# Patient Record
Sex: MALE | Race: ASIAN | NOT HISPANIC OR LATINO | URBAN - METROPOLITAN AREA
[De-identification: names, ages, dates, MRNs, and addresses within clinical notes are randomized per-mention and may not be internally consistent; named-entity substitution may affect disease eponyms.]

---

## 2024-10-13 ENCOUNTER — EMERGENCY (EMERGENCY)
Facility: HOSPITAL | Age: 44
LOS: 0 days | Discharge: ANOTHER HOSPITAL | End: 2024-10-14
Attending: EMERGENCY MEDICINE
Payer: MEDICAID

## 2024-10-13 VITALS
WEIGHT: 145.06 LBS | RESPIRATION RATE: 18 BRPM | HEIGHT: 63 IN | TEMPERATURE: 99 F | OXYGEN SATURATION: 99 % | SYSTOLIC BLOOD PRESSURE: 185 MMHG | DIASTOLIC BLOOD PRESSURE: 100 MMHG | HEART RATE: 83 BPM

## 2024-10-13 DIAGNOSIS — I10 ESSENTIAL (PRIMARY) HYPERTENSION: ICD-10-CM

## 2024-10-13 DIAGNOSIS — R51.9 HEADACHE, UNSPECIFIED: ICD-10-CM

## 2024-10-13 DIAGNOSIS — Z91.148 PATIENT'S OTHER NONCOMPLIANCE WITH MEDICATION REGIMEN FOR OTHER REASON: ICD-10-CM

## 2024-10-13 DIAGNOSIS — T46.5X6A UNDERDOSING OF OTHER ANTIHYPERTENSIVE DRUGS, INITIAL ENCOUNTER: ICD-10-CM

## 2024-10-13 DIAGNOSIS — I49.8 OTHER SPECIFIED CARDIAC ARRHYTHMIAS: ICD-10-CM

## 2024-10-13 DIAGNOSIS — R50.9 FEVER, UNSPECIFIED: ICD-10-CM

## 2024-10-13 DIAGNOSIS — I61.5 NONTRAUMATIC INTRACEREBRAL HEMORRHAGE, INTRAVENTRICULAR: ICD-10-CM

## 2024-10-13 PROCEDURE — 99291 CRITICAL CARE FIRST HOUR: CPT

## 2024-10-13 NOTE — ED ADULT TRIAGE NOTE - CHIEF COMPLAINT QUOTE
per brother, sent from urgent care for hypertension. .  bodyaches, headache, fever, vomiting, chills x 3 days.  pt non-compliant with HTN meds, stopped taking it "a long time ago"

## 2024-10-13 NOTE — ED ADULT NURSE NOTE - OBJECTIVE STATEMENT
a&ox4, ambulatory at baseline as per pt. PT presents today coming from  for HTN. Pt family at bedside reports he went to  due to subjective fever, body aches, vomiting and decreased intake that started Friday. Family noticed an unsteady gait. Pt reports a headache along with symptoms. Denies cough, CP, dizziness, lightheadedness, SOB. UC - (-)COVID, (-)Flu, and afebrile. Pt reports taking 4 extra strength Tylenol today prior to UC visit. While at , Hypertensive and was told to come to ER. Pt reports he forgets and has not taken his BP medications in 5 days. PMH - HTN. Denies PSH

## 2024-10-14 ENCOUNTER — INPATIENT (INPATIENT)
Facility: HOSPITAL | Age: 44
LOS: 20 days | Discharge: NURSING, REHAB OR LONG-TERM CARE | DRG: 66 | End: 2024-11-04
Attending: INTERNAL MEDICINE | Admitting: NEUROLOGICAL SURGERY
Payer: COMMERCIAL

## 2024-10-14 VITALS
HEIGHT: 68 IN | HEART RATE: 89 BPM | WEIGHT: 179.9 LBS | DIASTOLIC BLOOD PRESSURE: 88 MMHG | SYSTOLIC BLOOD PRESSURE: 133 MMHG | RESPIRATION RATE: 18 BRPM | OXYGEN SATURATION: 94 %

## 2024-10-14 VITALS
DIASTOLIC BLOOD PRESSURE: 98 MMHG | SYSTOLIC BLOOD PRESSURE: 179 MMHG | OXYGEN SATURATION: 96 % | HEART RATE: 80 BPM | RESPIRATION RATE: 18 BRPM

## 2024-10-14 DIAGNOSIS — I61.0 NONTRAUMATIC INTRACEREBRAL HEMORRHAGE IN HEMISPHERE, SUBCORTICAL: ICD-10-CM

## 2024-10-14 LAB
A1C WITH ESTIMATED AVERAGE GLUCOSE RESULT: 5.9 % — HIGH (ref 4–5.6)
ALBUMIN SERPL ELPH-MCNC: 4 G/DL — SIGNIFICANT CHANGE UP (ref 3.3–5)
ALBUMIN SERPL ELPH-MCNC: 4.2 G/DL — SIGNIFICANT CHANGE UP (ref 3.3–5)
ALP SERPL-CCNC: 101 U/L — SIGNIFICANT CHANGE UP (ref 40–120)
ALP SERPL-CCNC: 97 U/L — SIGNIFICANT CHANGE UP (ref 40–120)
ALT FLD-CCNC: 69 U/L — HIGH (ref 10–45)
ALT FLD-CCNC: 86 U/L — HIGH (ref 12–78)
ANION GAP SERPL CALC-SCNC: 13 MMOL/L — SIGNIFICANT CHANGE UP (ref 5–17)
ANION GAP SERPL CALC-SCNC: 15 MMOL/L — SIGNIFICANT CHANGE UP (ref 5–17)
ANION GAP SERPL CALC-SCNC: 15 MMOL/L — SIGNIFICANT CHANGE UP (ref 5–17)
ANION GAP SERPL CALC-SCNC: 9 MMOL/L — SIGNIFICANT CHANGE UP (ref 5–17)
ANISOCYTOSIS BLD QL: SLIGHT — SIGNIFICANT CHANGE UP
APTT BLD: 26.6 SEC — SIGNIFICANT CHANGE UP (ref 24.5–35.6)
APTT BLD: 29.6 SEC — SIGNIFICANT CHANGE UP (ref 24.5–35.6)
AST SERPL-CCNC: 30 U/L — SIGNIFICANT CHANGE UP (ref 15–37)
AST SERPL-CCNC: 35 U/L — SIGNIFICANT CHANGE UP (ref 10–40)
BASOPHILS # BLD AUTO: 0 K/UL — SIGNIFICANT CHANGE UP (ref 0–0.2)
BASOPHILS # BLD AUTO: 0.03 K/UL — SIGNIFICANT CHANGE UP (ref 0–0.2)
BASOPHILS NFR BLD AUTO: 0 % — SIGNIFICANT CHANGE UP (ref 0–2)
BASOPHILS NFR BLD AUTO: 0.2 % — SIGNIFICANT CHANGE UP (ref 0–2)
BILIRUB SERPL-MCNC: 0.9 MG/DL — SIGNIFICANT CHANGE UP (ref 0.2–1.2)
BILIRUB SERPL-MCNC: 1.1 MG/DL — SIGNIFICANT CHANGE UP (ref 0.2–1.2)
BLD GP AB SCN SERPL QL: SIGNIFICANT CHANGE UP
BUN SERPL-MCNC: 17 MG/DL — SIGNIFICANT CHANGE UP (ref 7–23)
BUN SERPL-MCNC: 20 MG/DL — SIGNIFICANT CHANGE UP (ref 7–23)
BUN SERPL-MCNC: 20 MG/DL — SIGNIFICANT CHANGE UP (ref 7–23)
BUN SERPL-MCNC: 22 MG/DL — SIGNIFICANT CHANGE UP (ref 7–23)
CALCIUM SERPL-MCNC: 8.5 MG/DL — SIGNIFICANT CHANGE UP (ref 8.4–10.5)
CALCIUM SERPL-MCNC: 8.6 MG/DL — SIGNIFICANT CHANGE UP (ref 8.4–10.5)
CALCIUM SERPL-MCNC: 9 MG/DL — SIGNIFICANT CHANGE UP (ref 8.4–10.5)
CALCIUM SERPL-MCNC: 9.4 MG/DL — SIGNIFICANT CHANGE UP (ref 8.5–10.1)
CHLORIDE SERPL-SCNC: 100 MMOL/L — SIGNIFICANT CHANGE UP (ref 96–108)
CHLORIDE SERPL-SCNC: 98 MMOL/L — SIGNIFICANT CHANGE UP (ref 96–108)
CHLORIDE SERPL-SCNC: 99 MMOL/L — SIGNIFICANT CHANGE UP (ref 96–108)
CHLORIDE SERPL-SCNC: 99 MMOL/L — SIGNIFICANT CHANGE UP (ref 96–108)
CHOLEST SERPL-MCNC: 209 MG/DL — HIGH
CK SERPL-CCNC: 222 U/L — SIGNIFICANT CHANGE UP (ref 26–308)
CO2 SERPL-SCNC: 19 MMOL/L — LOW (ref 22–31)
CO2 SERPL-SCNC: 21 MMOL/L — LOW (ref 22–31)
CO2 SERPL-SCNC: 21 MMOL/L — LOW (ref 22–31)
CO2 SERPL-SCNC: 25 MMOL/L — SIGNIFICANT CHANGE UP (ref 22–31)
CREAT SERPL-MCNC: 0.86 MG/DL — SIGNIFICANT CHANGE UP (ref 0.5–1.3)
CREAT SERPL-MCNC: 0.9 MG/DL — SIGNIFICANT CHANGE UP (ref 0.5–1.3)
CREAT SERPL-MCNC: 0.91 MG/DL — SIGNIFICANT CHANGE UP (ref 0.5–1.3)
CREAT SERPL-MCNC: 1.19 MG/DL — SIGNIFICANT CHANGE UP (ref 0.5–1.3)
EGFR: 107 ML/MIN/1.73M2 — SIGNIFICANT CHANGE UP
EGFR: 109 ML/MIN/1.73M2 — SIGNIFICANT CHANGE UP
EGFR: 110 ML/MIN/1.73M2 — SIGNIFICANT CHANGE UP
EGFR: 78 ML/MIN/1.73M2 — SIGNIFICANT CHANGE UP
EOSINOPHIL # BLD AUTO: 0 K/UL — SIGNIFICANT CHANGE UP (ref 0–0.5)
EOSINOPHIL # BLD AUTO: 0 K/UL — SIGNIFICANT CHANGE UP (ref 0–0.5)
EOSINOPHIL NFR BLD AUTO: 0 % — SIGNIFICANT CHANGE UP (ref 0–6)
EOSINOPHIL NFR BLD AUTO: 0 % — SIGNIFICANT CHANGE UP (ref 0–6)
ESTIMATED AVERAGE GLUCOSE: 123 MG/DL — HIGH (ref 68–114)
FLUAV AG NPH QL: SIGNIFICANT CHANGE UP
FLUBV AG NPH QL: SIGNIFICANT CHANGE UP
GLUCOSE BLDC GLUCOMTR-MCNC: 107 MG/DL — HIGH (ref 70–99)
GLUCOSE BLDC GLUCOMTR-MCNC: 111 MG/DL — HIGH (ref 70–99)
GLUCOSE SERPL-MCNC: 118 MG/DL — HIGH (ref 70–99)
GLUCOSE SERPL-MCNC: 137 MG/DL — HIGH (ref 70–99)
GLUCOSE SERPL-MCNC: 139 MG/DL — HIGH (ref 70–99)
GLUCOSE SERPL-MCNC: 148 MG/DL — HIGH (ref 70–99)
HCT VFR BLD CALC: 41.5 % — SIGNIFICANT CHANGE UP (ref 39–50)
HCT VFR BLD CALC: 43.7 % — SIGNIFICANT CHANGE UP (ref 39–50)
HDLC SERPL-MCNC: 43 MG/DL — SIGNIFICANT CHANGE UP
HGB BLD-MCNC: 14.3 G/DL — SIGNIFICANT CHANGE UP (ref 13–17)
HGB BLD-MCNC: 15.2 G/DL — SIGNIFICANT CHANGE UP (ref 13–17)
IMM GRANULOCYTES NFR BLD AUTO: 0.9 % — SIGNIFICANT CHANGE UP (ref 0–0.9)
INR BLD: 1.01 RATIO — SIGNIFICANT CHANGE UP (ref 0.85–1.16)
LG PLATELETS BLD QL AUTO: SLIGHT — SIGNIFICANT CHANGE UP
LIPID PNL WITH DIRECT LDL SERPL: 146 MG/DL — HIGH
LYMPHOCYTES # BLD AUTO: 1.95 K/UL — SIGNIFICANT CHANGE UP (ref 1–3.3)
LYMPHOCYTES # BLD AUTO: 12.4 % — LOW (ref 13–44)
LYMPHOCYTES # BLD AUTO: 19 % — SIGNIFICANT CHANGE UP (ref 13–44)
LYMPHOCYTES # BLD AUTO: 2.89 K/UL — SIGNIFICANT CHANGE UP (ref 1–3.3)
MACROCYTES BLD QL: SLIGHT — SIGNIFICANT CHANGE UP
MAGNESIUM SERPL-MCNC: 2 MG/DL — SIGNIFICANT CHANGE UP (ref 1.6–2.6)
MANUAL SMEAR VERIFICATION: SIGNIFICANT CHANGE UP
MCHC RBC-ENTMCNC: 28.6 PG — SIGNIFICANT CHANGE UP (ref 27–34)
MCHC RBC-ENTMCNC: 28.7 PG — SIGNIFICANT CHANGE UP (ref 27–34)
MCHC RBC-ENTMCNC: 34.5 GM/DL — SIGNIFICANT CHANGE UP (ref 32–36)
MCHC RBC-ENTMCNC: 34.8 G/DL — SIGNIFICANT CHANGE UP (ref 32–36)
MCV RBC AUTO: 82.1 FL — SIGNIFICANT CHANGE UP (ref 80–100)
MCV RBC AUTO: 83.2 FL — SIGNIFICANT CHANGE UP (ref 80–100)
MONOCYTES # BLD AUTO: 0 K/UL — SIGNIFICANT CHANGE UP (ref 0–0.9)
MONOCYTES # BLD AUTO: 0.42 K/UL — SIGNIFICANT CHANGE UP (ref 0–0.9)
MONOCYTES NFR BLD AUTO: 0 % — LOW (ref 2–14)
MONOCYTES NFR BLD AUTO: 2.7 % — SIGNIFICANT CHANGE UP (ref 2–14)
NEUTROPHILS # BLD AUTO: 12.33 K/UL — HIGH (ref 1.8–7.4)
NEUTROPHILS # BLD AUTO: 13.23 K/UL — HIGH (ref 1.8–7.4)
NEUTROPHILS NFR BLD AUTO: 80 % — HIGH (ref 43–77)
NEUTROPHILS NFR BLD AUTO: 83.8 % — HIGH (ref 43–77)
NEUTS BAND # BLD: 1 % — SIGNIFICANT CHANGE UP (ref 0–8)
NON HDL CHOLESTEROL: 165 MG/DL — HIGH
NRBC # BLD: 0 /100 WBCS — SIGNIFICANT CHANGE UP (ref 0–0)
NRBC # BLD: 0 /100 WBCS — SIGNIFICANT CHANGE UP (ref 0–0)
NRBC # BLD: SIGNIFICANT CHANGE UP /100 WBCS (ref 0–0)
PHOSPHATE SERPL-MCNC: 1.4 MG/DL — LOW (ref 2.5–4.5)
PLAT MORPH BLD: NORMAL — SIGNIFICANT CHANGE UP
PLATELET # BLD AUTO: 166 K/UL — SIGNIFICANT CHANGE UP (ref 150–400)
PLATELET # BLD AUTO: 218 K/UL — SIGNIFICANT CHANGE UP (ref 150–400)
POTASSIUM SERPL-MCNC: 3.1 MMOL/L — LOW (ref 3.5–5.3)
POTASSIUM SERPL-MCNC: 3.3 MMOL/L — LOW (ref 3.5–5.3)
POTASSIUM SERPL-MCNC: 3.3 MMOL/L — LOW (ref 3.5–5.3)
POTASSIUM SERPL-MCNC: 3.7 MMOL/L — SIGNIFICANT CHANGE UP (ref 3.5–5.3)
POTASSIUM SERPL-SCNC: 3.1 MMOL/L — LOW (ref 3.5–5.3)
POTASSIUM SERPL-SCNC: 3.3 MMOL/L — LOW (ref 3.5–5.3)
POTASSIUM SERPL-SCNC: 3.3 MMOL/L — LOW (ref 3.5–5.3)
POTASSIUM SERPL-SCNC: 3.7 MMOL/L — SIGNIFICANT CHANGE UP (ref 3.5–5.3)
PROT SERPL-MCNC: 7.4 G/DL — SIGNIFICANT CHANGE UP (ref 6–8.3)
PROT SERPL-MCNC: 8.2 GM/DL — SIGNIFICANT CHANGE UP (ref 6–8.3)
PROTHROM AB SERPL-ACNC: 11.6 SEC — SIGNIFICANT CHANGE UP (ref 9.9–13.4)
RBC # BLD: 4.99 M/UL — SIGNIFICANT CHANGE UP (ref 4.2–5.8)
RBC # BLD: 5.32 M/UL — SIGNIFICANT CHANGE UP (ref 4.2–5.8)
RBC # FLD: 12.9 % — SIGNIFICANT CHANGE UP (ref 10.3–14.5)
RBC # FLD: 13 % — SIGNIFICANT CHANGE UP (ref 10.3–14.5)
RBC BLD AUTO: NORMAL — SIGNIFICANT CHANGE UP
SARS-COV-2 RNA SPEC QL NAA+PROBE: SIGNIFICANT CHANGE UP
SMUDGE CELLS # BLD: PRESENT — SIGNIFICANT CHANGE UP
SODIUM SERPL-SCNC: 132 MMOL/L — LOW (ref 135–145)
SODIUM SERPL-SCNC: 133 MMOL/L — LOW (ref 135–145)
SODIUM SERPL-SCNC: 134 MMOL/L — LOW (ref 135–145)
SODIUM SERPL-SCNC: 135 MMOL/L — SIGNIFICANT CHANGE UP (ref 135–145)
T3 SERPL-MCNC: 49 NG/DL — LOW (ref 80–200)
T4 AB SER-ACNC: 3.7 UG/DL — LOW (ref 4.6–12)
T4 FREE SERPL-MCNC: 1.1 NG/DL — SIGNIFICANT CHANGE UP (ref 0.9–1.8)
TRIGL SERPL-MCNC: 109 MG/DL — SIGNIFICANT CHANGE UP
TROPONIN I, HIGH SENSITIVITY RESULT: 524.6 NG/L — HIGH
TROPONIN T, HIGH SENSITIVITY RESULT: 44 NG/L — SIGNIFICANT CHANGE UP (ref 0–51)
TSH SERPL-MCNC: 0.45 UIU/ML — SIGNIFICANT CHANGE UP (ref 0.27–4.2)
WBC # BLD: 15.22 K/UL — HIGH (ref 3.8–10.5)
WBC # BLD: 15.77 K/UL — HIGH (ref 3.8–10.5)
WBC # FLD AUTO: 15.22 K/UL — HIGH (ref 3.8–10.5)
WBC # FLD AUTO: 15.77 K/UL — HIGH (ref 3.8–10.5)

## 2024-10-14 PROCEDURE — 70450 CT HEAD/BRAIN W/O DYE: CPT | Mod: 26,59,77

## 2024-10-14 PROCEDURE — 93010 ELECTROCARDIOGRAM REPORT: CPT

## 2024-10-14 PROCEDURE — 99291 CRITICAL CARE FIRST HOUR: CPT

## 2024-10-14 PROCEDURE — 70496 CT ANGIOGRAPHY HEAD: CPT | Mod: 26

## 2024-10-14 PROCEDURE — 70450 CT HEAD/BRAIN W/O DYE: CPT | Mod: 26,59,MC

## 2024-10-14 PROCEDURE — 70498 CT ANGIOGRAPHY NECK: CPT | Mod: 26

## 2024-10-14 PROCEDURE — 93306 TTE W/DOPPLER COMPLETE: CPT | Mod: 26

## 2024-10-14 PROCEDURE — 93970 EXTREMITY STUDY: CPT | Mod: 26

## 2024-10-14 PROCEDURE — 71045 X-RAY EXAM CHEST 1 VIEW: CPT | Mod: 26

## 2024-10-14 RX ORDER — LOSARTAN POTASSIUM 25 MG/1
100 TABLET ORAL DAILY
Refills: 0 | Status: DISCONTINUED | OUTPATIENT
Start: 2024-10-14 | End: 2024-10-17

## 2024-10-14 RX ORDER — LABETALOL HYDROCHLORIDE 200 MG/1
20 TABLET, FILM COATED ORAL
Refills: 0 | Status: DISCONTINUED | OUTPATIENT
Start: 2024-10-14 | End: 2024-10-14

## 2024-10-14 RX ORDER — PANTOPRAZOLE SODIUM 40 MG/1
40 TABLET, DELAYED RELEASE ORAL DAILY
Refills: 0 | Status: DISCONTINUED | OUTPATIENT
Start: 2024-10-14 | End: 2024-10-16

## 2024-10-14 RX ORDER — LOSARTAN POTASSIUM 25 MG/1
50 TABLET ORAL DAILY
Refills: 0 | Status: DISCONTINUED | OUTPATIENT
Start: 2024-10-14 | End: 2024-10-14

## 2024-10-14 RX ORDER — POLYETHYLENE GLYCOL 3350 17 G/17G
17 POWDER, FOR SOLUTION ORAL EVERY 12 HOURS
Refills: 0 | Status: DISCONTINUED | OUTPATIENT
Start: 2024-10-14 | End: 2024-11-04

## 2024-10-14 RX ORDER — NICARDIPINE HCL 25 MG/10ML
5 AMPUL (ML) INTRAVENOUS
Qty: 40 | Refills: 0 | Status: DISCONTINUED | OUTPATIENT
Start: 2024-10-14 | End: 2024-10-14

## 2024-10-14 RX ORDER — LABETALOL HYDROCHLORIDE 200 MG/1
20 TABLET, FILM COATED ORAL ONCE
Refills: 0 | Status: COMPLETED | OUTPATIENT
Start: 2024-10-14 | End: 2024-10-14

## 2024-10-14 RX ORDER — ACETAMINOPHEN 500 MG
650 TABLET ORAL EVERY 6 HOURS
Refills: 0 | Status: DISCONTINUED | OUTPATIENT
Start: 2024-10-14 | End: 2024-10-14

## 2024-10-14 RX ORDER — HYDRALAZINE HYDROCHLORIDE 100 MG/1
10 TABLET ORAL ONCE
Refills: 0 | Status: COMPLETED | OUTPATIENT
Start: 2024-10-14 | End: 2024-10-14

## 2024-10-14 RX ORDER — POTASSIUM CHLORIDE 10 MEQ
10 TABLET, EXTENDED RELEASE ORAL
Refills: 0 | Status: COMPLETED | OUTPATIENT
Start: 2024-10-14 | End: 2024-10-14

## 2024-10-14 RX ORDER — HYDRALAZINE HYDROCHLORIDE 50 MG/1
5 TABLET, FILM COATED ORAL EVERY 8 HOURS
Refills: 0 | Status: DISCONTINUED | OUTPATIENT
Start: 2024-10-14 | End: 2024-10-14

## 2024-10-14 RX ORDER — LOSARTAN POTASSIUM 25 MG/1
50 TABLET ORAL ONCE
Refills: 0 | Status: COMPLETED | OUTPATIENT
Start: 2024-10-14 | End: 2024-10-14

## 2024-10-14 RX ORDER — POTASSIUM CHLORIDE 10 MEQ
40 TABLET, EXTENDED RELEASE ORAL
Refills: 0 | Status: COMPLETED | OUTPATIENT
Start: 2024-10-14 | End: 2024-10-15

## 2024-10-14 RX ORDER — AMLODIPINE BESYLATE 10 MG
10 TABLET ORAL DAILY
Refills: 0 | Status: DISCONTINUED | OUTPATIENT
Start: 2024-10-14 | End: 2024-10-17

## 2024-10-14 RX ORDER — POTASSIUM PHOSPHATE 236; 224 MG/ML; MG/ML
30 INJECTION, SOLUTION INTRAVENOUS ONCE
Refills: 0 | Status: COMPLETED | OUTPATIENT
Start: 2024-10-14 | End: 2024-10-14

## 2024-10-14 RX ORDER — SENNA 187 MG
2 TABLET ORAL AT BEDTIME
Refills: 0 | Status: DISCONTINUED | OUTPATIENT
Start: 2024-10-14 | End: 2024-11-04

## 2024-10-14 RX ORDER — INSULIN LISPRO 100/ML
VIAL (ML) SUBCUTANEOUS EVERY 6 HOURS
Refills: 0 | Status: DISCONTINUED | OUTPATIENT
Start: 2024-10-14 | End: 2024-10-17

## 2024-10-14 RX ORDER — SODIUM CHLORIDE 5 % 5 %
1000 INTRAVENOUS SOLUTION INTRAVENOUS
Refills: 0 | Status: DISCONTINUED | OUTPATIENT
Start: 2024-10-14 | End: 2024-10-14

## 2024-10-14 RX ORDER — ACETAMINOPHEN 325 MG
1000 TABLET ORAL ONCE
Refills: 0 | Status: COMPLETED | OUTPATIENT
Start: 2024-10-14 | End: 2024-10-14

## 2024-10-14 RX ORDER — AMLODIPINE BESYLATE 10 MG
5 TABLET ORAL DAILY
Refills: 0 | Status: DISCONTINUED | OUTPATIENT
Start: 2024-10-14 | End: 2024-10-14

## 2024-10-14 RX ORDER — TRAMADOL HYDROCHLORIDE 50 MG/1
50 TABLET, COATED ORAL EVERY 6 HOURS
Refills: 0 | Status: DISCONTINUED | OUTPATIENT
Start: 2024-10-14 | End: 2024-10-14

## 2024-10-14 RX ORDER — SODIUM CHLORIDE 0.9 % (FLUSH) 0.9 %
1000 SYRINGE (ML) INJECTION ONCE
Refills: 0 | Status: COMPLETED | OUTPATIENT
Start: 2024-10-14 | End: 2024-10-14

## 2024-10-14 RX ORDER — HYDRALAZINE HYDROCHLORIDE 50 MG/1
25 TABLET, FILM COATED ORAL ONCE
Refills: 0 | Status: COMPLETED | OUTPATIENT
Start: 2024-10-14 | End: 2024-10-14

## 2024-10-14 RX ORDER — METOCLOPRAMIDE HCL 5 MG
10 TABLET ORAL ONCE
Refills: 0 | Status: COMPLETED | OUTPATIENT
Start: 2024-10-14 | End: 2024-10-14

## 2024-10-14 RX ORDER — POTASSIUM CHLORIDE 10 MEQ
20 TABLET, EXTENDED RELEASE ORAL EVERY 4 HOURS
Refills: 0 | Status: COMPLETED | OUTPATIENT
Start: 2024-10-14 | End: 2024-10-14

## 2024-10-14 RX ORDER — HYDRALAZINE HYDROCHLORIDE 50 MG/1
25 TABLET, FILM COATED ORAL EVERY 8 HOURS
Refills: 0 | Status: DISCONTINUED | OUTPATIENT
Start: 2024-10-14 | End: 2024-10-15

## 2024-10-14 RX ORDER — SODIUM CHLORIDE 5 % 5 %
1000 INTRAVENOUS SOLUTION INTRAVENOUS
Refills: 0 | Status: DISCONTINUED | OUTPATIENT
Start: 2024-10-14 | End: 2024-10-15

## 2024-10-14 RX ORDER — HYDRALAZINE HYDROCHLORIDE 50 MG/1
5 TABLET, FILM COATED ORAL ONCE
Refills: 0 | Status: DISCONTINUED | OUTPATIENT
Start: 2024-10-14 | End: 2024-10-14

## 2024-10-14 RX ORDER — NICARDIPINE HCL 30 MG
5 CAPSULE, EXTENDED RELEASE ORAL
Qty: 40 | Refills: 0 | Status: DISCONTINUED | OUTPATIENT
Start: 2024-10-14 | End: 2024-10-16

## 2024-10-14 RX ORDER — TRAMADOL HYDROCHLORIDE 50 MG/1
25 TABLET, COATED ORAL EVERY 4 HOURS
Refills: 0 | Status: DISCONTINUED | OUTPATIENT
Start: 2024-10-14 | End: 2024-10-16

## 2024-10-14 RX ORDER — LEVETIRACETAM 500 MG/1
1000 TABLET, FILM COATED ORAL ONCE
Refills: 0 | Status: DISCONTINUED | OUTPATIENT
Start: 2024-10-14 | End: 2024-10-14

## 2024-10-14 RX ORDER — CHLORHEXIDINE GLUCONATE 40 MG/ML
1 SOLUTION TOPICAL
Refills: 0 | Status: DISCONTINUED | OUTPATIENT
Start: 2024-10-14 | End: 2024-10-17

## 2024-10-14 RX ORDER — ACETAMINOPHEN 325 MG
975 TABLET ORAL ONCE
Refills: 0 | Status: DISCONTINUED | OUTPATIENT
Start: 2024-10-14 | End: 2024-10-14

## 2024-10-14 RX ADMIN — Medication 100 MILLIEQUIVALENT(S): at 13:46

## 2024-10-14 RX ADMIN — Medication 25 MG/HR: at 06:12

## 2024-10-14 RX ADMIN — LABETALOL HYDROCHLORIDE 20 MILLIGRAM(S): 200 TABLET, FILM COATED ORAL at 01:51

## 2024-10-14 RX ADMIN — LOSARTAN POTASSIUM 50 MILLIGRAM(S): 25 TABLET ORAL at 20:26

## 2024-10-14 RX ADMIN — LEVETIRACETAM 1000 MILLIGRAM(S): 500 TABLET, FILM COATED ORAL at 04:16

## 2024-10-14 RX ADMIN — Medication 1000 MILLILITER(S): at 00:56

## 2024-10-14 RX ADMIN — Medication 40 MILLIGRAM(S): at 22:40

## 2024-10-14 RX ADMIN — Medication 20 MILLIEQUIVALENT(S): at 13:46

## 2024-10-14 RX ADMIN — PANTOPRAZOLE SODIUM 40 MILLIGRAM(S): 40 TABLET, DELAYED RELEASE ORAL at 11:20

## 2024-10-14 RX ADMIN — Medication 25 MG/HR: at 20:30

## 2024-10-14 RX ADMIN — Medication 50 MILLILITER(S): at 23:36

## 2024-10-14 RX ADMIN — Medication 40 MILLIEQUIVALENT(S): at 22:42

## 2024-10-14 RX ADMIN — Medication 25 MG/HR: at 02:36

## 2024-10-14 RX ADMIN — Medication 400 MILLIGRAM(S): at 02:32

## 2024-10-14 RX ADMIN — Medication 2 TABLET(S): at 22:40

## 2024-10-14 RX ADMIN — Medication 50 MILLILITER(S): at 06:57

## 2024-10-14 RX ADMIN — Medication 100 MILLIEQUIVALENT(S): at 16:18

## 2024-10-14 RX ADMIN — Medication 100 MILLIEQUIVALENT(S): at 07:00

## 2024-10-14 RX ADMIN — Medication 100 MILLIEQUIVALENT(S): at 08:16

## 2024-10-14 RX ADMIN — LOSARTAN POTASSIUM 50 MILLIGRAM(S): 25 TABLET ORAL at 06:57

## 2024-10-14 RX ADMIN — Medication 25 MG/HR: at 08:16

## 2024-10-14 RX ADMIN — HYDRALAZINE HYDROCHLORIDE 10 MILLIGRAM(S): 100 TABLET ORAL at 02:00

## 2024-10-14 RX ADMIN — Medication 25 MG/HR: at 04:13

## 2024-10-14 RX ADMIN — CHLORHEXIDINE GLUCONATE 1 APPLICATION(S): 40 SOLUTION TOPICAL at 22:41

## 2024-10-14 RX ADMIN — Medication 10 MILLIGRAM(S): at 01:01

## 2024-10-14 RX ADMIN — POTASSIUM PHOSPHATE 83.33 MILLIMOLE(S): 236; 224 INJECTION, SOLUTION INTRAVENOUS at 23:36

## 2024-10-14 RX ADMIN — POLYETHYLENE GLYCOL 3350 17 GRAM(S): 17 POWDER, FOR SOLUTION ORAL at 17:10

## 2024-10-14 RX ADMIN — Medication 100 MILLIEQUIVALENT(S): at 14:55

## 2024-10-14 RX ADMIN — Medication 5 MILLIGRAM(S): at 06:58

## 2024-10-14 RX ADMIN — HYDRALAZINE HYDROCHLORIDE 25 MILLIGRAM(S): 50 TABLET, FILM COATED ORAL at 17:49

## 2024-10-14 RX ADMIN — Medication 100 MILLIEQUIVALENT(S): at 06:12

## 2024-10-14 RX ADMIN — Medication 20 MILLIEQUIVALENT(S): at 17:10

## 2024-10-14 RX ADMIN — Medication 50 MILLILITER(S): at 08:16

## 2024-10-14 NOTE — PROCEDURE NOTE - NSINFORMCONSENT_GEN_A_CORE
Kerri Ferro, Brother/Benefits, risks, and possible complications of procedure explained to patient/caregiver who verbalized understanding and gave written consent.

## 2024-10-14 NOTE — DISCHARGE NOTE NURSING/CASE MANAGEMENT/SOCIAL WORK - PATIENT PORTAL LINK FT
You can access the FollowMyHealth Patient Portal offered by [facility] by registering at the following website: [email or web address]. By joining [employer]’s FollowMyHealth portal, you will also be able to view your health information using other applications (apps) compatible with our system.

## 2024-10-14 NOTE — PROVIDER CONTACT NOTE (CHANGE IN STATUS NOTIFICATION) - SITUATION
change in mental status, on assessment pt. disoriented to name, month and year, and RUE drift noted.

## 2024-10-14 NOTE — PROGRESS NOTE ADULT - SUBJECTIVE AND OBJECTIVE BOX
SUMMARY:    HOSPITAL COURSE:    ADMISSION SCORES:   GCS: HH: MF: NIHSS: ICH Score:    REVIEW OF SYSTEMS: None other than stated in HPI    ALLERGIES: Allergies    No Known Allergies    Intolerances        VITALS/DATA/ORDERS:    T(C): 36.6 (10-14-24 @ 03:37), Max: 36.6 (10-14-24 @ 03:37)  HR: 79 (10-14-24 @ 04:21) (79 - 110)  BP: 128/74 (10-14-24 @ 04:21) (128/74 - 144/61)  RR: 17 (10-14-24 @ 04:21) (17 - 19)  SpO2: 96% (10-14-24 @ 04:21) (94% - 97%)                          14.3   15.77 )-----------( 166      ( 14 Oct 2024 04:13 )             41.5       10-14    132[L]  |  98  |  20  ----------------------------<  139[H]  3.7   |  19[L]  |  0.91    Ca    9.0      14 Oct 2024 04:13    TPro  7.4  /  Alb  4.2  /  TBili  0.9  /  DBili  x   /  AST  35  /  ALT  69[H]  /  AlkPhos  97  10-14              PT/INR - ( 14 Oct 2024 04:13 )   PT: 11.6 sec;   INR: 1.01 ratio         PTT - ( 14 Oct 2024 04:13 )  PTT:29.6 sec              acetaminophen     Tablet .. 650 milliGRAM(s) Oral every 6 hours PRN  chlorhexidine 4% Liquid 1 Application(s) Topical <User Schedule>  niCARdipine Infusion 5 mG/Hr IV Continuous <Continuous>  polyethylene glycol 3350 17 Gram(s) Oral every 12 hours  senna 2 Tablet(s) Oral at bedtime      EXAMINATION:  General: No acute distress  HEENT: Anicteric sclerae  Cardiac: [id number]  Lungs: Clear  Abdomen: Soft, non-tender, +BS  Extremities: No c/c/e  Skin/Incision Site: Clean, dry and intact  Neurologic: Awake, alert, fully oriented, follows commands, PERRL, VFFtc, EOMI, face symmetric, tongue midline, no drift, full strength SUMMARY:  44M, not on AC/AP, PMH uncontrolled HTN. P/w flu-like symptoms and headache; CTH w/ left BG IPH w/ IVH extension. Exam: Kinyarwanda speaking, EOV, Ox3, PERRL, no drift, CHAPA 5/5, SILT    HOSPITAL COURSE:  10/14: admitted to [facility]    ADMISSION SCORES:   NIHSS: 1    ICH Score: 1    REVIEW OF SYSTEMS: None other than stated in HPI    ALLERGIES: Allergies    No Known Allergies    Intolerances        VITALS/DATA/ORDERS:    T(C): 36.6 (10-14-24 @ 03:37), Max: 36.6 (10-14-24 @ 03:37)  HR: 79 (10-14-24 @ 04:21) (79 - 110)  BP: 128/74 (10-14-24 @ 04:21) (128/74 - 144/61)  RR: 17 (10-14-24 @ 04:21) (17 - 19)  SpO2: 96% (10-14-24 @ 04:21) (94% - 97%)                          14.3   15.77 )-----------( 166      ( 14 Oct 2024 04:13 )             41.5       10-14    132[L]  |  98  |  20  ----------------------------<  139[H]  3.7   |  19[L]  |  0.91    Ca    9.0      14 Oct 2024 04:13    TPro  7.4  /  Alb  4.2  /  TBili  0.9  /  DBili  x   /  AST  35  /  ALT  69[H]  /  AlkPhos  97  10-14              PT/INR - ( 14 Oct 2024 04:13 )   PT: 11.6 sec;   INR: 1.01 ratio         PTT - ( 14 Oct 2024 04:13 )  PTT:29.6 sec              acetaminophen     Tablet .. 650 milliGRAM(s) Oral every 6 hours PRN  chlorhexidine 4% Liquid 1 Application(s) Topical <User Schedule>  niCARdipine Infusion 5 mG/Hr IV Continuous <Continuous>  polyethylene glycol 3350 17 Gram(s) Oral every 12 hours  senna 2 Tablet(s) Oral at bedtime      EXAMINATION:  General: No acute distress  HEENT: Anicteric sclerae  Cardiac: [id number]  Lungs: Clear  Abdomen: Soft, non-tender, +BS  Extremities: No c/c/e  Skin/Incision Site: Clean, dry and intact  Neurologic: Awake, alert, fully oriented, follows commands, PERRL, VFFtc, EOMI, face symmetric, tongue midline, no drift, full strength SUMMARY:  44M, not on AC/AP, PMH uncontrolled HTN. P/w flu-like symptoms and headache; CTH w/ left BG IPH w/ IVH extension. Exam: Turkish speaking, EOV, Ox3, PERRL, no drift, CHAPA 5/5, SILT    HOSPITAL COURSE:  10/14: admitted to [facility]    ADMISSION SCORES:   NIHSS: 1    ICH Score: 1    REVIEW OF SYSTEMS: None other than stated in HPI    ALLERGIES: Allergies    No Known Allergies    Intolerances        VITALS/DATA/ORDERS:    T(C): 36.6 (10-14-24 @ 03:37), Max: 36.6 (10-14-24 @ 03:37)  HR: 79 (10-14-24 @ 04:21) (79 - 110)  BP: 128/74 (10-14-24 @ 04:21) (128/74 - 144/61)  RR: 17 (10-14-24 @ 04:21) (17 - 19)  SpO2: 96% (10-14-24 @ 04:21) (94% - 97%)                          14.3   15.77 )-----------( 166      ( 14 Oct 2024 04:13 )             41.5       10-14    132[L]  |  98  |  20  ----------------------------<  139[H]  3.7   |  19[L]  |  0.91    Ca    9.0      14 Oct 2024 04:13    TPro  7.4  /  Alb  4.2  /  TBili  0.9  /  DBili  x   /  AST  35  /  ALT  69[H]  /  AlkPhos  97  10-14              PT/INR - ( 14 Oct 2024 04:13 )   PT: 11.6 sec;   INR: 1.01 ratio         PTT - ( 14 Oct 2024 04:13 )  PTT:29.6 sec              acetaminophen     Tablet .. 650 milliGRAM(s) Oral every 6 hours PRN  chlorhexidine 4% Liquid 1 Application(s) Topical <User Schedule>  niCARdipine Infusion 5 mG/Hr IV Continuous <Continuous>  polyethylene glycol 3350 17 Gram(s) Oral every 12 hours  senna 2 Tablet(s) Oral at bedtime      EXAMINATION:  General: No acute distress  HEENT: Anicteric sclerae  Cardiac: [id number]  Lungs: Clear  Abdomen: Soft, non-tender, +BS  Extremities: No c/c/e  Skin/Incision Site: Clean, dry and intact  Neurologic: Awake, alert, fully oriented 2 to person and time. not to place.  follows commands  CNII-CNXII intact. , PERRL, VFFtc, EOMI, face symmetric, tongue midline,   Strength: 5/5 in the UE and LE b/l.   no drift,   FTn intact b/l.

## 2024-10-14 NOTE — ED PROVIDER NOTE - OBJECTIVE STATEMENT
This patient is a 44 year old man hx of HTN who presents to the ER c/o fever, chills, headache, and body aches x 3 days.  No sick contacts of recent travel.  He had a few episodes of vomiting today.  He has been taking tylenol for pain.  Patient non-compliant with his high blood pressure medication.  He was last seen by his PMD 1 month ago.  Patient was seen at urgent care and sent to the ER because of hypertension.  Currently in the ER patient's family saying that he feels sick.  Patient c/o headache unable to give pain scale.  No focal weakness and no vision loss.    : patient's sister and brother in law at bedside

## 2024-10-14 NOTE — ED PROVIDER NOTE - CLINICAL SUMMARY MEDICAL DECISION MAKING FREE TEXT BOX
Patient sent from urgent care with hypertension c/o flu like symptoms.  Patient reporting headache appearing uncomfortable due to pain.  Possible flu covid, however with patient hypertension will get CT r/o ICH, check labs and give medication for pain.    CT + for ICH.  Transfer Center called for neurosurgical transfer.  BP medications given including infusion.  Patient accepted for transfer.

## 2024-10-14 NOTE — ED PROVIDER NOTE - CLINICAL SUMMARY MEDICAL DECISION MAKING FREE TEXT BOX
Patient presents emergency department for concern of a head bleed.  Patient is hemodynamically stable afebrile on presentation.  Patient arrives a nicardipine drip.  Patient neurological exam within normal limits at this time.  Neurosurgery at bedside.  Patient will be admitted to neurosurgical ICU.  Preop's have been obtained

## 2024-10-14 NOTE — PROGRESS NOTE ADULT - ASSESSMENT
ASSESSMENT/PLAN:  44M w/ uncontrolled HTN w/ hypertensive Left BG heme w/ IVH.    NEURO:  -Q1H neurochecks.  -CTA H/N w/o obvious vascular malformation, pending final read.  -Pending 6hr repeat CTH at 7:30AM.  -Na goal 140-150.  -Hydrowatch.  -Stroke Neurology consult.  Activity: [] mobilize as tolerated [] Bedrest [] PT [] OT [] PMNR    PULM:  -SpO2 goal >92%.    CV:  SBP goal 100-160.  TTE pending.  Troponin at OSH elevated to 500s, pending repeat troponin level.    RENAL:  Fluids: IV NS at 70cc/hr  Na 132, starting 2% NaCl at 50cc/hr, Na goal 140-150. BMP Q6H.  Monitor I&Os.    GI:  Diet: NPO  GI prophylaxis [X] not indicated [] PPI [] other:  Bowel regimen [] colace [X] senna [] other:    ENDO:   Goal euglycemia (-180), most recent glucose 139.    HEME/ONC:  VTE prophylaxis: scdS    ID:  C/o flu-like symptoms but RVP neg at OSH.   Monitor for fever    MISC:    SOCIAL/FAMILY:  [] awaiting [X] updated at bedside [] family meeting    CODE STATUS:  [X] Full Code [] DNR [] DNI [] Palliative/Comfort Care    DISPOSITION:  [X] ICU [] Stroke Unit [] Floor [] EMU [] RCU [] PCU     ASSESSMENT/PLAN:  44M w/ uncontrolled HTN w/ hypertensive Left BG heme w/ IVH.    NEURO:  -Q1H neurochecks.  -CTA H/N w/o obvious vascular malformation, pending final read.  -Pending 6hr repeat CTH at 7:30AM.  -Na goal 140-150.  -Hydrowatch.  -Stroke Neurology consult.  Activity: [] mobilize as tolerated [] Bedrest [] PT [] OT [] PMNR    PULM:  Incentive spirometry   -SpO2 goal >92%.      CV:  SBP goal 100-160.  TTE pending.  Troponin at OSH elevated to 500s, pending repeat troponin level.    RENAL:  Fluids: IV NS at 70cc/hr  Na 132,    2% NaCl at 50cc/hr,   Na goal 140-150.   BMP Q6H.  Monitor I&Os.    GI:  Diet: NPO  GI prophylaxis [X] not indicated [] PPI [] other:  Bowel regimen [] colace [X] senna [] other:  monitor lfts in 48hrs     ENDO:   Goal euglycemia (-180), most recent glucose 139.    HEME/ONC:  VTE prophylaxis: scdS    ID:  C/o flu-like symptoms but RVP neg at OSH.   Monitor for fever    MISC:    SOCIAL/FAMILY:  [] awaiting [X] updated at bedside [] family meeting    CODE STATUS:  [X] Full Code [] DNR [] DNI [] Palliative/Comfort Care    DISPOSITION:  [X] ICU [] Stroke Unit [] Floor [] EMU [] RCU [] PCU

## 2024-10-14 NOTE — CONSULT NOTE ADULT - NSCONSULTADDITIONALINFOA_GEN_ALL_CORE
44 Right handed man with HTN, HLD p/w non-focal increased ICP signs with headache, emesis and dizziness, was found to have L BG IPH with intraventricular extension. Likely HTNsive etiology based on locations. Blood pressure management and ICU monitoring for potential EVD placement mainstay of treatment at this point. Rest of the plan as above.

## 2024-10-14 NOTE — OCCUPATIONAL THERAPY INITIAL EVALUATION ADULT - COGNITIVE, VISUAL PERCEPTUAL, OT EVAL
Pt will follow 100% simple commands within 4 weeks. Pt will be oriented x4 100% of the time within 4 weeks.

## 2024-10-14 NOTE — PROGRESS NOTE ADULT - ASSESSMENT
ASSESSMENT: HPI:  44M no AC/AP hx uncontrolled HTN tx LVS s/p flu-like symptoms and headache; CTH w/ left BG IPH w/ IVH extension. Exam: Malay speaking, Ox3, PERRL, no drift, CHAPA 5/5, SILT (14 Oct 2024 04:30)      s/p    NEURO:  - CT Head in AM  - CTH (10/14): L BG lucency c/f infarct, L corona radiata heme w/ IVH extension and mild vent dilation, stable   - 10/14 CTA H/N: no significant stenosis, no vessel occlusion/AVM   - Continue to monitor for possible EVD if exam worsens  - Hydrowatch  - Continue with neuro checks q1  - Continue pain regimen: tylenol and oxycodone PRN   - Stroke neurology following  - Activity: [] OOB as tolerated [] Bedrest [] PT [] OT [] PMNR    PULM:  - Incentive spirometry, mobilize as tolerated  - satting >92% on room air    CV:  - Keep -160mmHg  - TTE (10/14): EF: 60%  - Continue to wean off cardene, added hydralazine 25q8  - Continue lipitor 40mg daily for HLD  - Continue hydralazine 25q8, losartan 100 daily, amlodipine 10mg daily for HTN  - Troponin 524 on admission, repeat 44    RENAL:  - Continue 2% + acetate @50  - Continue to monitor BMP q6hrs for Na goal 140-150  - Continue to monitor Is&Os    GI:  - Diet: Regular  - GI prophylaxis [] not indicated [x] PPI [] other:  - Bowel regimen [x] miralax  [x] senna [] other:  - LBM:     ENDO:   - Goal euglycemia (-180)  - ISS    HEME/ONC:  - VTE prophylaxis: [x] SCDs [] chemoprophylaxis [] hold chemoprophylaxis due to: [] high risk of DVT/PE on admission due to:  - LED done on (10/14): negative    ID:  - afebrile    MISC:    SOCIAL/FAMILY:  [x] awaiting [] updated at bedside [] family meeting    CODE STATUS:  [x] Full Code [] DNR [] DNI [] Palliative/Comfort Care    DISPOSITION:  [x] ICU [] Stroke Unit [] Floor [] EMU [] RCU [] PCU    [x] Patient is at high risk of neurologic deterioration/death due to: pending clincial course    Time seen:  Time spent: __35_ [x] critical care minutes    Contact: [phone] ASSESSMENT: HPI:  44M no AC/AP hx uncontrolled HTN tx LVS s/p flu-like symptoms and headache; CTH w/ left BG IPH w/ IVH extension. Exam: Maltese speaking, Ox3, PERRL, no drift, CHAPA 5/5, SILT (14 Oct 2024 04:30)      s/p    NEURO:  - CT Head in AM  - CTH (10/14): L BG lucency c/f infarct, L corona radiata heme w/ IVH extension and mild vent dilation, stable   - 10/14 CTA H/N: no significant stenosis, no vessel occlusion/AVM   - Continue to monitor for possible EVD if exam worsens  - Hydrowatch  - Continue with neuro checks q1  - Continue pain regimen: tylenol and oxycodone PRN   - Stroke neurology following  - Activity: [] OOB as tolerated [] Bedrest [] PT [] OT [] PMNR    PULM:  - Incentive spirometry, mobilize as tolerated  - satting >92% on room air    CV:  - Keep -160mmHg  - TTE (10/14): EF: 60%  - Continue to wean off cardene, added hydralazine 25q8  - Continue lipitor 40mg daily for HLD  - Continue hydralazine 25q8, losartan 100 daily, amlodipine 10mg daily for HTN  - Troponin 524 on admission, repeat 44    RENAL:  - Continue 3% + acetate @50  - Continue to monitor BMP q6hrs for Na goal 140-150  - Continue to monitor Is&Os    GI:  - Diet: Regular  - GI prophylaxis [] not indicated [x] PPI [] other:  - Bowel regimen [x] miralax  [x] senna [] other:  - LBM:     ENDO:   - Goal euglycemia (-180)  - ISS    HEME/ONC:  - VTE prophylaxis: [x] SCDs [] chemoprophylaxis [] hold chemoprophylaxis due to: [] high risk of DVT/PE on admission due to:  - LED done on (10/14): negative    ID:  - afebrile    MISC:    SOCIAL/FAMILY:  [x] awaiting [] updated at bedside [] family meeting    CODE STATUS:  [x] Full Code [] DNR [] DNI [] Palliative/Comfort Care    DISPOSITION:  [x] ICU [] Stroke Unit [] Floor [] EMU [] RCU [] PCU    [x] Patient is at high risk of neurologic deterioration/death due to: pending clincial course    Time seen:  Time spent: __35_ [x] critical care minutes    Contact: [phone] ASSESSMENT: HPI:  44M no AC/AP hx uncontrolled HTN tx LVS s/p flu-like symptoms and headache; CTH w/ left BG IPH w/ IVH extension. Exam: Divehi speaking, Ox3, PERRL, no drift, CHAPA 5/5, SILT (14 Oct 2024 04:30)    NEURO:  - CT Head in AM  - CTH (10/14): L BG lucency c/f infarct, L corona radiata heme w/ IVH extension and mild vent dilation, stable   - 10/14 CTA H/N: no significant stenosis, no vessel occlusion/AVM   - Continue to monitor for possible EVD if exam worsens  - Hydrowatch  - Continue with neuro checks q2  - Continue pain regimen: tylenol and oxycodone PRN   - Stroke neurology following  - Activity: [x] OOB as tolerated [] Bedrest [] PT [] OT [] PMNR    PULM:  - Incentive spirometry, mobilize as tolerated  - satting >92% on 2L NC    CV:  - Keep -160mmHg  - TTE (10/14): EF: 60%  - Continue to wean off cardene, added hydralazine 25q8  - Continue lipitor 40mg daily for HLD  - Continue hydralazine 25q8, losartan 100 daily, amlodipine 10mg daily for HTN  - Troponin 524 on admission, repeat 44    RENAL:  - Continue 3% + acetate @50  - Continue to monitor BMP q6hrs for Na goal 140-150  - Continue to monitor Is&Os    GI:  - Diet: Regular  - GI prophylaxis [] not indicated [x] PPI [] other:  - Bowel regimen [x] miralax  [x] senna [] other:  - LBM: prior to admission    ENDO:   - Goal euglycemia (-180)  - ISS    HEME/ONC:  - VTE prophylaxis: [x] SCDs [] chemoprophylaxis [] hold chemoprophylaxis due to: [] high risk of DVT/PE on admission due to:  - LED done on (10/14): negative    ID:  - afebrile    MISC:    SOCIAL/FAMILY:  [x] awaiting [] updated at bedside [] family meeting    CODE STATUS:  [x] Full Code [] DNR [] DNI [] Palliative/Comfort Care    DISPOSITION:  [x] ICU [] Stroke Unit [] Floor [] EMU [] RCU [] PCU    [x] Patient is at high risk of neurologic deterioration/death due to: pending clincial course    Time seen:  Time spent: __35_ [x] critical care minutes    Contact: [phone] ASSESSMENT: HPI:  44M no AC/AP hx uncontrolled HTN tx LVS s/p flu-like symptoms and headache; CTH w/ left BG IPH w/ IVH extension. Exam: Wolof speaking, Ox3, PERRL, no drift, CHAPA 5/5, SILT (14 Oct 2024 04:30)    NEURO:  - CT Head in AM  - CTH (10/14): L BG lucency c/f infarct, L corona radiata heme w/ IVH extension and mild vent dilation, stable   - 10/14 CTA H/N: no significant stenosis, no vessel occlusion/AVM   - Hydrowatch: Continue to monitor for possible EVD if exam worsens  - Continue with neuro checks q2  - Continue pain regimen: tylenol and oxycodone PRN   - Stroke neurology following  - Activity: [x] OOB as tolerated [] Bedrest [] PT [] OT [] PMNR    PULM:  - Incentive spirometry, mobilize as tolerated  - satting >92% on 2L NC    CV:  - Keep -160mmHg  - TTE (10/14): EF: 60%  - Continue to wean off cardene, added hydralazine 25q8  - Continue lipitor 40mg daily for HLD  - Continue hydralazine 25q8, losartan 100 daily, amlodipine 10mg daily for HTN  - Troponin 524 on admission, repeat was 44    RENAL:  - Continue 3% + acetate @50  - Continue to monitor BMP q6hrs for Na goal 140-150  - Continue to monitor Is&Os    GI:  - Diet: Regular  - GI prophylaxis [] not indicated [x] PPI [] other:  - Bowel regimen [x] miralax  [x] senna [] other:  - LBM: prior to admission    ENDO:   - Goal euglycemia (-180)  - ISS    HEME/ONC:  - VTE prophylaxis: [x] SCDs [] chemoprophylaxis [] hold chemoprophylaxis due to: [] high risk of DVT/PE on admission due to:  - LED done on (10/14): negative    ID:  - afebrile    MISC:    SOCIAL/FAMILY:  [x] awaiting [] updated at bedside [] family meeting    CODE STATUS:  [x] Full Code [] DNR [] DNI [] Palliative/Comfort Care    DISPOSITION:  [x] ICU [] Stroke Unit [] Floor [] EMU [] RCU [] PCU    [x] Patient is at high risk of neurologic deterioration/death due to: pending clincial course    Time seen:  Time spent: __35_ [x] critical care minutes    Contact: [phone]

## 2024-10-14 NOTE — PHYSICAL THERAPY INITIAL EVALUATION ADULT - GENERAL OBSERVATIONS, REHAB EVAL
received supine with head of bed elevated +O2 via nc, +iv, +maggy, +condom cath, +bilateral sequential compression devices

## 2024-10-14 NOTE — ED ADULT NURSE NOTE - OBJECTIVE STATEMENT
Pt is a 44 y.o male c.o headache. PT BIBEMS from OSH where he was found to have a spontaneous basal ganglia bleed. PT sister at bedside. PT has a hx of HTN and is noncompliant with his medication. PT endorsed headache, fever, and nausea x43 days. PT lethargic on exam, but arousable and A&Ox3, spontaneously breathing on 2L NC, PERRLA, GCS 14, peripheral pulses present, skin dry and intact. PT denies any HA, N/V/D, CP, SOB, or fever at this time. Safety and comfort measures initiated- bed placed in lowest position and side rails raised.

## 2024-10-14 NOTE — CHART NOTE - NSCHARTNOTEFT_GEN_A_CORE
CAPRINI SCORE [CLOT] Score on Admission for     AGE RELATED RISK FACTORS                                                       MOBILITY RELATED FACTORS  [x ] Age 41-60 years                                            (1 Point)                  [ ] Bed rest                                                        (1 Point)  [ ] Age: 61-74 years                                           (2 Points)                 [ ] Plaster cast                                                   (2 Points)  [ ] Age= 75 years                                              (3 Points)                 [ ] Bed bound for more than 72 hours                 (2 Points)    DISEASE RELATED RISK FACTORS                                               GENDER SPECIFIC FACTORS  [ ] Edema in the lower extremities                       (1 Point)                  [ ] Pregnancy                                                     (1 Point)  [ ] Varicose veins                                               (1 Point)                  [ ] Post-partum < 6 weeks                                   (1 Point)             [ ] BMI > 25 Kg/m2                                            (1 Point)                  [ ] Hormonal therapy  or oral contraception          (1 Point)                 [ ] Sepsis (in the previous month)                        (1 Point)                  [ ] History of pregnancy complications                 (1 point)  [ ] Pneumonia or serious lung disease                                               [ ] Unexplained or recurrent                     (1 Point)           (in the previous month)                               (1 Point)  [ ] Abnormal pulmonary function test                     (1 Point)                 SURGERY RELATED RISK FACTORS (include planned surgeries)  [ ] Acute myocardial infarction                              (1 Point)                 [ ]  Section                                             (1 Point)  [ ] Congestive heart failure (in the previous month)  (1 Point)         [ ] Minor surgery                                                  (1 Point)   [ ] Inflammatory bowel disease                             (1 Point)                 [ ] Arthroscopic surgery                                        (2 Points)  [ ] Central venous access                                      (2 Points)                [ ] General surgery lasting more than 45 minutes   (2 Points)       [x ] Stroke (in the previous month)                          (5 Points)               [ ] Elective arthroplasty                                         (5 Points)            [ ] current or past malignancy                              (2 Points)                                                                                                       HEMATOLOGY RELATED FACTORS                                                 TRAUMA RELATED RISK FACTORS  [ ] Prior episodes of VTE                                     (3 Points)                [ ] Fracture of the hip, pelvis, or leg                       (5 Points)  [ ] Positive family history for VTE                         (3 Points)                 [ ] Acute spinal cord injury (in the previous month)  (5 Points)  [ ] Prothrombin 27474 A                                     (3 Points)                 [ ] Paralysis  (less than 1 month)                             (5 Points)  [ ] Factor V Leiden                                             (3 Points)                  [ ] Multiple Trauma within 1 month                        (5 Points)  [ ] Lupus anticoagulants                                     (3 Points)                                                           [ ] Anticardiolipin antibodies                               (3 Points)                                                       [ ] High homocysteine in the blood                      (3 Points)                                             [ ] Other congenital or acquired thrombophilia      (3 Points)                                                [ ] Heparin induced thrombocytopenia                  (3 Points)                                          Total Score [     6     ]    Risk:  Very low 0   Low 1 to 2   Moderate 3 to 4   High =5       VTE Prophylasix Recommednations:  [ x] mechanical pneumatic compression devices                                      [ ] contraindicated: _____________________  [ ] chemo prophylasix                                                                                   [ ] contraindicated _____________________    **** HIGH LIKELIHOOD DVT PRESENT ON ADMISSION  [ ] (please order LE dopplers within 24 hours of admission)

## 2024-10-14 NOTE — OCCUPATIONAL THERAPY INITIAL EVALUATION ADULT - PERTINENT HX OF CURRENT PROBLEM, REHAB EVAL
Patient is a 44 year old M no AC/AP, hx uncontrolled HTN, transfer from [facility] s/p flu-like symptoms and headache; CTH w/ left BG IPH w/ IVH extension. Exam: Turkish speaking, Ox3, PERRL, no drift, CHAPA 5/5, SILT, adm NSCU under Dr. Flores    CT head 10/14: IMPRESSION: Left basal ganglia lucency suspicious for infarct. Left   corona radiata hemorrhage with intraventricular extension and mild   ventricular dilatation, no change since 4:46 AM.

## 2024-10-14 NOTE — H&P ADULT - HISTORY OF PRESENT ILLNESS
44M no AC/AP hx uncontrolled HTN tx LVS s/p flu-like symptoms and headache; CTH w/ left BG IPH w/ IVH extension. Exam: English speaking, Ox3, PERRL, no drift, CHAPA 5/5, SILT

## 2024-10-14 NOTE — ED PROVIDER NOTE - OBJECTIVE STATEMENT
Patient is a 44-year-old male with a past medical history of hypertension and is noncompliant with his medication presents.  Transfer from outside emergency department for spontaneous basal ganglia bleed.  Patient was evaluated at outside hospital and found to have a spontaneous basal ganglia bleed.  GCS 14.  Patient was transferred for neurosurgical evaluation.  Patient is alert and oriented x 3 on examination and arrival.  Neurosurgery at bedside.

## 2024-10-14 NOTE — PHYSICAL THERAPY INITIAL EVALUATION ADULT - ADDITIONAL COMMENTS
Santa video  used during session  pt hypophonic and difficult for  to hear and pt somnolent during orientation questions

## 2024-10-14 NOTE — CONSULT NOTE ADULT - ATTENDING COMMENTS
I reviewed available diagnostic studies, and reviewed images personally. I agree with resident's history, exam, orders placed, and plan of care. Thirty minutes of critical care time was spent in caring for this patient who has concern of sudden and clinically significant deterioration requiring a high level of physician preparedness, management of brain supporting interventions, and frequent physician assessments. This excludes any time spent on separate procedures or teaching.    Medical issues needing to be addressed include: Nontraumatic intracerebral hemorrhage subcortical w/ IVH, perihematomal cerebral edema and brain compression, HTN, medication noncompliance per brother, HA, AMS, n/v, dizziness. Plan & exam as above. Likely hypertensive hemorrhage, will f up tox screen. Hydro watch. cardene, start Po antihypertensives if passes swallow.

## 2024-10-14 NOTE — ED ADULT NURSE NOTE - NS ED NURSE TRANSPORT WITH
ED RN, NSCU MD, ED Tech/Cardiac Monitor/Defib/ACLS/Rescue Kit/O2/BVM/IV pump ED RN, NSCU PA, ED Tech/Cardiac Monitor/Defib/ACLS/Rescue Kit/O2/BVM/IV pump

## 2024-10-14 NOTE — PHYSICAL THERAPY INITIAL EVALUATION ADULT - PERTINENT HX OF CURRENT PROBLEM, REHAB EVAL
PMHx: uncontrolled HTN. transferred from [facility] s/p flu-like symptoms and headache; CTH: left BG IPH with IVH extension.   NIHSS: 1, ICH Score: 1.    CT HEAD: Stable L medial basal ganglia parenchymal hemorrhage with associated intraventricular extension of hemorrhage. Unchanged edema within the adjacent L basal ganglia as well as rightward bulging of the septum pellucidum.  CTA NECK: No evidence of significant stenosis or occlusion.  CTA HEAD: No large vessel occlusion, significant stenosis or vascular abnormality identified.

## 2024-10-14 NOTE — PROGRESS NOTE ADULT - SUBJECTIVE AND OBJECTIVE BOX
HOSPITAL COURSE: 44M, not on AC/AP, PMH uncontrolled HTN. P/w flu-like symptoms and headache; CTH w/ left BG IPH w/ IVH extension. Exam: Upper sorbian speaking, EOV, Ox3, PERRL, no drift, CHAPA 5/5, SILT    HOSPITAL COURSE:  10/14: admitted to NSCU    ADMISSION SCORES:   NIHSS: 1    ICH Score: 1      Allergies    No Known Allergies    Intolerances        REVIEW OF SYSTEMS: [ ] Unable to Assess due to neurologic exam   [x ] All ROS addressed below are non-contributory, except:  Neuro: [ ] Headache [ ] Back pain [ ] Numbness [ ] Weakness [ ] Ataxia [ ] Dizziness [ ] Aphasia [ ] Dysarthria [ ] Visual disturbance  Resp: [ ] Shortness of breath/dyspnea, [ ] Orthopnea [ ] Cough  CV: [ ] Chest pain [ ] Palpitation [ ] Lightheadedness [ ] Syncope  Renal: [ ] Thirst [ ] Edema  GI: [ ] Nausea [ ] Emesis [ ] Abdominal pain [ ] Constipation [ ] Diarrhea  Hem: [ ] Hematemesis [ ] bright red blood per rectum  ID: [ ] Fever [ ] Chills [ ] Dysuria  ENT: [ ] Rhinorrhea      DEVICES:   [ ] Restraints [ ] ET tube [ ] central line [ ] arterial line [ ] mclaughlin [ ] NGT/OGT [ ] EVD [ ] LD [ ] ENRIQUE/HMV [ ] Trach [ ] PEG [ ] Chest Tube     VITALS:   Vital Signs Last 24 Hrs  T(C): 37.1 (14 Oct 2024 15:00), Max: 37.4 (14 Oct 2024 07:00)  T(F): 98.7 (14 Oct 2024 15:00), Max: 99.4 (14 Oct 2024 07:00)  HR: 94 (14 Oct 2024 18:30) (66 - 110)  BP: 128/68 (14 Oct 2024 06:30) (124/71 - 188/92)  BP(mean): 92 (14 Oct 2024 06:30) (81 - 124)  RR: 18 (14 Oct 2024 18:30) (14 - 24)  SpO2: 100% (14 Oct 2024 18:30) (94% - 100%)    Parameters below as of 14 Oct 2024 09:27  Patient On (Oxygen Delivery Method): nasal cannula      CAPILLARY BLOOD GLUCOSE      POCT Blood Glucose.: 111 mg/dL (14 Oct 2024 17:28)  POCT Blood Glucose.: 107 mg/dL (14 Oct 2024 11:59)    I&O's Summary    13 Oct 2024 07:01  -  14 Oct 2024 07:00  --------------------------------------------------------  IN: 325 mL / OUT: 0 mL / NET: 325 mL    14 Oct 2024 07:01  -  14 Oct 2024 19:03  --------------------------------------------------------  IN: 1400 mL / OUT: 700 mL / NET: 700 mL        Respiratory:        LABS:                        14.3   15.77 )-----------( 166      ( 14 Oct 2024 04:13 )             41.5     10-14    135  |  99  |  17  ----------------------------<  118[H]  3.1[L]   |  21[L]  |  0.86             MEDICATION LEVELS:     IVF FLUIDS/MEDICATIONS:   MEDICATIONS  (STANDING):  amLODIPine   Tablet 10 milliGRAM(s) Oral daily  atorvastatin 40 milliGRAM(s) Oral at bedtime  chlorhexidine 4% Liquid 1 Application(s) Topical <User Schedule>  hydrALAZINE 25 milliGRAM(s) Oral every 8 hours  insulin lispro (ADMELOG) corrective regimen sliding scale   SubCutaneous every 6 hours  losartan 100 milliGRAM(s) Oral daily  niCARdipine Infusion 5 mG/Hr (25 mL/Hr) IV Continuous <Continuous>  pantoprazole  Injectable 40 milliGRAM(s) IV Push daily  polyethylene glycol 3350 17 Gram(s) Oral every 12 hours  senna 2 Tablet(s) Oral at bedtime  sodium chloride 2% + sodium acetate 50:50 1000 milliLiter(s) (50 mL/Hr) IV Continuous <Continuous>    MEDICATIONS  (PRN):  traMADol 50 milliGRAM(s) Oral every 6 hours PRN Severe Pain (7 - 10)  traMADol 25 milliGRAM(s) Oral every 4 hours PRN Moderate Pain (4 - 6)        IMAGING:  < from: CT Head No Cont (10.14.24 @ 09:56) >  IMPRESSION: Left basal ganglia lucency suspicious for infarct. Left   corona radiata hemorrhage with intraventricular extension and mild   ventricular dilatation, no change since 4:46 AM.    --- End of Report ---    DARRICK BOJORQUEZ MD; Attending Radiologist  This document has been electronically signed. Oct 14 2024 10:26AM    < end of copied text >    < from: CT Angio Neck w/ IV Cont (10.14.24 @ 04:47) >    IMPRESSION:    CT HEAD:  Stable left medial basal ganglia parenchymal hemorrhage with associated   intraventricular extension of hemorrhage. Unchanged edema within the   adjacent left basal ganglia as well as rightward bulging of the septum   pellucidum.    CTA NECK:  No evidence of significant stenosis or occlusion.    CTA HEAD:  No large vessel occlusion, significant stenosis or vascular abnormality   identified.        --- End of Report ---            JOSY BROWN MD; Attending Radiologist  This document has been electronically signed. Oct 14 2024  5:59AM    < end of copied text >      EXAMINATION:  PHYSICAL EXAM:    Constitutional: No Acute Distress     Neurological: Awake, alert oriented to person, place and time, Following Commands, PERRL, EOMI, No Gaze Preference, Face Symmetrical, Speech Fluent, No dysmetria, No ataxia, No nystagmus     Pulmonary: Clear to Auscultation, No rales, No rhonchi, No wheezes     Cardiovascular: S1, S2, Regular rate and rhythm     Gastrointestinal: Soft, Non-tender, Non-distended     Extremities: No calf tenderness    HOSPITAL COURSE: 44M, not on AC/AP, PMH uncontrolled HTN. P/w flu-like symptoms and headache; CTH w/ left BG IPH w/ IVH extension. Exam: Yoruba speaking, EOV, Ox3, PERRL, no drift, CHAPA 5/5, SILT    HOSPITAL COURSE:  10/14: admitted to NSCU    ADMISSION SCORES:   NIHSS: 1    ICH Score: 1      Allergies    No Known Allergies    Intolerances        REVIEW OF SYSTEMS: [ ] Unable to Assess due to neurologic exam   [x ] All ROS addressed below are non-contributory, except:  Neuro: [ ] Headache [ ] Back pain [ ] Numbness [ ] Weakness [ ] Ataxia [ ] Dizziness [ ] Aphasia [ ] Dysarthria [ ] Visual disturbance  Resp: [ ] Shortness of breath/dyspnea, [ ] Orthopnea [ ] Cough  CV: [ ] Chest pain [ ] Palpitation [ ] Lightheadedness [ ] Syncope  Renal: [ ] Thirst [ ] Edema  GI: [ ] Nausea [ ] Emesis [ ] Abdominal pain [ ] Constipation [ ] Diarrhea  Hem: [ ] Hematemesis [ ] bright red blood per rectum  ID: [ ] Fever [ ] Chills [ ] Dysuria  ENT: [ ] Rhinorrhea      DEVICES:   [ ] Restraints [ ] ET tube [ ] central line [x ] arterial line [ ] mclaughlin [ ] NGT/OGT [ ] EVD [ ] LD [ ] ENRIQUE/HMV [ ] Trach [ ] PEG [ ] Chest Tube     VITALS:   Vital Signs Last 24 Hrs  T(C): 37.1 (14 Oct 2024 15:00), Max: 37.4 (14 Oct 2024 07:00)  T(F): 98.7 (14 Oct 2024 15:00), Max: 99.4 (14 Oct 2024 07:00)  HR: 94 (14 Oct 2024 18:30) (66 - 110)  BP: 128/68 (14 Oct 2024 06:30) (124/71 - 188/92)  BP(mean): 92 (14 Oct 2024 06:30) (81 - 124)  RR: 18 (14 Oct 2024 18:30) (14 - 24)  SpO2: 100% (14 Oct 2024 18:30) (94% - 100%)    Parameters below as of 14 Oct 2024 09:27  Patient On (Oxygen Delivery Method): nasal cannula      CAPILLARY BLOOD GLUCOSE      POCT Blood Glucose.: 111 mg/dL (14 Oct 2024 17:28)  POCT Blood Glucose.: 107 mg/dL (14 Oct 2024 11:59)    I&O's Summary    13 Oct 2024 07:01  -  14 Oct 2024 07:00  --------------------------------------------------------  IN: 325 mL / OUT: 0 mL / NET: 325 mL    14 Oct 2024 07:01  -  14 Oct 2024 19:03  --------------------------------------------------------  IN: 1400 mL / OUT: 700 mL / NET: 700 mL        Respiratory:        LABS:                        14.3   15.77 )-----------( 166      ( 14 Oct 2024 04:13 )             41.5     10-14    135  |  99  |  17  ----------------------------<  118[H]  3.1[L]   |  21[L]  |  0.86             MEDICATION LEVELS:     IVF FLUIDS/MEDICATIONS:   MEDICATIONS  (STANDING):  amLODIPine   Tablet 10 milliGRAM(s) Oral daily  atorvastatin 40 milliGRAM(s) Oral at bedtime  chlorhexidine 4% Liquid 1 Application(s) Topical <User Schedule>  hydrALAZINE 25 milliGRAM(s) Oral every 8 hours  insulin lispro (ADMELOG) corrective regimen sliding scale   SubCutaneous every 6 hours  losartan 100 milliGRAM(s) Oral daily  niCARdipine Infusion 5 mG/Hr (25 mL/Hr) IV Continuous <Continuous>  pantoprazole  Injectable 40 milliGRAM(s) IV Push daily  polyethylene glycol 3350 17 Gram(s) Oral every 12 hours  senna 2 Tablet(s) Oral at bedtime  sodium chloride 2% + sodium acetate 50:50 1000 milliLiter(s) (50 mL/Hr) IV Continuous <Continuous>    MEDICATIONS  (PRN):  traMADol 50 milliGRAM(s) Oral every 6 hours PRN Severe Pain (7 - 10)  traMADol 25 milliGRAM(s) Oral every 4 hours PRN Moderate Pain (4 - 6)        IMAGING:  < from: CT Head No Cont (10.14.24 @ 09:56) >  IMPRESSION: Left basal ganglia lucency suspicious for infarct. Left   corona radiata hemorrhage with intraventricular extension and mild   ventricular dilatation, no change since 4:46 AM.    --- End of Report ---    DARRICK BOJORQUEZ MD; Attending Radiologist  This document has been electronically signed. Oct 14 2024 10:26AM    < end of copied text >    < from: CT Angio Neck w/ IV Cont (10.14.24 @ 04:47) >    IMPRESSION:    CT HEAD:  Stable left medial basal ganglia parenchymal hemorrhage with associated   intraventricular extension of hemorrhage. Unchanged edema within the   adjacent left basal ganglia as well as rightward bulging of the septum   pellucidum.    CTA NECK:  No evidence of significant stenosis or occlusion.    CTA HEAD:  No large vessel occlusion, significant stenosis or vascular abnormality   identified.        --- End of Report ---            JOSY BROWN MD; Attending Radiologist  This document has been electronically signed. Oct 14 2024  5:59AM    < end of copied text >      EXAMINATION:  PHYSICAL EXAM:    Constitutional: No Acute Distress     Neurological: Arousable, Ox3 (self, place, year w/ choices), PERRL, EOMI, FC, trace RUE drift, CHAPA 5/5, SILT      Pulmonary: Clear to Auscultation, No rales, No rhonchi, No wheezes     Cardiovascular: S1, S2, Regular rate and rhythm     Gastrointestinal: Soft, Non-tender, Non-distended     Extremities: No calf tenderness

## 2024-10-14 NOTE — H&P ADULT - ASSESSMENT
44M no AC/AP hx uncontrolled HTN tx LVS s/p flu-like symptoms and headache; CTH w/ left BG IPH w/ IVH extension. Exam: Albanian speaking, Ox3, PERRL, no drift, CHAPA 5/5, SILT  - adm NSCU under Dr. Flores  - 4hr interval CTH/CTA  - no AC/AP  - SBP <160

## 2024-10-14 NOTE — OCCUPATIONAL THERAPY INITIAL EVALUATION ADULT - PHYSICAL ASSIST/NONPHYSICAL ASSIST:DRESS LOWER BODY, OT EVAL
Infant nursing well with the assistance of the shield. She is also pumping when baby is sleepy and will not latch. She states that she is comfortable continuing nursing with the shield and/or pumping for feedings. She is going home, therefore engorgement management and nipple care reviewed. LC discussed normal infant weight loss and appropriate diaper output. Mother provided with group info and warm line number. Pt to call for further lactation assistance if needed. Nipple shield recommended due to flat nipples and difficulty latching. Pros and cons of nipple shield use reviewed. Patient instructed how to apply shield to nipple/areola and cleaning of nipple shield. Nipple shield plan of care includes breastfeeding with nipple shield per instructions. Reinforces with pt that nipple shield is best used as temporary tool/aid to help infant learn how to latch onto breast.  Reviewed community resources for breastfeeding support. Pumping:  Guidelines for pumping, milk collection and storage, proper cleaning of pump parts all reviewed. How to establish and maintain breast milk supply through pumping reviewed. Differences between hospital grade rental pumps vs store bought double electric/hand pumps discussed. Set up pumping with double electric set up. Assisted with pump session. List of area pump rental locations and lactation support services provided. Pt will successfully establish breastfeeding by feeding in response to early feeding cues   or wake every 3h, will obtain deep latch, and will keep log of feedings/output. Taught to BF at hunger cues and or q 2-3 hrs and to offer 10-20 drops of hand expressed colostrum at any non-feeds.       Breast Assessment  Left Breast: Medium  Left Nipple: Everted, Intact, Short  Right Breast: Medium  Right Nipple: Everted, Intact, Short  Breast- Feeding Assessment  Attends Breast-Feeding Classes: No  Breast-Feeding Experience: No  Breast Trauma/Surgery: No  Type/Quality: Fair (pumping and using the shield)  Lactation Consultant Visits  Breast-Feedings: Good   Mother/Infant Observation  Mother Observation: Breast comfortable, Alignment, Holds breast, Nipple round on release, Sleepy after feeding  Infant Observation: Audible swallows, Feeding cues, Frenulum checked, Latches nipple and aereolae, Lips flanged, upper, Opens mouth, Lips flanged, lower, Relaxed after feeding, Rhythmic suck  LATCH Documentation  Latch: Repeated attempts, hold nipple in mouth, stimulate to suck  Audible Swallowing: Spontaneous and intermittent (24 hours old)  Type of Nipple: Everted (after stimulation)  Comfort (Breast/Nipple): Soft/non-tender  Hold (Positioning): Full assist, teach one side, mother does other, staff holds  [facility] Score: 8    Chart shows numerous feedings, void, stool WNL. Discussed importance of monitoring outputs and feedings on first week of life. Discussed ways to tell if baby is  getting enough breast milk, ie  voids and stools, change in color of stool, and return to birth wt within 2 weeks. Follow up with pediatrician visit for weight check in 1-2 days (per AAP guidelines.)  Encouraged to call Warm Line  [phone]  for any questions/problems that arise.  Mother also given breastfeeding support group dates and times for any future needs verbal cues/1 person assist

## 2024-10-14 NOTE — ED PROVIDER NOTE - ATTENDING CONTRIBUTION TO CARE
MD Sanchez:  patient seen and evaluated personally.   I agree with the History & Physical,  Impression & Plan other than what was detailed in my note.  MD Sanchez   44-year-old male with a past medical history of hypertension and is noncompliant with his medication presents. as a trx from [facility]. Pt currently has ha, no cp, sob, n/v, no unilat weakness, afebrile vitals stable received meds for bp on cardine gtt, bp  controlled, gcs 14 [id number], no unilat weakness slurred speech, no focal deficits, plan for ns eval, admission

## 2024-10-14 NOTE — PATIENT PROFILE ADULT - NSPROPOAPRESSUREINJURY_GEN_A_NUR
Discharge Note  Short Stay      SUMMARY     Admit Date: 4/24/2024    Attending Physician: Reagan Jackson Jr., MD     Discharge Physician: Reagan Jackson Jr., MD    Discharge Date: 4/24/2024 1:17 PM    Final Diagnosis: Diarrhea, unspecified type [R19.7]  Heartburn [R12]  Nausea and vomiting, unspecified vomiting type [R11.2]  Hematemesis, unspecified whether nausea present [K92.0]      Impression:            - Normal oropharynx.                          - Normal larynx.                          - Normal cricopharyngeus.                          - Normal upper third of esophagus and middle third                          of esophagus.                          - Reflux esophagitis. Biopsied.                          - Z-line regular, 35 cm from the incisors.                          - Patulous lower esophageal sphincter.                          - Normal gastric fundus and gastric body.                          - Gastritis. Biopsied.                          - Normal antrum and prepyloric region of the                          stomach.                          - Normal pylorus.                          - Normal examined duodenum. Biopsied.                          - Normal major papilla.                          - Non-erosive esophageal reflux (NERD) disease                          present.   Recommendation:        - Perform a colonoscopy as previously scheduled.                          - Discharge patient to home.                          - Follow an antireflux regimen.                          - Exercise and weight loss.                          - Continue present medications.                          - Use Prilosec (omeprazole) 40 mg PO daily.                          - Call the [facility] clinic in 2 weeks for reports (if                          you haven't heard from us sooner) [phone].                          - Return to GI clinic in 6-8 weeks.     Impression:            - The anus is normal.                           - Non-bleeding internal hemorrhoids.                          - The rectum and recto-sigmoid colon are normal.                          Fluid aspiration performed.                          - The entire examined colon is normal. Biopsied.                          - The examined portion of the ileum was normal.                          Biopsied.                          - Irritable bowel syndrome.   Recommendation:        - Discharge patient to home.                          - Await pathology results.                          - High fiber diet and low fat diet.                          - Use fiber, for example Citrucel, Fibercon,                          Konsyl or Metamucil.                          - Take a PROBIOTIC, such as a carton of GREEK                          YOGURT (Chobani or Oikos, or Activia or Dannon);                          or tablets of ALIGN or CULTURELLE or ASHLEY-Q (all                          non-prescription), every day for a month.                          - Continue present medications.                          - Use Bentyl (dicyclomine) 10 mg PO QID 30 min AC.                          - Exercise and weight loss.                          - Return to GI clinic in 6 weeks.                          - Repeat colonoscopy at age 46 for screening                          purposes.     Reagan Jackson MD   4/24/2024       Disposition: HOME OR SELF CARE    Patient Instructions:   Current Discharge Medication List        START taking these medications    Details   !! dicyclomine (BENTYL) 10 MG capsule Take 1 capsule (10 mg total) by mouth 4 (four) times daily before meals and nightly. , to ease cramps and diarrhea.  Qty: 120 capsule, Refills: 11       !! - Potential duplicate medications found. Please discuss with provider.        CONTINUE these medications which have CHANGED    Details   omeprazole (PRILOSEC) 40 MG capsule Take 1 capsule (40 mg total) by mouth before breakfast.  Qty: 60  capsule, Refills: 1    Associated Diagnoses: Heartburn; Non-intractable vomiting with nausea           CONTINUE these medications which have NOT CHANGED    Details   atomoxetine (STRATTERA) 60 MG capsule Take 60 mg by mouth every morning.      !! dicyclomine (BENTYL) 10 MG capsule Take 1 capsule (10 mg total) by mouth before meals and at bedtime as needed (abdominal cramping/pain).  Qty: 120 capsule, Refills: 0    Associated Diagnoses: Chronic diarrhea      venlafaxine (EFFEXOR-XR) 37.5 MG 24 hr capsule Take 37.5 mg by mouth every morning.       !! - Potential duplicate medications found. Please discuss with provider.          Discharge Procedure Orders (must include Diet, Follow-up, Activity)    Follow Up:  Follow up with PCP as per your routine.  Please follow an anti reflux diet and a high fiber diet.  Activity as tolerated.    No driving day of procedure.     no

## 2024-10-14 NOTE — CONSULT NOTE ADULT - SUBJECTIVE AND OBJECTIVE BOX
~~~~~~~~~~~~~~~~~~~~~~~~~~~~~~~~~~~~~~~~~~~~~~~~~~  Neurology Service   [facility] Stroke Service- [place] [id number]  ~~~~~~~~~~~~~~~~~~~~~~~~~~~~~~~~~~~~~~~~~~~~~~~~~~  History:  HPI:   PACO SILVA is a 44year old Right-handed Man with PMHx HTN, HLD who presents for headache, dizziness, emesis found to have IPH w/ IVH xferred to [facility] for NSGY eval.   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 Symptoms persisted until brought in by sister Monday 10/14 in early am. Xferred to [facility] for NSGY eval    LKN: Friday 10/11/24  NIHSS: 1 (Questions)  preMRS: 9  ICH Score: 1 for IVH  Pt is not a candidate for tenecteplase due to outside tenecteplase window / Bleed  Pt is not a candidate for mechanical thrombectomy due to no large vessel occlusion on CTA, Bleed    Review of Systems:    unable to assess   somnolent     PMHx: HTN, HLD, nonsmoker  Fam Hx: No fam hx strokes or ICH  Allergies: NKDA    Social History:  Lives with: Sister, ahs wife and child in [facility]  Alcohol use: no  Tobacco use:  no  Other drug use: no  Profession: Drives taxi  ADLs: Independent / no assistive devices     Medications  Home Medications:  amLODIPine 5 mg oral tablet: 1 tab(s) orally once a day (14 Oct 2024 05:41)  Lipitor 20 mg oral tablet: 1 tab(s) orally once a day (14 Oct 2024 05:39)  losartan 50 mg oral tablet: 1 tab(s) orally once a day (14 Oct 2024 05:39)  Protonix 40 mg oral delayed release tablet: 1 tab(s) orally once a day (14 Oct 2024 05:40)    MEDICATIONS  (STANDING):  amLODIPine   Tablet 10 milliGRAM(s) Oral daily  atorvastatin 40 milliGRAM(s) Oral at bedtime  chlorhexidine 4% Liquid 1 Application(s) Topical <User Schedule>  insulin lispro (ADMELOG) corrective regimen sliding scale   SubCutaneous every 6 hours  losartan 100 milliGRAM(s) Oral daily  niCARdipine Infusion 5 mG/Hr (25 mL/Hr) IV Continuous <Continuous>  pantoprazole  Injectable 40 milliGRAM(s) IV Push daily  polyethylene glycol 3350 17 Gram(s) Oral every 12 hours  potassium chloride   Powder 20 milliEquivalent(s) Oral every 4 hours  potassium chloride  10 mEq/100 mL IVPB 10 milliEquivalent(s) IV Intermittent every 1 hour  senna 2 Tablet(s) Oral at bedtime  sodium chloride 2% + sodium acetate 50:50 1000 milliLiter(s) (50 mL/Hr) IV Continuous <Continuous>    MEDICATIONS  (PRN):  traMADol 25 milliGRAM(s) Oral every 4 hours PRN Moderate Pain (4 - 6)  traMADol 50 milliGRAM(s) Oral every 6 hours PRN Severe Pain (7 - 10)      Exam:  Vitals:  T(C): 37.2 (10-14-24 @ 11:00), Max: 37.4 (10-14-24 @ 07:00)  T(F): 99 (10-14-24 @ 11:00), Max: 99.4 (10-14-24 @ 07:00)  HR: 83 (10-14-24 @ 13:00) (72 - 110)  BP: 128/68 (10-14-24 @ 06:30) (124/71 - 145/86)  RR: 17 (10-14-24 @ 13:00) (14 - 24)  SpO2: 99% (10-14-24 @ 13:00) (94% - 100%)  I&O's Summary    13 Oct 2024 07:01  -  14 Oct 2024 07:00  --------------------------------------------------------  IN: 325 mL / OUT: 0 mL / NET: 325 mL    14 Oct 2024 07:01  -  14 Oct 2024 14:33  --------------------------------------------------------  IN: 365 mL / OUT: 0 mL / NET: 365 mL    Physical and Neurological Examination:   - General: NAD     - Mental Status:   level of consciousness: Asleep, awakens to voice, can maintain wakefulness but often falls asleep if left alone for some time  Orientation: Oriented to person, age, place, and not time   Language: Speech is fluent with intact naming, repetition, and ability to follow some simple commands. Does not smile upon request,. Protrudes tongue but does not move side to side on request. Struggles with cross commands    - Cranial Nerves:   PERRL, VFF to threat Facial sensation is intact in the V1-V3 distribution bilaterally; face is symmetric at rest, not smiling; hearing is intact to conversation, uvula is midline and soft palate rises symmetrically; tongue protrudes in the midline    - Motor Testing:  Bulk: Normal   Tone: Normal  Strength:  There is no pronator drift b/l  No orbiting  There is no leg drift b/l  Requires encouragement for full effort but a best:                               Right           Left  Should-Abduct      5                   5  Elbow-Flex             5                   5  Elbow-Ext              5                   5                        5                   5    Hip-Flex                 5                   5  Hip-Ext      not understanding request  Knee-Flex     not understanding request  Knee-Ext                5                   5  Dorsiflex                5                   5  Plantarflex        not understanding request    - Sensory: Intact throughout to light touch.   - Coordination: Finger-nose-finger intact without dysmetria.  Heel-shin intact within 1/2 ROM, he does not participate for full ROM,   - Gait: unable to assess due to mental status      Objective Studies  Labs:                          14.3   15.77 )-----------( 166      ( 14 Oct 2024 04:13 )             41.5       10-14    135  |  99  |  17  ----------------------------<  118[H]  3.1[L]   |  21[L]  |  0.86    Ca    8.6      14 Oct 2024 12:50    TPro  7.4  /  Alb  4.2  /  TBili  0.9  /  DBili  x   /  AST  35  /  ALT  69[H]  /  AlkPhos  97  10-14          Urinalysis Basic - ( 14 Oct 2024 12:50 )    Color: x / Appearance: x / SG: x / pH: x  Gluc: 118 mg/dL / Ketone: x  / Bili: x / Urobili: x   Blood: x / Protein: x / Nitrite: x   Leuk Esterase: x / RBC: x / WBC x   Sq Epi: x / Non Sq Epi: x / Bacteria: x        PT/INR - ( 14 Oct 2024 04:13 )   PT: 11.6 sec;   INR: 1.01 ratio         PTT - ( 14 Oct 2024 04:13 )  PTT:29.6 sec    Lactate Trend            CAPILLARY BLOOD GLUCOSE      POCT Blood Glucose.: 107 mg/dL (14 Oct 2024 11:59)          PT/INR - ( 14 Oct 2024 04:13 )   PT: 11.6 sec;   INR: 1.01 ratio         PTT - ( 14 Oct 2024 04:13 )  PTT:29.6 sec    CSF:          < from: Xray Chest 1 View- PORTABLE-Urgent (Xray Chest 1 View- PORTABLE-Urgent .) (10.14.24 @ 07:13) >  IMPRESSION:  No acute pulmonary disease.    < end of copied text >      < from: [facility] Duplex Lower Ext Vein Scan, Bilat (10.14.24 @ 11:47) >    IMPRESSION:  No evidence of deep venous thrombosis in either lower extremity.      < end of copied text >        CNS Imaging:  CT Head No Cont:  (14 Oct 2024 09:56)  There is no significant change in the left corona radiata hemorrhage with   intraventricular extension into the left lateral ventricle and hematocrit   level on the left. Lucency in the left basal ganglia is also identified   suggesting an acute infarct.    There is mass effect on the left lateral ventricle. Mild ventricular   dilatation is unchanged.    IMPRESSION: Left basal ganglia lucency suspicious for infarct. Left   corona radiata hemorrhage with intraventricular extension and mild   ventricular dilatation, no change since 4:46 AM.    CT Head No Cont:  (14 Oct 2024 04:46)      < from: CT Head No Cont (10.14.24 @ 04:46) >    FINDINGS:    CT BRAIN:    VENTRICLES AND SULCI: Intraventricular extension of hemorrhage,   predominantly within the left lateral ventricle, with a smaller degree of  hemorrhage identified within the right lateral, third, and fourth   ventricles. No significant ventriculomegaly.  INTRA-AXIAL: Redemonstration of a intraparenchymal hemorrhage involving   the medial left basal ganglia as well as hypodensity in the adjacent   basal ganglia, grossly stable since prior examination. Rightward bulging   of the septum pellucidum approximately 7 mm.  EXTRA-AXIAL: No mass or fluid collection. Basal cisterns are normal in   appearance.    VISUALIZED SINUSES:  Left maxillary sinus retention cyst versus polyp  TYMPANOMASTOID CAVITIES:  Clear.  VISUALIZED ORBITS: Normal.  CALVARIUM: Intact.    MISCELLANEOUS: None.      CTA NECK:    AORTIC ARCH AND VISUALIZED GREAT VESSELS: Within normal limits.    RIGHT:  COMMON CAROTID ARTERY: No significant stenosis to the carotid bifurcation.  INTERNAL CAROTID ARTERY: No significant stenosis based on NASCET criteria.  VERTEBRAL ARTERY: Normal in course and caliber to the intracranial   circulation.    LEFT:  COMMON CAROTID ARTERY: No significant stenosis to the carotid bifurcation.  INTERNAL CAROTID ARTERY: No significant stenosis based on NASCET criteria.  VERTEBRAL ARTERY: Normal in course and caliber to the intracranial   circulation.    VISUALIZED LUNGS: Partially imagedsuperior segment right lower lobe   airspace opacity, evaluation of which is limited by motion.    MISCELLANEOUS: 1.6 x 1.0 cm salivary within the substance of the left   submandibular gland. Numerous bilateral tonsilliths.    CAROTID STENOSIS REFERENCE: Percent (%) stenosis is expressed in terms of   NASCET Criteria. (NASCET = 100x1-(N/D)). N=greatest narrowing. D=normal   distal diameter - MILD = <50% stenosis. - MODERATE = 50-69% stenosis. -   SEVERE = 70-89% stenosis. - HAIRLINE/CRITICAL = 90-99% stenosis. -   OCCLUDED = 100% stenosis.      CTA HEAD:    INTERNAL CAROTID ARTERIES: Bilateral petrous, precavernous, cavernous,   and supraclinoid regions are patent without significant stenosis.    [place]: No aneurysm identified. Tinyaneurysms can be beyond   the resolution of CTA technique.    ANTERIOR CEREBRAL ARTERIES: No significant stenosis or occlusion.  MIDDLE CEREBRAL ARTERIES: No significant stenosis or occlusion.  POSTERIOR CEREBRAL ARTERIES: No significant stenosis or occlusion.    DISTAL VERTEBRAL / BASILAR ARTERIES: No significant stenosis or occlusion.    VENOUS STRUCTURES: Inadequate venous phase opacification.    MISCELLANEOUS: No other vascular abnormality is seen.      IMPRESSION:    CT HEAD:  Stable left medial basal ganglia parenchymal hemorrhage with associated   intraventricular extension of hemorrhage. Unchanged edema within the   adjacent left basal ganglia as well as rightward bulging of the septum   pellucidum.    CTA NECK:  No evidence of significant stenosis or occlusion.    CTA HEAD:  No large vessel occlusion, significant stenosis or vascular abnormality   identified.        --- End of Report ---    < end of copied text >    TTE  ____________________________________________________________________________________     CONCLUSIONS:      1. Fair study quality.   2. Left ventricular systolic function is normal with an ejection fraction of 60 % by Chua's method of disks.   3. Normal right ventricular systolic function.   4. No prior echocardiogram is available for comparison.    ________________________________________________________________________________________  FINDINGS:     Left Ventricle:  The left ventricular cavity is normal in size. Left ventricular wall thickness is normal. Left ventricular systolic function is normal with a calculated ejection fraction of 60 % by the Chua's biplane method of disks. There is normal LV mass and normal geometry. No definitive evidence of regional wall motion abnormalities. The left ventricular diastolic function is indeterminate.     Right Ventricle:  The right ventricular cavity is normal in size and right ventricular systolic function is normal. Tricuspid annular plane systolic excursion (TAPSE) is 3.0 cm (normal >=1.7 cm).     Left Atrium:  The left atrium is normal in size with an indexed volume of 21.00 ml/m².     Right Atrium:  The right atrium is normal in size with an indexed volume of 9.85 ml/m².     Aortic Valve:  The aortic valve was not well visualized. The aortic valve anatomy cannot be determined. There is no aortic valve stenosis. There is trace aortic regurgitation.     Mitral Valve:  There is mild calcification of the mitral valve annulus. Thickened mitral valve leaflets. There is no mitral valve stenosis. There is trace mitral regurgitation.     Tricuspid Valve:  Structurally normal tricuspid valve with normal leaflet excursion. There is trace tricuspid regurgitation. There is insufficient tricuspid regurgitation detected to calculate pulmonary artery systolic pressure.     Pulmonic Valve:  The pulmonic valve was not well visualized. There is no pulmonic valve stenosis.     Aorta:  The ascending aorta is not well visualized. The aortic root at the sinuses of Valsalva is normal in size, measuring 3.13 cm (indexed 1.62 cm/m²). The ascending aorta is normal in size, measuring 3.10 cm (indexed 1.61 cm/m²).     Pericardium:  No pericardial effusion seen.     Systemic Veins:  The inferior vena cava was not well visualized.     ~~~~~~~~~~~~~~~~~~~~~~~~~~~~~~~~~~~~~~~~~~~~~~~~~~  Neurology Service   [facility] Stroke Service- [place] [id number]  ~~~~~~~~~~~~~~~~~~~~~~~~~~~~~~~~~~~~~~~~~~~~~~~~~~  History:  HPI:   PACO SILVA is a 44year old Right-handed Man with PMHx HTN, HLD who presents for headache, dizziness, emesis found to have IPH w/ IVH xferred to [facility] for NSGY eval.   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 Symptoms persisted until brought in by sister Monday 10/14 in early am. Xferred to [facility] for NSGY eval    LKN: Friday 10/11/24  NIHSS: 1 (Questions)  preMRS: 0  ICH Score: 1 for IVH  Pt is not a candidate for tenecteplase due to outside tenecteplase window / Bleed  Pt is not a candidate for mechanical thrombectomy due to no large vessel occlusion on CTA, Bleed    Review of Systems:    unable to assess   somnolent     PMHx: HTN, HLD, nonsmoker  Fam Hx: No fam hx strokes or ICH  Allergies: NKDA    Social History:  Lives with: Sister, ahs wife and child in [facility]  Alcohol use: no  Tobacco use:  no  Other drug use: no  Profession: Drives taxi  ADLs: Independent / no assistive devices     Medications  Home Medications:  amLODIPine 5 mg oral tablet: 1 tab(s) orally once a day (14 Oct 2024 05:41)  Lipitor 20 mg oral tablet: 1 tab(s) orally once a day (14 Oct 2024 05:39)  losartan 50 mg oral tablet: 1 tab(s) orally once a day (14 Oct 2024 05:39)  Protonix 40 mg oral delayed release tablet: 1 tab(s) orally once a day (14 Oct 2024 05:40)    MEDICATIONS  (STANDING):  amLODIPine   Tablet 10 milliGRAM(s) Oral daily  atorvastatin 40 milliGRAM(s) Oral at bedtime  chlorhexidine 4% Liquid 1 Application(s) Topical <User Schedule>  insulin lispro (ADMELOG) corrective regimen sliding scale   SubCutaneous every 6 hours  losartan 100 milliGRAM(s) Oral daily  niCARdipine Infusion 5 mG/Hr (25 mL/Hr) IV Continuous <Continuous>  pantoprazole  Injectable 40 milliGRAM(s) IV Push daily  polyethylene glycol 3350 17 Gram(s) Oral every 12 hours  potassium chloride   Powder 20 milliEquivalent(s) Oral every 4 hours  potassium chloride  10 mEq/100 mL IVPB 10 milliEquivalent(s) IV Intermittent every 1 hour  senna 2 Tablet(s) Oral at bedtime  sodium chloride 2% + sodium acetate 50:50 1000 milliLiter(s) (50 mL/Hr) IV Continuous <Continuous>    MEDICATIONS  (PRN):  traMADol 25 milliGRAM(s) Oral every 4 hours PRN Moderate Pain (4 - 6)  traMADol 50 milliGRAM(s) Oral every 6 hours PRN Severe Pain (7 - 10)      Exam:  Vitals:  T(C): 37.2 (10-14-24 @ 11:00), Max: 37.4 (10-14-24 @ 07:00)  T(F): 99 (10-14-24 @ 11:00), Max: 99.4 (10-14-24 @ 07:00)  HR: 83 (10-14-24 @ 13:00) (72 - 110)  BP: 128/68 (10-14-24 @ 06:30) (124/71 - 145/86)  RR: 17 (10-14-24 @ 13:00) (14 - 24)  SpO2: 99% (10-14-24 @ 13:00) (94% - 100%)  I&O's Summary    13 Oct 2024 07:01  -  14 Oct 2024 07:00  --------------------------------------------------------  IN: 325 mL / OUT: 0 mL / NET: 325 mL    14 Oct 2024 07:01  -  14 Oct 2024 14:33  --------------------------------------------------------  IN: 365 mL / OUT: 0 mL / NET: 365 mL    Physical and Neurological Examination:   - General: NAD     - Mental Status:   level of consciousness: Asleep, awakens to voice, can maintain wakefulness but often falls asleep if left alone for some time  Orientation: Oriented to person, age, place, and not time   Language: Speech is fluent with intact naming, repetition, and ability to follow some simple commands. Does not smile upon request,. Protrudes tongue but does not move side to side on request. Struggles with cross commands    - Cranial Nerves:   PERRL, VFF to threat Facial sensation is intact in the V1-V3 distribution bilaterally; face is symmetric at rest, not smiling; hearing is intact to conversation, uvula is midline and soft palate rises symmetrically; tongue protrudes in the midline    - Motor Testing:  Bulk: Normal   Tone: Normal  Strength:  There is no pronator drift b/l  No orbiting  There is no leg drift b/l  Requires encouragement for full effort but a best:                               Right           Left  Should-Abduct      5                   5  Elbow-Flex             5                   5  Elbow-Ext              5                   5                        5                   5    Hip-Flex                 5                   5  Hip-Ext      not understanding request  Knee-Flex     not understanding request  Knee-Ext                5                   5  Dorsiflex                5                   5  Plantarflex        not understanding request    - Sensory: Intact throughout to light touch.   - Coordination: Finger-nose-finger intact without dysmetria.  Heel-shin intact within 1/2 ROM, he does not participate for full ROM,   - Gait: unable to assess due to mental status      Objective Studies  Labs:                          14.3   15.77 )-----------( 166      ( 14 Oct 2024 04:13 )             41.5       10-14    135  |  99  |  17  ----------------------------<  118[H]  3.1[L]   |  21[L]  |  0.86    Ca    8.6      14 Oct 2024 12:50    TPro  7.4  /  Alb  4.2  /  TBili  0.9  /  DBili  x   /  AST  35  /  ALT  69[H]  /  AlkPhos  97  10-14          Urinalysis Basic - ( 14 Oct 2024 12:50 )    Color: x / Appearance: x / SG: x / pH: x  Gluc: 118 mg/dL / Ketone: x  / Bili: x / Urobili: x   Blood: x / Protein: x / Nitrite: x   Leuk Esterase: x / RBC: x / WBC x   Sq Epi: x / Non Sq Epi: x / Bacteria: x        PT/INR - ( 14 Oct 2024 04:13 )   PT: 11.6 sec;   INR: 1.01 ratio         PTT - ( 14 Oct 2024 04:13 )  PTT:29.6 sec    Lactate Trend            CAPILLARY BLOOD GLUCOSE      POCT Blood Glucose.: 107 mg/dL (14 Oct 2024 11:59)          PT/INR - ( 14 Oct 2024 04:13 )   PT: 11.6 sec;   INR: 1.01 ratio         PTT - ( 14 Oct 2024 04:13 )  PTT:29.6 sec    CSF:          < from: Xray Chest 1 View- PORTABLE-Urgent (Xray Chest 1 View- PORTABLE-Urgent .) (10.14.24 @ 07:13) >  IMPRESSION:  No acute pulmonary disease.    < end of copied text >      < from: VA Duplex Lower Ext Vein Scan, Bilat (10.14.24 @ 11:47) >    IMPRESSION:  No evidence of deep venous thrombosis in either lower extremity.      < end of copied text >        CNS Imaging:  CT Head No Cont:  (14 Oct 2024 09:56)  There is no significant change in the left corona radiata hemorrhage with   intraventricular extension into the left lateral ventricle and hematocrit   level on the left. Lucency in the left basal ganglia is also identified   suggesting an acute infarct.    There is mass effect on the left lateral ventricle. Mild ventricular   dilatation is unchanged.    IMPRESSION: Left basal ganglia lucency suspicious for infarct. Left   corona radiata hemorrhage with intraventricular extension and mild   ventricular dilatation, no change since 4:46 AM.    CT Head No Cont:  (14 Oct 2024 04:46)      < from: CT Head No Cont (10.14.24 @ 04:46) >    FINDINGS:    CT BRAIN:    VENTRICLES AND SULCI: Intraventricular extension of hemorrhage,   predominantly within the left lateral ventricle, with a smaller degree of  hemorrhage identified within the right lateral, third, and fourth   ventricles. No significant ventriculomegaly.  INTRA-AXIAL: Redemonstration of a intraparenchymal hemorrhage involving   the medial left basal ganglia as well as hypodensity in the adjacent   basal ganglia, grossly stable since prior examination. Rightward bulging   of the septum pellucidum approximately 7 mm.  EXTRA-AXIAL: No mass or fluid collection. Basal cisterns are normal in   appearance.    VISUALIZED SINUSES:  Left maxillary sinus retention cyst versus polyp  TYMPANOMASTOID CAVITIES:  Clear.  VISUALIZED ORBITS: Normal.  CALVARIUM: Intact.    MISCELLANEOUS: None.      CTA NECK:    AORTIC ARCH AND VISUALIZED GREAT VESSELS: Within normal limits.    RIGHT:  COMMON CAROTID ARTERY: No significant stenosis to the carotid bifurcation.  INTERNAL CAROTID ARTERY: No significant stenosis based on NASCET criteria.  VERTEBRAL ARTERY: Normal in course and caliber to the intracranial   circulation.    LEFT:  COMMON CAROTID ARTERY: No significant stenosis to the carotid bifurcation.  INTERNAL CAROTID ARTERY: No significant stenosis based on NASCET criteria.  VERTEBRAL ARTERY: Normal in course and caliber to the intracranial   circulation.    VISUALIZED LUNGS: Partially imagedsuperior segment right lower lobe   airspace opacity, evaluation of which is limited by motion.    MISCELLANEOUS: 1.6 x 1.0 cm salivary within the substance of the left   submandibular gland. Numerous bilateral tonsilliths.    CAROTID STENOSIS REFERENCE: Percent (%) stenosis is expressed in terms of   NASCET Criteria. (NASCET = 100x1-(N/D)). N=greatest narrowing. D=normal   distal diameter - MILD = <50% stenosis. - MODERATE = 50-69% stenosis. -   SEVERE = 70-89% stenosis. - HAIRLINE/CRITICAL = 90-99% stenosis. -   OCCLUDED = 100% stenosis.      CTA HEAD:    INTERNAL CAROTID ARTERIES: Bilateral petrous, precavernous, cavernous,   and supraclinoid regions are patent without significant stenosis.    [place]: No aneurysm identified. Tinyaneurysms can be beyond   the resolution of CTA technique.    ANTERIOR CEREBRAL ARTERIES: No significant stenosis or occlusion.  MIDDLE CEREBRAL ARTERIES: No significant stenosis or occlusion.  POSTERIOR CEREBRAL ARTERIES: No significant stenosis or occlusion.    DISTAL VERTEBRAL / BASILAR ARTERIES: No significant stenosis or occlusion.    VENOUS STRUCTURES: Inadequate venous phase opacification.    MISCELLANEOUS: No other vascular abnormality is seen.      IMPRESSION:    CT HEAD:  Stable left medial basal ganglia parenchymal hemorrhage with associated   intraventricular extension of hemorrhage. Unchanged edema within the   adjacent left basal ganglia as well as rightward bulging of the septum   pellucidum.    CTA NECK:  No evidence of significant stenosis or occlusion.    CTA HEAD:  No large vessel occlusion, significant stenosis or vascular abnormality   identified.        --- End of Report ---    < end of copied text >    TTE  ____________________________________________________________________________________     CONCLUSIONS:      1. Fair study quality.   2. Left ventricular systolic function is normal with an ejection fraction of 60 % by Chua's method of disks.   3. Normal right ventricular systolic function.   4. No prior echocardiogram is available for comparison.    ________________________________________________________________________________________  FINDINGS:     Left Ventricle:  The left ventricular cavity is normal in size. Left ventricular wall thickness is normal. Left ventricular systolic function is normal with a calculated ejection fraction of 60 % by the Chua's biplane method of disks. There is normal LV mass and normal geometry. No definitive evidence of regional wall motion abnormalities. The left ventricular diastolic function is indeterminate.     Right Ventricle:  The right ventricular cavity is normal in size and right ventricular systolic function is normal. Tricuspid annular plane systolic excursion (TAPSE) is 3.0 cm (normal >=1.7 cm).     Left Atrium:  The left atrium is normal in size with an indexed volume of 21.00 ml/m².     Right Atrium:  The right atrium is normal in size with an indexed volume of 9.85 ml/m².     Aortic Valve:  The aortic valve was not well visualized. The aortic valve anatomy cannot be determined. There is no aortic valve stenosis. There is trace aortic regurgitation.     Mitral Valve:  There is mild calcification of the mitral valve annulus. Thickened mitral valve leaflets. There is no mitral valve stenosis. There is trace mitral regurgitation.     Tricuspid Valve:  Structurally normal tricuspid valve with normal leaflet excursion. There is trace tricuspid regurgitation. There is insufficient tricuspid regurgitation detected to calculate pulmonary artery systolic pressure.     Pulmonic Valve:  The pulmonic valve was not well visualized. There is no pulmonic valve stenosis.     Aorta:  The ascending aorta is not well visualized. The aortic root at the sinuses of Valsalva is normal in size, measuring 3.13 cm (indexed 1.62 cm/m²). The ascending aorta is normal in size, measuring 3.10 cm (indexed 1.61 cm/m²).     Pericardium:  No pericardial effusion seen.     Systemic Veins:  The inferior vena cava was not well visualized.

## 2024-10-14 NOTE — CONSULT NOTE ADULT - ASSESSMENT
Assessment  44 Right handed man with HTN HLD p/w headache, emesis, dizziness found to have left IPH w/ IVH. VS in triage 133/88. Exam vairying from awake  +somnolence to lethargic +somnolence, some confusion/disorientation. Labs w/ PMN predomn leukocytosis to 16K, normal coags, mild hyponatremia improved.  LDL/HDL: 146/43. A1C 5. CXR clear lungs. VA duplex neg for dvt in b/l LE. CTH with left BG IPH and IVH stable @ 4 hours.   NIHSS 1  ICH Score 1    Impression:  -Headache and somnolence due to left BG IPH w/ IVH extension. Etiology of bleed pending workup. Possibly hypertensive  -10/14 is somnolent without focal deficits and 4 hour interval scan is stable    NEURO:   LDL/HDL: 146/43. A1C 5.9  Continue close monitoring for neurologic deterioration.     []MRI Brain with and without contrast,   []Physical therapy/OT/Speech eval/treatment.   []BP goal per NSGY per now  []Hydro-watch for now with IVH  []Hypertonic per NSGY/NSICU for now  []Continue atorvastatin     ANTITHROMBOTIC THERAPY:  []None iso ICH    PULMONARY: CXR clear, protecting airway, saturating well     CARDIOVASCULAR: TTE without pathology  cardiac monitoring                              SBP goal: <140 or per NSICU    GASTROINTESTINAL:  dysphagia screen  - passed     Diet: regular    RENAL: BUN/Cr stable, good urine output      Na Goal: Greater than 145 or per NSGY    HEMATOLOGY: H/H stable, Platelets normal      DVT ppx: Only mechanical for now iso PICH    ID: afebrile, nonspecific leukocytosis  []Trend leukocytosis and fever curve, no localized infectious symptoms noted     OTHER:     DISPOSITION: Rehab or home depending on PT eval once stable and workup is complete    CORE MEASURES:        Admission NIHSS: 1     TPA: [] YES [X] NO      LDL/HDL: 146/43     Depression Screen:      Statin Therapy: YES     Dysphagia Screen: [X] PASS [] FAIL     Smoking [] YES [X] NO      Afib [] YES [X] NO     Stroke Education [] YES [] NO Assessment  44 Right handed man with HTN HLD p/w headache, emesis, dizziness found to have left IPH w/ IVH. VS in triage 133/88. Exam vairying from awake  +somnolence to lethargic +somnolence, some confusion/disorientation. Labs w/ PMN predomn leukocytosis to 16K, normal coags, mild hyponatremia improved.  LDL/HDL: 146/43. A1C 5. CXR clear lungs. VA duplex neg for dvt in b/l LE. CTH with left BG IPH and IVH stable @ 4 hours.   NIHSS 1  ICH Score 1    Impression:  -Headache and somnolence due to left BG IPH w/ IVH extension. Etiology of bleed pending workup. Possibly hypertensive  -10/14 is somnolent without focal deficits and 4 hour interval scan is stable    NEURO:   LDL/HDL: 146/43. A1C 5.9  Continue close monitoring for neurologic deterioration.     []MRI Brain with and without contrast,   []Physical therapy/OT/Speech eval/treatment.   []BP goal per NSGY per now  []Hydro-watch for now with IVH  []Hypertonic per NSGY/NSICU for now  []Continue atorvastatin   []Check UDS    ANTITHROMBOTIC THERAPY:  []None iso ICH    PULMONARY: CXR clear, protecting airway, saturating well     CARDIOVASCULAR: TTE without pathology  cardiac monitoring                              SBP goal: <140 or per NSICU    GASTROINTESTINAL:  dysphagia screen  - passed     Diet: regular    RENAL: BUN/Cr stable, good urine output      Na Goal: Greater than 145 or per NSGY    HEMATOLOGY: H/H stable, Platelets normal      DVT ppx: Only mechanical for now iso PICH    ID: afebrile, nonspecific leukocytosis  []Trend leukocytosis and fever curve, no localized infectious symptoms noted     OTHER:     DISPOSITION: Rehab or home depending on PT eval once stable and workup is complete    CORE MEASURES:        Admission NIHSS: 1     TPA: [] YES [X] NO      LDL/HDL: 146/43     Depression Screen:      Statin Therapy: YES     Dysphagia Screen: [X] PASS [] FAIL     Smoking [] YES [X] NO      Afib [] YES [X] NO     Stroke Education [] YES [] NO

## 2024-10-14 NOTE — PROCEDURE NOTE - ATTENDING PROVIDER
Report received from Elena RICO RN. Patient care assumed. Patient is AAOx4 and observed lying in bed with cardiac monitoring in progress. Vital signs stable and found in flow sheets with complete patient assessment. Skin dry and intact. No complaints of pain and no signs of respiratory distress noted.  Patient stable and will continue to be monitored.    Judy Stewart

## 2024-10-15 LAB
ANION GAP SERPL CALC-SCNC: 13 MMOL/L — SIGNIFICANT CHANGE UP (ref 5–17)
ANION GAP SERPL CALC-SCNC: 13 MMOL/L — SIGNIFICANT CHANGE UP (ref 5–17)
ANION GAP SERPL CALC-SCNC: 14 MMOL/L — SIGNIFICANT CHANGE UP (ref 5–17)
BUN SERPL-MCNC: 17 MG/DL — SIGNIFICANT CHANGE UP (ref 7–23)
BUN SERPL-MCNC: 18 MG/DL — SIGNIFICANT CHANGE UP (ref 7–23)
BUN SERPL-MCNC: 20 MG/DL — SIGNIFICANT CHANGE UP (ref 7–23)
CALCIUM SERPL-MCNC: 8.3 MG/DL — LOW (ref 8.4–10.5)
CALCIUM SERPL-MCNC: 8.3 MG/DL — LOW (ref 8.4–10.5)
CALCIUM SERPL-MCNC: 8.5 MG/DL — SIGNIFICANT CHANGE UP (ref 8.4–10.5)
CHLORIDE SERPL-SCNC: 102 MMOL/L — SIGNIFICANT CHANGE UP (ref 96–108)
CHLORIDE SERPL-SCNC: 104 MMOL/L — SIGNIFICANT CHANGE UP (ref 96–108)
CHLORIDE SERPL-SCNC: 104 MMOL/L — SIGNIFICANT CHANGE UP (ref 96–108)
CO2 SERPL-SCNC: 20 MMOL/L — LOW (ref 22–31)
CO2 SERPL-SCNC: 20 MMOL/L — LOW (ref 22–31)
CO2 SERPL-SCNC: 21 MMOL/L — LOW (ref 22–31)
CREAT SERPL-MCNC: 0.81 MG/DL — SIGNIFICANT CHANGE UP (ref 0.5–1.3)
CREAT SERPL-MCNC: 0.85 MG/DL — SIGNIFICANT CHANGE UP (ref 0.5–1.3)
CREAT SERPL-MCNC: 1.04 MG/DL — SIGNIFICANT CHANGE UP (ref 0.5–1.3)
EGFR: 111 ML/MIN/1.73M2 — SIGNIFICANT CHANGE UP
EGFR: 112 ML/MIN/1.73M2 — SIGNIFICANT CHANGE UP
EGFR: 91 ML/MIN/1.73M2 — SIGNIFICANT CHANGE UP
GLUCOSE BLDC GLUCOMTR-MCNC: 108 MG/DL — HIGH (ref 70–99)
GLUCOSE BLDC GLUCOMTR-MCNC: 110 MG/DL — HIGH (ref 70–99)
GLUCOSE BLDC GLUCOMTR-MCNC: 114 MG/DL — HIGH (ref 70–99)
GLUCOSE BLDC GLUCOMTR-MCNC: 125 MG/DL — HIGH (ref 70–99)
GLUCOSE SERPL-MCNC: 117 MG/DL — HIGH (ref 70–99)
GLUCOSE SERPL-MCNC: 120 MG/DL — HIGH (ref 70–99)
GLUCOSE SERPL-MCNC: 130 MG/DL — HIGH (ref 70–99)
HCT VFR BLD CALC: 38.6 % — LOW (ref 39–50)
HCT VFR BLD CALC: 38.8 % — LOW (ref 39–50)
HGB BLD-MCNC: 13.5 G/DL — SIGNIFICANT CHANGE UP (ref 13–17)
HGB BLD-MCNC: 13.6 G/DL — SIGNIFICANT CHANGE UP (ref 13–17)
MAGNESIUM SERPL-MCNC: 2.1 MG/DL — SIGNIFICANT CHANGE UP (ref 1.6–2.6)
MAGNESIUM SERPL-MCNC: 2.2 MG/DL — SIGNIFICANT CHANGE UP (ref 1.6–2.6)
MAGNESIUM SERPL-MCNC: 2.3 MG/DL — SIGNIFICANT CHANGE UP (ref 1.6–2.6)
MCHC RBC-ENTMCNC: 28.4 PG — SIGNIFICANT CHANGE UP (ref 27–34)
MCHC RBC-ENTMCNC: 28.7 PG — SIGNIFICANT CHANGE UP (ref 27–34)
MCHC RBC-ENTMCNC: 35 GM/DL — SIGNIFICANT CHANGE UP (ref 32–36)
MCHC RBC-ENTMCNC: 35.1 GM/DL — SIGNIFICANT CHANGE UP (ref 32–36)
MCV RBC AUTO: 81.3 FL — SIGNIFICANT CHANGE UP (ref 80–100)
MCV RBC AUTO: 81.9 FL — SIGNIFICANT CHANGE UP (ref 80–100)
NRBC # BLD: 0 /100 WBCS — SIGNIFICANT CHANGE UP (ref 0–0)
NRBC # BLD: 0 /100 WBCS — SIGNIFICANT CHANGE UP (ref 0–0)
PHOSPHATE SERPL-MCNC: 1.6 MG/DL — LOW (ref 2.5–4.5)
PHOSPHATE SERPL-MCNC: 2.7 MG/DL — SIGNIFICANT CHANGE UP (ref 2.5–4.5)
PHOSPHATE SERPL-MCNC: 2.9 MG/DL — SIGNIFICANT CHANGE UP (ref 2.5–4.5)
PLATELET # BLD AUTO: 173 K/UL — SIGNIFICANT CHANGE UP (ref 150–400)
PLATELET # BLD AUTO: 179 K/UL — SIGNIFICANT CHANGE UP (ref 150–400)
POTASSIUM SERPL-MCNC: 3.4 MMOL/L — LOW (ref 3.5–5.3)
POTASSIUM SERPL-MCNC: 3.5 MMOL/L — SIGNIFICANT CHANGE UP (ref 3.5–5.3)
POTASSIUM SERPL-MCNC: 4 MMOL/L — SIGNIFICANT CHANGE UP (ref 3.5–5.3)
POTASSIUM SERPL-SCNC: 3.4 MMOL/L — LOW (ref 3.5–5.3)
POTASSIUM SERPL-SCNC: 3.5 MMOL/L — SIGNIFICANT CHANGE UP (ref 3.5–5.3)
POTASSIUM SERPL-SCNC: 4 MMOL/L — SIGNIFICANT CHANGE UP (ref 3.5–5.3)
RBC # BLD: 4.74 M/UL — SIGNIFICANT CHANGE UP (ref 4.2–5.8)
RBC # BLD: 4.75 M/UL — SIGNIFICANT CHANGE UP (ref 4.2–5.8)
RBC # FLD: 12.8 % — SIGNIFICANT CHANGE UP (ref 10.3–14.5)
RBC # FLD: 13.1 % — SIGNIFICANT CHANGE UP (ref 10.3–14.5)
SODIUM SERPL-SCNC: 136 MMOL/L — SIGNIFICANT CHANGE UP (ref 135–145)
SODIUM SERPL-SCNC: 137 MMOL/L — SIGNIFICANT CHANGE UP (ref 135–145)
SODIUM SERPL-SCNC: 138 MMOL/L — SIGNIFICANT CHANGE UP (ref 135–145)
WBC # BLD: 16.17 K/UL — HIGH (ref 3.8–10.5)
WBC # BLD: 16.29 K/UL — HIGH (ref 3.8–10.5)
WBC # FLD AUTO: 16.17 K/UL — HIGH (ref 3.8–10.5)
WBC # FLD AUTO: 16.29 K/UL — HIGH (ref 3.8–10.5)

## 2024-10-15 PROCEDURE — 99291 CRITICAL CARE FIRST HOUR: CPT

## 2024-10-15 PROCEDURE — 70450 CT HEAD/BRAIN W/O DYE: CPT | Mod: 26

## 2024-10-15 RX ORDER — SODIUM CHLORIDE 5 % 5 %
1000 INTRAVENOUS SOLUTION INTRAVENOUS
Refills: 0 | Status: DISCONTINUED | OUTPATIENT
Start: 2024-10-15 | End: 2024-10-17

## 2024-10-15 RX ORDER — HYDRALAZINE HYDROCHLORIDE 50 MG/1
50 TABLET, FILM COATED ORAL ONCE
Refills: 0 | Status: COMPLETED | OUTPATIENT
Start: 2024-10-15 | End: 2024-10-15

## 2024-10-15 RX ORDER — SODIUM CHLORIDE 9 MG/ML
2 INJECTION, SOLUTION INTRAMUSCULAR; INTRAVENOUS; SUBCUTANEOUS EVERY 6 HOURS
Refills: 0 | Status: DISCONTINUED | OUTPATIENT
Start: 2024-10-15 | End: 2024-11-04

## 2024-10-15 RX ORDER — HYDRALAZINE HYDROCHLORIDE 50 MG/1
25 TABLET, FILM COATED ORAL ONCE
Refills: 0 | Status: COMPLETED | OUTPATIENT
Start: 2024-10-15 | End: 2024-10-15

## 2024-10-15 RX ORDER — HYDRALAZINE HYDROCHLORIDE 50 MG/1
50 TABLET, FILM COATED ORAL EVERY 8 HOURS
Refills: 0 | Status: DISCONTINUED | OUTPATIENT
Start: 2024-10-15 | End: 2024-10-15

## 2024-10-15 RX ORDER — HYDRALAZINE HYDROCHLORIDE 50 MG/1
100 TABLET, FILM COATED ORAL EVERY 8 HOURS
Refills: 0 | Status: DISCONTINUED | OUTPATIENT
Start: 2024-10-15 | End: 2024-10-25

## 2024-10-15 RX ORDER — HYDRALAZINE HYDROCHLORIDE 50 MG/1
5 TABLET, FILM COATED ORAL ONCE
Refills: 0 | Status: COMPLETED | OUTPATIENT
Start: 2024-10-15 | End: 2024-10-15

## 2024-10-15 RX ORDER — ACETAMINOPHEN 500 MG
1000 TABLET ORAL ONCE
Refills: 0 | Status: COMPLETED | OUTPATIENT
Start: 2024-10-15 | End: 2024-10-15

## 2024-10-15 RX ORDER — POTASSIUM CHLORIDE 10 MEQ
20 TABLET, EXTENDED RELEASE ORAL
Refills: 0 | Status: COMPLETED | OUTPATIENT
Start: 2024-10-15 | End: 2024-10-15

## 2024-10-15 RX ORDER — POTASSIUM CHLORIDE 10 MEQ
20 TABLET, EXTENDED RELEASE ORAL
Refills: 0 | Status: COMPLETED | OUTPATIENT
Start: 2024-10-15 | End: 2024-10-16

## 2024-10-15 RX ORDER — POTASSIUM PHOSPHATE 236; 224 MG/ML; MG/ML
30 INJECTION, SOLUTION INTRAVENOUS ONCE
Refills: 0 | Status: COMPLETED | OUTPATIENT
Start: 2024-10-15 | End: 2024-10-15

## 2024-10-15 RX ADMIN — HYDRALAZINE HYDROCHLORIDE 25 MILLIGRAM(S): 50 TABLET, FILM COATED ORAL at 01:01

## 2024-10-15 RX ADMIN — Medication 1000 MILLIGRAM(S): at 05:00

## 2024-10-15 RX ADMIN — Medication 20 MILLIEQUIVALENT(S): at 22:46

## 2024-10-15 RX ADMIN — Medication 20 MILLIEQUIVALENT(S): at 17:01

## 2024-10-15 RX ADMIN — Medication 2 TABLET(S): at 21:07

## 2024-10-15 RX ADMIN — Medication 400 MILLIGRAM(S): at 04:30

## 2024-10-15 RX ADMIN — TRAMADOL HYDROCHLORIDE 25 MILLIGRAM(S): 50 TABLET, COATED ORAL at 21:30

## 2024-10-15 RX ADMIN — HYDRALAZINE HYDROCHLORIDE 50 MILLIGRAM(S): 50 TABLET, FILM COATED ORAL at 10:04

## 2024-10-15 RX ADMIN — Medication 40 MILLIGRAM(S): at 21:07

## 2024-10-15 RX ADMIN — Medication 50 MILLILITER(S): at 08:30

## 2024-10-15 RX ADMIN — POLYETHYLENE GLYCOL 3350 17 GRAM(S): 17 POWDER, FOR SOLUTION ORAL at 17:01

## 2024-10-15 RX ADMIN — Medication 25 MG/HR: at 16:17

## 2024-10-15 RX ADMIN — HYDRALAZINE HYDROCHLORIDE 100 MILLIGRAM(S): 50 TABLET, FILM COATED ORAL at 20:29

## 2024-10-15 RX ADMIN — HYDRALAZINE HYDROCHLORIDE 5 MILLIGRAM(S): 50 TABLET, FILM COATED ORAL at 04:00

## 2024-10-15 RX ADMIN — Medication 10 MILLIGRAM(S): at 05:03

## 2024-10-15 RX ADMIN — LOSARTAN POTASSIUM 100 MILLIGRAM(S): 25 TABLET ORAL at 06:06

## 2024-10-15 RX ADMIN — HYDRALAZINE HYDROCHLORIDE 50 MILLIGRAM(S): 50 TABLET, FILM COATED ORAL at 11:59

## 2024-10-15 RX ADMIN — Medication 20 MILLIEQUIVALENT(S): at 11:59

## 2024-10-15 RX ADMIN — Medication 40 MILLIEQUIVALENT(S): at 00:44

## 2024-10-15 RX ADMIN — POTASSIUM PHOSPHATE 83.33 MILLIMOLE(S): 236; 224 INJECTION, SOLUTION INTRAVENOUS at 22:46

## 2024-10-15 RX ADMIN — HYDRALAZINE HYDROCHLORIDE 25 MILLIGRAM(S): 50 TABLET, FILM COATED ORAL at 02:28

## 2024-10-15 RX ADMIN — CHLORHEXIDINE GLUCONATE 1 APPLICATION(S): 40 SOLUTION TOPICAL at 21:10

## 2024-10-15 RX ADMIN — TRAMADOL HYDROCHLORIDE 25 MILLIGRAM(S): 50 TABLET, COATED ORAL at 21:00

## 2024-10-15 RX ADMIN — PANTOPRAZOLE SODIUM 40 MILLIGRAM(S): 40 TABLET, DELAYED RELEASE ORAL at 11:59

## 2024-10-15 RX ADMIN — POLYETHYLENE GLYCOL 3350 17 GRAM(S): 17 POWDER, FOR SOLUTION ORAL at 05:03

## 2024-10-15 RX ADMIN — Medication 20 MILLIEQUIVALENT(S): at 13:49

## 2024-10-15 RX ADMIN — Medication 25 MG/HR: at 08:29

## 2024-10-15 NOTE — SPEECH LANGUAGE PATHOLOGY EVALUATION - ARTICULATION
reduced ROM of articulators in setting of suspected component of hypokinetic dysarthria vs. reduced effort/imprecise

## 2024-10-15 NOTE — SWALLOW BEDSIDE ASSESSMENT ADULT - ASR SWALLOW ASPIRATION MONITOR
Monitor for s/s aspiration/laryngeal penetration. If noted:  D/C p.o. intake, provide non-oral nutrition/hydration/meds, and contact this service @ [phone]/change of breathing pattern/cough/gurgly voice/fever/pneumonia/throat clearing/upper respiratory infection

## 2024-10-15 NOTE — PROGRESS NOTE ADULT - ASSESSMENT
ASSESSMENT: HPI:  44M no AC/AP hx uncontrolled HTN tx LVS s/p flu-like symptoms and headache; CTH w/ left BG IPH w/ IVH extension. Exam: Pashto speaking, Ox3, PERRL, no drift, CHAPA 5/5, SILT (14 Oct 2024 04:30)    NEURO:  - CT Head in AM  - CTH (10/14): L BG lucency c/f infarct, L corona radiata heme w/ IVH extension and mild vent dilation, stable   - 10/14 CTA H/N: no significant stenosis, no vessel occlusion/AVM   - Hydrowatch: Continue to monitor for possible EVD if exam worsens  - Continue with neuro checks q2  - Continue pain regimen: tylenol and oxycodone PRN   - Stroke neurology following  - Activity: [x] OOB as tolerated [] Bedrest [] PT [] OT [] PMNR    PULM:  - Incentive spirometry, mobilize as tolerated  - satting >92% on 2L NC    CV:  - Keep -160mmHg  - TTE (10/14): EF: 60%  - Continue to wean off cardene, added hydralazine 25q8  - Continue lipitor 40mg daily for HLD  - Continue hydralazine 25q8, losartan 100 daily, amlodipine 10mg daily for HTN  - Troponin 524 on admission, repeat was 44    RENAL:  - Continue 3% + acetate @50  - Continue to monitor BMP q6hrs for Na goal 140-150  - Continue to monitor Is&Os    GI:  - Diet: Regular  - GI prophylaxis [] not indicated [x] PPI [] other:  - Bowel regimen [x] miralax  [x] senna [] other:  - LBM: prior to admission    ENDO:   - Goal euglycemia (-180)  - ISS    HEME/ONC:  - VTE prophylaxis: [x] SCDs [] chemoprophylaxis [] hold chemoprophylaxis due to: [] high risk of DVT/PE on admission due to:  - LED done on (10/14): negative    ID:  - afebrile    MISC:    SOCIAL/FAMILY:  [x] awaiting [] updated at bedside [] family meeting    CODE STATUS:  [x] Full Code [] DNR [] DNI [] Palliative/Comfort Care    DISPOSITION:  [x] ICU [] Stroke Unit [] Floor [] EMU [] RCU [] PCU    [x] Patient is at high risk of neurologic deterioration/death due to: pending clincial course    Time seen:  Time spent: __35_ [x] critical care minutes    Contact: [phone] ASSESSMENT: HPI:  44M no AC/AP hx uncontrolled HTN tx LVS s/p flu-like symptoms and headache; CTH w/ left BG IPH w/ IVH extension. Exam: Arabic speaking, Ox3, PERRL, no drift, CHAPA 5/5, SILT (14 Oct 2024 04:30)    NEURO:  - CT Head in AM  - CTH (10/14): L BG lucency c/f infarct, L corona radiata heme w/ IVH extension and mild vent dilation, stable   - 10/14 CTA H/N: no significant stenosis, no vessel occlusion/AVM   - Hydrowatch: Continue to monitor for possible EVD if exam worsens  - c/w neuro checks q2  - Pain regimen: tylenol and oxycodone PRN   - Stroke neurology following  - Activity: [x] OOB as tolerated [] Bedrest [] PT [] OT [] PMNR    PULM:  - Incentive spirometry, mobilize as tolerated  - satting >92% on 2L NC    CV:  - Keep -160mmHg  - TTE (10/14): EF: 60%  - Continue to wean off cardene, added hydralazine 25q8  - Continue lipitor 40mg daily for HLD  - Continue hydralazine 25q8, losartan 100 daily, amlodipine 10mg daily for HTN  - Troponin 524 on admission, repeat was 44    RENAL:  - Continue 3% + acetate @50  - Continue to monitor BMP q6hrs for Na goal 140-150  - Continue to monitor Is&Os    GI:  - Diet: Regular  - GI prophylaxis [] not indicated [x] PPI [] other:  - Bowel regimen [x] miralax  [x] senna [] other:  - LBM: prior to admission    ENDO:   - Goal euglycemia (-180)  - ISS    HEME/ONC:  - VTE prophylaxis: [x] SCDs [] chemoprophylaxis [] hold chemoprophylaxis due to: [] high risk of DVT/PE on admission due to:  - LED done on (10/14): negative    ID:  - afebrile    MISC:    SOCIAL/FAMILY:  [x] awaiting [] updated at bedside [] family meeting    CODE STATUS:  [x] Full Code [] DNR [] DNI [] Palliative/Comfort Care    DISPOSITION:  [x] ICU [] Stroke Unit [] Floor [] EMU [] RCU [] PCU    [x] Patient is at high risk of neurologic deterioration/death due to: pending clincial course    Time seen:  Time spent: __35_ [x] critical care minutes    Contact: [phone] ASSESSMENT: HPI:  44M no AC/AP hx uncontrolled HTN tx LVS s/p flu-like symptoms and headache; CTH w/ left BG IPH w/ IVH extension. Exam: Welsh speaking, Ox3, PERRL, no drift, CHAPA 5/5, SILT (14 Oct 2024 04:30)    NEURO:  - CTH (10/14): L BG lucency c/f infarct, L corona radiata heme w/ IVH extension and mild vent dilation, stable   - repeat CT Head this AM - stable pending official read  - 10/14 CTA H/N: no significant stenosis, no vessel occlusion/AVM   - Hydrowatch: Continue to monitor for possible EVD if exam worsens  - c/w neuro checks q2h  - Pain regimen: tylenol and oxycodone PRN   - Stroke neurology following  - Activity: [x] OOB as tolerated [] Bedrest [] PT [] OT [] PMNR    PULM:  - Incentive spirometry, mobilize as tolerated  - satting >92% on 2L NC    CV:  - Keep -160mmHg  - TTE (10/14): EF: 60%  - Continue to wean off cardene, added hydralazine 25q8  - Continue lipitor 40mg daily for HLD  - increase hydralazine 100q8  - c/w losartan 100 daily, amlodipine 10mg daily for HTN  - Troponin 524 on admission, repeat was 44    RENAL:  - Continue 3% + acetate @50  - Continue to monitor BMP q6hrs for Na goal 140-150  - Continue to monitor Is&Os    GI:  - Diet: Regular  - GI prophylaxis [] not indicated [x] PPI [] other:  - Bowel regimen [x] miralax  [x] senna [] other:  - LBM: prior to admission    ENDO:   - Goal euglycemia (-180)  - ISS    HEME/ONC:  - VTE prophylaxis: [x] SCDs [] chemoprophylaxis [] hold chemoprophylaxis due to: [] high risk of DVT/PE on admission due to:  - LED done on (10/14): negative    ID:  - afebrile    MISC:    SOCIAL/FAMILY:  [x] awaiting [] updated at bedside [] family meeting    CODE STATUS:  [x] Full Code [] DNR [] DNI [] Palliative/Comfort Care    DISPOSITION:  [x] ICU [] Stroke Unit [] Floor [] EMU [] RCU [] PCU    [x] Patient is at high risk of neurologic deterioration/death due to: pending clincial course    Time seen:  Time spent: __35_ [x] critical care minutes    Contact: [phone]

## 2024-10-15 NOTE — SPEECH LANGUAGE PATHOLOGY EVALUATION - SLP DIAGNOSIS
44yoM admitted with [facility] with IVH. Pt presents with significant hypophonia in setting of suspected component of hypokinetic dysarthria vs. reduced effort. Pt also presents with evidence of suspected cognitive-linguistic impairment with reduced orientation and suspected reduced insight and initiation. Pt notably drowsy, asking to defer remainder of evaluation to another time. This service will continue to follow.

## 2024-10-15 NOTE — SWALLOW BEDSIDE ASSESSMENT ADULT - SLP GENERAL OBSERVATIONS
Pt seen at bedside, awake but drowsy on encounter, oriented to self, place and year, stated November as month, verbally responsive, following simple directions for the purposes of the exam. Pt noted to be significantly hypophonic in setting of suspected reduced effort. Pt seen with assistance of  #[id number] (Chapo) zainab Pratt.

## 2024-10-15 NOTE — PROGRESS NOTE ADULT - SUBJECTIVE AND OBJECTIVE BOX
HOSPITAL COURSE: Pt is a 43yo M with history of uncontrolled HTN, not on AC/AP admitted for L BG IPH with IVH extension.     HOSPITAL COURSE:  10/14: admitted to [facility]    ADMISSION SCORES:   NIHSS: 1    ICH Score: 1    -----------------------------------------------------------------------------------------------------  ICU Vital Signs Last 24 Hrs  T(C): 36.9 (15 Oct 2024 03:00), Max: 37.2 (14 Oct 2024 11:00)  T(F): 98.5 (15 Oct 2024 03:00), Max: 99 (14 Oct 2024 11:00)  HR: 88 (15 Oct 2024 06:15) (72 - 109)  BP: --  BP(mean): --  ABP: 137/64 (15 Oct 2024 06:15) (108/74 - 173/89)  ABP(mean): 88 (15 Oct 2024 06:15) (78 - 116)  RR: 20 (15 Oct 2024 06:15) (14 - 24)  SpO2: 98% (15 Oct 2024 06:15) (96% - 100%)    O2 Parameters below as of 15 Oct 2024 03:00  Patient On (Oxygen Delivery Method): room air            I&O's Summary    14 Oct 2024 07:01  -  15 Oct 2024 07:00  --------------------------------------------------------  IN: 3404.3 mL / OUT: 1425 mL / NET: 1979.3 mL        MEDICATIONS  (STANDING):  amLODIPine   Tablet 10 milliGRAM(s) Oral daily  atorvastatin 40 milliGRAM(s) Oral at bedtime  chlorhexidine 4% Liquid 1 Application(s) Topical <User Schedule>  hydrALAZINE 50 milliGRAM(s) Oral every 8 hours  insulin lispro (ADMELOG) corrective regimen sliding scale   SubCutaneous every 6 hours  losartan 100 milliGRAM(s) Oral daily  niCARdipine Infusion 5 mG/Hr (25 mL/Hr) IV Continuous <Continuous>  pantoprazole  Injectable 40 milliGRAM(s) IV Push daily  polyethylene glycol 3350 17 Gram(s) Oral every 12 hours  senna 2 Tablet(s) Oral at bedtime  sodium chloride 3% + sodium acetate 50:50 1000 milliLiter(s) (50 mL/Hr) IV Continuous <Continuous>      RESPIRATORY:      IMAGING:   Recent imaging studies were reviewed.    LAB RESULTS:                          13.6   16.29 )-----------( 173      ( 15 Oct 2024 02:48 )             38.8       PT/INR - ( 14 Oct 2024 04:13 )   PT: 11.6 sec;   INR: 1.01 ratio         PTT - ( 14 Oct 2024 04:13 )  PTT:29.6 sec    10-15    137  |  104  |  18  ----------------------------<  117[H]  4.0   |  20[L]  |  0.85    Ca    8.3[L]      15 Oct 2024 02:48  Phos  2.9     10-15  Mg     2.1     10-15    TPro  7.4  /  Alb  4.2  /  TBili  0.9  /  DBili  x   /  AST  35  /  ALT  69[H]  /  AlkPhos  97  10-14  -----------------------------------------------------------------------------------------------------------------------------------------------------------------------------------      DEVICES:   [ ] Restraints [ ] ET tube [ ] central line [x ] arterial line [ ] mclaughlin [ ] NGT/OGT [ ] EVD [ ] LD [ ] ENRIQUE/HMV [ ] Trach [ ] PEG [ ] Chest Tube       EXAMINATION:  PHYSICAL EXAM:    Constitutional: No Acute Distress     Neurological: Arousable, Ox3 (self, place, year w/ choices), PERRL, EOMI, FC, trace RUE drift, CHAPA 5/5, SILT      Pulmonary: Clear to Auscultation, No rales, No rhonchi, No wheezes     Cardiovascular: S1, S2, Regular rate and rhythm     Gastrointestinal: Soft, Non-tender, Non-distended     Extremities: No calf tenderness

## 2024-10-15 NOTE — SPEECH LANGUAGE PATHOLOGY EVALUATION - SLP PERTINENT HISTORY OF CURRENT PROBLEM
44M no AC/AP hx uncontrolled HTN tx LVS s/p flu-like symptoms and headache; CTH w/ left BG IPH w/ IVH extension. Exam: Tamazight speaking, Ox3, PERRL, no drift, CHAPA 5/5, SILT. NIHSS: 1, ICH Score: 1; Hydrowatch; Poss EVD if exam worsens; wean cardene

## 2024-10-15 NOTE — PROGRESS NOTE ADULT - SUBJECTIVE AND OBJECTIVE BOX
THE PATIENT WAS SEEN AND EXAMINED BY ME WITH THE HOUSESTAFF AND STROKE TEAM DURING MORNING ROUNDS.   HPI:  44M no AC/AP hx uncontrolled HTN tx LVS s/p flu-like symptoms and headache; CTH w/ left BG IPH w/ IVH extension. Exam: Albanian speaking, Ox3, PERRL, no drift, CHAPA 5/5, SILT (14 Oct 2024 04:30)  SUBJECTIVE: No events overnight.  No new neurologic complaints.      MEDICATIONS:   Antibiotics:     Neuro:   traMADol 25 milliGRAM(s) Oral every 4 hours PRN  traMADol 50 milliGRAM(s) Oral every 6 hours PRN    CV:   amLODIPine   Tablet 10 milliGRAM(s) Oral daily  hydrALAZINE 100 milliGRAM(s) Oral every 8 hours  losartan 100 milliGRAM(s) Oral daily  niCARdipine Infusion 5 mG/Hr IV Continuous <Continuous>    Pulm:     GI/:  pantoprazole  Injectable 40 milliGRAM(s) IV Push daily  polyethylene glycol 3350 17 Gram(s) Oral every 12 hours  senna 2 Tablet(s) Oral at bedtime    Heme:     Other:   atorvastatin 40 milliGRAM(s) Oral at bedtime  chlorhexidine 4% Liquid 1 Application(s) Topical <User Schedule>  insulin lispro (ADMELOG) corrective regimen sliding scale   SubCutaneous every 6 hours  potassium chloride    Tablet ER 20 milliEquivalent(s) Oral every 2 hours  sodium chloride 3% + sodium acetate 50:50 1000 milliLiter(s) IV Continuous <Continuous>      PHYSICAL EXAM:   Vital Signs Last 24 Hrs  T(C): 36.9 (15 Oct 2024 11:00), Max: 37.1 (14 Oct 2024 15:00)  T(F): 98.5 (15 Oct 2024 11:00), Max: 98.7 (14 Oct 2024 15:00)  HR: 104 (15 Oct 2024 12:30) (75 - 109)  BP: --  BP(mean): --  RR: 17 (15 Oct 2024 12:30) (14 - 22)  SpO2: 100% (15 Oct 2024 12:30) (96% - 100%)    Parameters below as of 15 Oct 2024 07:00  Patient On (Oxygen Delivery Method): room air      General: No acute distress  HEENT: EOM intact, visual fields full  Abdomen: Soft, nontender, nondistended   Extremities: No edema    NEUROLOGICAL EXAM:  Mental status: Awake, alert, oriented x3, no aphasia, no neglect, normal memory   Cranial Nerves: No facial asymmetry, no nystagmus, no dysarthria, no dysphagia, tongue midline, shoulder shrug intact bilaterally.  Motor exam: Normal tone, no drift, normal fine finger movements.         [] Upper extremity                Delt       Bicep    Tricep                                                  R         5/5        5/5        5/5       5/5                                               L          5/5        5/5        5/5       5/5         [] Lower extremity               HF          KE          KF        DF         PF                                               R        5/5        5/5        5/5       5/5       5/5                                               L         5/5        5/5       5/5       5/5        5/5    Sensation: Intact to light touch   Coordination/ Gait: No dysmetria, LORA intact and symmetric bilaterally, on bedrest     LABS:                        13.6   16.29 )-----------( 173      ( 15 Oct 2024 02:48 )             38.8    10-15    138  |  104  |  17  ----------------------------<  120[H]  3.4[L]   |  20[L]  |  0.81    Ca    8.5      15 Oct 2024 09:11  Phos  2.7     10-15  Mg     2.2     10-15    TPro  7.4  /  Alb  4.2  /  TBili  0.9  /  DBili  x   /  AST  35  /  ALT  69[H]  /  AlkPhos  97  10-14  PT/INR - ( 14 Oct 2024 04:13 )   PT: 11.6 sec;   INR: 1.01 ratio         PTT - ( 14 Oct 2024 04:13 )  PTT:29.6 sec      IMAGING:     < from: Xray Chest 1 View- PORTABLE-Urgent (Xray Chest 1 View- PORTABLE-Urgent .) (10.14.24 @ 07:13) >  IMPRESSION:  No acute pulmonary disease.    < end of copied text >      < from: [facility] Duplex Lower Ext Vein Scan, Bilat (10.14.24 @ 11:47) >    IMPRESSION:  No evidence of deep venous thrombosis in either lower extremity.      < end of copied text >        CNS Imaging:  CT Head No Cont:  (14 Oct 2024 09:56)  There is no significant change in the left corona radiata hemorrhage with   intraventricular extension into the left lateral ventricle and hematocrit   level on the left. Lucency in the left basal ganglia is also identified   suggesting an acute infarct.  There is mass effect on the left lateral ventricle. Mild ventricular   dilatation is unchanged.  IMPRESSION: Left basal ganglia lucency suspicious for infarct. Left   corona radiata hemorrhage with intraventricular extension and mild   ventricular dilatation, no change since 4:46 AM.    CT Head No Cont:  (14 Oct 2024 04:46)  FINDINGS:  CT BRAIN:  VENTRICLES AND SULCI: Intraventricular extension of hemorrhage,   predominantly within the left lateral ventricle, with a smaller degree of  hemorrhage identified within the right lateral, third, and fourth   ventricles. No significant ventriculomegaly.  INTRA-AXIAL: Redemonstration of a intraparenchymal hemorrhage involving   the medial left basal ganglia as well as hypodensity in the adjacent   basal ganglia, grossly stable since prior examination. Rightward bulging   of the septum pellucidum approximately 7 mm.  EXTRA-AXIAL: No mass or fluid collection. Basal cisterns are normal in   appearance.  VISUALIZED SINUSES:  Left maxillary sinus retention cyst versus polyp  TYMPANOMASTOID CAVITIES:  Clear.  VISUALIZED ORBITS: Normal.  CALVARIUM: Intact.  MISCELLANEOUS: None.  CTA NECK:  AORTIC ARCH AND VISUALIZED GREAT VESSELS: Within normal limits.  RIGHT:  COMMON CAROTID ARTERY: No significant stenosis to the carotid bifurcation.  INTERNAL CAROTID ARTERY: No significant stenosis based on NASCET criteria.  VERTEBRAL ARTERY: Normal in course and caliber to the intracranial   circulation.  LEFT:  COMMON CAROTID ARTERY: No significant stenosis to the carotid bifurcation.  INTERNAL CAROTID ARTERY: No significant stenosis based on NASCET criteria.  VERTEBRAL ARTERY: Normal in course and caliber to the intracranial   circulation.  VISUALIZED LUNGS: Partially imagedsuperior segment right lower lobe   airspace opacity, evaluation of which is limited by motion.  MISCELLANEOUS: 1.6 x 1.0 cm salivary within the substance of the left   submandibular gland. Numerous bilateral tonsilliths.  CTA HEAD:  INTERNAL CAROTID ARTERIES: Bilateral petrous, precavernous, cavernous,   and supraclinoid regions are patent without significant stenosis.  [place] OF WOLFF: No aneurysm identified. Tinyaneurysms can be beyond   the resolution of CTA technique.  ANTERIOR CEREBRAL ARTERIES: No significant stenosis or occlusion.  MIDDLE CEREBRAL ARTERIES: No significant stenosis or occlusion.  POSTERIOR CEREBRAL ARTERIES: No significant stenosis or occlusion.  DISTAL VERTEBRAL / BASILAR ARTERIES: No significant stenosis or occlusion.  VENOUS STRUCTURES: Inadequate venous phase opacification.  MISCELLANEOUS: No other vascular abnormality is seen.  IMPRESSION:  CT HEAD:  Stable left medial basal ganglia parenchymal hemorrhage with associated   intraventricular extension of hemorrhage. Unchanged edema within the   adjacent left basal ganglia as well as rightward bulging of the septum   pellucidum.    CTA NECK:  No evidence of significant stenosis or occlusion.    CTA HEAD:  No large vessel occlusion, significant stenosis or vascular abnormality   identified.    TTE  ____________________________________________________________________________________     CONCLUSIONS:      1. Fair study quality.   2. Left ventricular systolic function is normal with an ejection fraction of 60 % by Chua's method of disks.   3. Normal right ventricular systolic function.   4. No prior echocardiogram is available for comparison.    ________________________________________________________________________________________  FINDINGS:     Left Ventricle:  The left ventricular cavity is normal in size. Left ventricular wall thickness is normal. Left ventricular systolic function is normal with a calculated ejection fraction of 60 % by the Chua's biplane method of disks. There is normal LV mass and normal geometry. No definitive evidence of regional wall motion abnormalities. The left ventricular diastolic function is indeterminate.     Right Ventricle:  The right ventricular cavity is normal in size and right ventricular systolic function is normal. Tricuspid annular plane systolic excursion (TAPSE) is 3.0 cm (normal >=1.7 cm).     Left Atrium:  The left atrium is normal in size with an indexed volume of 21.00 ml/m².     Right Atrium:  The right atrium is normal in size with an indexed volume of 9.85 ml/m².     Aortic Valve:  The aortic valve was not well visualized. The aortic valve anatomy cannot be determined. There is no aortic valve stenosis. There is trace aortic regurgitation.     Mitral Valve:  There is mild calcification of the mitral valve annulus. Thickened mitral valve leaflets. There is no mitral valve stenosis. There is trace mitral regurgitation.     Tricuspid Valve:  Structurally normal tricuspid valve with normal leaflet excursion. There is trace tricuspid regurgitation. There is insufficient tricuspid regurgitation detected to calculate pulmonary artery systolic pressure.     Pulmonic Valve:  The pulmonic valve was not well visualized. There is no pulmonic valve stenosis.     Aorta:  The ascending aorta is not well visualized. The aortic root at the sinuses of Valsalva is normal in size, measuring 3.13 cm (indexed 1.62 cm/m²). The ascending aorta is normal in size, measuring 3.10 cm (indexed 1.61 cm/m²).     Pericardium:  No pericardial effusion seen.     Systemic Veins:  The inferior vena cava was not well visualized.     THE PATIENT WAS SEEN AND EXAMINED BY ME WITH THE HOUSESTAFF AND STROKE TEAM DURING MORNING ROUNDS.   HPI:  44M no AC/AP hx uncontrolled HTN tx LVS s/p flu-like symptoms and headache; CTH w/ left BG IPH w/ IVH extension. Exam: Portuguese speaking, Ox3, PERRL, no drift, CHAPA 5/5, SILT (14 Oct 2024 04:30)  SUBJECTIVE: No events overnight.  No new neurologic complaints.      MEDICATIONS:   Antibiotics:     Neuro:   traMADol 25 milliGRAM(s) Oral every 4 hours PRN  traMADol 50 milliGRAM(s) Oral every 6 hours PRN    CV:   amLODIPine   Tablet 10 milliGRAM(s) Oral daily  hydrALAZINE 100 milliGRAM(s) Oral every 8 hours  losartan 100 milliGRAM(s) Oral daily  niCARdipine Infusion 5 mG/Hr IV Continuous <Continuous>    Pulm:     GI/:  pantoprazole  Injectable 40 milliGRAM(s) IV Push daily  polyethylene glycol 3350 17 Gram(s) Oral every 12 hours  senna 2 Tablet(s) Oral at bedtime    Heme:     Other:   atorvastatin 40 milliGRAM(s) Oral at bedtime  chlorhexidine 4% Liquid 1 Application(s) Topical <User Schedule>  insulin lispro (ADMELOG) corrective regimen sliding scale   SubCutaneous every 6 hours  potassium chloride    Tablet ER 20 milliEquivalent(s) Oral every 2 hours  sodium chloride 3% + sodium acetate 50:50 1000 milliLiter(s) IV Continuous <Continuous>      PHYSICAL EXAM:   Vital Signs Last 24 Hrs  T(C): 36.9 (15 Oct 2024 11:00), Max: 37.1 (14 Oct 2024 15:00)  T(F): 98.5 (15 Oct 2024 11:00), Max: 98.7 (14 Oct 2024 15:00)  HR: 104 (15 Oct 2024 12:30) (75 - 109)  BP: --  BP(mean): --  RR: 17 (15 Oct 2024 12:30) (14 - 22)  SpO2: 100% (15 Oct 2024 12:30) (96% - 100%)    Parameters below as of 15 Oct 2024 07:00  Patient On (Oxygen Delivery Method): room air      General: No acute distress   on monitor, /70 satting well    HEENT: EOM intact, visual fields full  Abdomen: Soft, nontender, nondistended   Extremities: No edema    NEUROLOGICAL EXAM:  Mental status: Awake, alert, somnolent, hypophonic, states month is november states year is "9"  Cranial Nerves: No facial asymmetry however had twitching of right faceupon muscle activation, resolved with relaxing, no nystagmus, EOMI, PERRL  Motor exam: No drift x 4  Sensation: Intact to light touch   Coordination/ Gait: No dysmetria      LABS:                        13.6   16.29 )-----------( 173      ( 15 Oct 2024 02:48 )             38.8    10-15    138  |  104  |  17  ----------------------------<  120[H]  3.4[L]   |  20[L]  |  0.81    Ca    8.5      15 Oct 2024 09:11  Phos  2.7     10-15  Mg     2.2     10-15    TPro  7.4  /  Alb  4.2  /  TBili  0.9  /  DBili  x   /  AST  35  /  ALT  69[H]  /  AlkPhos  97  10-14  PT/INR - ( 14 Oct 2024 04:13 )   PT: 11.6 sec;   INR: 1.01 ratio         PTT - ( 14 Oct 2024 04:13 )  PTT:29.6 sec      IMAGING:     < from: Xray Chest 1 View- PORTABLE-Urgent (Xray Chest 1 View- PORTABLE-Urgent .) (10.14.24 @ 07:13) >  IMPRESSION:  No acute pulmonary disease.    < end of copied text >      < from: VA Duplex Lower Ext Vein Scan, Bilat (10.14.24 @ 11:47) >    IMPRESSION:  No evidence of deep venous thrombosis in either lower extremity.      < end of copied text >        CNS Imaging:  CT Head No Cont:  (14 Oct 2024 09:56)  There is no significant change in the left corona radiata hemorrhage with   intraventricular extension into the left lateral ventricle and hematocrit   level on the left. Lucency in the left basal ganglia is also identified   suggesting an acute infarct.  There is mass effect on the left lateral ventricle. Mild ventricular   dilatation is unchanged.  IMPRESSION: Left basal ganglia lucency suspicious for infarct. Left   corona radiata hemorrhage with intraventricular extension and mild   ventricular dilatation, no change since 4:46 AM.    CT Head No Cont:  (14 Oct 2024 04:46)  FINDINGS:  CT BRAIN:  VENTRICLES AND SULCI: Intraventricular extension of hemorrhage,   predominantly within the left lateral ventricle, with a smaller degree of  hemorrhage identified within the right lateral, third, and fourth   ventricles. No significant ventriculomegaly.  INTRA-AXIAL: Redemonstration of a intraparenchymal hemorrhage involving   the medial left basal ganglia as well as hypodensity in the adjacent   basal ganglia, grossly stable since prior examination. Rightward bulging   of the septum pellucidum approximately 7 mm.  EXTRA-AXIAL: No mass or fluid collection. Basal cisterns are normal in   appearance.  VISUALIZED SINUSES:  Left maxillary sinus retention cyst versus polyp  TYMPANOMASTOID CAVITIES:  Clear.  VISUALIZED ORBITS: Normal.  CALVARIUM: Intact.  MISCELLANEOUS: None.  CTA NECK:  AORTIC ARCH AND VISUALIZED GREAT VESSELS: Within normal limits.  RIGHT:  COMMON CAROTID ARTERY: No significant stenosis to the carotid bifurcation.  INTERNAL CAROTID ARTERY: No significant stenosis based on NASCET criteria.  VERTEBRAL ARTERY: Normal in course and caliber to the intracranial   circulation.  LEFT:  COMMON CAROTID ARTERY: No significant stenosis to the carotid bifurcation.  INTERNAL CAROTID ARTERY: No significant stenosis based on NASCET criteria.  VERTEBRAL ARTERY: Normal in course and caliber to the intracranial   circulation.  VISUALIZED LUNGS: Partially imagedsuperior segment right lower lobe   airspace opacity, evaluation of which is limited by motion.  MISCELLANEOUS: 1.6 x 1.0 cm salivary within the substance of the left   submandibular gland. Numerous bilateral tonsilliths.  CTA HEAD:  INTERNAL CAROTID ARTERIES: Bilateral petrous, precavernous, cavernous,   and supraclinoid regions are patent without significant stenosis.  [place] OF WOLFF: No aneurysm identified. Tinyaneurysms can be beyond   the resolution of CTA technique.  ANTERIOR CEREBRAL ARTERIES: No significant stenosis or occlusion.  MIDDLE CEREBRAL ARTERIES: No significant stenosis or occlusion.  POSTERIOR CEREBRAL ARTERIES: No significant stenosis or occlusion.  DISTAL VERTEBRAL / BASILAR ARTERIES: No significant stenosis or occlusion.  VENOUS STRUCTURES: Inadequate venous phase opacification.  MISCELLANEOUS: No other vascular abnormality is seen.  IMPRESSION:  CT HEAD:  Stable left medial basal ganglia parenchymal hemorrhage with associated   intraventricular extension of hemorrhage. Unchanged edema within the   adjacent left basal ganglia as well as rightward bulging of the septum   pellucidum.    CTA NECK:  No evidence of significant stenosis or occlusion.    CTA HEAD:  No large vessel occlusion, significant stenosis or vascular abnormality   identified.    TTE  ____________________________________________________________________________________     CONCLUSIONS:      1. Fair study quality.   2. Left ventricular systolic function is normal with an ejection fraction of 60 % by Chua's method of disks.   3. Normal right ventricular systolic function.   4. No prior echocardiogram is available for comparison.    ________________________________________________________________________________________  FINDINGS:     Left Ventricle:  The left ventricular cavity is normal in size. Left ventricular wall thickness is normal. Left ventricular systolic function is normal with a calculated ejection fraction of 60 % by the Chua's biplane method of disks. There is normal LV mass and normal geometry. No definitive evidence of regional wall motion abnormalities. The left ventricular diastolic function is indeterminate.     Right Ventricle:  The right ventricular cavity is normal in size and right ventricular systolic function is normal. Tricuspid annular plane systolic excursion (TAPSE) is 3.0 cm (normal >=1.7 cm).     Left Atrium:  The left atrium is normal in size with an indexed volume of 21.00 ml/m².     Right Atrium:  The right atrium is normal in size with an indexed volume of 9.85 ml/m².     Aortic Valve:  The aortic valve was not well visualized. The aortic valve anatomy cannot be determined. There is no aortic valve stenosis. There is trace aortic regurgitation.     Mitral Valve:  There is mild calcification of the mitral valve annulus. Thickened mitral valve leaflets. There is no mitral valve stenosis. There is trace mitral regurgitation.     Tricuspid Valve:  Structurally normal tricuspid valve with normal leaflet excursion. There is trace tricuspid regurgitation. There is insufficient tricuspid regurgitation detected to calculate pulmonary artery systolic pressure.     Pulmonic Valve:  The pulmonic valve was not well visualized. There is no pulmonic valve stenosis.     Aorta:  The ascending aorta is not well visualized. The aortic root at the sinuses of Valsalva is normal in size, measuring 3.13 cm (indexed 1.62 cm/m²). The ascending aorta is normal in size, measuring 3.10 cm (indexed 1.61 cm/m²).     Pericardium:  No pericardial effusion seen.     Systemic Veins:  The inferior vena cava was not well visualized.

## 2024-10-15 NOTE — SWALLOW BEDSIDE ASSESSMENT ADULT - SLP PERTINENT HISTORY OF CURRENT PROBLEM
44M no AC/AP hx uncontrolled HTN tx LVS s/p flu-like symptoms and headache; CTH w/ left BG IPH w/ IVH extension. Exam: Telugu speaking, Ox3, PERRL, no drift, CHAPA 5/5, SILT. NIHSS: 1, ICH Score: 1; Hydrowatch; Poss EVD if exam worsens; wean cardene

## 2024-10-15 NOTE — SPEECH LANGUAGE PATHOLOGY EVALUATION - SLP ORIENTATION
Oriented to self, hospital, and 2024. Pt stating that it's November despite cues. Pt with reduced awareness of reason for hospitalization.

## 2024-10-15 NOTE — SWALLOW BEDSIDE ASSESSMENT ADULT - CONSISTENCIES ADMINISTERED
thin liquid/pureed/regular solid when heat is on. few times a month, less than 5mins. mild, brief nosebleeds noted mostly when heat is on during winter months. Resolve within a few mins, always within 5 mins.

## 2024-10-15 NOTE — PROGRESS NOTE ADULT - ASSESSMENT
ASSESSMENT: HPI:  44M no AC/AP hx uncontrolled HTN tx LVS s/p flu-like symptoms and headache; CTH w/ left BG IPH w/ IVH extension. Exam: English speaking, Ox3, PERRL, no drift, CHAPA 5/5, SILT (14 Oct 2024 04:30)    NEURO:  - CTH (10/14): L BG lucency c/f infarct, L corona radiata heme w/ IVH extension and mild vent dilation, stable   - repeat CT Head this AM - stable pending official read  - 10/14 CTA H/N: no significant stenosis, no vessel occlusion/AVM   - Hydrowatch: Continue to monitor for possible EVD if exam worsens  - c/w neuro checks q2h  - Pain regimen: tylenol and oxycodone PRN   - Stroke neurology following  - Activity: [x] OOB as tolerated [] Bedrest [] PT [] OT [] PMNR    PULM:  - Incentive spirometry, mobilize as tolerated  - satting >92% on 2L NC    CV:  - Keep -160mmHg  - TTE (10/14): EF: 60%  - Continue to wean off cardene, added hydralazine 25q8  - Continue lipitor 40mg daily for HLD  - increase hydralazine 100q8  - c/w losartan 100 daily, amlodipine 10mg daily for HTN  - Troponin 524 on admission, repeat was 44    RENAL:  - Continue 3% + acetate @50  - Continue to monitor BMP q6hrs for Na goal 140-150  - Continue to monitor Is&Os    GI:  - Diet: Regular  - GI prophylaxis [] not indicated [x] PPI [] other:  - Bowel regimen [x] miralax  [x] senna [] other:  - LBM: prior to admission    ENDO:   - Goal euglycemia (-180)  - ISS    HEME/ONC:  - VTE prophylaxis: [x] SCDs [] chemoprophylaxis [] hold chemoprophylaxis due to: [] high risk of DVT/PE on admission due to:  - LED done on (10/14): negative    ID:  - afebrile    MISC:    SOCIAL/FAMILY:  [x] awaiting [] updated at bedside [] family meeting    CODE STATUS:  [x] Full Code [] DNR [] DNI [] Palliative/Comfort Care    DISPOSITION:  [x] ICU [] Stroke Unit [] Floor [] EMU [] RCU [] PCU    [x] Patient is at high risk of neurologic deterioration/death due to: pending clincial course    Time seen:  Time spent: __35_ [x] critical care minutes    Contact: [phone] ASSESSMENT: HPI:  44M no AC/AP hx uncontrolled HTN tx LVS s/p flu-like symptoms and headache; CTH w/ left BG IPH w/ IVH extension. Exam: Sinhala speaking, Ox3, PERRL, no drift, CHAPA 5/5, SILT (14 Oct 2024 04:30)    NEURO:  - CTH (10/14): L BG lucency c/f infarct, L corona radiata heme w/ IVH extension and mild vent dilation, stable   - Repeat CT Head this AM stable.  - 10/14 CTA H/N: no significant stenosis, no vessel occlusion/AVM   - Hydrowatch: Continue to monitor for possible EVD if exam worsens  - c/w neuro checks q2h  - Pain regimen: tylenol and oxycodone PRN   - Stroke neurology following  - Activity: [x] OOB as tolerated [] Bedrest [] PT [] OT [] PMNR    PULM:  - Incentive spirometry, mobilize as tolerated  - satting >92% on 2L NC    CV:  - Keep -160mmHg  - TTE (10/14): EF: 60%  - Continue to wean off cardene, added hydralazine 100q8  - C/w losartan 100 daily, amlodipine 10mg daily for HTN  - Continue lipitor 40mg daily for HLD  - Troponin 524 on admission, repeat was 44    RENAL:  - Continue 3% + acetate @30  - Continue to monitor BMP q6hrs for Na goal 140-150, most recent 136.  - Adding NaCl tabs at 2Q6.  - Continue to monitor Is&Os    GI:  - Diet: Regular  - GI prophylaxis [] not indicated [x] PPI [] other:  - Bowel regimen [x] miralax  [x] senna [] other:  - LBM: prior to admission    ENDO:   - Goal euglycemia (-180), most recent glucose level 130.  - ISS    HEME/ONC:  - VTE prophylaxis: SCDs  - LED done on (10/14): negative    ID:  - afebrile    MISC:    SOCIAL/FAMILY:  [] awaiting [X] updated at bedside [] family meeting    CODE STATUS:  [x] Full Code [] DNR [] DNI [] Palliative/Comfort Care    DISPOSITION:  [] ICU [X] Stroke Unit [] Floor [] EMU [] RCU [] PCU

## 2024-10-15 NOTE — SPEECH LANGUAGE PATHOLOGY EVALUATION - SLP GENERAL OBSERVATIONS
Pt seen at bedside, awake but drowsy on encounter, oriented to self, place and year, stated November as month, verbally responsive, following simple directions for the purposes of the exam. Pt noted to be significantly hypophonic in setting of suspected reduced effort. Pt seen with assistance of  #[id number] (Chapo) zainab Pratt.

## 2024-10-15 NOTE — SWALLOW BEDSIDE ASSESSMENT ADULT - SWALLOW EVAL: DIAGNOSIS
44yoM admitted with [facility] with IVH. Pt presents with an oropharyngeal swallow sequence that appears WFL to tolerate a regular texture diet with no overt s/s of laryngeal penetration or aspiration. See Speech and Language evaluation for further details.

## 2024-10-15 NOTE — SPEECH LANGUAGE PATHOLOGY EVALUATION - COMMENTS
verbal production noted to be limited, but grossly fluent and grammatical for brief utterances. Limited evaluation in setting of reduced effort, and request to defer exam

## 2024-10-15 NOTE — PROGRESS NOTE ADULT - ASSESSMENT
Assessment  44 Right handed man with HTN HLD p/w headache, emesis, dizziness found to have left IPH w/ IVH. VS in triage 133/88. Exam vairying from awake  +somnolence to lethargic +somnolence, some confusion/disorientation. Labs w/ PMN predomn leukocytosis to 16K, normal coags, mild hyponatremia improved.  LDL/HDL: 146/43. A1C 5. CXR clear lungs. VA duplex neg for dvt in b/l LE. CTH with left BG IPH and IVH stable @ 4 hours.   NIHSS 1  ICH Score 1    Impression:  -Headache and somnolence due to left BG IPH w/ IVH extension. Etiology of bleed pending workup. Possibly hypertensive  -10/14 is somnolent without focal deficits and 4 hour interval scan is stable  -10/15 is somnolent without focal deficits and 24 hour interval scan is stable    NEURO:   LDL/HDL: 146/43. A1C 5.9  Continue close monitoring for neurologic deterioration.     []MRI Brain with and without contrast,   []Physical therapy/OT/Speech eval/treatment.   []BP goal per NSGY per now  []Hydro-watch for now with IVH  []Hypertonic per NSGY/NSICU for now  []Continue atorvastatin     ANTITHROMBOTIC THERAPY:  []None iso ICH    PULMONARY: CXR clear, protecting airway, saturating well     CARDIOVASCULAR: TTE without pathology  cardiac / telemetry  monitoring                              SBP goal: <140 or per NSICU    GASTROINTESTINAL:  dysphagia screen  - passed     Diet: regular    RENAL: BUN/Cr stable, good urine output      Na Goal: Greater than 145 or per NSGY    HEMATOLOGY: H/H stable, Platelets normal      DVT ppx: Only mechanical for now iso PICH    ID: afebrile, nonspecific leukocytosis  Trend leukocytosis and fever curve, no localized infectious symptoms noted     OTHER:   DISPOSITION: Rehab or home depending on PT eval once stable and workup is complete    CORE MEASURES:        Admission NIHSS: 1     TPA: [] YES [X] NO      LDL/HDL: 146/43     Depression Screen:      Statin Therapy: YES     Dysphagia Screen: [X] PASS [] FAIL     Smoking [] YES [X] NO      Afib [] YES [X] NO     Stroke Education [] YES [] NO   Assessment  44 Right handed man with HTN HLD p/w headache, emesis, dizziness found to have left IPH w/ IVH. VS in triage 133/88. Exam vairying from awake  +somnolence to lethargic +somnolence, some confusion/disorientation. Labs w/ PMN predomn leukocytosis to 16K, normal coags, mild hyponatremia improved.  LDL/HDL: 146/43. A1C 5. CXR clear lungs. VA duplex neg for dvt in b/l LE. CTH with left BG IPH and IVH stable @ 4 hours.   NIHSS 1  ICH Score 1    Impression:  -Headache and somnolence due to left BG IPH w/ IVH extension. Etiology of bleed pending workup. Possibly hypertensive  -10/14 is somnolent without focal deficits and 4 hour interval scan is stable  -10/15 is somnolent without focal deficits and 24 hour interval scan is stable    NEURO:   LDL/HDL: 146/43. A1C 5.9  Continue close monitoring for neurologic deterioration.     []MRI Brain with and without contrast,   []Physical therapy/OT/Speech eval/treatment.   []BP goal per NSGY per now  []Hydro-watch for now with IVH  []Hypertonic per NSGY/NSICU for now  []Continue atorvastatin     ANTITHROMBOTIC THERAPY:  []None iso ICH    PULMONARY: CXR clear, protecting airway, saturating well     CARDIOVASCULAR: TTE without pathology  cardiac / telemetry  monitoring                              SBP goal: <140 or per NSICU  on 3% HTS     GASTROINTESTINAL:  dysphagia screen  - passed     Diet: regular    RENAL: BUN/Cr stable, good urine output      Na Goal: Greater than 145 or per NSGY    HEMATOLOGY: H/H stable, Platelets normal      DVT ppx: Only mechanical for now iso PICH    ID: afebrile, nonspecific leukocytosis  Trend leukocytosis and fever curve, no localized infectious symptoms noted     OTHER:   DISPOSITION: Rehab or home depending on PT eval once stable and workup is complete    CORE MEASURES:        Admission NIHSS: 1     TPA: [] YES [X] NO      LDL/HDL: 146/43     Depression Screen:      Statin Therapy: YES     Dysphagia Screen: [X] PASS [] FAIL     Smoking [] YES [X] NO      Afib [] YES [X] NO     Stroke Education [] YES [] NO

## 2024-10-15 NOTE — PROGRESS NOTE ADULT - SUBJECTIVE AND OBJECTIVE BOX
HOSPITAL COURSE: 44M, not on AC/AP, PMH uncontrolled HTN. P/w flu-like symptoms and headache; CTH w/ left BG IPH w/ IVH extension. Exam: Welsh speaking, EOV, Ox3, PERRL, no drift, EDUARD 5/5, ALMA    HOSPITAL COURSE:  10/14: [facility]    ADMISSION SCORES:   NIHSS: 1    ICH Score: 1    -----------------------------------------------------------------------------------------------------  ICU Vital Signs Last 24 Hrs  T(C): 36.9 (15 Oct 2024 03:00), Max: 37.2 (14 Oct 2024 11:00)  T(F): 98.5 (15 Oct 2024 03:00), Max: 99 (14 Oct 2024 11:00)  HR: 88 (15 Oct 2024 06:15) (72 - 109)  BP: --  BP(mean): --  ABP: 137/64 (15 Oct 2024 06:15) (108/74 - 173/89)  ABP(mean): 88 (15 Oct 2024 06:15) (78 - 116)  RR: 20 (15 Oct 2024 06:15) (14 - 24)  SpO2: 98% (15 Oct 2024 06:15) (96% - 100%)    O2 Parameters below as of 15 Oct 2024 03:00  Patient On (Oxygen Delivery Method): room air            I&O's Summary    14 Oct 2024 07:01  -  15 Oct 2024 07:00  --------------------------------------------------------  IN: 3404.3 mL / OUT: 1425 mL / NET: 1979.3 mL        MEDICATIONS  (STANDING):  amLODIPine   Tablet 10 milliGRAM(s) Oral daily  atorvastatin 40 milliGRAM(s) Oral at bedtime  chlorhexidine 4% Liquid 1 Application(s) Topical <User Schedule>  hydrALAZINE 50 milliGRAM(s) Oral every 8 hours  insulin lispro (ADMELOG) corrective regimen sliding scale   SubCutaneous every 6 hours  losartan 100 milliGRAM(s) Oral daily  niCARdipine Infusion 5 mG/Hr (25 mL/Hr) IV Continuous <Continuous>  pantoprazole  Injectable 40 milliGRAM(s) IV Push daily  polyethylene glycol 3350 17 Gram(s) Oral every 12 hours  senna 2 Tablet(s) Oral at bedtime  sodium chloride 3% + sodium acetate 50:50 1000 milliLiter(s) (50 mL/Hr) IV Continuous <Continuous>      RESPIRATORY:      IMAGING:   Recent imaging studies were reviewed.    LAB RESULTS:                          13.6   16.29 )-----------( 173      ( 15 Oct 2024 02:48 )             38.8       PT/INR - ( 14 Oct 2024 04:13 )   PT: 11.6 sec;   INR: 1.01 ratio         PTT - ( 14 Oct 2024 04:13 )  PTT:29.6 sec    10-15    137  |  104  |  18  ----------------------------<  117[H]  4.0   |  20[L]  |  0.85    Ca    8.3[L]      15 Oct 2024 02:48  Phos  2.9     10-15  Mg     2.1     10-15    TPro  7.4  /  Alb  4.2  /  TBili  0.9  /  DBili  x   /  AST  35  /  ALT  69[H]  /  AlkPhos  97  10-14  -----------------------------------------------------------------------------------------------------------------------------------------------------------------------------------      Allergies    No Known Allergies    Intolerances      DEVICES:   [ ] Restraints [ ] ET tube [ ] central line [x ] arterial line [ ] mclaughlin [ ] NGT/OGT [ ] EVD [ ] LD [ ] ENRIQUE/HMV [ ] Trach [ ] PEG [ ] Chest Tube       EXAMINATION:  PHYSICAL EXAM:    Constitutional: No Acute Distress     Neurological: Arousable, Ox3 (self, place, year w/ choices), PERRL, EOMI, FC, trace RUE drift, CHAPA 5/5, SILT      Pulmonary: Clear to Auscultation, No rales, No rhonchi, No wheezes     Cardiovascular: S1, S2, Regular rate and rhythm     Gastrointestinal: Soft, Non-tender, Non-distended     Extremities: No calf tenderness    HOSPITAL COURSE: Pt is a 45yo M with history of uncontrolled HTN, not on AC/AP admitted for L BG IPH with IVH extension.     HOSPITAL COURSE:  10/14: admitted to [facility]    ADMISSION SCORES:   NIHSS: 1    ICH Score: 1    -----------------------------------------------------------------------------------------------------  ICU Vital Signs Last 24 Hrs  T(C): 36.9 (15 Oct 2024 03:00), Max: 37.2 (14 Oct 2024 11:00)  T(F): 98.5 (15 Oct 2024 03:00), Max: 99 (14 Oct 2024 11:00)  HR: 88 (15 Oct 2024 06:15) (72 - 109)  BP: --  BP(mean): --  ABP: 137/64 (15 Oct 2024 06:15) (108/74 - 173/89)  ABP(mean): 88 (15 Oct 2024 06:15) (78 - 116)  RR: 20 (15 Oct 2024 06:15) (14 - 24)  SpO2: 98% (15 Oct 2024 06:15) (96% - 100%)    O2 Parameters below as of 15 Oct 2024 03:00  Patient On (Oxygen Delivery Method): room air            I&O's Summary    14 Oct 2024 07:01  -  15 Oct 2024 07:00  --------------------------------------------------------  IN: 3404.3 mL / OUT: 1425 mL / NET: 1979.3 mL        MEDICATIONS  (STANDING):  amLODIPine   Tablet 10 milliGRAM(s) Oral daily  atorvastatin 40 milliGRAM(s) Oral at bedtime  chlorhexidine 4% Liquid 1 Application(s) Topical <User Schedule>  hydrALAZINE 50 milliGRAM(s) Oral every 8 hours  insulin lispro (ADMELOG) corrective regimen sliding scale   SubCutaneous every 6 hours  losartan 100 milliGRAM(s) Oral daily  niCARdipine Infusion 5 mG/Hr (25 mL/Hr) IV Continuous <Continuous>  pantoprazole  Injectable 40 milliGRAM(s) IV Push daily  polyethylene glycol 3350 17 Gram(s) Oral every 12 hours  senna 2 Tablet(s) Oral at bedtime  sodium chloride 3% + sodium acetate 50:50 1000 milliLiter(s) (50 mL/Hr) IV Continuous <Continuous>      RESPIRATORY:      IMAGING:   Recent imaging studies were reviewed.    LAB RESULTS:                          13.6   16.29 )-----------( 173      ( 15 Oct 2024 02:48 )             38.8       PT/INR - ( 14 Oct 2024 04:13 )   PT: 11.6 sec;   INR: 1.01 ratio         PTT - ( 14 Oct 2024 04:13 )  PTT:29.6 sec    10-15    137  |  104  |  18  ----------------------------<  117[H]  4.0   |  20[L]  |  0.85    Ca    8.3[L]      15 Oct 2024 02:48  Phos  2.9     10-15  Mg     2.1     10-15    TPro  7.4  /  Alb  4.2  /  TBili  0.9  /  DBili  x   /  AST  35  /  ALT  69[H]  /  AlkPhos  97  10-14  -----------------------------------------------------------------------------------------------------------------------------------------------------------------------------------      DEVICES:   [ ] Restraints [ ] ET tube [ ] central line [x ] arterial line [ ] mclaughlin [ ] NGT/OGT [ ] EVD [ ] LD [ ] ENRIQUE/HMV [ ] Trach [ ] PEG [ ] Chest Tube       EXAMINATION:  PHYSICAL EXAM:    Constitutional: No Acute Distress     Neurological: Arousable, Ox3 (self, place, year w/ choices), PERRL, EOMI, FC, trace RUE drift, CHAPA 5/5, SILT      Pulmonary: Clear to Auscultation, No rales, No rhonchi, No wheezes     Cardiovascular: S1, S2, Regular rate and rhythm     Gastrointestinal: Soft, Non-tender, Non-distended     Extremities: No calf tenderness

## 2024-10-15 NOTE — PROGRESS NOTE ADULT - SUBJECTIVE AND OBJECTIVE BOX
Patient seen and examined at bedside.    --Anticoagulation--    T(C): 37 (10-14-24 @ 19:00), Max: 37.4 (10-14-24 @ 07:00)  HR: 91 (10-14-24 @ 23:30) (66 - 110)  BP: 128/68 (10-14-24 @ 06:30) (124/71 - 188/92)  RR: 16 (10-14-24 @ 23:30) (14 - 24)  SpO2: 100% (10-14-24 @ 23:30) (94% - 100%)  Wt(kg): --    Exam:  Arousable, Ox3 (self, place, year w/ choices), PERRL, EOMI, FC, trace RUE drift, CHAPA 5/5, SILT

## 2024-10-16 LAB
AMPHET UR-MCNC: NEGATIVE NG/ML — SIGNIFICANT CHANGE UP
ANION GAP SERPL CALC-SCNC: 10 MMOL/L — SIGNIFICANT CHANGE UP (ref 5–17)
ANION GAP SERPL CALC-SCNC: 12 MMOL/L — SIGNIFICANT CHANGE UP (ref 5–17)
ANION GAP SERPL CALC-SCNC: 13 MMOL/L — SIGNIFICANT CHANGE UP (ref 5–17)
ANION GAP SERPL CALC-SCNC: 14 MMOL/L — SIGNIFICANT CHANGE UP (ref 5–17)
APPEARANCE UR: CLEAR — SIGNIFICANT CHANGE UP
BARBITURATES UR QL SCN: NEGATIVE NG/ML — SIGNIFICANT CHANGE UP
BARBITURATES UR-MCNC: NEGATIVE NG/ML — SIGNIFICANT CHANGE UP
BENZODIAZ UR-MCNC: NEGATIVE NG/ML — SIGNIFICANT CHANGE UP
BILIRUB UR-MCNC: NEGATIVE — SIGNIFICANT CHANGE UP
BUN SERPL-MCNC: 17 MG/DL — SIGNIFICANT CHANGE UP (ref 7–23)
BUN SERPL-MCNC: 19 MG/DL — SIGNIFICANT CHANGE UP (ref 7–23)
BUN SERPL-MCNC: 20 MG/DL — SIGNIFICANT CHANGE UP (ref 7–23)
BUN SERPL-MCNC: 24 MG/DL — HIGH (ref 7–23)
CALCIUM SERPL-MCNC: 8 MG/DL — LOW (ref 8.4–10.5)
CALCIUM SERPL-MCNC: 8.2 MG/DL — LOW (ref 8.4–10.5)
CALCIUM SERPL-MCNC: 8.4 MG/DL — SIGNIFICANT CHANGE UP (ref 8.4–10.5)
CALCIUM SERPL-MCNC: 8.4 MG/DL — SIGNIFICANT CHANGE UP (ref 8.4–10.5)
CHLORIDE SERPL-SCNC: 100 MMOL/L — SIGNIFICANT CHANGE UP (ref 96–108)
CHLORIDE SERPL-SCNC: 101 MMOL/L — SIGNIFICANT CHANGE UP (ref 96–108)
CHLORIDE SERPL-SCNC: 104 MMOL/L — SIGNIFICANT CHANGE UP (ref 96–108)
CHLORIDE SERPL-SCNC: 105 MMOL/L — SIGNIFICANT CHANGE UP (ref 96–108)
CO2 SERPL-SCNC: 18 MMOL/L — LOW (ref 22–31)
CO2 SERPL-SCNC: 19 MMOL/L — LOW (ref 22–31)
CO2 SERPL-SCNC: 19 MMOL/L — LOW (ref 22–31)
CO2 SERPL-SCNC: 20 MMOL/L — LOW (ref 22–31)
COCAINE METAB.OTHER UR-MCNC: NEGATIVE NG/ML — SIGNIFICANT CHANGE UP
COLOR SPEC: YELLOW — SIGNIFICANT CHANGE UP
CREAT SERPL-MCNC: 0.87 MG/DL — SIGNIFICANT CHANGE UP (ref 0.5–1.3)
CREAT SERPL-MCNC: 0.89 MG/DL — SIGNIFICANT CHANGE UP (ref 0.5–1.3)
CREAT SERPL-MCNC: 0.9 MG/DL — SIGNIFICANT CHANGE UP (ref 0.5–1.3)
CREAT SERPL-MCNC: 0.91 MG/DL — SIGNIFICANT CHANGE UP (ref 0.5–1.3)
CREATININE, URINE THERAPEUTIC: 75.9 MG/DL — SIGNIFICANT CHANGE UP (ref 20–300)
DIFF PNL FLD: NEGATIVE — SIGNIFICANT CHANGE UP
EGFR: 107 ML/MIN/1.73M2 — SIGNIFICANT CHANGE UP
EGFR: 109 ML/MIN/1.73M2 — SIGNIFICANT CHANGE UP
EGFR: 109 ML/MIN/1.73M2 — SIGNIFICANT CHANGE UP
EGFR: 110 ML/MIN/1.73M2 — SIGNIFICANT CHANGE UP
FENTANYL UR QL SCN: NEGATIVE NG/ML — SIGNIFICANT CHANGE UP
GLUCOSE BLDC GLUCOMTR-MCNC: 106 MG/DL — HIGH (ref 70–99)
GLUCOSE BLDC GLUCOMTR-MCNC: 114 MG/DL — HIGH (ref 70–99)
GLUCOSE BLDC GLUCOMTR-MCNC: 119 MG/DL — HIGH (ref 70–99)
GLUCOSE BLDC GLUCOMTR-MCNC: 124 MG/DL — HIGH (ref 70–99)
GLUCOSE SERPL-MCNC: 101 MG/DL — HIGH (ref 70–99)
GLUCOSE SERPL-MCNC: 114 MG/DL — HIGH (ref 70–99)
GLUCOSE SERPL-MCNC: 130 MG/DL — HIGH (ref 70–99)
GLUCOSE SERPL-MCNC: 140 MG/DL — HIGH (ref 70–99)
GLUCOSE UR QL: NEGATIVE MG/DL — SIGNIFICANT CHANGE UP
KETONES UR-MCNC: NEGATIVE MG/DL — SIGNIFICANT CHANGE UP
LEUKOCYTE ESTERASE UR-ACNC: NEGATIVE — SIGNIFICANT CHANGE UP
MAGNESIUM SERPL-MCNC: 2.4 MG/DL — SIGNIFICANT CHANGE UP (ref 1.6–2.6)
METHADONE UR QL SCN: NEGATIVE NG/ML — SIGNIFICANT CHANGE UP
NITRITE UR-MCNC: NEGATIVE — SIGNIFICANT CHANGE UP
OPIATES UR-MCNC: NEGATIVE NG/ML — SIGNIFICANT CHANGE UP
OXYCODONE UR QL SCN: NEGATIVE NG/ML — SIGNIFICANT CHANGE UP
PCP UR-MCNC: NEGATIVE NG/ML — SIGNIFICANT CHANGE UP
PH UR: 8 — SIGNIFICANT CHANGE UP (ref 5–8)
PH, URINE RESULT: 5.2 — SIGNIFICANT CHANGE UP (ref 4.5–8.9)
PHOSPHATE SERPL-MCNC: 2.7 MG/DL — SIGNIFICANT CHANGE UP (ref 2.5–4.5)
POTASSIUM SERPL-MCNC: 3.8 MMOL/L — SIGNIFICANT CHANGE UP (ref 3.5–5.3)
POTASSIUM SERPL-MCNC: 3.8 MMOL/L — SIGNIFICANT CHANGE UP (ref 3.5–5.3)
POTASSIUM SERPL-MCNC: 3.9 MMOL/L — SIGNIFICANT CHANGE UP (ref 3.5–5.3)
POTASSIUM SERPL-MCNC: 4 MMOL/L — SIGNIFICANT CHANGE UP (ref 3.5–5.3)
POTASSIUM SERPL-SCNC: 3.8 MMOL/L — SIGNIFICANT CHANGE UP (ref 3.5–5.3)
POTASSIUM SERPL-SCNC: 3.8 MMOL/L — SIGNIFICANT CHANGE UP (ref 3.5–5.3)
POTASSIUM SERPL-SCNC: 3.9 MMOL/L — SIGNIFICANT CHANGE UP (ref 3.5–5.3)
POTASSIUM SERPL-SCNC: 4 MMOL/L — SIGNIFICANT CHANGE UP (ref 3.5–5.3)
PROT UR-MCNC: NEGATIVE MG/DL — SIGNIFICANT CHANGE UP
SODIUM SERPL-SCNC: 130 MMOL/L — LOW (ref 135–145)
SODIUM SERPL-SCNC: 134 MMOL/L — LOW (ref 135–145)
SODIUM SERPL-SCNC: 135 MMOL/L — SIGNIFICANT CHANGE UP (ref 135–145)
SODIUM SERPL-SCNC: 136 MMOL/L — SIGNIFICANT CHANGE UP (ref 135–145)
SP GR SPEC: 1.01 — SIGNIFICANT CHANGE UP (ref 1–1.03)
THC UR QL: NEGATIVE NG/ML — SIGNIFICANT CHANGE UP
UROBILINOGEN FLD QL: 1 MG/DL — SIGNIFICANT CHANGE UP (ref 0.2–1)

## 2024-10-16 PROCEDURE — 99291 CRITICAL CARE FIRST HOUR: CPT

## 2024-10-16 PROCEDURE — 99233 SBSQ HOSP IP/OBS HIGH 50: CPT

## 2024-10-16 PROCEDURE — 71045 X-RAY EXAM CHEST 1 VIEW: CPT | Mod: 26

## 2024-10-16 RX ORDER — ENOXAPARIN SODIUM 40MG/0.4ML
40 SYRINGE (ML) SUBCUTANEOUS
Refills: 0 | Status: DISCONTINUED | OUTPATIENT
Start: 2024-10-16 | End: 2024-11-04

## 2024-10-16 RX ORDER — DIBASIC SODIUM PHOSPHATE, MONOBASIC POTASSIUM PHOSPHATE AND MONOBASIC SODIUM PHOSPHATE 852; 155; 130 MG/1; MG/1; MG/1
2 TABLET ORAL ONCE
Refills: 0 | Status: COMPLETED | OUTPATIENT
Start: 2024-10-16 | End: 2024-10-16

## 2024-10-16 RX ORDER — PANTOPRAZOLE SODIUM 40 MG/1
40 TABLET, DELAYED RELEASE ORAL
Refills: 0 | Status: DISCONTINUED | OUTPATIENT
Start: 2024-10-16 | End: 2024-11-04

## 2024-10-16 RX ORDER — POTASSIUM CHLORIDE 10 MEQ
20 TABLET, EXTENDED RELEASE ORAL ONCE
Refills: 0 | Status: COMPLETED | OUTPATIENT
Start: 2024-10-16 | End: 2024-10-16

## 2024-10-16 RX ORDER — LABETALOL HCL 200 MG
100 TABLET ORAL DAILY
Refills: 0 | Status: DISCONTINUED | OUTPATIENT
Start: 2024-10-16 | End: 2024-10-16

## 2024-10-16 RX ORDER — ACETAMINOPHEN 500 MG
1000 TABLET ORAL ONCE
Refills: 0 | Status: COMPLETED | OUTPATIENT
Start: 2024-10-16 | End: 2024-10-16

## 2024-10-16 RX ORDER — LABETALOL HCL 200 MG
100 TABLET ORAL ONCE
Refills: 0 | Status: COMPLETED | OUTPATIENT
Start: 2024-10-16 | End: 2024-10-16

## 2024-10-16 RX ORDER — LABETALOL HCL 200 MG
100 TABLET ORAL EVERY 8 HOURS
Refills: 0 | Status: DISCONTINUED | OUTPATIENT
Start: 2024-10-16 | End: 2024-10-17

## 2024-10-16 RX ADMIN — Medication 40 MILLIGRAM(S): at 17:30

## 2024-10-16 RX ADMIN — TRAMADOL HYDROCHLORIDE 25 MILLIGRAM(S): 50 TABLET, COATED ORAL at 12:00

## 2024-10-16 RX ADMIN — Medication 20 MILLIEQUIVALENT(S): at 17:17

## 2024-10-16 RX ADMIN — SODIUM CHLORIDE 2 GRAM(S): 9 INJECTION, SOLUTION INTRAMUSCULAR; INTRAVENOUS; SUBCUTANEOUS at 05:40

## 2024-10-16 RX ADMIN — Medication 30 MILLILITER(S): at 07:15

## 2024-10-16 RX ADMIN — Medication 100 MILLIGRAM(S): at 09:53

## 2024-10-16 RX ADMIN — HYDRALAZINE HYDROCHLORIDE 100 MILLIGRAM(S): 50 TABLET, FILM COATED ORAL at 03:30

## 2024-10-16 RX ADMIN — LOSARTAN POTASSIUM 100 MILLIGRAM(S): 25 TABLET ORAL at 05:40

## 2024-10-16 RX ADMIN — Medication 100 MILLIGRAM(S): at 14:00

## 2024-10-16 RX ADMIN — Medication 2 TABLET(S): at 21:02

## 2024-10-16 RX ADMIN — CHLORHEXIDINE GLUCONATE 1 APPLICATION(S): 40 SOLUTION TOPICAL at 21:02

## 2024-10-16 RX ADMIN — HYDRALAZINE HYDROCHLORIDE 100 MILLIGRAM(S): 50 TABLET, FILM COATED ORAL at 20:02

## 2024-10-16 RX ADMIN — Medication 100 MILLIGRAM(S): at 21:02

## 2024-10-16 RX ADMIN — Medication 40 MILLIGRAM(S): at 21:02

## 2024-10-16 RX ADMIN — SODIUM CHLORIDE 2 GRAM(S): 9 INJECTION, SOLUTION INTRAMUSCULAR; INTRAVENOUS; SUBCUTANEOUS at 23:32

## 2024-10-16 RX ADMIN — POLYETHYLENE GLYCOL 3350 17 GRAM(S): 17 POWDER, FOR SOLUTION ORAL at 17:17

## 2024-10-16 RX ADMIN — DIBASIC SODIUM PHOSPHATE, MONOBASIC POTASSIUM PHOSPHATE AND MONOBASIC SODIUM PHOSPHATE 2 PACKET(S): 852; 155; 130 TABLET ORAL at 23:32

## 2024-10-16 RX ADMIN — Medication 400 MILLIGRAM(S): at 04:15

## 2024-10-16 RX ADMIN — Medication 10 MILLIGRAM(S): at 05:40

## 2024-10-16 RX ADMIN — Medication 1000 MILLIGRAM(S): at 04:30

## 2024-10-16 RX ADMIN — HYDRALAZINE HYDROCHLORIDE 100 MILLIGRAM(S): 50 TABLET, FILM COATED ORAL at 11:18

## 2024-10-16 RX ADMIN — TRAMADOL HYDROCHLORIDE 25 MILLIGRAM(S): 50 TABLET, COATED ORAL at 11:18

## 2024-10-16 RX ADMIN — Medication 20 MILLIEQUIVALENT(S): at 00:46

## 2024-10-16 RX ADMIN — SODIUM CHLORIDE 2 GRAM(S): 9 INJECTION, SOLUTION INTRAMUSCULAR; INTRAVENOUS; SUBCUTANEOUS at 00:47

## 2024-10-16 RX ADMIN — TRAMADOL HYDROCHLORIDE 25 MILLIGRAM(S): 50 TABLET, COATED ORAL at 23:30

## 2024-10-16 RX ADMIN — SODIUM CHLORIDE 2 GRAM(S): 9 INJECTION, SOLUTION INTRAMUSCULAR; INTRAVENOUS; SUBCUTANEOUS at 17:16

## 2024-10-16 RX ADMIN — POLYETHYLENE GLYCOL 3350 17 GRAM(S): 17 POWDER, FOR SOLUTION ORAL at 05:40

## 2024-10-16 RX ADMIN — Medication 20 MILLIEQUIVALENT(S): at 03:30

## 2024-10-16 RX ADMIN — SODIUM CHLORIDE 2 GRAM(S): 9 INJECTION, SOLUTION INTRAMUSCULAR; INTRAVENOUS; SUBCUTANEOUS at 11:18

## 2024-10-16 RX ADMIN — Medication 30 MILLILITER(S): at 20:03

## 2024-10-16 RX ADMIN — Medication 20 MILLIEQUIVALENT(S): at 23:32

## 2024-10-16 NOTE — PROGRESS NOTE ADULT - SUBJECTIVE AND OBJECTIVE BOX
HOSPITAL COURSE: Pt is a 43yo M with history of uncontrolled HTN, not on AC/AP admitted for L BG IP with IVH extension.     Overnight: fever - pan-cultured    HOSPITAL COURSE:  10/14: admitted to [facility]    ADMISSION SCORES:   NIHSS: 1    ICH Score: 1    EXAMINATION:  PHYSICAL EXAM:    Constitutional: No Acute Distress     Neurological: Arousable, Ox3 (self, place, year w/ choices), PERRL, EOMI, FC, trace RUE drift, CHAPA 5/5, SILT      Pulmonary: Clear to Auscultation, No rales, No rhonchi, No wheezes     Cardiovascular: S1, S2, Regular rate and rhythm     Gastrointestinal: Soft, Non-tender, Non-distended     Extremities: No calf tenderness     -----------------------------------------------------------------------------------------------------  ICU Vital Signs Last 24 Hrs  T(C): 37.9 (16 Oct 2024 07:00), Max: 38.3 (16 Oct 2024 04:00)  T(F): 100.2 (16 Oct 2024 07:00), Max: 100.9 (16 Oct 2024 04:00)  HR: 80 (16 Oct 2024 07:00) (76 - 123)  BP: --  BP(mean): --  ABP: 148/68 (16 Oct 2024 07:00) (94/64 - 168/76)  ABP(mean): 98 (16 Oct 2024 07:00) (77 - 113)  RR: 17 (16 Oct 2024 07:00) (16 - 26)  SpO2: 95% (16 Oct 2024 07:00) (92% - 100%)    O2 Parameters below as of 16 Oct 2024 07:00  Patient On (Oxygen Delivery Method): nasal cannula  O2 Flow (L/min): 2          I&O's Summary    15 Oct 2024 07:01  -  16 Oct 2024 07:00  --------------------------------------------------------  IN: 3962.3 mL / OUT: 3600 mL / NET: 362.3 mL        MEDICATIONS  (STANDING):  amLODIPine   Tablet 10 milliGRAM(s) Oral daily  atorvastatin 40 milliGRAM(s) Oral at bedtime  chlorhexidine 4% Liquid 1 Application(s) Topical <User Schedule>  enoxaparin Injectable 40 milliGRAM(s) SubCutaneous <User Schedule>  hydrALAZINE 100 milliGRAM(s) Oral every 8 hours  insulin lispro (ADMELOG) corrective regimen sliding scale   SubCutaneous every 6 hours  losartan 100 milliGRAM(s) Oral daily  niCARdipine Infusion 5 mG/Hr (25 mL/Hr) IV Continuous <Continuous>  pantoprazole  Injectable 40 milliGRAM(s) IV Push daily  polyethylene glycol 3350 17 Gram(s) Oral every 12 hours  senna 2 Tablet(s) Oral at bedtime  sodium chloride 2 Gram(s) Oral every 6 hours  sodium chloride 3% + sodium acetate 50:50 1000 milliLiter(s) (30 mL/Hr) IV Continuous <Continuous>      RESPIRATORY:      IMAGING:   Recent imaging studies were reviewed.    LAB RESULTS:                          13.5   16.17 )-----------( 179      ( 15 Oct 2024 20:03 )             38.6           10-16    135  |  105  |  19  ----------------------------<  114[H]  3.9   |  18[L]  |  0.90    Ca    8.0[L]      16 Oct 2024 02:05  Phos  1.6     10-15  Mg     2.3     10-15      -----------------------------------------------------------------------------------------------------------------------------------------------------------------------------------

## 2024-10-16 NOTE — DIETITIAN INITIAL EVALUATION ADULT - ADD RECOMMEND
1. continue current diet as tolerated of: consistent carbohydrate diet  2. provide assistance with meals for the patient as needed  3. RD to add Magic Cup 1x/day for extra caloric/protein intake  4. recent hypophosphatemia; supplementation noted, continue to follow electrolyte trends, replete levels as appropriate  5. monitor PO intake, weight trend, electrolytes, blood glucose levels, labs, BMs

## 2024-10-16 NOTE — DIETITIAN INITIAL EVALUATION ADULT - ORAL INTAKE PTA/DIET HISTORY
Patient's family reports patient normally eats well at home PTA. Denied chewing/swallowing impairment or nausea/vomiting for the patient PTA. Patient does not follow any therapeutic diet at home. Patient does not consume pork products but will have most other food items per family.

## 2024-10-16 NOTE — PROGRESS NOTE ADULT - ASSESSMENT
Assessment  44 Right handed man with HTN HLD p/w headache, emesis, dizziness found to have left IPH w/ IVH. VS in triage 133/88. Exam vairying from awake  +somnolence to lethargic +somnolence, some confusion/disorientation. Labs w/ PMN predomn leukocytosis to 16K, normal coags, mild hyponatremia improved.  LDL/HDL: 146/43. A1C 5. CXR clear lungs. VA duplex neg for dvt in b/l LE. CTH with left BG IPH and IVH stable @ 4 hours.   NIHSS 1  ICH Score 1    Impression:  -Headache and somnolence due to left BG IPH w/ IVH extension. Etiology of bleed pending workup. Possibly hypertensive  -10/14 is somnolent without focal deficits and 4 hour interval scan is stable  -10/15 is somnolent without focal deficits and 24 hour interval scan is stable  -10/15 stable, somnolent    NEURO:   LDL/HDL: 146/43. A1C 5.9  Continue close monitoring for neurologic deterioration.     []MRI Brain with and without contrast,   []Physical therapy/OT/Speech eval/treatment.   []BP goal per NSGY per now  []Hydro-watch for now with IVH  []Hypertonic per NSGY/NSICU for now  []Continue atorvastatin     ANTITHROMBOTIC THERAPY:  []None iso ICH    PULMONARY: CXR clear, protecting airway, saturating well     CARDIOVASCULAR: TTE without pathology  cardiac / telemetry  monitoring                              SBP goal: <140 or per NSICU  on 3% HTS     GASTROINTESTINAL:  dysphagia screen  - passed     Diet: regular    RENAL: BUN/Cr stable, good urine output      Na Goal: Greater than 145 or per NSGY    HEMATOLOGY: H/H stable, Platelets normal      DVT ppx: Only mechanical for now iso PICH    ID: afebrile, nonspecific leukocytosis  Trend leukocytosis and fever curve, no localized infectious symptoms noted     OTHER:   DISPOSITION: Rehab or home depending on PT eval once stable and workup is complete    CORE MEASURES:        Admission NIHSS: 1     TPA: [] YES [X] NO      LDL/HDL: 146/43     Depression Screen:      Statin Therapy: YES     Dysphagia Screen: [X] PASS [] FAIL     Smoking [] YES [X] NO      Afib [] YES [X] NO     Stroke Education [] YES [] NO

## 2024-10-16 NOTE — PROGRESS NOTE ADULT - NS ATTEND AMEND GEN_ALL_CORE FT
I reviewed available diagnostic studies, and reviewed images personally. I agree with resident's history, exam, orders placed, and plan of care. Thirty minutes of critical care time was spent in caring for this patient who has concern of sudden and clinically significant deterioration requiring a high level of physician preparedness, management of brain supporting interventions, and frequent physician assessments. This excludes any time spent on separate procedures or teaching.    Medical issues needing to be addressed include: Nontraumatic intracerebral hemorrhage subcortical w/ IVH, perihematomal cerebral edema and brain compression, HTN, medication noncompliance per brother, HA, AMS, n/v, dizziness. Plan & exam as above. Likely hypertensive hemorrhage, will f up tox screen. Hydro watch.  Cont to titrate up antihypertensives to wean off cardene. Cont HTS /edema management per [facility].

## 2024-10-16 NOTE — DIETITIAN INITIAL EVALUATION ADULT - PERTINENT MEDS FT
MEDICATIONS  (STANDING):  amLODIPine   Tablet 10 milliGRAM(s) Oral daily  atorvastatin 40 milliGRAM(s) Oral at bedtime  chlorhexidine 4% Liquid 1 Application(s) Topical <User Schedule>  enoxaparin Injectable 40 milliGRAM(s) SubCutaneous <User Schedule>  hydrALAZINE 100 milliGRAM(s) Oral every 8 hours  insulin lispro (ADMELOG) corrective regimen sliding scale   SubCutaneous every 6 hours  labetalol 100 milliGRAM(s) Oral every 8 hours  losartan 100 milliGRAM(s) Oral daily  pantoprazole    Tablet 40 milliGRAM(s) Oral before breakfast  polyethylene glycol 3350 17 Gram(s) Oral every 12 hours  senna 2 Tablet(s) Oral at bedtime  sodium chloride 2 Gram(s) Oral every 6 hours  sodium chloride 3% + sodium acetate 50:50 1000 milliLiter(s) (30 mL/Hr) IV Continuous <Continuous>    MEDICATIONS  (PRN):  traMADol 50 milliGRAM(s) Oral every 6 hours PRN Severe Pain (7 - 10)  traMADol 25 milliGRAM(s) Oral every 4 hours PRN Moderate Pain (4 - 6)

## 2024-10-16 NOTE — PROGRESS NOTE ADULT - SUBJECTIVE AND OBJECTIVE BOX
Patient seen and examined at bedside.    --Anticoagulation--    T(C): 37.6 (10-15-24 @ 19:00), Max: 37.6 (10-15-24 @ 19:00)  HR: 103 (10-16-24 @ 00:00) (75 - 123)  BP: --  RR: 20 (10-16-24 @ 00:00) (15 - 26)  SpO2: 95% (10-16-24 @ 00:00) (92% - 100%)  Wt(kg): --    Exam: Arousable, Ox3 (self, place, year w/ choices), PERRL, EOMI, FC, trace RUE drift, CHAPA 5/5, SILT     0 (no pain/absence of nonverbal indicators of pain)

## 2024-10-16 NOTE — PROGRESS NOTE ADULT - SUBJECTIVE AND OBJECTIVE BOX
THE PATIENT WAS SEEN AND EXAMINED BY ME WITH THE HOUSESTAFF AND STROKE TEAM DURING MORNING ROUNDS.   HPI:  44M no AC/AP hx uncontrolled HTN tx LVS s/p flu-like symptoms and headache; CTH w/ left BG IPH w/ IVH extension. Exam: Amharic speaking, Ox3, PERRL, no drift, CHAPA 5/5, SILT (14 Oct 2024 04:30)      SUBJECTIVE: Had fever and stable CTH, otherwise No events overnight.  No new neurologic complaints.  Headache improving    MEDICATIONS:   Antibiotics:     Neuro:   traMADol 25 milliGRAM(s) Oral every 4 hours PRN  traMADol 50 milliGRAM(s) Oral every 6 hours PRN    CV:   amLODIPine   Tablet 10 milliGRAM(s) Oral daily  hydrALAZINE 100 milliGRAM(s) Oral every 8 hours  labetalol 100 milliGRAM(s) Oral every 8 hours  losartan 100 milliGRAM(s) Oral daily    Pulm:     GI/:  pantoprazole    Tablet 40 milliGRAM(s) Oral before breakfast  polyethylene glycol 3350 17 Gram(s) Oral every 12 hours  senna 2 Tablet(s) Oral at bedtime    Heme:   enoxaparin Injectable 40 milliGRAM(s) SubCutaneous <User Schedule>    Other:   atorvastatin 40 milliGRAM(s) Oral at bedtime  chlorhexidine 4% Liquid 1 Application(s) Topical <User Schedule>  insulin lispro (ADMELOG) corrective regimen sliding scale   SubCutaneous every 6 hours  sodium chloride 2 Gram(s) Oral every 6 hours  sodium chloride 3% + sodium acetate 50:50 1000 milliLiter(s) IV Continuous <Continuous>      PHYSICAL EXAM:   Vital Signs Last 24 Hrs  T(C): 37.2 (16 Oct 2024 15:00), Max: 38.3 (16 Oct 2024 04:00)  T(F): 98.9 (16 Oct 2024 15:00), Max: 100.9 (16 Oct 2024 04:00)  HR: 83 (16 Oct 2024 15:38) (76 - 114)  BP: 154/80 (16 Oct 2024 15:38) (154/80 - 154/80)  BP(mean): 109 (16 Oct 2024 15:38) (109 - 109)  RR: 19 (16 Oct 2024 15:38) (16 - 26)  SpO2: 93% (16 Oct 2024 15:38) (92% - 100%)    Parameters below as of 16 Oct 2024 15:38  Patient On (Oxygen Delivery Method): nasal cannula  O2 Flow (L/min): 2    HEENT: EOM intact, visual fields full  Extremities: No edema    NEUROLOGICAL EXAM:  Mental status: Awake, alert, somnolent, hypophonic, paucity of speech   Cranial Nerves: No facial asymmetry however had twitching of right faceupon muscle activation, resolved with relaxing, no nystagmus, EOMI, PERRL  Motor exam: No drift x 4  Sensation: Intact to light touch   Coordination/ Gait: No dysmetria      LABS:                        13.5   16.17 )-----------( 179      ( 15 Oct 2024 20:03 )             38.6    10-16    136  |  104  |  17  ----------------------------<  101[H]  4.0   |  19[L]  |  0.87    Ca    8.2[L]      16 Oct 2024 08:33  Phos  1.6     10-15  Mg     2.3     10-15          IMAGING: Reviewed by me.     IMAGING:     < from: Xray Chest 1 View- PORTABLE-Urgent (Xray Chest 1 View- PORTABLE-Urgent .) (10.14.24 @ 07:13) >  IMPRESSION:  No acute pulmonary disease.    < end of copied text >      < from: [facility] Duplex Lower Ext Vein Scan, Bilat (10.14.24 @ 11:47) >    IMPRESSION:  No evidence of deep venous thrombosis in either lower extremity.      < end of copied text >        CNS Imaging:  CT Head No Cont:  (14 Oct 2024 09:56)  There is no significant change in the left corona radiata hemorrhage with   intraventricular extension into the left lateral ventricle and hematocrit   level on the left. Lucency in the left basal ganglia is also identified   suggesting an acute infarct.  There is mass effect on the left lateral ventricle. Mild ventricular   dilatation is unchanged.  IMPRESSION: Left basal ganglia lucency suspicious for infarct. Left   corona radiata hemorrhage with intraventricular extension and mild   ventricular dilatation, no change since 4:46 AM.    CT Head No Cont:  (14 Oct 2024 04:46)  FINDINGS:  CT BRAIN:  VENTRICLES AND SULCI: Intraventricular extension of hemorrhage,   predominantly within the left lateral ventricle, with a smaller degree of  hemorrhage identified within the right lateral, third, and fourth   ventricles. No significant ventriculomegaly.  INTRA-AXIAL: Redemonstration of a intraparenchymal hemorrhage involving   the medial left basal ganglia as well as hypodensity in the adjacent   basal ganglia, grossly stable since prior examination. Rightward bulging   of the septum pellucidum approximately 7 mm.  EXTRA-AXIAL: No mass or fluid collection. Basal cisterns are normal in   appearance.  VISUALIZED SINUSES:  Left maxillary sinus retention cyst versus polyp  TYMPANOMASTOID CAVITIES:  Clear.  VISUALIZED ORBITS: Normal.  CALVARIUM: Intact.  MISCELLANEOUS: None.  CTA NECK:  AORTIC ARCH AND VISUALIZED GREAT VESSELS: Within normal limits.  RIGHT:  COMMON CAROTID ARTERY: No significant stenosis to the carotid bifurcation.  INTERNAL CAROTID ARTERY: No significant stenosis based on NASCET criteria.  VERTEBRAL ARTERY: Normal in course and caliber to the intracranial   circulation.  LEFT:  COMMON CAROTID ARTERY: No significant stenosis to the carotid bifurcation.  INTERNAL CAROTID ARTERY: No significant stenosis based on NASCET criteria.  VERTEBRAL ARTERY: Normal in course and caliber to the intracranial   circulation.  VISUALIZED LUNGS: Partially imagedsuperior segment right lower lobe   airspace opacity, evaluation of which is limited by motion.  MISCELLANEOUS: 1.6 x 1.0 cm salivary within the substance of the left   submandibular gland. Numerous bilateral tonsilliths.  CTA HEAD:  INTERNAL CAROTID ARTERIES: Bilateral petrous, precavernous, cavernous,   and supraclinoid regions are patent without significant stenosis.  Knik OF WOLFF: No aneurysm identified. Tinyaneurysms can be beyond   the resolution of CTA technique.  ANTERIOR CEREBRAL ARTERIES: No significant stenosis or occlusion.  MIDDLE CEREBRAL ARTERIES: No significant stenosis or occlusion.  POSTERIOR CEREBRAL ARTERIES: No significant stenosis or occlusion.  DISTAL VERTEBRAL / BASILAR ARTERIES: No significant stenosis or occlusion.  VENOUS STRUCTURES: Inadequate venous phase opacification.  MISCELLANEOUS: No other vascular abnormality is seen.  IMPRESSION:  CT HEAD:  Stable left medial basal ganglia parenchymal hemorrhage with associated   intraventricular extension of hemorrhage. Unchanged edema within the   adjacent left basal ganglia as well as rightward bulging of the septum   pellucidum.    CTA NECK:  No evidence of significant stenosis or occlusion.    CTA HEAD:  No large vessel occlusion, significant stenosis or vascular abnormality   identified.    TTE  ____________________________________________________________________________________     CONCLUSIONS:      1. Fair study quality.   2. Left ventricular systolic function is normal with an ejection fraction of 60 % by Hcua's method of disks.   3. Normal right ventricular systolic function.   4. No prior echocardiogram is available for comparison.    ________________________________________________________________________________________  FINDINGS:     Left Ventricle:  The left ventricular cavity is normal in size. Left ventricular wall thickness is normal. Left ventricular systolic function is normal with a calculated ejection fraction of 60 % by the Chua's biplane method of disks. There is normal LV mass and normal geometry. No definitive evidence of regional wall motion abnormalities. The left ventricular diastolic function is indeterminate.     Right Ventricle:  The right ventricular cavity is normal in size and right ventricular systolic function is normal. Tricuspid annular plane systolic excursion (TAPSE) is 3.0 cm (normal >=1.7 cm).     Left Atrium:  The left atrium is normal in size with an indexed volume of 21.00 ml/m².     Right Atrium:  The right atrium is normal in size with an indexed volume of 9.85 ml/m².     Aortic Valve:  The aortic valve was not well visualized. The aortic valve anatomy cannot be determined. There is no aortic valve stenosis. There is trace aortic regurgitation.     Mitral Valve:  There is mild calcification of the mitral valve annulus. Thickened mitral valve leaflets. There is no mitral valve stenosis. There is trace mitral regurgitation.     Tricuspid Valve:  Structurally normal tricuspid valve with normal leaflet excursion. There is trace tricuspid regurgitation. There is insufficient tricuspid regurgitation detected to calculate pulmonary artery systolic pressure.     Pulmonic Valve:  The pulmonic valve was not well visualized. There is no pulmonic valve stenosis.     Aorta:  The ascending aorta is not well visualized. The aortic root at the sinuses of Valsalva is normal in size, measuring 3.13 cm (indexed 1.62 cm/m²). The ascending aorta is normal in size, measuring 3.10 cm (indexed 1.61 cm/m²).     Pericardium:  No pericardial effusion seen.     Systemic Veins:  The inferior vena cava was not well visualized.

## 2024-10-16 NOTE — DIETITIAN INITIAL EVALUATION ADULT - PERSON TAUGHT/METHOD
adequate caloric/protein intake w/ food sources reviewed, food preferences, strategies of increasing PO intake, all questions were answered/family at bedside/verbal instruction/teach back - (Patient repeats in own words)

## 2024-10-16 NOTE — PROGRESS NOTE ADULT - SUBJECTIVE AND OBJECTIVE BOX
HOSPITAL COURSE: Pt is a 43yo M with history of uncontrolled HTN, not on AC/AP admitted for L BG IP with IVH extension.     HOSPITAL COURSE:  10/14: admitted to [facility]    ADMISSION SCORES:   NIHSS: 1    ICH Score: 1    EXAMINATION:  PHYSICAL EXAM:    Constitutional: No Acute Distress     Neurological: Arousable, Ox3 (self, place, year w/ choices), PERRL, EOMI, FC, trace RUE drift, CHAPA 5/5, SILT      Pulmonary: Clear to Auscultation, No rales, No rhonchi, No wheezes     Cardiovascular: S1, S2, Regular rate and rhythm     Gastrointestinal: Soft, Non-tender, Non-distended     Extremities: No calf tenderness     -----------------------------------------------------------------------------------------------------  ICU Vital Signs Last 24 Hrs  T(C): 37.9 (16 Oct 2024 07:00), Max: 38.3 (16 Oct 2024 04:00)  T(F): 100.2 (16 Oct 2024 07:00), Max: 100.9 (16 Oct 2024 04:00)  HR: 80 (16 Oct 2024 07:00) (76 - 123)  BP: --  BP(mean): --  ABP: 148/68 (16 Oct 2024 07:00) (94/64 - 168/76)  ABP(mean): 98 (16 Oct 2024 07:00) (77 - 113)  RR: 17 (16 Oct 2024 07:00) (16 - 26)  SpO2: 95% (16 Oct 2024 07:00) (92% - 100%)    O2 Parameters below as of 16 Oct 2024 07:00  Patient On (Oxygen Delivery Method): nasal cannula  O2 Flow (L/min): 2          I&O's Summary    15 Oct 2024 07:01  -  16 Oct 2024 07:00  --------------------------------------------------------  IN: 3962.3 mL / OUT: 3600 mL / NET: 362.3 mL        MEDICATIONS  (STANDING):  amLODIPine   Tablet 10 milliGRAM(s) Oral daily  atorvastatin 40 milliGRAM(s) Oral at bedtime  chlorhexidine 4% Liquid 1 Application(s) Topical <User Schedule>  enoxaparin Injectable 40 milliGRAM(s) SubCutaneous <User Schedule>  hydrALAZINE 100 milliGRAM(s) Oral every 8 hours  insulin lispro (ADMELOG) corrective regimen sliding scale   SubCutaneous every 6 hours  losartan 100 milliGRAM(s) Oral daily  niCARdipine Infusion 5 mG/Hr (25 mL/Hr) IV Continuous <Continuous>  pantoprazole  Injectable 40 milliGRAM(s) IV Push daily  polyethylene glycol 3350 17 Gram(s) Oral every 12 hours  senna 2 Tablet(s) Oral at bedtime  sodium chloride 2 Gram(s) Oral every 6 hours  sodium chloride 3% + sodium acetate 50:50 1000 milliLiter(s) (30 mL/Hr) IV Continuous <Continuous>      RESPIRATORY:      IMAGING:   Recent imaging studies were reviewed.    LAB RESULTS:                          13.5   16.17 )-----------( 179      ( 15 Oct 2024 20:03 )             38.6           10-16    135  |  105  |  19  ----------------------------<  114[H]  3.9   |  18[L]  |  0.90    Ca    8.0[L]      16 Oct 2024 02:05  Phos  1.6     10-15  Mg     2.3     10-15      -----------------------------------------------------------------------------------------------------------------------------------------------------------------------------------   HOSPITAL COURSE: Pt is a 45yo M with history of uncontrolled HTN, not on AC/AP admitted for L BG IP with IVH extension.     Overnight: fever - pan-cultured    HOSPITAL COURSE:  10/14: admitted to [facility]    ADMISSION SCORES:   NIHSS: 1    ICH Score: 1    EXAMINATION:  PHYSICAL EXAM:    Constitutional: No Acute Distress     Neurological: Arousable, Ox3 (self, place, year w/ choices), PERRL, EOMI, FC, trace RUE drift, CHAPA 5/5, SILT      Pulmonary: Clear to Auscultation, No rales, No rhonchi, No wheezes     Cardiovascular: S1, S2, Regular rate and rhythm     Gastrointestinal: Soft, Non-tender, Non-distended     Extremities: No calf tenderness     -----------------------------------------------------------------------------------------------------  ICU Vital Signs Last 24 Hrs  T(C): 37.9 (16 Oct 2024 07:00), Max: 38.3 (16 Oct 2024 04:00)  T(F): 100.2 (16 Oct 2024 07:00), Max: 100.9 (16 Oct 2024 04:00)  HR: 80 (16 Oct 2024 07:00) (76 - 123)  BP: --  BP(mean): --  ABP: 148/68 (16 Oct 2024 07:00) (94/64 - 168/76)  ABP(mean): 98 (16 Oct 2024 07:00) (77 - 113)  RR: 17 (16 Oct 2024 07:00) (16 - 26)  SpO2: 95% (16 Oct 2024 07:00) (92% - 100%)    O2 Parameters below as of 16 Oct 2024 07:00  Patient On (Oxygen Delivery Method): nasal cannula  O2 Flow (L/min): 2          I&O's Summary    15 Oct 2024 07:01  -  16 Oct 2024 07:00  --------------------------------------------------------  IN: 3962.3 mL / OUT: 3600 mL / NET: 362.3 mL        MEDICATIONS  (STANDING):  amLODIPine   Tablet 10 milliGRAM(s) Oral daily  atorvastatin 40 milliGRAM(s) Oral at bedtime  chlorhexidine 4% Liquid 1 Application(s) Topical <User Schedule>  enoxaparin Injectable 40 milliGRAM(s) SubCutaneous <User Schedule>  hydrALAZINE 100 milliGRAM(s) Oral every 8 hours  insulin lispro (ADMELOG) corrective regimen sliding scale   SubCutaneous every 6 hours  losartan 100 milliGRAM(s) Oral daily  niCARdipine Infusion 5 mG/Hr (25 mL/Hr) IV Continuous <Continuous>  pantoprazole  Injectable 40 milliGRAM(s) IV Push daily  polyethylene glycol 3350 17 Gram(s) Oral every 12 hours  senna 2 Tablet(s) Oral at bedtime  sodium chloride 2 Gram(s) Oral every 6 hours  sodium chloride 3% + sodium acetate 50:50 1000 milliLiter(s) (30 mL/Hr) IV Continuous <Continuous>      RESPIRATORY:      IMAGING:   Recent imaging studies were reviewed.    LAB RESULTS:                          13.5   16.17 )-----------( 179      ( 15 Oct 2024 20:03 )             38.6           10-16    135  |  105  |  19  ----------------------------<  114[H]  3.9   |  18[L]  |  0.90    Ca    8.0[L]      16 Oct 2024 02:05  Phos  1.6     10-15  Mg     2.3     10-15      -----------------------------------------------------------------------------------------------------------------------------------------------------------------------------------

## 2024-10-16 NOTE — DIETITIAN INITIAL EVALUATION ADULT - PERTINENT LABORATORY DATA
10-16    136  |  104  |  17  ----------------------------<  101[H]  4.0   |  19[L]  |  0.87    Ca    8.2[L]      16 Oct 2024 08:33  Phos  1.6     10-15  Mg     2.3     10-15    POCT Blood Glucose.: 124 mg/dL (10-16-24 @ 05:39)  A1C with Estimated Average Glucose Result: 5.9 % (10-14-24 @ 04:13)

## 2024-10-16 NOTE — PROGRESS NOTE ADULT - ASSESSMENT
ASSESSMENT: HPI:  44M no AC/AP hx uncontrolled HTN tx LVS s/p flu-like symptoms and headache; CTH w/ left BG IPH w/ IVH extension. Exam: Ukrainian speaking, Ox3, PERRL, no drift, CHAPA 5/5, SILT (14 Oct 2024 04:30)    NEURO:  - CTH (10/14): L BG lucency c/f infarct, L corona radiata heme w/ IVH extension and mild vent dilation, stable   - Repeat CT Head this AM stable.  - 10/14 CTA H/N: no significant stenosis, no vessel occlusion/AVM   - c/w neuro checks q2h  - Pain regimen: tylenol and oxycodone PRN   - pending Stroke Unit bed  - Stroke neurology following  - Activity: [x] OOB as tolerated [] Bedrest [] PT [] OT [] PMNR    PULM:  - Incentive spirometry, mobilize as tolerated  - O2 sat>92% on 2L NC    CV:  - Keep -160mmHg  - TTE (10/14): EF: 60%  - Continue to wean off cardene, added hydralazine 100q8  - C/w hydralzine 100 q8h, labetalol 100 q8h, losartan 100 daily, amlodipine 10mg daily for HTN  - Continue lipitor 40mg daily for HLD  - Troponin 524 on admission, repeat was 44    RENAL:  - Continue 3% + acetate @30  - Continue to monitor BMP q6hrs for Na goal 140-150, most recent 136.  - c/w NaCl tabs at 2Q6.  - Continue to monitor Is&Os    GI:  - Diet: Regular  - GI prophylaxis [] not indicated [x] PPI [] other:  - Bowel regimen [x] miralax  [x] senna [] other:  - LBM: 10/14/2024    ENDO:   - Goal euglycemia (-180), most recent glucose level 130.  - ISS    HEME/ONC:  - VTE prophylaxis: SCDs  - LED done on (10/14): negative    ID:  - Febrile  - pan cultured, pending results    MISC:    SOCIAL/FAMILY:  [] awaiting [X] updated at bedside [] family meeting    CODE STATUS:  [x] Full Code [] DNR [] DNI [] Palliative/Comfort Care    DISPOSITION:  [] ICU [X] Stroke Unit [] Floor [] EMU [] RCU [] PCU     ASSESSMENT: HPI:  44M no AC/AP hx uncontrolled HTN tx LVS s/p flu-like symptoms and headache; CTH w/ left BG IPH w/ IVH extension. Exam: Frisian speaking, Ox3, PERRL, no drift, CHAPA 5/5, SILT (14 Oct 2024 04:30)    NEURO:  - CTH (10/14): L BG lucency c/f infarct, L corona radiata heme w/ IVH extension and mild vent dilation, stable   - Repeat CT Head this AM stable.  - 10/14 CTA H/N: no significant stenosis, no vessel occlusion/AVM   - c/w neuro checks q2h  - Pain regimen: tylenol and oxycodone PRN   - pending Stroke Unit bed  - Stroke neurology following  - Na 140-150  - Activity: [x] OOB as tolerated [] Bedrest [] PT [] OT [] PMNR    PULM:  - Incentive spirometry, mobilize as tolerated  - O2 sat>92% on 2L NC    CV:  - Keep -160mmHg  - TTE (10/14): EF: 60%  - C/w hydralzine 100 q8h, labetalol 100 q8h, losartan 100 daily, amlodipine 10mg daily for HTN  - Continue lipitor 40mg daily for HLD  - Troponin 524 on admission, repeat was 44    RENAL:  - NaCl 2% at 75cc/hr  - Continue to monitor BMP q6hrs for Na goal 140-150, most recent 130.  - Increase NaCl tabs to 3Q6.  - Continue to monitor I&Os    GI:  - Diet: CCD  - GI prophylaxis [] not indicated [x] PPI [] other:  - Bowel regimen [x] miralax  [x] senna [] other:  - LBM: 10/14/2024    ENDO:   - Goal euglycemia (-180), most recent glucose level 106.  - ISS  - A1c 5.9    HEME/ONC:  - VTE prophylaxis: SCDs, SQL  - LED done on (10/14): negative    ID:  - Monitor for fevers.    MISC:    SOCIAL/FAMILY:  [] awaiting [X] updated at bedside [] family meeting    CODE STATUS:  [x] Full Code [] DNR [] DNI [] Palliative/Comfort Care    DISPOSITION:  [] ICU [X] Stroke Unit [] Floor [] EMU [] RCU [] PCU

## 2024-10-16 NOTE — DIETITIAN INITIAL EVALUATION ADULT - REASON INDICATOR FOR ASSESSMENT
Consult for "MST score 2 or >"  Source: chart, family at bedside (patient sleeping during encounter)  Chart reviewed, events noted

## 2024-10-16 NOTE — DIETITIAN INITIAL EVALUATION ADULT - OTHER CALCULATIONS
Defer fluid needs to team. Calculations based on patient's reported recent UBW into admission (as questionable accuracy of current admission weight) and w/ consideration for nontraumatic intracerebral hemorrhage subcortical w/ IVH per chart

## 2024-10-16 NOTE — DIETITIAN INITIAL EVALUATION ADULT - OTHER INFO
NKFA per family. Patient's height on file is 5'8"; per patient's family patient's height is 5'3". Patient's family reporting patient's UBW PTA is roughly 145lbs w/ no recent weight fluctuations. ? accuracy of current BW of 179.8lbs as patient w/ +1 generalized edema which would slightly skew weight status, request updated weight of patient when appropriate. No long term anthropometric data available via [facility] growth chart beyond current admission. Will follow weight trend.    - Prior intermittent hypokalemia, last being yesterday. WNL as of last night's labs.  - Hypophosphatemic 10/15. Supplementation noted.  - Ordered for 3% NaCl + sodium acetate 50:50 IVF & 2g sodium chloride tablets Q6H by team. Sodium goal is 136-140 currently per chart.  - HgbA1C 5.9% (10/14); patient w/ no h/o DM PTA per chart. SS admelog in place for insulin regimen.

## 2024-10-16 NOTE — DIETITIAN INITIAL EVALUATION ADULT - SIGNS/SYMPTOMS
pt tx for nontraumatic intracerebral hemorrhage subcortical w/ IVH per chart pt w/ ~50% PO intake w/ meals during admission reported

## 2024-10-16 NOTE — DIETITIAN INITIAL EVALUATION ADULT - ENERGY INTAKE
Patient w/ overall roughly fair PO intake/appetite during admission reported by family, working to increase PO intake daily. Per family patient is tolerating regular consistency diet w/ no chewing/swallowing difficulty. SLP assistance appreciated; per their assessment 10/15 recommendations for regular consistency solids w/ thin liquids. Fair (50-75%)

## 2024-10-16 NOTE — PROGRESS NOTE ADULT - ASSESSMENT
ASSESSMENT: HPI:  44M no AC/AP hx uncontrolled HTN tx LVS s/p flu-like symptoms and headache; CTH w/ left BG IPH w/ IVH extension. Exam: Belarusian speaking, Ox3, PERRL, no drift, CHAPA 5/5, SILT (14 Oct 2024 04:30)    NEURO:  - CTH (10/14): L BG lucency c/f infarct, L corona radiata heme w/ IVH extension and mild vent dilation, stable   - Repeat CT Head this AM stable.  - 10/14 CTA H/N: no significant stenosis, no vessel occlusion/AVM   - Hydrowatch: Continue to monitor for possible EVD if exam worsens  - c/w neuro checks q2h  - Pain regimen: tylenol and oxycodone PRN   - Stroke neurology following  - Activity: [x] OOB as tolerated [] Bedrest [] PT [] OT [] PMNR    PULM:  - Incentive spirometry, mobilize as tolerated  - satting >92% on 2L NC    CV:  - Keep -160mmHg  - TTE (10/14): EF: 60%  - Continue to wean off cardene, added hydralazine 100q8  - C/w losartan 100 daily, amlodipine 10mg daily for HTN  - Continue lipitor 40mg daily for HLD  - Troponin 524 on admission, repeat was 44    RENAL:  - Continue 3% + acetate @30  - Continue to monitor BMP q6hrs for Na goal 140-150, most recent 136.  - Adding NaCl tabs at 2Q6.  - Continue to monitor Is&Os    GI:  - Diet: Regular  - GI prophylaxis [] not indicated [x] PPI [] other:  - Bowel regimen [x] miralax  [x] senna [] other:  - LBM: prior to admission    ENDO:   - Goal euglycemia (-180), most recent glucose level 130.  - ISS    HEME/ONC:  - VTE prophylaxis: SCDs  - LED done on (10/14): negative    ID:  - afebrile    MISC:    SOCIAL/FAMILY:  [] awaiting [X] updated at bedside [] family meeting    CODE STATUS:  [x] Full Code [] DNR [] DNI [] Palliative/Comfort Care    DISPOSITION:  [] ICU [X] Stroke Unit [] Floor [] EMU [] RCU [] PCU     ASSESSMENT: HPI:  44M no AC/AP hx uncontrolled HTN tx LVS s/p flu-like symptoms and headache; CTH w/ left BG IPH w/ IVH extension. Exam: Ukrainian speaking, Ox3, PERRL, no drift, CHAPA 5/5, SILT (14 Oct 2024 04:30)    NEURO:  - CTH (10/14): L BG lucency c/f infarct, L corona radiata heme w/ IVH extension and mild vent dilation, stable   - Repeat CT Head this AM stable.  - 10/14 CTA H/N: no significant stenosis, no vessel occlusion/AVM   - c/w neuro checks q2h  - Pain regimen: tylenol and oxycodone PRN   - pending Stroke Unit bed  - Stroke neurology following  - Activity: [x] OOB as tolerated [] Bedrest [] PT [] OT [] PMNR    PULM:  - Incentive spirometry, mobilize as tolerated  - O2 sat>92% on 2L NC    CV:  - Keep -160mmHg  - TTE (10/14): EF: 60%  - Continue to wean off cardene, added hydralazine 100q8  - C/w hydralzine 100 q8h, labetalol 100 q8h, losartan 100 daily, amlodipine 10mg daily for HTN  - Continue lipitor 40mg daily for HLD  - Troponin 524 on admission, repeat was 44    RENAL:  - Continue 3% + acetate @30  - Continue to monitor BMP q6hrs for Na goal 140-150, most recent 136.  - c/w NaCl tabs at 2Q6.  - Continue to monitor Is&Os    GI:  - Diet: Regular  - GI prophylaxis [] not indicated [x] PPI [] other:  - Bowel regimen [x] miralax  [x] senna [] other:  - LBM: 10/14/2024    ENDO:   - Goal euglycemia (-180), most recent glucose level 130.  - ISS    HEME/ONC:  - VTE prophylaxis: SCDs  - LED done on (10/14): negative    ID:  - Febrile  - pan cultured, pending results    MISC:    SOCIAL/FAMILY:  [] awaiting [X] updated at bedside [] family meeting    CODE STATUS:  [x] Full Code [] DNR [] DNI [] Palliative/Comfort Care    DISPOSITION:  [] ICU [X] Stroke Unit [] Floor [] EMU [] RCU [] PCU

## 2024-10-16 NOTE — DIETITIAN INITIAL EVALUATION ADULT - REASON FOR ADMISSION
Nontraumatic subcortical hemorrhage of cerebral hemisphere    Per chart, patient is a 43 y/o male with PMH of uncontrolled HTN. Patient presents to [facility] as a transfer after initial evaluation of flu-like symptoms and headache; CTH w/ L BG IPH w/ IVH extension per MD.

## 2024-10-17 LAB
ANION GAP SERPL CALC-SCNC: 11 MMOL/L — SIGNIFICANT CHANGE UP (ref 5–17)
ANION GAP SERPL CALC-SCNC: 12 MMOL/L — SIGNIFICANT CHANGE UP (ref 5–17)
BUN SERPL-MCNC: 21 MG/DL — SIGNIFICANT CHANGE UP (ref 7–23)
BUN SERPL-MCNC: 23 MG/DL — SIGNIFICANT CHANGE UP (ref 7–23)
CALCIUM SERPL-MCNC: 8.4 MG/DL — SIGNIFICANT CHANGE UP (ref 8.4–10.5)
CALCIUM SERPL-MCNC: 8.5 MG/DL — SIGNIFICANT CHANGE UP (ref 8.4–10.5)
CHLORIDE SERPL-SCNC: 101 MMOL/L — SIGNIFICANT CHANGE UP (ref 96–108)
CHLORIDE SERPL-SCNC: 102 MMOL/L — SIGNIFICANT CHANGE UP (ref 96–108)
CO2 SERPL-SCNC: 21 MMOL/L — LOW (ref 22–31)
CO2 SERPL-SCNC: 22 MMOL/L — SIGNIFICANT CHANGE UP (ref 22–31)
CREAT SERPL-MCNC: 0.82 MG/DL — SIGNIFICANT CHANGE UP (ref 0.5–1.3)
CREAT SERPL-MCNC: 0.93 MG/DL — SIGNIFICANT CHANGE UP (ref 0.5–1.3)
EGFR: 104 ML/MIN/1.73M2 — SIGNIFICANT CHANGE UP
EGFR: 112 ML/MIN/1.73M2 — SIGNIFICANT CHANGE UP
GLUCOSE BLDC GLUCOMTR-MCNC: 126 MG/DL — HIGH (ref 70–99)
GLUCOSE BLDC GLUCOMTR-MCNC: 143 MG/DL — HIGH (ref 70–99)
GLUCOSE BLDC GLUCOMTR-MCNC: 93 MG/DL — SIGNIFICANT CHANGE UP (ref 70–99)
GLUCOSE BLDC GLUCOMTR-MCNC: 99 MG/DL — SIGNIFICANT CHANGE UP (ref 70–99)
GLUCOSE SERPL-MCNC: 105 MG/DL — HIGH (ref 70–99)
GLUCOSE SERPL-MCNC: 175 MG/DL — HIGH (ref 70–99)
MAGNESIUM SERPL-MCNC: 2.5 MG/DL — SIGNIFICANT CHANGE UP (ref 1.6–2.6)
OSMOLALITY UR: 675 MOS/KG — SIGNIFICANT CHANGE UP (ref 300–900)
PHOSPHATE SERPL-MCNC: 3.7 MG/DL — SIGNIFICANT CHANGE UP (ref 2.5–4.5)
POTASSIUM SERPL-MCNC: 3.9 MMOL/L — SIGNIFICANT CHANGE UP (ref 3.5–5.3)
POTASSIUM SERPL-MCNC: 3.9 MMOL/L — SIGNIFICANT CHANGE UP (ref 3.5–5.3)
POTASSIUM SERPL-SCNC: 3.9 MMOL/L — SIGNIFICANT CHANGE UP (ref 3.5–5.3)
POTASSIUM SERPL-SCNC: 3.9 MMOL/L — SIGNIFICANT CHANGE UP (ref 3.5–5.3)
SODIUM SERPL-SCNC: 134 MMOL/L — LOW (ref 135–145)
SODIUM SERPL-SCNC: 135 MMOL/L — SIGNIFICANT CHANGE UP (ref 135–145)
SODIUM UR-SCNC: 165 MMOL/L — SIGNIFICANT CHANGE UP
SP GR UR STRIP: 1.02 — SIGNIFICANT CHANGE UP (ref 1–1.03)

## 2024-10-17 PROCEDURE — 99291 CRITICAL CARE FIRST HOUR: CPT

## 2024-10-17 PROCEDURE — 99233 SBSQ HOSP IP/OBS HIGH 50: CPT

## 2024-10-17 RX ORDER — LABETALOL HCL 200 MG
100 TABLET ORAL ONCE
Refills: 0 | Status: COMPLETED | OUTPATIENT
Start: 2024-10-17 | End: 2024-10-17

## 2024-10-17 RX ORDER — SODIUM CHLORIDE 5 % 5 %
1000 INTRAVENOUS SOLUTION INTRAVENOUS
Refills: 0 | Status: DISCONTINUED | OUTPATIENT
Start: 2024-10-17 | End: 2024-10-19

## 2024-10-17 RX ORDER — LABETALOL HCL 200 MG
200 TABLET ORAL EVERY 8 HOURS
Refills: 0 | Status: DISCONTINUED | OUTPATIENT
Start: 2024-10-17 | End: 2024-11-04

## 2024-10-17 RX ORDER — AMLODIPINE BESYLATE 10 MG
10 TABLET ORAL DAILY
Refills: 0 | Status: DISCONTINUED | OUTPATIENT
Start: 2024-10-17 | End: 2024-11-04

## 2024-10-17 RX ORDER — LABETALOL HCL 200 MG
10 TABLET ORAL ONCE
Refills: 0 | Status: COMPLETED | OUTPATIENT
Start: 2024-10-17 | End: 2024-10-17

## 2024-10-17 RX ORDER — SODIUM CHLORIDE 5 % 5 %
1000 INTRAVENOUS SOLUTION INTRAVENOUS
Refills: 0 | Status: DISCONTINUED | OUTPATIENT
Start: 2024-10-17 | End: 2024-10-17

## 2024-10-17 RX ORDER — LABETALOL HCL 200 MG
100 TABLET ORAL EVERY 8 HOURS
Refills: 0 | Status: DISCONTINUED | OUTPATIENT
Start: 2024-10-17 | End: 2024-10-17

## 2024-10-17 RX ORDER — LOSARTAN POTASSIUM 25 MG/1
100 TABLET ORAL DAILY
Refills: 0 | Status: DISCONTINUED | OUTPATIENT
Start: 2024-10-17 | End: 2024-11-04

## 2024-10-17 RX ORDER — ACETAMINOPHEN 500 MG
1000 TABLET ORAL ONCE
Refills: 0 | Status: COMPLETED | OUTPATIENT
Start: 2024-10-17 | End: 2024-10-17

## 2024-10-17 RX ORDER — INSULIN LISPRO 100/ML
VIAL (ML) SUBCUTANEOUS AT BEDTIME
Refills: 0 | Status: DISCONTINUED | OUTPATIENT
Start: 2024-10-17 | End: 2024-10-18

## 2024-10-17 RX ORDER — INSULIN LISPRO 100/ML
VIAL (ML) SUBCUTANEOUS
Refills: 0 | Status: DISCONTINUED | OUTPATIENT
Start: 2024-10-17 | End: 2024-10-18

## 2024-10-17 RX ORDER — POTASSIUM CHLORIDE 10 MEQ
20 TABLET, EXTENDED RELEASE ORAL ONCE
Refills: 0 | Status: COMPLETED | OUTPATIENT
Start: 2024-10-17 | End: 2024-10-17

## 2024-10-17 RX ADMIN — Medication 10 MILLIGRAM(S): at 05:21

## 2024-10-17 RX ADMIN — Medication 1000 MILLIGRAM(S): at 17:32

## 2024-10-17 RX ADMIN — POLYETHYLENE GLYCOL 3350 17 GRAM(S): 17 POWDER, FOR SOLUTION ORAL at 17:07

## 2024-10-17 RX ADMIN — SODIUM CHLORIDE 2 GRAM(S): 9 INJECTION, SOLUTION INTRAMUSCULAR; INTRAVENOUS; SUBCUTANEOUS at 23:13

## 2024-10-17 RX ADMIN — POLYETHYLENE GLYCOL 3350 17 GRAM(S): 17 POWDER, FOR SOLUTION ORAL at 05:21

## 2024-10-17 RX ADMIN — TRAMADOL HYDROCHLORIDE 25 MILLIGRAM(S): 50 TABLET, COATED ORAL at 00:00

## 2024-10-17 RX ADMIN — HYDRALAZINE HYDROCHLORIDE 100 MILLIGRAM(S): 50 TABLET, FILM COATED ORAL at 02:11

## 2024-10-17 RX ADMIN — Medication 100 MILLIGRAM(S): at 16:19

## 2024-10-17 RX ADMIN — Medication 400 MILLIGRAM(S): at 17:17

## 2024-10-17 RX ADMIN — HYDRALAZINE HYDROCHLORIDE 100 MILLIGRAM(S): 50 TABLET, FILM COATED ORAL at 21:09

## 2024-10-17 RX ADMIN — SODIUM CHLORIDE 2 GRAM(S): 9 INJECTION, SOLUTION INTRAMUSCULAR; INTRAVENOUS; SUBCUTANEOUS at 11:20

## 2024-10-17 RX ADMIN — Medication 100 MILLIGRAM(S): at 05:21

## 2024-10-17 RX ADMIN — Medication 200 MILLIGRAM(S): at 21:09

## 2024-10-17 RX ADMIN — HYDRALAZINE HYDROCHLORIDE 100 MILLIGRAM(S): 50 TABLET, FILM COATED ORAL at 10:41

## 2024-10-17 RX ADMIN — Medication 2 TABLET(S): at 21:09

## 2024-10-17 RX ADMIN — Medication 40 MILLIGRAM(S): at 17:08

## 2024-10-17 RX ADMIN — Medication 20 MILLIEQUIVALENT(S): at 11:21

## 2024-10-17 RX ADMIN — SODIUM CHLORIDE 2 GRAM(S): 9 INJECTION, SOLUTION INTRAMUSCULAR; INTRAVENOUS; SUBCUTANEOUS at 17:08

## 2024-10-17 RX ADMIN — CHLORHEXIDINE GLUCONATE 1 APPLICATION(S): 40 SOLUTION TOPICAL at 21:56

## 2024-10-17 RX ADMIN — Medication 400 MILLIGRAM(S): at 02:40

## 2024-10-17 RX ADMIN — LOSARTAN POTASSIUM 100 MILLIGRAM(S): 25 TABLET ORAL at 05:21

## 2024-10-17 RX ADMIN — Medication 10 MILLIGRAM(S): at 12:34

## 2024-10-17 RX ADMIN — Medication 100 MILLIGRAM(S): at 13:15

## 2024-10-17 RX ADMIN — Medication 75 MILLILITER(S): at 14:38

## 2024-10-17 RX ADMIN — SODIUM CHLORIDE 2 GRAM(S): 9 INJECTION, SOLUTION INTRAMUSCULAR; INTRAVENOUS; SUBCUTANEOUS at 05:21

## 2024-10-17 RX ADMIN — Medication 40 MILLIGRAM(S): at 21:09

## 2024-10-17 RX ADMIN — PANTOPRAZOLE SODIUM 40 MILLIGRAM(S): 40 TABLET, DELAYED RELEASE ORAL at 08:38

## 2024-10-17 RX ADMIN — Medication 1000 MILLIGRAM(S): at 03:00

## 2024-10-17 RX ADMIN — Medication 75 MILLILITER(S): at 01:53

## 2024-10-17 NOTE — PROGRESS NOTE ADULT - NS ATTEND AMEND GEN_ALL_CORE FT
I reviewed available diagnostic studies, and reviewed images personally. I agree with resident's history, exam, orders placed, and plan of care. Thirty minutes of critical care time was spent in caring for this patient who has concern of sudden and clinically significant deterioration requiring a high level of physician preparedness, management of brain supporting interventions, and frequent physician assessments. This excludes any time spent on separate procedures or teaching.    Medical issues needing to be addressed include: Nontraumatic intracerebral hemorrhage subcortical w/ IVH, perihematomal cerebral edema and brain compression, HTN, medication noncompliance per brother, HA, AMS, n/v, dizziness. Plan & exam as above. Likely hypertensive hemorrhage, Utox (-). HTS 2% 75cc/hr. MRI pending. If stable, wean HTS. OK for stroke unit. Exam is stable.

## 2024-10-17 NOTE — PROGRESS NOTE ADULT - SUBJECTIVE AND OBJECTIVE BOX
Evening Progress note    HOSPITAL COURSE: Pt is a 45yo M with history of uncontrolled HTN, not on AC/AP admitted for L BG IP with IVH extension.       HOSPITAL COURSE:  10/14: admitted to [facility]    ADMISSION SCORES:   NIHSS: 1    ICH Score: 1    EXAMINATION:  PHYSICAL EXAM:    Constitutional: No Acute Distress     Neurological: Arousable, Ox3 (self, place, year w/ choices), PERRL, EOMI, FC, trace RUE drift, CHAPA 5/5, SILT      Pulmonary: Clear to Auscultation, No rales, No rhonchi, No wheezes     ICU Vital Signs Last 24 Hrs  T(C): 37.1 (17 Oct 2024 19:00), Max: 37.2 (17 Oct 2024 15:00)  T(F): 98.7 (17 Oct 2024 19:00), Max: 99 (17 Oct 2024 15:00)  HR: 86 (17 Oct 2024 19:00) (71 - 88)  BP: 138/68 (17 Oct 2024 19:00) (138/68 - 198/87)  BP(mean): 95 (17 Oct 2024 19:00) (95 - 134)  ABP: 175/83 (17 Oct 2024 16:00) (155/83 - 186/105)  ABP(mean): 111 (17 Oct 2024 16:00) (108 - 132)  RR: 15 (17 Oct 2024 19:00) (13 - 18)  SpO2: 95% (17 Oct 2024 19:00) (91% - 96%)    O2 Parameters below as of 17 Oct 2024 19:00  Patient On (Oxygen Delivery Method): nasal cannula  O2 Flow (L/min): 2      Cardiovascular: S1, S2, Regular rate and rhythm     Gastrointestinal: Soft, Non-tender, Non-distended     Extremities: No calf tenderness       MEDICATIONS  (STANDING):  amLODIPine   Tablet 10 milliGRAM(s) Oral daily  atorvastatin 40 milliGRAM(s) Oral at bedtime  chlorhexidine 4% Liquid 1 Application(s) Topical <User Schedule>  enoxaparin Injectable 40 milliGRAM(s) SubCutaneous <User Schedule>  hydrALAZINE 100 milliGRAM(s) Oral every 8 hours  insulin lispro (ADMELOG) corrective regimen sliding scale   SubCutaneous every 6 hours  labetalol 100 milliGRAM(s) Oral every 8 hours  losartan 100 milliGRAM(s) Oral daily  pantoprazole    Tablet 40 milliGRAM(s) Oral before breakfast  polyethylene glycol 3350 17 Gram(s) Oral every 12 hours  senna 2 Tablet(s) Oral at bedtime  sodium chloride 2 Gram(s) Oral every 6 hours  sodium chloride 2% + sodium acetate 50:50 1000 milliLiter(s) (75 mL/Hr) IV Continuous <Continuous>      RESPIRATORY:      IMAGING:   Recent imaging studies were reviewed.      LABS:      10-17    134[L]  |  101  |  23  ----------------------------<  175[H]  3.9   |  21[L]  |  0.93    Ca    8.5      17 Oct 2024 15:37  Phos  3.7     10-17  Mg     2.5     10-17    Urinalysis Basic - ( 17 Oct 2024 15:37 )    Color: x / Appearance: x / SG: x / pH: x  Gluc: 175 mg/dL / Ketone: x  / Bili: x / Urobili: x   Blood: x / Protein: x / Nitrite: x   Leuk Esterase: x / RBC: x / WBC x   Sq Epi: x / Non Sq Epi: x / Bacteria: x

## 2024-10-17 NOTE — CHART NOTE - NSCHARTNOTEFT_GEN_A_CORE
Patient was signed out to BAM Natarajan at 10:40PM at [id number] in regards to the patient's hospital course and complications. Patient is now being transferred to the stroke unit under Dr. Resendez.

## 2024-10-17 NOTE — PROGRESS NOTE ADULT - THIS PATIENT HAS THE FOLLOWING CONDITION(S)/DIAGNOSES ON THIS ADMISSION:
Cerebral Edema
Cerebral Edema/Brain Compression / Herniation
None
None
Cerebral Edema
None
Cerebral Edema
None
Cerebral Edema

## 2024-10-17 NOTE — PROGRESS NOTE ADULT - SUBJECTIVE AND OBJECTIVE BOX
HOSPITAL COURSE: Pt is a 45yo M with history of uncontrolled HTN, not on AC/AP admitted for L  IP with IVH extension.     Overnight: fever - pan-cultured    HOSPITAL COURSE:  10/14: admitted to [facility]    ADMISSION SCORES:   NIHSS: 1    ICH Score: 1    EXAMINATION:  PHYSICAL EXAM:    Constitutional: No Acute Distress     Neurological: Arousable, Ox3 (self, place, year w/ choices), PERRL, EOMI, FC, trace RUE drift, CHAPA 5/5, SILT      Pulmonary: Clear to Auscultation, No rales, No rhonchi, No wheezes     Cardiovascular: S1, S2, Regular rate and rhythm     Gastrointestinal: Soft, Non-tender, Non-distended     Extremities: No calf tenderness     -----------------------------------------------------------------------------------------------------  ICU Vital Signs Last 24 Hrs  T(C): 36.8 (17 Oct 2024 03:00), Max: 37.3 (16 Oct 2024 11:00)  T(F): 98.3 (17 Oct 2024 03:00), Max: 99.1 (16 Oct 2024 11:00)  HR: 72 (17 Oct 2024 05:00) (72 - 93)  BP: 154/82 (17 Oct 2024 05:00) (154/80 - 166/80)  BP(mean): 112 (17 Oct 2024 05:00) (109 - 120)  ABP: 155/83 (17 Oct 2024 05:00) (120/90 - 183/86)  ABP(mean): 108 (17 Oct 2024 05:00) (103 - 132)  RR: 15 (17 Oct 2024 03:00) (15 - 19)  SpO2: 94% (17 Oct 2024 05:00) (92% - 97%)    O2 Parameters below as of 16 Oct 2024 19:00  Patient On (Oxygen Delivery Method): nasal cannula  O2 Flow (L/min): 2          I&O's Summary    16 Oct 2024 07:01  -  17 Oct 2024 07:00  --------------------------------------------------------  IN: 2720 mL / OUT: 2900 mL / NET: -180 mL        MEDICATIONS  (STANDING):  amLODIPine   Tablet 10 milliGRAM(s) Oral daily  atorvastatin 40 milliGRAM(s) Oral at bedtime  chlorhexidine 4% Liquid 1 Application(s) Topical <User Schedule>  enoxaparin Injectable 40 milliGRAM(s) SubCutaneous <User Schedule>  hydrALAZINE 100 milliGRAM(s) Oral every 8 hours  insulin lispro (ADMELOG) corrective regimen sliding scale   SubCutaneous every 6 hours  labetalol 100 milliGRAM(s) Oral every 8 hours  losartan 100 milliGRAM(s) Oral daily  pantoprazole    Tablet 40 milliGRAM(s) Oral before breakfast  polyethylene glycol 3350 17 Gram(s) Oral every 12 hours  senna 2 Tablet(s) Oral at bedtime  sodium chloride 2 Gram(s) Oral every 6 hours  sodium chloride 2% + sodium acetate 50:50 1000 milliLiter(s) (75 mL/Hr) IV Continuous <Continuous>      RESPIRATORY:      IMAGING:   Recent imaging studies were reviewed.    LAB RESULTS:                          13.5   16.17 )-----------( 179      ( 15 Oct 2024 20:03 )             38.6     10-16    130[L]  |  100  |  24[H]  ----------------------------<  130[H]  3.8   |  20[L]  |  0.89    Ca    8.4      16 Oct 2024 21:51  Phos  2.7     10-16  Mg     2.4     10-16  -----------------------------------------------------------------------------------------------------------------------------------------------------------------------------------

## 2024-10-17 NOTE — PROGRESS NOTE ADULT - ASSESSMENT
Assessment  44 Right handed man with HTN HLD p/w headache, emesis, dizziness found to have left IPH w/ IVH. VS in triage 133/88. Exam vairying from awake  +somnolence to lethargic +somnolence, some confusion/disorientation. Labs w/ PMN predomn leukocytosis to 16K, normal coags, mild hyponatremia improved.  LDL/HDL: 146/43. A1C 5. CXR clear lungs. VA duplex neg for dvt in b/l LE. CTH with left BG IPH and IVH stable @ 4 hours.   NIHSS 1  ICH Score 1    Impression:  -Headache and somnolence due to left BG IPH w/ IVH extension. Etiology of bleed pending workup. Possibly hypertensive  -10/14 is somnolent without focal deficits and 4 hour interval scan is stable  -10/15 is somnolent without focal deficits and 24 hour interval scan is stable  -10/15 stable, somnolent  -10/17, stable, somnolent     NEURO:   LDL/HDL: 146/43. A1C 5.9  Continue close monitoring for neurologic deterioration.     []MRI Brain with and without contrast,   []Physical therapy/OT/Speech eval/treatment.   []BP goal per NSGY per now  []Hydro-watch for now with IVH  []Hypertonic per NSGY/NSICU for now - on 2% @ 75cc/hr  []Continue atorvastatin     ANTITHROMBOTIC THERAPY: None iso ICH    PULMONARY: CXR clear, protecting airway, saturating well     CARDIOVASCULAR: TTE without pathology  cardiac / telemetry  monitoring                              SBP goal: <140 or per NSICU  on 2% HTS     GASTROINTESTINAL:  dysphagia screen  - passed     Diet: regular    RENAL: BUN/Cr stable, good urine output      Na Goal: Greater than 145 or per NSGY    HEMATOLOGY: H/H stable, Platelets normal      DVT ppx: mechanical and enoxaparin     ID: afebrile, nonspecific leukocytosis  Trend leukocytosis and fever curve, no localized infectious symptoms noted     OTHER:   DISPOSITION: Rehab or home depending on PT eval once stable and workup is complete    CORE MEASURES:        Admission NIHSS: 1     TPA: [] YES [X] NO      LDL/HDL: 146/43     Depression Screen:      Statin Therapy: YES     Dysphagia Screen: [X] PASS [] FAIL     Smoking [] YES [X] NO      Afib [] YES [X] NO     Stroke Education [] YES [] NO

## 2024-10-17 NOTE — PROGRESS NOTE ADULT - SUBJECTIVE AND OBJECTIVE BOX
Patient seen and examined at bedside.    --Anticoagulation--  enoxaparin Injectable 40 milliGRAM(s) SubCutaneous <User Schedule>    T(C): 36.9 (10-16-24 @ 23:00), Max: 38.3 (10-16-24 @ 04:00)  HR: 87 (10-16-24 @ 23:00) (76 - 108)  BP: 158/92 (10-16-24 @ 23:00) (154/80 - 166/80)  RR: 18 (10-16-24 @ 23:00) (16 - 20)  SpO2: 96% (10-16-24 @ 23:00) (92% - 97%)  Wt(kg): --    Exam: Arousable, Ox3, PERRL, EOMI, FC, trace RUE drift, CHAPA 5/5, SILT

## 2024-10-17 NOTE — PROGRESS NOTE ADULT - ASSESSMENT
ASSESSMENT: HPI:  44M no AC/AP hx uncontrolled HTN tx LVS s/p flu-like symptoms and headache; CTH w/ left BG IPH w/ IVH extension. Exam: Turkish speaking, Ox3, PERRL, no drift, CHAPA 5/5, SILT (14 Oct 2024 04:30)    NEURO:  - CTH (10/14): L BG lucency c/f infarct, L corona radiata heme w/ IVH extension and mild vent dilation, stable   - CT Head this AM stable.  - 10/14 CTA H/N: no significant stenosis, no vessel occlusion/AVM   - c/w neuro checks q4h  - repeat CT head in one week  - Pain regimen: tylenol and oxycodone PRN   - pending Stroke Unit bed  - Stroke neurology following  - Na 140-150  - Activity: [x] OOB as tolerated [] Bedrest [] PT [] OT [] PMNR    PULM:  - Incentive spirometry, mobilize as tolerated  - O2 sat>92% on 2L NC    CV:  - Keep -160mmHg  - TTE (10/14): EF: 60%  - C/w hydralzine 100 q8h, labetalol 100 q8h, losartan 100 daily, amlodipine 10mg daily for HTN  - Continue lipitor 40mg daily for HLD  - Troponin 524 on admission, repeat was 44    RENAL:  - NaCl 2% at 75cc/hr  - Continue to monitor BMP q6hrs for Na goal 140-150  - Increase NaCl tabs to 3Q6.  - Continue to monitor I&Os    GI:  - Diet: CCD  - GI prophylaxis [] not indicated [x] PPI [] other:  - Bowel regimen [x] miralax  [x] senna [] other:  - LBM: 10/14/2024    ENDO:   - Goal euglycemia (-180), most recent glucose level 106.  - ISS  - A1c 5.9    HEME/ONC:  - VTE prophylaxis: SCDs, SQL  - LED done on (10/14): negative    ID:  - Monitor for fevers.  -MRSA swab    MISC:    SOCIAL/FAMILY:  [] awaiting [X] updated at bedside [] family meeting    CODE STATUS:  [x] Full Code [] DNR [] DNI [] Palliative/Comfort Care    DISPOSITION:  [] ICU [X] Stroke Unit [] Floor [] EMU [] RCU [] PCU

## 2024-10-17 NOTE — PROGRESS NOTE ADULT - ASSESSMENT
ASSESSMENT: HPI:  44M no AC/AP hx uncontrolled HTN tx LVS s/p flu-like symptoms and headache; CTH w/ left BG IPH w/ IVH extension. Exam: Luxembourgish speaking, Ox3, PERRL, no drift, CHAPA 5/5, SILT (14 Oct 2024 04:30)    NEURO:  - CTH (10/14): L BG lucency c/f infarct, L corona radiata heme w/ IVH extension and mild vent dilation, stable   - Repeat CT Head this AM stable.  - 10/14 CTA H/N: no significant stenosis, no vessel occlusion/AVM   - c/w neuro checks q2h  - Pain regimen: tylenol and oxycodone PRN   - pending Stroke Unit bed  - Stroke neurology following  - Na 140-150  - Activity: [x] OOB as tolerated [] Bedrest [] PT [] OT [] PMNR    PULM:  - Incentive spirometry, mobilize as tolerated  - O2 sat>92% on 2L NC    CV:  - Keep -160mmHg  - TTE (10/14): EF: 60%  - C/w hydralzine 100 q8h, labetalol 100 q8h, losartan 100 daily, amlodipine 10mg daily for HTN  - Continue lipitor 40mg daily for HLD  - Troponin 524 on admission, repeat was 44    RENAL:  - NaCl 2% at 75cc/hr  - Continue to monitor BMP q6hrs for Na goal 140-150, most recent 130.  - Increase NaCl tabs to 3Q6.  - Continue to monitor I&Os    GI:  - Diet: CCD  - GI prophylaxis [] not indicated [x] PPI [] other:  - Bowel regimen [x] miralax  [x] senna [] other:  - LBM: 10/14/2024    ENDO:   - Goal euglycemia (-180), most recent glucose level 106.  - ISS  - A1c 5.9    HEME/ONC:  - VTE prophylaxis: SCDs, SQL  - LED done on (10/14): negative    ID:  - Monitor for fevers.    MISC:    SOCIAL/FAMILY:  [] awaiting [X] updated at bedside [] family meeting    CODE STATUS:  [x] Full Code [] DNR [] DNI [] Palliative/Comfort Care    DISPOSITION:  [] ICU [X] Stroke Unit [] Floor [] EMU [] RCU [] PCU     ASSESSMENT: HPI:  44M no AC/AP hx uncontrolled HTN tx LVS s/p flu-like symptoms and headache; CTH w/ left BG IPH w/ IVH extension. Exam: Thai speaking, Ox3, PERRL, no drift, CHAPA 5/5, SILT (14 Oct 2024 04:30)    NEURO:  - CTH (10/14): L BG lucency c/f infarct, L corona radiata heme w/ IVH extension and mild vent dilation, stable   - CT Head this AM stable.  - 10/14 CTA H/N: no significant stenosis, no vessel occlusion/AVM   - c/w neuro checks q4h  - repeat CT head in one week  - Pain regimen: tylenol and oxycodone PRN   - pending Stroke Unit bed  - Stroke neurology following  - Na 140-150  - Activity: [x] OOB as tolerated [] Bedrest [] PT [] OT [] PMNR    PULM:  - Incentive spirometry, mobilize as tolerated  - O2 sat>92% on 2L NC    CV:  - Keep -160mmHg  - TTE (10/14): EF: 60%  - C/w hydralzine 100 q8h, labetalol 100 q8h, losartan 100 daily, amlodipine 10mg daily for HTN  - Continue lipitor 40mg daily for HLD  - Troponin 524 on admission, repeat was 44    RENAL:  - NaCl 2% at 75cc/hr  - Continue to monitor BMP q6hrs for Na goal 140-150  - Increase NaCl tabs to 3Q6.  - Continue to monitor I&Os    GI:  - Diet: CCD  - GI prophylaxis [] not indicated [x] PPI [] other:  - Bowel regimen [x] miralax  [x] senna [] other:  - LBM: 10/14/2024    ENDO:   - Goal euglycemia (-180), most recent glucose level 106.  - ISS  - A1c 5.9    HEME/ONC:  - VTE prophylaxis: SCDs, SQL  - LED done on (10/14): negative    ID:  - Monitor for fevers.  -MRSA swab    MISC:    SOCIAL/FAMILY:  [] awaiting [X] updated at bedside [] family meeting    CODE STATUS:  [x] Full Code [] DNR [] DNI [] Palliative/Comfort Care    DISPOSITION:  [] ICU [X] Stroke Unit [] Floor [] EMU [] RCU [] PCU

## 2024-10-18 LAB
ALBUMIN SERPL ELPH-MCNC: 3.2 G/DL — LOW (ref 3.3–5)
ALBUMIN SERPL ELPH-MCNC: 3.6 G/DL — SIGNIFICANT CHANGE UP (ref 3.3–5)
ALP SERPL-CCNC: 76 U/L — SIGNIFICANT CHANGE UP (ref 40–120)
ALP SERPL-CCNC: 78 U/L — SIGNIFICANT CHANGE UP (ref 40–120)
ALT FLD-CCNC: 122 U/L — HIGH (ref 10–45)
ALT FLD-CCNC: 140 U/L — HIGH (ref 10–45)
ANION GAP SERPL CALC-SCNC: 11 MMOL/L — SIGNIFICANT CHANGE UP (ref 5–17)
ANION GAP SERPL CALC-SCNC: 12 MMOL/L — SIGNIFICANT CHANGE UP (ref 5–17)
ANION GAP SERPL CALC-SCNC: 12 MMOL/L — SIGNIFICANT CHANGE UP (ref 5–17)
ANION GAP SERPL CALC-SCNC: 14 MMOL/L — SIGNIFICANT CHANGE UP (ref 5–17)
AST SERPL-CCNC: 42 U/L — HIGH (ref 10–40)
AST SERPL-CCNC: 57 U/L — HIGH (ref 10–40)
BILIRUB SERPL-MCNC: 0.8 MG/DL — SIGNIFICANT CHANGE UP (ref 0.2–1.2)
BILIRUB SERPL-MCNC: 1 MG/DL — SIGNIFICANT CHANGE UP (ref 0.2–1.2)
BUN SERPL-MCNC: 23 MG/DL — SIGNIFICANT CHANGE UP (ref 7–23)
BUN SERPL-MCNC: 26 MG/DL — HIGH (ref 7–23)
BUN SERPL-MCNC: 27 MG/DL — HIGH (ref 7–23)
BUN SERPL-MCNC: 30 MG/DL — HIGH (ref 7–23)
CALCIUM SERPL-MCNC: 8.2 MG/DL — LOW (ref 8.4–10.5)
CALCIUM SERPL-MCNC: 8.3 MG/DL — LOW (ref 8.4–10.5)
CALCIUM SERPL-MCNC: 8.3 MG/DL — LOW (ref 8.4–10.5)
CALCIUM SERPL-MCNC: 8.4 MG/DL — SIGNIFICANT CHANGE UP (ref 8.4–10.5)
CHLORIDE SERPL-SCNC: 102 MMOL/L — SIGNIFICANT CHANGE UP (ref 96–108)
CHLORIDE SERPL-SCNC: 102 MMOL/L — SIGNIFICANT CHANGE UP (ref 96–108)
CHLORIDE SERPL-SCNC: 103 MMOL/L — SIGNIFICANT CHANGE UP (ref 96–108)
CHLORIDE SERPL-SCNC: 103 MMOL/L — SIGNIFICANT CHANGE UP (ref 96–108)
CO2 SERPL-SCNC: 18 MMOL/L — LOW (ref 22–31)
CO2 SERPL-SCNC: 20 MMOL/L — LOW (ref 22–31)
CO2 SERPL-SCNC: 21 MMOL/L — LOW (ref 22–31)
CO2 SERPL-SCNC: 21 MMOL/L — LOW (ref 22–31)
CREAT SERPL-MCNC: 0.87 MG/DL — SIGNIFICANT CHANGE UP (ref 0.5–1.3)
CREAT SERPL-MCNC: 0.89 MG/DL — SIGNIFICANT CHANGE UP (ref 0.5–1.3)
CREAT SERPL-MCNC: 0.95 MG/DL — SIGNIFICANT CHANGE UP (ref 0.5–1.3)
CREAT SERPL-MCNC: 1.06 MG/DL — SIGNIFICANT CHANGE UP (ref 0.5–1.3)
EGFR: 102 ML/MIN/1.73M2 — SIGNIFICANT CHANGE UP
EGFR: 109 ML/MIN/1.73M2 — SIGNIFICANT CHANGE UP
EGFR: 110 ML/MIN/1.73M2 — SIGNIFICANT CHANGE UP
EGFR: 89 ML/MIN/1.73M2 — SIGNIFICANT CHANGE UP
GLUCOSE BLDC GLUCOMTR-MCNC: 104 MG/DL — HIGH (ref 70–99)
GLUCOSE BLDC GLUCOMTR-MCNC: 111 MG/DL — HIGH (ref 70–99)
GLUCOSE SERPL-MCNC: 102 MG/DL — HIGH (ref 70–99)
GLUCOSE SERPL-MCNC: 107 MG/DL — HIGH (ref 70–99)
GLUCOSE SERPL-MCNC: 176 MG/DL — HIGH (ref 70–99)
GLUCOSE SERPL-MCNC: 91 MG/DL — SIGNIFICANT CHANGE UP (ref 70–99)
HCT VFR BLD CALC: 38.9 % — LOW (ref 39–50)
HGB BLD-MCNC: 12.9 G/DL — LOW (ref 13–17)
MAGNESIUM SERPL-MCNC: 2.4 MG/DL — SIGNIFICANT CHANGE UP (ref 1.6–2.6)
MAGNESIUM SERPL-MCNC: 2.5 MG/DL — SIGNIFICANT CHANGE UP (ref 1.6–2.6)
MCHC RBC-ENTMCNC: 27.9 PG — SIGNIFICANT CHANGE UP (ref 27–34)
MCHC RBC-ENTMCNC: 33.2 GM/DL — SIGNIFICANT CHANGE UP (ref 32–36)
MCV RBC AUTO: 84 FL — SIGNIFICANT CHANGE UP (ref 80–100)
MRSA PCR RESULT.: SIGNIFICANT CHANGE UP
NRBC # BLD: 0 /100 WBCS — SIGNIFICANT CHANGE UP (ref 0–0)
PHOSPHATE SERPL-MCNC: 2.9 MG/DL — SIGNIFICANT CHANGE UP (ref 2.5–4.5)
PHOSPHATE SERPL-MCNC: 3.2 MG/DL — SIGNIFICANT CHANGE UP (ref 2.5–4.5)
PLATELET # BLD AUTO: 178 K/UL — SIGNIFICANT CHANGE UP (ref 150–400)
POTASSIUM SERPL-MCNC: 3.8 MMOL/L — SIGNIFICANT CHANGE UP (ref 3.5–5.3)
POTASSIUM SERPL-MCNC: 3.8 MMOL/L — SIGNIFICANT CHANGE UP (ref 3.5–5.3)
POTASSIUM SERPL-MCNC: 4 MMOL/L — SIGNIFICANT CHANGE UP (ref 3.5–5.3)
POTASSIUM SERPL-MCNC: 4.5 MMOL/L — SIGNIFICANT CHANGE UP (ref 3.5–5.3)
POTASSIUM SERPL-SCNC: 3.8 MMOL/L — SIGNIFICANT CHANGE UP (ref 3.5–5.3)
POTASSIUM SERPL-SCNC: 3.8 MMOL/L — SIGNIFICANT CHANGE UP (ref 3.5–5.3)
POTASSIUM SERPL-SCNC: 4 MMOL/L — SIGNIFICANT CHANGE UP (ref 3.5–5.3)
POTASSIUM SERPL-SCNC: 4.5 MMOL/L — SIGNIFICANT CHANGE UP (ref 3.5–5.3)
PROT SERPL-MCNC: 6 G/DL — SIGNIFICANT CHANGE UP (ref 6–8.3)
PROT SERPL-MCNC: 6.6 G/DL — SIGNIFICANT CHANGE UP (ref 6–8.3)
RBC # BLD: 4.63 M/UL — SIGNIFICANT CHANGE UP (ref 4.2–5.8)
RBC # FLD: 13.2 % — SIGNIFICANT CHANGE UP (ref 10.3–14.5)
S AUREUS DNA NOSE QL NAA+PROBE: DETECTED
SODIUM SERPL-SCNC: 134 MMOL/L — LOW (ref 135–145)
SODIUM SERPL-SCNC: 135 MMOL/L — SIGNIFICANT CHANGE UP (ref 135–145)
WBC # BLD: 11.84 K/UL — HIGH (ref 3.8–10.5)
WBC # FLD AUTO: 11.84 K/UL — HIGH (ref 3.8–10.5)

## 2024-10-18 PROCEDURE — 93975 VASCULAR STUDY: CPT | Mod: 26

## 2024-10-18 PROCEDURE — 99291 CRITICAL CARE FIRST HOUR: CPT

## 2024-10-18 PROCEDURE — 70553 MRI BRAIN STEM W/O & W/DYE: CPT | Mod: 26

## 2024-10-18 RX ADMIN — SODIUM CHLORIDE 2 GRAM(S): 9 INJECTION, SOLUTION INTRAMUSCULAR; INTRAVENOUS; SUBCUTANEOUS at 11:49

## 2024-10-18 RX ADMIN — Medication 200 MILLIGRAM(S): at 23:02

## 2024-10-18 RX ADMIN — HYDRALAZINE HYDROCHLORIDE 100 MILLIGRAM(S): 50 TABLET, FILM COATED ORAL at 20:47

## 2024-10-18 RX ADMIN — HYDRALAZINE HYDROCHLORIDE 100 MILLIGRAM(S): 50 TABLET, FILM COATED ORAL at 04:08

## 2024-10-18 RX ADMIN — SODIUM CHLORIDE 2 GRAM(S): 9 INJECTION, SOLUTION INTRAMUSCULAR; INTRAVENOUS; SUBCUTANEOUS at 23:02

## 2024-10-18 RX ADMIN — Medication 10 MILLIGRAM(S): at 05:58

## 2024-10-18 RX ADMIN — HYDRALAZINE HYDROCHLORIDE 100 MILLIGRAM(S): 50 TABLET, FILM COATED ORAL at 11:49

## 2024-10-18 RX ADMIN — Medication 40 MILLIGRAM(S): at 23:02

## 2024-10-18 RX ADMIN — SODIUM CHLORIDE 2 GRAM(S): 9 INJECTION, SOLUTION INTRAMUSCULAR; INTRAVENOUS; SUBCUTANEOUS at 17:32

## 2024-10-18 RX ADMIN — Medication 75 MILLILITER(S): at 05:57

## 2024-10-18 RX ADMIN — PANTOPRAZOLE SODIUM 40 MILLIGRAM(S): 40 TABLET, DELAYED RELEASE ORAL at 05:58

## 2024-10-18 RX ADMIN — Medication 2 TABLET(S): at 23:01

## 2024-10-18 RX ADMIN — SODIUM CHLORIDE 2 GRAM(S): 9 INJECTION, SOLUTION INTRAMUSCULAR; INTRAVENOUS; SUBCUTANEOUS at 05:57

## 2024-10-18 RX ADMIN — POLYETHYLENE GLYCOL 3350 17 GRAM(S): 17 POWDER, FOR SOLUTION ORAL at 17:33

## 2024-10-18 RX ADMIN — Medication 40 MILLIGRAM(S): at 17:33

## 2024-10-18 RX ADMIN — Medication 30 MILLILITER(S): at 11:56

## 2024-10-18 RX ADMIN — Medication 200 MILLIGRAM(S): at 05:58

## 2024-10-18 RX ADMIN — LOSARTAN POTASSIUM 100 MILLIGRAM(S): 25 TABLET ORAL at 08:48

## 2024-10-18 RX ADMIN — POLYETHYLENE GLYCOL 3350 17 GRAM(S): 17 POWDER, FOR SOLUTION ORAL at 05:58

## 2024-10-18 RX ADMIN — Medication 200 MILLIGRAM(S): at 14:06

## 2024-10-18 NOTE — PROGRESS NOTE ADULT - ASSESSMENT
Assessment  44 Right handed man with HTN HLD p/w headache, emesis, dizziness found to have left IPH w/ IVH. VS in triage 133/88. Exam vairying from awake  +somnolence to lethargic +somnolence, some confusion/disorientation. Labs w/ PMN predomn leukocytosis to 16K, normal coags, mild hyponatremia improved.  LDL/HDL: 146/43. A1C 5. CXR clear lungs. VA duplex neg for dvt in b/l LE. CTH with left BG IPH and IVH stable @ 4 hours.   NIHSS 1  ICH Score 1    Impression: AMS and dizziness due to Nontraumatic intracerebral hemorrhage subcortical w/ IVH, perihematomal cerebral edema and brain compression. Etiology Likely hypertensive hemorrhage    NEURO:   MRI Brain with and without contrast,   Continue atorvastatin     ANTITHROMBOTIC THERAPY:  None iso ICH    PULMONARY: CXR clear, protecting airway, saturating well     CARDIOVASCULAR: TTE without pathology  cardiac / telemetry  monitoring                              SBP goal: <140 or per NSICU  on 2% HTS     GASTROINTESTINAL:  dysphagia screen  - passed     Diet: regular    RENAL: BUN/Cr stable, good urine output      Na Goal: Greater than 145 or per NSGY    HEMATOLOGY: H/H stable, Platelets normal      DVT ppx:Lovenox     ID: afebrile, nonspecific leukocytosis  Trend leukocytosis and fever curve, no localized infectious symptoms noted     OTHER:   DISPOSITION: AR     CORE MEASURES:        Admission NIHSS: 1     TPA: [] YES [X] NO      LDL/HDL: 146/43     Depression Screen:      Statin Therapy: YES     Dysphagia Screen: [X] PASS [] FAIL     Smoking [] YES [X] NO      Afib [] YES [X] NO     Stroke Education [] YES [] NO   Assessment  44 Right handed man with HTN HLD p/w headache, emesis, dizziness found to have left IPH w/ IVH. VS in triage 133/88. Exam vairying from awake  +somnolence to lethargic +somnolence, some confusion/disorientation. Labs w/ PMN predomn leukocytosis to 16K, normal coags, mild hyponatremia improved.  LDL/HDL: 146/43. A1C 5. CXR clear lungs. VA duplex neg for dvt in b/l LE. CTH with left BG IPH and IVH stable @ 4 hours.   NIHSS 1  ICH Score 1    Impression: AMS and dizziness due to Nontraumatic intracerebral hemorrhage subcortical w/ IVH, perihematomal cerebral edema and brain compression. Etiology Likely hypertensive hemorrhage    NEURO:   MRI Brain with and without contrast  Continue atorvastatin     ANTITHROMBOTIC THERAPY:  None iso ICH    PULMONARY: CXR clear, protecting airway, saturating well     CARDIOVASCULAR: TTE without pathology  cardiac / telemetry  monitoring                              SBP goal: <140   on 2% HTS, decreased to 2% will titrate further tomorrow    GASTROINTESTINAL:  dysphagia screen  - passed     Diet: regular    RENAL: BUN/Cr stable, good urine output       HEMATOLOGY: H/H stable, Platelets normal      DVT ppx:Lovenox     ID: afebrile, nonspecific leukocytosis  Trend leukocytosis and fever curve, no localized infectious symptoms noted     OTHER:   DISPOSITION: AR     CORE MEASURES:        Admission NIHSS: 1     TPA: [] YES [X] NO      LDL/HDL: 146/43     Depression Screen:      Statin Therapy: YES     Dysphagia Screen: [X] PASS [] FAIL     Smoking [] YES [X] NO      Afib [] YES [X] NO     Stroke Education [] YES [] NO   Assessment  44 Right handed man with HTN HLD p/w headache, emesis, dizziness found to have left IPH w/ IVH. VS in triage 133/88. Exam vairying from awake  +somnolence to lethargic +somnolence, some confusion/disorientation. Labs w/ PMN predomn leukocytosis to 16K, normal coags, mild hyponatremia improved.  LDL/HDL: 146/43. A1C 5. CXR clear lungs. VA duplex neg for dvt in b/l LE. CTH with left BG IPH and IVH stable @ 4 hours.   NIHSS 1  ICH Score 1    Impression: AMS and dizziness due to Nontraumatic intracerebral hemorrhage subcortical w/ IVH, perihematomal cerebral edema and brain compression. Etiology Likely hypertensive hemorrhage    NEURO:   MRI Brain with and without contrast  Continue atorvastatin     ANTITHROMBOTIC THERAPY:  None iso ICH    PULMONARY: CXR clear, protecting airway, saturating well     CARDIOVASCULAR: TTE without pathology  cardiac / telemetry  monitoring                              SBP goal: <140   on 2% HTS, decreased to 2% will titrate further tomorrow  Renal ultrasound to rule out KAITLYN as patient has uncontrolled HTN on multiple meds  Cardio consulted     GASTROINTESTINAL:  dysphagia screen  - passed     Diet: regular    RENAL: BUN/Cr stable, good urine output       HEMATOLOGY: H/H stable, Platelets normal      DVT ppx:Lovenox     ID: afebrile, nonspecific leukocytosis  Trend leukocytosis and fever curve, no localized infectious symptoms noted     OTHER:   DISPOSITION: AR     CORE MEASURES:        Admission NIHSS: 1     TPA: [] YES [X] NO      LDL/HDL: 146/43     Depression Screen:      Statin Therapy: YES     Dysphagia Screen: [X] PASS [] FAIL     Smoking [] YES [X] NO      Afib [] YES [X] NO     Stroke Education [] YES [] NO

## 2024-10-18 NOTE — CONSULT NOTE ADULT - ASSESSMENT
Patient seen and examined, agree with above assessment and plan as transcribed above.    - Patient will need w/u for secondary causes of HTN  - neuro f/u    Bernardo Chilel MD, [facility]  BEEPER [phone]

## 2024-10-18 NOTE — PROGRESS NOTE ADULT - SUBJECTIVE AND OBJECTIVE BOX
Neurology - Prgoress Note    -  Spectra: [phone] ([facility]), [phone] ([facility])  -    Interval History: No events overnight.      Review of Systems:    PER HPI    Allergies:  No Known Allergies      PMHx/PSHx/Family Hx: As above, otherwise see below       Social Hx:  No current use of tobacco, alcohol, or illicit drugs      Medications:  MEDICATIONS  (STANDING):  amLODIPine   Tablet 10 milliGRAM(s) Oral daily  atorvastatin 40 milliGRAM(s) Oral at bedtime  enoxaparin Injectable 40 milliGRAM(s) SubCutaneous <User Schedule>  hydrALAZINE 100 milliGRAM(s) Oral every 8 hours  insulin lispro (ADMELOG) corrective regimen sliding scale   SubCutaneous three times a day before meals  insulin lispro (ADMELOG) corrective regimen sliding scale   SubCutaneous at bedtime  labetalol 200 milliGRAM(s) Oral every 8 hours  losartan 100 milliGRAM(s) Oral daily  pantoprazole    Tablet 40 milliGRAM(s) Oral before breakfast  polyethylene glycol 3350 17 Gram(s) Oral every 12 hours  senna 2 Tablet(s) Oral at bedtime  sodium chloride 2 Gram(s) Oral every 6 hours  sodium chloride 2% + sodium acetate 50:50 1000 milliLiter(s) (75 mL/Hr) IV Continuous <Continuous>    MEDICATIONS  (PRN):  traMADol 25 milliGRAM(s) Oral every 4 hours PRN Moderate Pain (4 - 6)  traMADol 50 milliGRAM(s) Oral every 6 hours PRN Severe Pain (7 - 10)      Vitals:  T(C): 37.3 (10-18-24 @ 04:42), Max: 37.7 (10-18-24 @ 00:47)  HR: 78 (10-18-24 @ 06:00) (71 - 88)  BP: 149/79 (10-18-24 @ 06:00) (138/68 - 198/87)  RR: 15 (10-18-24 @ 06:00) (14 - 17)  SpO2: 92% (10-18-24 @ 06:00) (91% - 97%)    Physical Examination:   HEENT: EOM intact, visual fields full  Extremities: No edema    NEUROLOGICAL EXAM:  Mental status: Awake, alert, somnolent, hypophonic, paucity of speech   Cranial Nerves: No facial asymmetry,, no nystagmus, EOMI, PERRL  Motor exam: able to move all extremities antigravity   Sensation: Intact to light touch   Coordination/ Gait: No dysmetria      Labs:                        12.9   11.84 )-----------( 178      ( 18 Oct 2024 05:36 )             38.9     10-18    135  |  103  |  23  ----------------------------<  107[H]  3.8   |  21[L]  |  0.87    Ca    8.3[L]      18 Oct 2024 05:36  Phos  2.9     10-18  Mg     2.4     10-18      CAPILLARY BLOOD GLUCOSE      POCT Blood Glucose.: 93 mg/dL (17 Oct 2024 23:06)        Culture - Blood (collected 16 Oct 2024 04:12)  Source: .Blood BLOOD  Preliminary Report (17 Oct 2024 11:01):    No growth at 24 hours    Culture - Blood (collected 16 Oct 2024 04:07)  Source: .Blood BLOOD  Preliminary Report (17 Oct 2024 11:01):    No growth at 24 hours                 Neurology - Progress Note    -  Spectra: [phone] ([facility]), [phone] ([facility])  -    Interval History: No events overnight.      Review of Systems:    PER HPI    Allergies:  No Known Allergies      PMHx/PSHx/Family Hx: As above, otherwise see below       Social Hx:  No current use of tobacco, alcohol, or illicit drugs      Medications:  MEDICATIONS  (STANDING):  amLODIPine   Tablet 10 milliGRAM(s) Oral daily  atorvastatin 40 milliGRAM(s) Oral at bedtime  enoxaparin Injectable 40 milliGRAM(s) SubCutaneous <User Schedule>  hydrALAZINE 100 milliGRAM(s) Oral every 8 hours  insulin lispro (ADMELOG) corrective regimen sliding scale   SubCutaneous three times a day before meals  insulin lispro (ADMELOG) corrective regimen sliding scale   SubCutaneous at bedtime  labetalol 200 milliGRAM(s) Oral every 8 hours  losartan 100 milliGRAM(s) Oral daily  pantoprazole    Tablet 40 milliGRAM(s) Oral before breakfast  polyethylene glycol 3350 17 Gram(s) Oral every 12 hours  senna 2 Tablet(s) Oral at bedtime  sodium chloride 2 Gram(s) Oral every 6 hours  sodium chloride 2% + sodium acetate 50:50 1000 milliLiter(s) (75 mL/Hr) IV Continuous <Continuous>    MEDICATIONS  (PRN):  traMADol 25 milliGRAM(s) Oral every 4 hours PRN Moderate Pain (4 - 6)  traMADol 50 milliGRAM(s) Oral every 6 hours PRN Severe Pain (7 - 10)      Vitals:  T(C): 37.3 (10-18-24 @ 04:42), Max: 37.7 (10-18-24 @ 00:47)  HR: 78 (10-18-24 @ 06:00) (71 - 88)  BP: 149/79 (10-18-24 @ 06:00) (138/68 - 198/87)  RR: 15 (10-18-24 @ 06:00) (14 - 17)  SpO2: 92% (10-18-24 @ 06:00) (91% - 97%)    Physical Examination:   HEENT: EOM intact, visual fields full  Extremities: No edema    NEUROLOGICAL EXAM:  Mental status: Awake, alert   Cranial Nerves: No facial asymmetry,, no nystagmus, EOMI, PERRL  Motor exam: able to move all extremities antigravity, but RUE 4+/5  Sensation: Intact to light touch   Coordination/ Gait: No dysmetria      Labs:                        12.9   11.84 )-----------( 178      ( 18 Oct 2024 05:36 )             38.9     10-18    135  |  103  |  23  ----------------------------<  107[H]  3.8   |  21[L]  |  0.87    Ca    8.3[L]      18 Oct 2024 05:36  Phos  2.9     10-18  Mg     2.4     10-18      CAPILLARY BLOOD GLUCOSE      POCT Blood Glucose.: 93 mg/dL (17 Oct 2024 23:06)        Culture - Blood (collected 16 Oct 2024 04:12)  Source: .Blood BLOOD  Preliminary Report (17 Oct 2024 11:01):    No growth at 24 hours    Culture - Blood (collected 16 Oct 2024 04:07)  Source: .Blood BLOOD  Preliminary Report (17 Oct 2024 11:01):    No growth at 24 hours

## 2024-10-18 NOTE — CONSULT NOTE ADULT - SUBJECTIVE AND OBJECTIVE BOX
C A R D I O L O G Y  *********************    DATE OF SERVICE: 10-18-24    HISTORY OF PRESENT ILLNESS: HPI:  Patient is a 45 y/o Male with PMH of HTN who presented with headaches admitted for ICH. Cardiology consulted for HTN. Sister at bedside reports patient was noncompliant with BP meds for past 2 weeks. Denies chest pain, SOB, palpitations, dizziness, or syncope.    PAST MEDICAL & SURGICAL HISTORY:  HTN    MEDICATIONS:  MEDICATIONS  (STANDING):  amLODIPine   Tablet 10 milliGRAM(s) Oral daily  atorvastatin 40 milliGRAM(s) Oral at bedtime  enoxaparin Injectable 40 milliGRAM(s) SubCutaneous <User Schedule>  hydrALAZINE 100 milliGRAM(s) Oral every 8 hours  labetalol 200 milliGRAM(s) Oral every 8 hours  losartan 100 milliGRAM(s) Oral daily  pantoprazole    Tablet 40 milliGRAM(s) Oral before breakfast  polyethylene glycol 3350 17 Gram(s) Oral every 12 hours  senna 2 Tablet(s) Oral at bedtime  sodium chloride 2 Gram(s) Oral every 6 hours  sodium chloride 2% + sodium acetate 50:50 1000 milliLiter(s) (30 mL/Hr) IV Continuous <Continuous>      Allergies    No Known Allergies    Intolerances        FAMILY HISTORY:    Non-contributary for premature coronary disease or sudden cardiac death    SOCIAL HISTORY:    [x ] Non-smoker  [ ] Smoker  [ ] Alcohol    FLU VACCINE THIS YEAR STARTS IN AUGUST:  [ ] Yes    [ ] No    IF OVER 65 HAVE YOU EVER HAD A PNA VACCINE:  [ ] Yes    [ ] No       [ ] N/A      REVIEW OF SYSTEMS:  [ ]chest pain  [  ]shortness of breath  [  ]palpitations  [  ]syncope  [ ]near syncope [ ]upper extremity weakness   [ ] lower extremity weakness  [  ]diplopia  [  ]altered mental status   [  ]fevers  [ ]chills [ ]nausea  [ ]vomiting  [  ]dysphagia    [ ]abdominal pain  [ ]melena  [ ]BRBPR    [  ]epistaxis  [  ]rash    [ ]lower extremity edema    +headache    [X] All others negative	  [ ] Unable to obtain      LABS:	 	    CARDIAC MARKERS:                              12.9   11.84 )-----------( 178      ( 18 Oct 2024 05:36 )             38.9     Hb Trend: 12.9<--    10-18    135  |  102  |  26[H]  ----------------------------<  102[H]  3.8   |  21[L]  |  0.89    Ca    8.4      18 Oct 2024 12:10  Phos  2.9     10-18  Mg     2.4     10-18    TPro  6.6  /  Alb  3.6  /  TBili  1.0  /  DBili  x   /  AST  42[H]  /  ALT  122[H]  /  AlkPhos  78  10-18    Creatinine Trend: 0.89<--, 0.87<--, 0.95<--, 0.93<--, 0.82<--, 0.89<--    Coags:      proBNP:   Lipid Profile:   HgA1c:   TSH:         PHYSICAL EXAM:  T(C): 37.2 (10-18-24 @ 12:00), Max: 37.7 (10-18-24 @ 00:47)  HR: 67 (10-18-24 @ 16:00) (67 - 88)  BP: 156/88 (10-18-24 @ 16:00) (122/71 - 173/105)  RR: 16 (10-18-24 @ 16:00) (12 - 17)  SpO2: 98% (10-18-24 @ 16:00) (92% - 98%)  Wt(kg): --   BMI (kg/m2): 27.4 (10-14-24 @ 03:12)  I&O's Summary    17 Oct 2024 07:01  -  18 Oct 2024 07:00  --------------------------------------------------------  IN: 2555 mL / OUT: 3550 mL / NET: -995 mL    18 Oct 2024 07:01  -  18 Oct 2024 17:52  --------------------------------------------------------  IN: 420 mL / OUT: 1650 mL / NET: -1230 mL        Gen: NAD  HEENT:  (-)icterus (-)pallor  CV: N S1 S2 1/6 SOHEILA (+)2 Pulses B/l  Resp:  Clear to auscultation B/L, normal effort  GI: (+) BS Soft, NT, ND  Lymph:  (-)Edema, (-)obvious lymphadenopathy  Skin: Warm to touch, Normal turgor  Psych: Appropriate mood and affect      TELEMETRY: NSR	      ECG: Sinus Rhythm  	    RADIOLOGY:         CXR:   < from: Xray Chest 1 View- PORTABLE-Urgent (Xray Chest 1 View- PORTABLE-Urgent .) (10.16.24 @ 05:38) >  IMPRESSION:  Clear lungs.    < end of copied text >    < from: TTE W or WO Ultrasound Enhancing Agent (10.14.24 @ 08:46) >     CONCLUSIONS:      1. Fair study quality.   2. Left ventricular systolic function is normal with an ejection fraction of 60 % by Chua's method of disks.   3. Normal right ventricular systolic function.   4. No prior echocardiogram is available for comparison.    < end of copied text >      ASSESSMENT/PLAN: Patient is a 45 y/o Male with PMH of HTN who presented with headaches admitted for ICH. Cardiology consulted for HTN.    #ICH  - Likely hypertensive hemorrhage per neuro  - Management per neuro/neurosx    #HTN  - SBP goal <140 per neuro  - Continue Amlodipine 10mg daily, Labetalol 200mg q8hrs (can uptitrate further if remains above goal), Hydralazine 100mg q8hrs, and Losartan 100mg daily  - Suspect will improve once able to wean of sodium chloride tabs and 2% IVF  - TTE with normal LV function  - Secondary HTN workup - Renal artery dopplers to r/o KAITLYN, TSH WNL, can send aldosterone/renin/metaneprhine/AM cortisol levels but need to check if still able to test while on sodium chloride tabs    Davi Carrion PA-C  Pager: [phone]

## 2024-10-19 LAB
ALBUMIN SERPL ELPH-MCNC: 3.3 G/DL — SIGNIFICANT CHANGE UP (ref 3.3–5)
ALP SERPL-CCNC: 75 U/L — SIGNIFICANT CHANGE UP (ref 40–120)
ALT FLD-CCNC: 143 U/L — HIGH (ref 10–45)
ANION GAP SERPL CALC-SCNC: 12 MMOL/L — SIGNIFICANT CHANGE UP (ref 5–17)
AST SERPL-CCNC: 50 U/L — HIGH (ref 10–40)
BILIRUB SERPL-MCNC: 0.9 MG/DL — SIGNIFICANT CHANGE UP (ref 0.2–1.2)
BUN SERPL-MCNC: 27 MG/DL — HIGH (ref 7–23)
CALCIUM SERPL-MCNC: 8.4 MG/DL — SIGNIFICANT CHANGE UP (ref 8.4–10.5)
CHLORIDE SERPL-SCNC: 102 MMOL/L — SIGNIFICANT CHANGE UP (ref 96–108)
CO2 SERPL-SCNC: 21 MMOL/L — LOW (ref 22–31)
CREAT SERPL-MCNC: 0.96 MG/DL — SIGNIFICANT CHANGE UP (ref 0.5–1.3)
EGFR: 101 ML/MIN/1.73M2 — SIGNIFICANT CHANGE UP
GLUCOSE SERPL-MCNC: 93 MG/DL — SIGNIFICANT CHANGE UP (ref 70–99)
HCT VFR BLD CALC: 36.9 % — LOW (ref 39–50)
HGB BLD-MCNC: 12.4 G/DL — LOW (ref 13–17)
MCHC RBC-ENTMCNC: 28.6 PG — SIGNIFICANT CHANGE UP (ref 27–34)
MCHC RBC-ENTMCNC: 33.6 GM/DL — SIGNIFICANT CHANGE UP (ref 32–36)
MCV RBC AUTO: 85.2 FL — SIGNIFICANT CHANGE UP (ref 80–100)
NRBC # BLD: 0 /100 WBCS — SIGNIFICANT CHANGE UP (ref 0–0)
PLATELET # BLD AUTO: 195 K/UL — SIGNIFICANT CHANGE UP (ref 150–400)
POTASSIUM SERPL-MCNC: 4.1 MMOL/L — SIGNIFICANT CHANGE UP (ref 3.5–5.3)
POTASSIUM SERPL-SCNC: 4.1 MMOL/L — SIGNIFICANT CHANGE UP (ref 3.5–5.3)
PROT SERPL-MCNC: 6.1 G/DL — SIGNIFICANT CHANGE UP (ref 6–8.3)
RBC # BLD: 4.33 M/UL — SIGNIFICANT CHANGE UP (ref 4.2–5.8)
RBC # FLD: 13 % — SIGNIFICANT CHANGE UP (ref 10.3–14.5)
SODIUM SERPL-SCNC: 135 MMOL/L — SIGNIFICANT CHANGE UP (ref 135–145)
WBC # BLD: 12 K/UL — HIGH (ref 3.8–10.5)
WBC # FLD AUTO: 12 K/UL — HIGH (ref 3.8–10.5)

## 2024-10-19 PROCEDURE — 70450 CT HEAD/BRAIN W/O DYE: CPT | Mod: 26

## 2024-10-19 PROCEDURE — 99233 SBSQ HOSP IP/OBS HIGH 50: CPT

## 2024-10-19 RX ADMIN — SODIUM CHLORIDE 2 GRAM(S): 9 INJECTION, SOLUTION INTRAMUSCULAR; INTRAVENOUS; SUBCUTANEOUS at 05:08

## 2024-10-19 RX ADMIN — Medication 40 MILLIGRAM(S): at 17:02

## 2024-10-19 RX ADMIN — HYDRALAZINE HYDROCHLORIDE 100 MILLIGRAM(S): 50 TABLET, FILM COATED ORAL at 04:55

## 2024-10-19 RX ADMIN — Medication 40 MILLIGRAM(S): at 22:34

## 2024-10-19 RX ADMIN — POLYETHYLENE GLYCOL 3350 17 GRAM(S): 17 POWDER, FOR SOLUTION ORAL at 05:08

## 2024-10-19 RX ADMIN — PANTOPRAZOLE SODIUM 40 MILLIGRAM(S): 40 TABLET, DELAYED RELEASE ORAL at 06:22

## 2024-10-19 RX ADMIN — HYDRALAZINE HYDROCHLORIDE 100 MILLIGRAM(S): 50 TABLET, FILM COATED ORAL at 22:33

## 2024-10-19 RX ADMIN — SODIUM CHLORIDE 1 GRAM(S): 9 INJECTION, SOLUTION INTRAMUSCULAR; INTRAVENOUS; SUBCUTANEOUS at 17:03

## 2024-10-19 RX ADMIN — HYDRALAZINE HYDROCHLORIDE 100 MILLIGRAM(S): 50 TABLET, FILM COATED ORAL at 12:08

## 2024-10-19 RX ADMIN — SODIUM CHLORIDE 2 GRAM(S): 9 INJECTION, SOLUTION INTRAMUSCULAR; INTRAVENOUS; SUBCUTANEOUS at 12:08

## 2024-10-19 RX ADMIN — Medication 200 MILLIGRAM(S): at 22:34

## 2024-10-19 RX ADMIN — Medication 200 MILLIGRAM(S): at 06:22

## 2024-10-19 RX ADMIN — Medication 200 MILLIGRAM(S): at 14:36

## 2024-10-19 RX ADMIN — Medication 10 MILLIGRAM(S): at 05:07

## 2024-10-19 RX ADMIN — LOSARTAN POTASSIUM 100 MILLIGRAM(S): 25 TABLET ORAL at 06:22

## 2024-10-19 RX ADMIN — Medication 2 TABLET(S): at 22:35

## 2024-10-19 RX ADMIN — POLYETHYLENE GLYCOL 3350 17 GRAM(S): 17 POWDER, FOR SOLUTION ORAL at 17:02

## 2024-10-19 NOTE — CHART NOTE - NSCHARTNOTEFT_GEN_A_CORE
Called to patients bedside by nurse for change in neuro exam  Patient began to experience left gaze palsy, somnolence and was only able to identify himself. Upon my assessment, patient was able o tell me his name; birthday and where he was (Hospital). patient was alert not needing repetitive stimulation. Mild gaze palsy noted in left eye. Currently patient denies headache, N/V,  dizziness or vision changes and VSS.   Urgent CTH confirms no change from previous.   Family at bedside updated.            amLODIPine   Tablet 10 milliGRAM(s) Oral daily  atorvastatin 40 milliGRAM(s) Oral at bedtime  enoxaparin Injectable 40 milliGRAM(s) SubCutaneous <User Schedule>  hydrALAZINE 100 milliGRAM(s) Oral every 8 hours  labetalol 200 milliGRAM(s) Oral every 8 hours  losartan 100 milliGRAM(s) Oral daily  pantoprazole    Tablet 40 milliGRAM(s) Oral before breakfast  polyethylene glycol 3350 17 Gram(s) Oral every 12 hours  senna 2 Tablet(s) Oral at bedtime  sodium chloride 2 Gram(s) Oral every 6 hours  traMADol 50 milliGRAM(s) Oral every 6 hours PRN  traMADol 25 milliGRAM(s) Oral every 4 hours PRN      CONSTITUTIONAL: No weight loss, weakness, fevers or chills  All other review of systems is negative unless indicated above.    Family History  Surgical History    PHYSICAL EXAM:   Vital Signs Last 24 Hrs  T(C): 37 (19 Oct 2024 20:00), Max: 37 (19 Oct 2024 20:00)  T(F): 98.6 (19 Oct 2024 20:00), Max: 98.6 (19 Oct 2024 20:00)  HR: 71 (19 Oct 2024 22:00) (69 - 92)  BP: 142/84 (19 Oct 2024 22:00) (110/69 - 156/103)  BP(mean): 108 (19 Oct 2024 22:00) (83 - 118)  RR: 15 (19 Oct 2024 22:00) (12 - 24)  SpO2: 95% (19 Oct 2024 22:00) (93% - 96%)    Parameters below as of 19 Oct 2024 22:00  Patient On (Oxygen Delivery Method): room air        General: Alert and Oriented x 3. No acute Distress. Well Nourished, Well Developed    NEUROLOGICAL EXAM:  Mental status: Awake, alert, oriented x3, speech fluent, follows commands, no neglect, normal memory   Cranial Nerves: No facial asymmetry, no nystagmus, no dysarthria,  tongue midline, mild L gaze palsy   Motor exam: Normal tone, no drift, 4/5 RUE, 5/5 RLE, 5/5 LUE, 5/5 LLE, moving all extremities  Sensation: Intact to light touch   Coordination/ Gait: No dysmetria,    LABS:                        12.4   12.00 )-----------( 195      ( 19 Oct 2024 06:04 )             36.9    10-19    135  |  102  |  27[H]  ----------------------------<  93  4.1   |  21[L]  |  0.96    Ca    8.4      19 Oct 2024 06:04  Phos  2.9     10-18  Mg     2.4     10-18    TPro  6.1  /  Alb  3.3  /  TBili  0.9  /  DBili  x   /  AST  50[H]  /  ALT  143[H]  /  AlkPhos  75  10-19        IMAGING: Reviewed by me.   CT Head:      Impression: stable      Plan: continue to monitor, will discuss with stroke team for need for EEG studis  Nursing Interventions; Continue Telemetry Monitor, Continuous Pulse Oximeter, Neuro checks q2, Oxygen prn, Activity  Will reassess in 30 minutes  Time spent with patient: 35 minutes  Event discussed with: dr Lackey

## 2024-10-19 NOTE — PROGRESS NOTE ADULT - SUBJECTIVE AND OBJECTIVE BOX
pt seen and examined, no complaints, ROS - .        amLODIPine   Tablet 10 milliGRAM(s) Oral daily  atorvastatin 40 milliGRAM(s) Oral at bedtime  enoxaparin Injectable 40 milliGRAM(s) SubCutaneous <User Schedule>  hydrALAZINE 100 milliGRAM(s) Oral every 8 hours  labetalol 200 milliGRAM(s) Oral every 8 hours  losartan 100 milliGRAM(s) Oral daily  pantoprazole    Tablet 40 milliGRAM(s) Oral before breakfast  polyethylene glycol 3350 17 Gram(s) Oral every 12 hours  senna 2 Tablet(s) Oral at bedtime  sodium chloride 2 Gram(s) Oral every 6 hours  traMADol 50 milliGRAM(s) Oral every 6 hours PRN  traMADol 25 milliGRAM(s) Oral every 4 hours PRN                            12.4   12.00 )-----------( 195      ( 19 Oct 2024 06:04 )             36.9       Hemoglobin: 12.4 g/dL (10-19 @ 06:04)  Hemoglobin: 12.9 g/dL (10-18 @ 05:36)  Hemoglobin: 13.5 g/dL (10-15 @ 20:03)  Hemoglobin: 13.6 g/dL (10-15 @ 02:48)      10-19    135  |  102  |  27[H]  ----------------------------<  93  4.1   |  21[L]  |  0.96    Ca    8.4      19 Oct 2024 06:04  Phos  2.9     10-18  Mg     2.4     10-18    TPro  6.1  /  Alb  3.3  /  TBili  0.9  /  DBili  x   /  AST  50[H]  /  ALT  143[H]  /  AlkPhos  75  10-19    Creatinine Trend: 0.96<--, 1.06<--, 0.89<--, 0.87<--, 0.95<--, 0.93<--    COAGS:           T(C): 37 (10-18-24 @ 20:00), Max: 37.2 (10-18-24 @ 12:00)  HR: 71 (10-19-24 @ 08:00) (67 - 86)  BP: 116/70 (10-19-24 @ 08:00) (116/70 - 158/73)  RR: 15 (10-19-24 @ 08:00) (12 - 24)  SpO2: 93% (10-19-24 @ 08:00) (93% - 98%)  Wt(kg): --    I&O's Summary    18 Oct 2024 07:01  -  19 Oct 2024 07:00  --------------------------------------------------------  IN: 420 mL / OUT: 1650 mL / NET: -1230 mL      Gen: NAD  HEENT:  (-)icterus (-)pallor  CV: N S1 S2 1/6 SOHEILA (+)2 Pulses B/l  Resp:  Clear to auscultation B/L, normal effort  GI: (+) BS Soft, NT, ND  Lymph:  (-)Edema, (-)obvious lymphadenopathy  Skin: Warm to touch, Normal turgor  Psych: Appropriate mood and affect      TELEMETRY: NSR	      ECG: Sinus Rhythm  	    RADIOLOGY:         CXR:   < from: Xray Chest 1 View- PORTABLE-Urgent (Xray Chest 1 View- PORTABLE-Urgent .) (10.16.24 @ 05:38) >  IMPRESSION:  Clear lungs.    < end of copied text >    < from: TTE W or WO Ultrasound Enhancing Agent (10.14.24 @ 08:46) >     CONCLUSIONS:      1. Fair study quality.   2. Left ventricular systolic function is normal with an ejection fraction of 60 % by Chua's method of disks.   3. Normal right ventricular systolic function.   4. No prior echocardiogram is available for comparison.    < end of copied text >      ASSESSMENT/PLAN: Patient is a 45 y/o Male with PMH of HTN who presented with headaches admitted for ICH. Cardiology consulted for HTN.    #ICH  - Likely hypertensive hemorrhage per neuro  - Management per neuro/neurosx  * cont statin     #HTN  - SBP goal <140 per neuro  - Continue Amlodipine 10mg daily, Labetalol 200mg q8hrs (can uptitrate further if remains above goal), Hydralazine 100mg q8hrs, and Losartan 100mg daily     - TTE with normal LV function  - Secondary HTN workup - Renal artery dopplers to r/o KAITLYN, TSH WNL, can send aldosterone/renin/metaneprhine/AM cortisol levels but need to check if still able to test while on sodium chloride tabs

## 2024-10-19 NOTE — PROGRESS NOTE ADULT - SUBJECTIVE AND OBJECTIVE BOX
Neurology Progress Note    SUBJECTIVE/OBJECTIVE/INTERVAL EVENTS: Patient seen and examined at bedside w/ neuro attending and team.     INTERVAL HISTORY:    REVIEW OF SYSTEMS: Otherwise denies fever, chills, headaches, vision changes, nausea, vomiting, hearing change. Few questions of a 10-system ROS was performed and is negative except for those items noted above and/or in the HPI.    VITALS & EXAMINATION:  Vital Signs Last 24 Hrs  T(C): 37 (18 Oct 2024 20:00), Max: 37.2 (18 Oct 2024 12:00)  T(F): 98.6 (18 Oct 2024 20:00), Max: 99 (18 Oct 2024 12:00)  HR: 71 (19 Oct 2024 08:00) (67 - 86)  BP: 116/70 (19 Oct 2024 08:00) (116/70 - 158/73)  BP(mean): 83 (19 Oct 2024 08:00) (83 - 118)  RR: 15 (19 Oct 2024 08:00) (12 - 24)  SpO2: 93% (19 Oct 2024 08:00) (93% - 98%)    Parameters below as of 19 Oct 2024 08:00  Patient On (Oxygen Delivery Method): nasal cannula  O2 Flow (L/min): 2    Physical Examination:   HEENT: EOM intact, visual fields full  Extremities: No edema    NEUROLOGICAL EXAM:  Mental status: Awake, alert   Cranial Nerves: No facial asymmetry,, no nystagmus, EOMI, PERRL  Motor exam: able to move all extremities antigravity, but RUE 4+/5  Sensation: Intact to light touch   Coordination/ Gait: No dysmetria    LABORATORY:  CBC                       12.4   12.00 )-----------( 195      ( 19 Oct 2024 06:04 )             36.9     Chem 10-19    135  |  102  |  27[H]  ----------------------------<  93  4.1   |  21[L]  |  0.96    Ca    8.4      19 Oct 2024 06:04  Phos  2.9     10-18  Mg     2.4     10-18    TPro  6.1  /  Alb  3.3  /  TBili  0.9  /  DBili  x   /  AST  50[H]  /  ALT  143[H]  /  AlkPhos  75  10-19    LFTs LIVER FUNCTIONS - ( 19 Oct 2024 06:04 )  Alb: 3.3 g/dL / Pro: 6.1 g/dL / ALK PHOS: 75 U/L / ALT: 143 U/L / AST: 50 U/L / GGT: x           Coagulopathy   Lipid Panel 10-14 Chol 209[H] LDL -- HDL 43 Trig 109  A1c   Cardiac enzymes     U/A Urinalysis Basic - ( 19 Oct 2024 06:04 )    Color: x / Appearance: x / SG: x / pH: x  Gluc: 93 mg/dL / Ketone: x  / Bili: x / Urobili: x   Blood: x / Protein: x / Nitrite: x   Leuk Esterase: x / RBC: x / WBC x   Sq Epi: x / Non Sq Epi: x / Bacteria: x      CSF  Immunological  Other    STUDIES & IMAGING: (EEG, CT, MR, U/S, TTE/CELENA):

## 2024-10-19 NOTE — PROGRESS NOTE ADULT - ASSESSMENT
Assessment  44 Right handed man with HTN HLD p/w headache, emesis, dizziness found to have left IPH w/ IVH. VS in triage 133/88. Exam vairying from awake  +somnolence to lethargic +somnolence, some confusion/disorientation. Labs w/ PMN predomn leukocytosis to 16K, normal coags, mild hyponatremia improved.  LDL/HDL: 146/43. A1C 5. CXR clear lungs. VA duplex neg for dvt in b/l LE. CTH with left BG IPH and IVH stable @ 4 hours.   NIHSS 1  ICH Score 1    Impression: AMS and dizziness due to Nontraumatic intracerebral hemorrhage subcortical w/ IVH, perihematomal cerebral edema and brain compression. Etiology Likely hypertensive hemorrhage.     NEURO:   MRI Brain with and without contrast  Continue atorvastatin     ANTITHROMBOTIC THERAPY:  None iso ICH    PULMONARY: CXR clear, protecting airway, saturating well     CARDIOVASCULAR: TTE without pathology  cardiac / telemetry  monitoring                              SBP goal: <140   on 2% HTS, decreased to 2% will titrate further tomorrow  Renal ultrasound to rule out KAITLYN as patient has uncontrolled HTN on multiple meds  Cardio consulted     GASTROINTESTINAL:  dysphagia screen  - passed     Diet: regular    RENAL: BUN/Cr stable, good urine output     HEMATOLOGY: H/H stable, Platelets normal      DVT ppx:Lovenox     ID: afebrile, nonspecific leukocytosis  Trend leukocytosis and fever curve, no localized infectious symptoms noted     OTHER:   DISPOSITION: AR     CORE MEASURES:        Admission NIHSS: 1     TPA: [] YES [X] NO      LDL/HDL: 146/43     Depression Screen:      Statin Therapy: YES     Dysphagia Screen: [X] PASS [] FAIL     Smoking [] YES [X] NO      Afib [] YES [X] NO     Stroke Education [] YES [] NO

## 2024-10-19 NOTE — PROGRESS NOTE ADULT - ASSESSMENT
Agree with above assessment and plan as outlined above.    - w/u for secondary causes of HTN    Bernardo Chilel MD, [facility]  BEEPER [phone]

## 2024-10-19 NOTE — PROGRESS NOTE ADULT - NS ATTEND AMEND GEN_ALL_CORE FT
I saw and examined the patient, reviewed diagnostic studies, and reviewed images personally. I agree with NP/PA’s history, exam, orders placed, and plan of care. Total care time spent, 50 minutes. Medical issues needing to be addressed include: Nontraumatic intracerebral hemorrhage subcortical w/ IVH, perihematomal cerebral edema and brain compression, HTN, medication noncompliance per brother, HA, AMS, n/v, dizziness. Plan & exam as above. Likely hypertensive hemorrhage, Utox (-). DC HTS today. MRI wwo. chemo DVT ppx ok. Pt is requiring multiple antihypertensives - will further assess for 2ndary etiology. Obtain renal artery u/s, renal/suprarenal u/s.  Cardiology consulted, SBP goal <160, but slowly start targetting normotension, appreciate recs re:secondary htn work up.

## 2024-10-20 LAB
ALBUMIN SERPL ELPH-MCNC: 3.7 G/DL — SIGNIFICANT CHANGE UP (ref 3.3–5)
ALP SERPL-CCNC: 85 U/L — SIGNIFICANT CHANGE UP (ref 40–120)
ALT FLD-CCNC: 208 U/L — HIGH (ref 10–45)
ANION GAP SERPL CALC-SCNC: 13 MMOL/L — SIGNIFICANT CHANGE UP (ref 5–17)
AST SERPL-CCNC: 75 U/L — HIGH (ref 10–40)
BILIRUB SERPL-MCNC: 1.1 MG/DL — SIGNIFICANT CHANGE UP (ref 0.2–1.2)
BUN SERPL-MCNC: 22 MG/DL — SIGNIFICANT CHANGE UP (ref 7–23)
CALCIUM SERPL-MCNC: 8.8 MG/DL — SIGNIFICANT CHANGE UP (ref 8.4–10.5)
CHLORIDE SERPL-SCNC: 101 MMOL/L — SIGNIFICANT CHANGE UP (ref 96–108)
CO2 SERPL-SCNC: 20 MMOL/L — LOW (ref 22–31)
CREAT SERPL-MCNC: 0.83 MG/DL — SIGNIFICANT CHANGE UP (ref 0.5–1.3)
EGFR: 111 ML/MIN/1.73M2 — SIGNIFICANT CHANGE UP
GLUCOSE SERPL-MCNC: 103 MG/DL — HIGH (ref 70–99)
HCT VFR BLD CALC: 39.1 % — SIGNIFICANT CHANGE UP (ref 39–50)
HGB BLD-MCNC: 13 G/DL — SIGNIFICANT CHANGE UP (ref 13–17)
MCHC RBC-ENTMCNC: 28.2 PG — SIGNIFICANT CHANGE UP (ref 27–34)
MCHC RBC-ENTMCNC: 33.2 GM/DL — SIGNIFICANT CHANGE UP (ref 32–36)
MCV RBC AUTO: 84.8 FL — SIGNIFICANT CHANGE UP (ref 80–100)
NRBC # BLD: 0 /100 WBCS — SIGNIFICANT CHANGE UP (ref 0–0)
PLATELET # BLD AUTO: 220 K/UL — SIGNIFICANT CHANGE UP (ref 150–400)
POTASSIUM SERPL-MCNC: 3.9 MMOL/L — SIGNIFICANT CHANGE UP (ref 3.5–5.3)
POTASSIUM SERPL-SCNC: 3.9 MMOL/L — SIGNIFICANT CHANGE UP (ref 3.5–5.3)
PROT SERPL-MCNC: 6.9 G/DL — SIGNIFICANT CHANGE UP (ref 6–8.3)
RBC # BLD: 4.61 M/UL — SIGNIFICANT CHANGE UP (ref 4.2–5.8)
RBC # FLD: 13 % — SIGNIFICANT CHANGE UP (ref 10.3–14.5)
SODIUM SERPL-SCNC: 134 MMOL/L — LOW (ref 135–145)
WBC # BLD: 12.46 K/UL — HIGH (ref 3.8–10.5)
WBC # FLD AUTO: 12.46 K/UL — HIGH (ref 3.8–10.5)

## 2024-10-20 PROCEDURE — 99233 SBSQ HOSP IP/OBS HIGH 50: CPT

## 2024-10-20 PROCEDURE — 95720 EEG PHY/QHP EA INCR W/VEEG: CPT

## 2024-10-20 RX ADMIN — SODIUM CHLORIDE 2 GRAM(S): 9 INJECTION, SOLUTION INTRAMUSCULAR; INTRAVENOUS; SUBCUTANEOUS at 12:18

## 2024-10-20 RX ADMIN — LOSARTAN POTASSIUM 100 MILLIGRAM(S): 25 TABLET ORAL at 05:36

## 2024-10-20 RX ADMIN — SODIUM CHLORIDE 2 GRAM(S): 9 INJECTION, SOLUTION INTRAMUSCULAR; INTRAVENOUS; SUBCUTANEOUS at 05:36

## 2024-10-20 RX ADMIN — Medication 200 MILLIGRAM(S): at 14:18

## 2024-10-20 RX ADMIN — Medication 200 MILLIGRAM(S): at 05:37

## 2024-10-20 RX ADMIN — Medication 200 MILLIGRAM(S): at 21:55

## 2024-10-20 RX ADMIN — Medication 10 MILLIGRAM(S): at 05:37

## 2024-10-20 RX ADMIN — Medication 40 MILLIGRAM(S): at 17:19

## 2024-10-20 RX ADMIN — SODIUM CHLORIDE 2 GRAM(S): 9 INJECTION, SOLUTION INTRAMUSCULAR; INTRAVENOUS; SUBCUTANEOUS at 00:57

## 2024-10-20 RX ADMIN — Medication 2 TABLET(S): at 21:55

## 2024-10-20 RX ADMIN — HYDRALAZINE HYDROCHLORIDE 100 MILLIGRAM(S): 50 TABLET, FILM COATED ORAL at 05:37

## 2024-10-20 RX ADMIN — SODIUM CHLORIDE 2 GRAM(S): 9 INJECTION, SOLUTION INTRAMUSCULAR; INTRAVENOUS; SUBCUTANEOUS at 23:30

## 2024-10-20 RX ADMIN — Medication 40 MILLIGRAM(S): at 21:55

## 2024-10-20 RX ADMIN — HYDRALAZINE HYDROCHLORIDE 100 MILLIGRAM(S): 50 TABLET, FILM COATED ORAL at 21:55

## 2024-10-20 RX ADMIN — PANTOPRAZOLE SODIUM 40 MILLIGRAM(S): 40 TABLET, DELAYED RELEASE ORAL at 05:37

## 2024-10-20 RX ADMIN — SODIUM CHLORIDE 2 GRAM(S): 9 INJECTION, SOLUTION INTRAMUSCULAR; INTRAVENOUS; SUBCUTANEOUS at 17:19

## 2024-10-20 RX ADMIN — HYDRALAZINE HYDROCHLORIDE 100 MILLIGRAM(S): 50 TABLET, FILM COATED ORAL at 14:18

## 2024-10-20 RX ADMIN — POLYETHYLENE GLYCOL 3350 17 GRAM(S): 17 POWDER, FOR SOLUTION ORAL at 05:36

## 2024-10-20 NOTE — PROGRESS NOTE ADULT - NS ATTEND AMEND GEN_ALL_CORE FT
I saw and examined the patient, reviewed diagnostic studies, and reviewed images personally. I agree with NP/PA’s history, exam, orders placed, and plan of care. Total care time spent, 50 minutes. Medical issues needing to be addressed include: Nontraumatic intracerebral hemorrhage subcortical w/ IVH, perihematomal cerebral edema and brain compression, HTN, medication noncompliance per brother, HA, AMS, n/v, dizziness. Plan & exam as above. Likely hypertensive hemorrhage, Utox (-). chemo DVT ppx ok. Pt is requiring multiple antihypertensives - assessing for 2ndary etiology including renal artery & renal/suprarenal u/s.  Appreciate cardiology recs. SBP goal <160, but slowly start targetting normotension. Repeat CTH last night given very mild gaze preference to left and decreased alertness, but scan stable. cEEG ordered. AAOx1~2, does better when given choices and speaking Syriac.

## 2024-10-20 NOTE — PROGRESS NOTE ADULT - ASSESSMENT
44 Right handed man with HTN HLD p/w headache, emesis, dizziness found to have left IPH w/ IVH. VS in triage 133/88. Exam vairying from awake  +somnolence to lethargic +somnolence, some confusion/disorientation. Labs w/ PMN predomn leukocytosis to 16K, normal coags, mild hyponatremia improved.  LDL/HDL: 146/43. A1C 5. CXR clear lungs. VA duplex neg for dvt in b/l LE. CTH with left BG IPH and IVH stable @ 4 hours.     Impression: AMS and dizziness due to Nontraumatic intracerebral hemorrhage subcortical w/ IVH, perihematomal cerebral edema and brain compression. Etiology Likely hypertensive hemorrhage.     NEURO:   MRI Brain with and without contrast done  Continue atorvastatin     ANTITHROMBOTIC THERAPY:  None iso ICH    PULMONARY: CXR clear, protecting airway, saturating well     CARDIOVASCULAR: TTE without pathology  cardiac / telemetry  monitoring                              SBP goal: <140    HTS 2% titrated off  Renal ultrasound-no KAITLYN  Cardio following    GASTROINTESTINAL:  dysphagia screen  - passed     Diet: regular    RENAL: BUN/Cr stable, good urine output     HEMATOLOGY: H/H stable, Platelets normal      DVT ppx:Lovenox     ID: afebrile, mild leukocytosis  Trend leukocytosis and fever curve, no localized infectious symptoms noted     OTHER:   DISPOSITION: AR     CORE MEASURES:        Admission NIHSS: 1     ICH 1     TPA: [] YES [X] NO      LDL/HDL: 146/43     Depression Screen:      Statin Therapy: YES     Dysphagia Screen: [X] PASS [] FAIL     Smoking [] YES [X] NO      Afib [] YES [X] NO     Stroke Education [] YES [] NO   44 Right handed man with HTN HLD p/w headache, emesis, dizziness found to have left IPH w/ IVH. VS in triage 133/88. Exam vairying from awake  +somnolence to lethargic +somnolence, some confusion/disorientation. Labs w/ PMN predomn leukocytosis to 16K, normal coags, mild hyponatremia improved.  LDL/HDL: 146/43. A1C 5. CXR clear lungs. VA duplex neg for dvt in b/l LE. CTH with left BG IPH and IVH stable @ 4 hours.     Impression: AMS and dizziness due to Nontraumatic intracerebral hemorrhage subcortical w/ IVH, perihematomal cerebral edema and brain compression. Etiology Likely hypertensive hemorrhage.     NEURO:   MRI Brain with and without contrast done  Continue atorvastatin   Q4 neurochecks at night for protected sleep    ANTITHROMBOTIC THERAPY:  None iso ICH    PULMONARY: CXR clear, protecting airway, saturating well     CARDIOVASCULAR: TTE without pathology  cardiac / telemetry  monitoring                              SBP goal: <140    HTS 2% titrated off  Renal ultrasound-no KAITLYN  Cardio following    GASTROINTESTINAL:  dysphagia screen  - passed     Diet: regular    RENAL: BUN/Cr stable, good urine output     HEMATOLOGY: H/H stable, Platelets normal      DVT ppx:Lovenox     ID: afebrile, mild leukocytosis  Trend leukocytosis and fever curve, no localized infectious symptoms noted     OTHER:   DISPOSITION: AR     CORE MEASURES:        Admission NIHSS: 1     ICH 1     TPA: [] YES [X] NO      LDL/HDL: 146/43     Depression Screen:      Statin Therapy: YES     Dysphagia Screen: [X] PASS [] FAIL     Smoking [] YES [X] NO      Afib [] YES [X] NO     Stroke Education [] YES [] NO

## 2024-10-20 NOTE — PROGRESS NOTE ADULT - SUBJECTIVE AND OBJECTIVE BOX
pt no new events overnight, he is resting in bed,   family at bedside, all questions answered        amLODIPine   Tablet 10 milliGRAM(s) Oral daily  atorvastatin 40 milliGRAM(s) Oral at bedtime  enoxaparin Injectable 40 milliGRAM(s) SubCutaneous <User Schedule>  hydrALAZINE 100 milliGRAM(s) Oral every 8 hours  labetalol 200 milliGRAM(s) Oral every 8 hours  losartan 100 milliGRAM(s) Oral daily  pantoprazole    Tablet 40 milliGRAM(s) Oral before breakfast  polyethylene glycol 3350 17 Gram(s) Oral every 12 hours  senna 2 Tablet(s) Oral at bedtime  sodium chloride 2 Gram(s) Oral every 6 hours  traMADol 50 milliGRAM(s) Oral every 6 hours PRN  traMADol 25 milliGRAM(s) Oral every 4 hours PRN                            13.0   12.46 )-----------( 220      ( 20 Oct 2024 06:35 )             39.1       Hemoglobin: 13.0 g/dL (10-20 @ 06:35)  Hemoglobin: 12.4 g/dL (10-19 @ 06:04)  Hemoglobin: 12.9 g/dL (10-18 @ 05:36)  Hemoglobin: 13.5 g/dL (10-15 @ 20:03)      10-20    134[L]  |  101  |  22  ----------------------------<  103[H]  3.9   |  20[L]  |  0.83    Ca    8.8      20 Oct 2024 06:35    TPro  6.9  /  Alb  3.7  /  TBili  1.1  /  DBili  x   /  AST  75[H]  /  ALT  208[H]  /  AlkPhos  85  10-20    Creatinine Trend: 0.83<--, 0.96<--, 1.06<--, 0.89<--, 0.87<--, 0.95<--    COAGS:           T(C): 36.9 (10-20-24 @ 08:00), Max: 37 (10-19-24 @ 20:00)  HR: 83 (10-20-24 @ 08:00) (64 - 92)  BP: 131/70 (10-20-24 @ 08:00) (110/69 - 159/90)  RR: 21 (10-20-24 @ 08:00) (12 - 21)  SpO2: 96% (10-20-24 @ 08:00) (93% - 96%)  Wt(kg): --    I&O's Summary    19 Oct 2024 07:01  -  20 Oct 2024 07:00  --------------------------------------------------------  IN: 800 mL / OUT: 2100 mL / NET: -1300 mL      Gen: NAD  HEENT:  (-)icterus (-)pallor  CV: N S1 S2 1/6 SOHEILA (+)2 Pulses B/l  Resp:  Clear to auscultation B/L, normal effort  GI: (+) BS Soft, NT, ND  Lymph:  (-)Edema, (-)obvious lymphadenopathy  Skin: Warm to touch, Normal turgor  Psych: Appropriate mood and affect      TELEMETRY: NSR	      ECG: Sinus Rhythm  	    RADIOLOGY:         CXR:   < from: Xray Chest 1 View- PORTABLE-Urgent (Xray Chest 1 View- PORTABLE-Urgent .) (10.16.24 @ 05:38) >  IMPRESSION:  Clear lungs.    < end of copied text >    < from: TTE W or WO Ultrasound Enhancing Agent (10.14.24 @ 08:46) >     CONCLUSIONS:      1. Fair study quality.   2. Left ventricular systolic function is normal with an ejection fraction of 60 % by Chua's method of disks.   3. Normal right ventricular systolic function.   4. No prior echocardiogram is available for comparison.    < end of copied text >      ASSESSMENT/PLAN: Patient is a 45 y/o Male with PMH of HTN who presented with headaches admitted for ICH. Cardiology consulted for HTN.    #ICH  - Likely hypertensive hemorrhage per neuro  - Management per neuro/neurosx  * cont statin   - eeg per neuro     #HTN  - SBP goal <140 per neuro  - Continue Amlodipine 10mg daily, Labetalol 200mg q8hrs (can uptitrate further if remains above goal), Hydralazine 100mg q8hrs, and Losartan 100mg daily     - TTE with normal LV function  - Secondary HTN workup - Renal artery dopplers to r/o KAITLYN, TSH WNL, can send aldosterone/renin/metaneprhine/AM cortisol levels but need to check if still able to test while on sodium chloride tabs

## 2024-10-20 NOTE — PROGRESS NOTE ADULT - SUBJECTIVE AND OBJECTIVE BOX
Neurology - Consult Note    -  Interval History: Patient noted yesterday with eft gaze palsy, somnolence        Allergies:  No Known Allergies      PMHx/PSHx/Family Hx: As above, otherwise see below       Social Hx:  No current use of tobacco, alcohol, or illicit drugs    Medications:  MEDICATIONS  (STANDING):  amLODIPine   Tablet 10 milliGRAM(s) Oral daily  atorvastatin 40 milliGRAM(s) Oral at bedtime  enoxaparin Injectable 40 milliGRAM(s) SubCutaneous <User Schedule>  hydrALAZINE 100 milliGRAM(s) Oral every 8 hours  labetalol 200 milliGRAM(s) Oral every 8 hours  losartan 100 milliGRAM(s) Oral daily  pantoprazole    Tablet 40 milliGRAM(s) Oral before breakfast  polyethylene glycol 3350 17 Gram(s) Oral every 12 hours  senna 2 Tablet(s) Oral at bedtime  sodium chloride 2 Gram(s) Oral every 6 hours    MEDICATIONS  (PRN):  traMADol 25 milliGRAM(s) Oral every 4 hours PRN Moderate Pain (4 - 6)  traMADol 50 milliGRAM(s) Oral every 6 hours PRN Severe Pain (7 - 10)      Vitals:  T(C): 36.4 (10-20-24 @ 04:00), Max: 37 (10-19-24 @ 20:00)  HR: 70 (10-20-24 @ 06:00) (64 - 92)  BP: 159/90 (10-20-24 @ 06:00) (110/69 - 159/90)  RR: 14 (10-20-24 @ 06:00) (12 - 19)  SpO2: 94% (10-20-24 @ 06:00) (93% - 95%)    Physical Examination:  General: Alert and Oriented x 3. No acute Distress. Well Nourished, Well Developed    NEUROLOGICAL EXAM:  Mental status: Awake, alert, oriented x3, speech fluent, follows commands, no neglect, normal memory   Cranial Nerves: No facial asymmetry, no nystagmus, no dysarthria,  tongue midline, mild L gaze palsy   Motor exam: Normal tone, no drift, 4/5 RUE, 5/5 RLE, 5/5 LUE, 5/5 LLE, moving all extremities  Sensation: Intact to light touch   Coordination/ Gait: No dysmetria,    Labs:                        13.0   12.46 )-----------( 220      ( 20 Oct 2024 06:35 )             39.1     10-20    134[L]  |  101  |  22  ----------------------------<  103[H]  3.9   |  20[L]  |  0.83    Ca    8.8      20 Oct 2024 06:35    TPro  6.9  /  Alb  3.7  /  TBili  1.1  /  DBili  x   /  AST  75[H]  /  ALT  208[H]  /  AlkPhos  85  10-20    CAPILLARY BLOOD GLUCOSE      POCT Blood Glucose.: 111 mg/dL (18 Oct 2024 11:20)    LIVER FUNCTIONS - ( 20 Oct 2024 06:35 )  Alb: 3.7 g/dL / Pro: 6.9 g/dL / ALK PHOS: 85 U/L / ALT: 208 U/L / AST: 75 U/L / GGT: x                   Radiology:  < from: CT Head No Cont (10.19.24 @ 22:36) >  IMPRESSION:  There is redemonstration of acute hemorrhage centered in the left basal   ganglia with intraventricular extension into the left lateral ventricular   including layering hemorrhage in the left occipital horn. There is stable   rightward midline shift.    No significant interval change.    Please follow up official report in the morning.    < end of copied text >   Neurology - Consult Note    -  Interval History: Patient noted yesterday with left gaze palsy, somnolence        Allergies:  No Known Allergies      PMHx/PSHx/Family Hx: As above, otherwise see below       Social Hx:  No current use of tobacco, alcohol, or illicit drugs    Medications:  MEDICATIONS  (STANDING):  amLODIPine   Tablet 10 milliGRAM(s) Oral daily  atorvastatin 40 milliGRAM(s) Oral at bedtime  enoxaparin Injectable 40 milliGRAM(s) SubCutaneous <User Schedule>  hydrALAZINE 100 milliGRAM(s) Oral every 8 hours  labetalol 200 milliGRAM(s) Oral every 8 hours  losartan 100 milliGRAM(s) Oral daily  pantoprazole    Tablet 40 milliGRAM(s) Oral before breakfast  polyethylene glycol 3350 17 Gram(s) Oral every 12 hours  senna 2 Tablet(s) Oral at bedtime  sodium chloride 2 Gram(s) Oral every 6 hours    MEDICATIONS  (PRN):  traMADol 25 milliGRAM(s) Oral every 4 hours PRN Moderate Pain (4 - 6)  traMADol 50 milliGRAM(s) Oral every 6 hours PRN Severe Pain (7 - 10)      Vitals:  T(C): 36.4 (10-20-24 @ 04:00), Max: 37 (10-19-24 @ 20:00)  HR: 70 (10-20-24 @ 06:00) (64 - 92)  BP: 159/90 (10-20-24 @ 06:00) (110/69 - 159/90)  RR: 14 (10-20-24 @ 06:00) (12 - 19)  SpO2: 94% (10-20-24 @ 06:00) (93% - 95%)    Physical Examination:  General: Alert and Oriented x 3. No acute Distress. Well Nourished, Well Developed    NEUROLOGICAL EXAM:  Mental status: Awake, alert, oriented 0, speech dysarthric  Cranial Nerves: No facial asymmetry, no nystagmus, no dysarthria,  tongue midline, mild L gaze palsy   Motor exam: Normal tone, no drift, 4/5 RUE, 5/5 RLE, 5/5 LUE, 5/5 LLE, moving all extremities        Labs:                        13.0   12.46 )-----------( 220      ( 20 Oct 2024 06:35 )             39.1     10-20    134[L]  |  101  |  22  ----------------------------<  103[H]  3.9   |  20[L]  |  0.83    Ca    8.8      20 Oct 2024 06:35    TPro  6.9  /  Alb  3.7  /  TBili  1.1  /  DBili  x   /  AST  75[H]  /  ALT  208[H]  /  AlkPhos  85  10-20    CAPILLARY BLOOD GLUCOSE      POCT Blood Glucose.: 111 mg/dL (18 Oct 2024 11:20)    LIVER FUNCTIONS - ( 20 Oct 2024 06:35 )  Alb: 3.7 g/dL / Pro: 6.9 g/dL / ALK PHOS: 85 U/L / ALT: 208 U/L / AST: 75 U/L / GGT: x                   Radiology:  < from: CT Head No Cont (10.19.24 @ 22:36) >  IMPRESSION:  There is redemonstration of acute hemorrhage centered in the left basal   ganglia with intraventricular extension into the left lateral ventricular   including layering hemorrhage in the left occipital horn. There is stable   rightward midline shift.    No significant interval change.    Please follow up official report in the morning.    < end of copied text >

## 2024-10-20 NOTE — PROGRESS NOTE ADULT - ASSESSMENT
Agree with above assessment and plan as outlined above.    - f/u secondary causes of HTN    Bernardo Chilel MD, [facility]  [place] [phone]

## 2024-10-21 LAB
ALBUMIN SERPL ELPH-MCNC: 3.4 G/DL — SIGNIFICANT CHANGE UP (ref 3.3–5)
ALP SERPL-CCNC: 82 U/L — SIGNIFICANT CHANGE UP (ref 40–120)
ALT FLD-CCNC: 232 U/L — HIGH (ref 10–45)
ANION GAP SERPL CALC-SCNC: 13 MMOL/L — SIGNIFICANT CHANGE UP (ref 5–17)
AST SERPL-CCNC: 90 U/L — HIGH (ref 10–40)
BILIRUB SERPL-MCNC: 1 MG/DL — SIGNIFICANT CHANGE UP (ref 0.2–1.2)
BUN SERPL-MCNC: 24 MG/DL — HIGH (ref 7–23)
CALCIUM SERPL-MCNC: 9 MG/DL — SIGNIFICANT CHANGE UP (ref 8.4–10.5)
CHLORIDE SERPL-SCNC: 100 MMOL/L — SIGNIFICANT CHANGE UP (ref 96–108)
CO2 SERPL-SCNC: 18 MMOL/L — LOW (ref 22–31)
CREAT SERPL-MCNC: 0.89 MG/DL — SIGNIFICANT CHANGE UP (ref 0.5–1.3)
CULTURE RESULTS: SIGNIFICANT CHANGE UP
CULTURE RESULTS: SIGNIFICANT CHANGE UP
EGFR: 109 ML/MIN/1.73M2 — SIGNIFICANT CHANGE UP
GLUCOSE SERPL-MCNC: 76 MG/DL — SIGNIFICANT CHANGE UP (ref 70–99)
HCT VFR BLD CALC: 38.6 % — LOW (ref 39–50)
HGB BLD-MCNC: 12.7 G/DL — LOW (ref 13–17)
MCHC RBC-ENTMCNC: 28.7 PG — SIGNIFICANT CHANGE UP (ref 27–34)
MCHC RBC-ENTMCNC: 32.9 GM/DL — SIGNIFICANT CHANGE UP (ref 32–36)
MCV RBC AUTO: 87.1 FL — SIGNIFICANT CHANGE UP (ref 80–100)
NRBC # BLD: 0 /100 WBCS — SIGNIFICANT CHANGE UP (ref 0–0)
PLATELET # BLD AUTO: 232 K/UL — SIGNIFICANT CHANGE UP (ref 150–400)
POTASSIUM SERPL-MCNC: 5.3 MMOL/L — SIGNIFICANT CHANGE UP (ref 3.5–5.3)
POTASSIUM SERPL-SCNC: 5.3 MMOL/L — SIGNIFICANT CHANGE UP (ref 3.5–5.3)
PROT SERPL-MCNC: 6.9 G/DL — SIGNIFICANT CHANGE UP (ref 6–8.3)
RBC # BLD: 4.43 M/UL — SIGNIFICANT CHANGE UP (ref 4.2–5.8)
RBC # FLD: 13.1 % — SIGNIFICANT CHANGE UP (ref 10.3–14.5)
SODIUM SERPL-SCNC: 131 MMOL/L — LOW (ref 135–145)
SPECIMEN SOURCE: SIGNIFICANT CHANGE UP
SPECIMEN SOURCE: SIGNIFICANT CHANGE UP
WBC # BLD: 12.65 K/UL — HIGH (ref 3.8–10.5)
WBC # FLD AUTO: 12.65 K/UL — HIGH (ref 3.8–10.5)

## 2024-10-21 PROCEDURE — 95720 EEG PHY/QHP EA INCR W/VEEG: CPT

## 2024-10-21 PROCEDURE — 99233 SBSQ HOSP IP/OBS HIGH 50: CPT

## 2024-10-21 PROCEDURE — 76705 ECHO EXAM OF ABDOMEN: CPT | Mod: 26

## 2024-10-21 RX ORDER — ASPIRIN/MAG CARB/ALUMINUM AMIN 325 MG
81 TABLET ORAL DAILY
Refills: 0 | Status: DISCONTINUED | OUTPATIENT
Start: 2024-10-21 | End: 2024-11-04

## 2024-10-21 RX ADMIN — SODIUM CHLORIDE 2 GRAM(S): 9 INJECTION, SOLUTION INTRAMUSCULAR; INTRAVENOUS; SUBCUTANEOUS at 14:58

## 2024-10-21 RX ADMIN — HYDRALAZINE HYDROCHLORIDE 100 MILLIGRAM(S): 50 TABLET, FILM COATED ORAL at 05:48

## 2024-10-21 RX ADMIN — SODIUM CHLORIDE 2 GRAM(S): 9 INJECTION, SOLUTION INTRAMUSCULAR; INTRAVENOUS; SUBCUTANEOUS at 05:49

## 2024-10-21 RX ADMIN — SODIUM CHLORIDE 2 GRAM(S): 9 INJECTION, SOLUTION INTRAMUSCULAR; INTRAVENOUS; SUBCUTANEOUS at 19:58

## 2024-10-21 RX ADMIN — LOSARTAN POTASSIUM 100 MILLIGRAM(S): 25 TABLET ORAL at 05:48

## 2024-10-21 RX ADMIN — PANTOPRAZOLE SODIUM 40 MILLIGRAM(S): 40 TABLET, DELAYED RELEASE ORAL at 05:48

## 2024-10-21 RX ADMIN — Medication 40 MILLIGRAM(S): at 22:38

## 2024-10-21 RX ADMIN — Medication 10 MILLIGRAM(S): at 05:51

## 2024-10-21 RX ADMIN — Medication 200 MILLIGRAM(S): at 05:48

## 2024-10-21 RX ADMIN — Medication 200 MILLIGRAM(S): at 14:58

## 2024-10-21 RX ADMIN — Medication 81 MILLIGRAM(S): at 12:40

## 2024-10-21 RX ADMIN — HYDRALAZINE HYDROCHLORIDE 100 MILLIGRAM(S): 50 TABLET, FILM COATED ORAL at 14:58

## 2024-10-21 RX ADMIN — Medication 2 TABLET(S): at 22:38

## 2024-10-21 RX ADMIN — Medication 40 MILLIGRAM(S): at 17:28

## 2024-10-21 RX ADMIN — POLYETHYLENE GLYCOL 3350 17 GRAM(S): 17 POWDER, FOR SOLUTION ORAL at 05:46

## 2024-10-21 NOTE — PROGRESS NOTE ADULT - ASSESSMENT
44 Right handed man with HTN HLD p/w headache, emesis, dizziness found to have left IPH w/ IVH. VS in triage 133/88. Exam vairying from awake  +somnolence to lethargic +somnolence, some confusion/disorientation. Labs w/ PMN predomn leukocytosis to 16K, normal coags, mild hyponatremia improved.  LDL/HDL: 146/43. A1C 5. CXR clear lungs. VA duplex neg for dvt in b/l LE. CTH with left BG IPH and IVH stable @ 4 hours.     Impression: AMS and dizziness due to Nontraumatic intracerebral hemorrhage subcortical w/ IVH, perihematomal cerebral edema and brain compression. Etiology Likely hypertensive hemorrhage.     NEURO: Neurologic examination with fluctuations in mental status, now overall stable. Repeat CTH 10/19 stable. EEG report pending. Continue monitoring for neurologic deterioration in the setting of cerebral edema and compression. Keep strict normotension. , continue high intensity statin, titrate to LDL goal < 70. MRI brain w/wo results as above. PT/OT recommend AR.  Q4 neurochecks at night for protected sleep    ANTITHROMBOTIC THERAPY: None iso ICH    PULMONARY: CXR (10/16) clear, protecting airway, saturating well     CARDIOVASCULAR: TTE: EF 60%, normal LA. Continue cardiac monitoring. Cardiology consulted for HTN management. Continue amlodipine, labetalol, hydralazine and losartan. Renal artery doppler: no evidence of KAITLYN. TSH wnl.      SBP goal: <160     GASTROINTESTINAL:  dysphagia screen  - passed, tolerating diet     Diet: regular    RENAL: BUN/Cr stable, good urine output. Weaned off of 2% HTS.     HEMATOLOGY: H/H stable, Platelets normal . BLE dopplers (10/14): no evidence of dvt.     DVT ppx: Lovenox     ID: Febrile on 10/16 (tmax 100.9F), now afebrile. Mild leukocytosis. Trend leukocytosis and fever curve, no localized infectious symptoms noted     OTHER: Acute rehab per PT/OT eval once stable and workup is complete    CORE MEASURES:        Admission NIHSS: 1     ICH 1     TPA: [] YES [X] NO      LDL/HDL: 146/43     Depression Screen:      Statin Therapy: YES     Dysphagia Screen: [X] PASS [] FAIL     Smoking [] YES [X] NO      Afib [] YES [X] NO     Stroke Education [] YES [] NO 44 Right handed man with HTN HLD p/w headache, emesis, dizziness found to have left IPH w/ IVH. VS in triage 133/88. Exam vairying from awake  +somnolence to lethargic +somnolence, some confusion/disorientation. Labs w/ PMN predomn leukocytosis to 16K, normal coags, mild hyponatremia improved.  LDL/HDL: 146/43. A1C 5. CXR clear lungs. VA duplex neg for dvt in b/l LE. CTH with left BG IPH and IVH stable @ 4 hours.     Impression: AMS and dizziness due to Nontraumatic intracerebral hemorrhage subcortical w/ IVH, perihematomal cerebral edema and brain compression. Etiology Likely hypertensive hemorrhage. MRI also reveals acute/subacute R corona radiata ischemic infarct, etiology likely ESUS    NEURO: Neurologic examination with fluctuations in mental status, now overall stable. Repeat CTH 10/19 stable. EEG report pending. Continue monitoring for neurologic deterioration in the setting of cerebral edema and compression. Keep strict normotension. , continue high intensity statin, titrate to LDL goal < 70. MRI brain w/wo results as above. PT/OT recommend AR.  Q4 neurochecks at night for protected sleep    ANTITHROMBOTIC THERAPY: Aspirin 81mg daily for secondary stroke prevention started 10/21/24    PULMONARY: CXR (10/16) clear, protecting airway, saturating well     CARDIOVASCULAR: TTE: EF 60%, normal LA. Continue cardiac monitoring. Cardiology consulted for HTN management. Continue amlodipine, labetalol, hydralazine and losartan. Renal artery doppler: no evidence of KAITLYN. TSH wnl. Consider [facility] inpatient vs outpatient. Recommend ILR to screen for arrhythmia like Afib.      SBP goal: <160     GASTROINTESTINAL:  dysphagia screen  - passed, tolerating diet. Elevated LFTs, continue to trend, internal medicine recs appreciated     Diet: regular    RENAL: BUN/Cr stable, good urine output. Weaned off of 2% HTS, continue NaCl tabs 2g q6h.    HEMATOLOGY: H/H stable, Platelets normal . BLE dopplers (10/14): no evidence of dvt.     DVT ppx: Lovenox     ID: Febrile on 10/16 (tmax 100.9F), now afebrile. Mild leukocytosis. Trend leukocytosis and fever curve, no localized infectious symptoms noted. UA negative, blood cultures x2 NGTD.    OTHER: Acute rehab per PT/OT eval once stable and workup is complete    CORE MEASURES:        Admission NIHSS: 1     ICH 1     TPA: [] YES [X] NO      LDL/HDL: 146/43     Depression Screen:      Statin Therapy: YES     Dysphagia Screen: [X] PASS [] FAIL     Smoking [] YES [X] NO      Afib [] YES [X] NO     Stroke Education [] YES [] NO

## 2024-10-21 NOTE — DISCHARGE NOTE PROVIDER - CARE PROVIDER_API CALL
Ashley Jiang  NP in Family Health  [address] [facility], [address]  [place] [address]  Phone: [phone]  Fax: [phone]  Follow Up Time: 2 weeks    Bernardo Chilel  Cardiovascular Disease  [address] [place] [address], [facility] [address]  [place] [address]  Phone: [phone]  Fax: [phone]  Follow Up Time: 1 month   Ashley Jiang  NP in Family Health  [address] [place] [address], [address]  [place] [address]  Phone: [phone]  Fax: [phone]  Follow Up Time: 2 weeks    Bernardo Chilel  Cardiovascular Disease  [address] [place] [address], [address]  [place] [address]  Phone: [phone]  Fax: [phone]  Follow Up Time: 1 month    Ana Resendez  Vascular Neurology  [address] [place] [address], [address]  [place] [address]  Phone: [phone]  Fax: [phone]  Follow Up Time: 1 week    Hawk Lizarraga  Cardiac Electrophysiology  [address] [place] [address], [id number]  [place] [address]  Phone: [phone]  Fax: [phone]  Follow Up Time: 1 week    Hawk Weston  Gastroenterology  [address] [place] [address]  Phone: [phone]  Fax: [phone]  Follow Up Time: 1 week    Angela Donaldson  Hematology  [address] [place] [address]  Phone: [phone]  Fax: [phone]  Follow Up Time: 1 week

## 2024-10-21 NOTE — DISCHARGE NOTE PROVIDER - HOSPITAL COURSE
HPI: 44M no AC/AP hx uncontrolled HTN tx LVS s/p flu-like symptoms and headache; CTH w/ left BG IPH w/ IVH extension. Exam: Lao speaking, Ox3, PERRL, no drift, CHAPA 5/5, SILT (14 Oct 2024 04:30)    Imaging:  CTH 10/19/24: No change since 10/15/2024. Left corona radiata hemorrhage with intraventricular extension. Mild midline shift to the right.    MRI Brain w/wo 10/18/24:  1. Stable parenchymal hematoma in the left basal ganglia, without evidence of an underlying hemorrhagic mass.  2. Stable IVH, with mild distention of the temporal horns.    CTH 10/15/24: Interval stability compared with the prior 10/14/2024 in left basal ganglia acute hematoma with intraventricular extension. No evidence of rehemorrhage. No change in the ventricle size.    CTH 10/14/24 10am: Left basal ganglia lucency suspicious for infarct. Left corona radiata hemorrhage with intraventricular extension and mild ventricular dilatation, no change since 4:46 AM.    10/14/24 4am  CT HEAD: Stable left medial basal ganglia parenchymal hemorrhage with associated intraventricular extension of hemorrhage. Unchanged edema within the adjacent left basal ganglia as well as rightward bulging of the septum pellucidum.  CTA NECK: No evidence of significant stenosis or occlusion.  CTA HEAD: No large vessel occlusion, significant stenosis or vascular abnormality identified.    CTH 10/14/24 1am: Intraparenchymal hematoma in the medial left basal ganglia with adjacent vasogenic edema, mass effect, 7 mm of left to right midline shift, and intraventricular extension of hemorrhage predominantly in the left lateral ventricle.      Impression: AMS and dizziness due to Nontraumatic intracerebral hemorrhage subcortical w/ IVH, perihematomal cerebral edema and brain compression. Etiology Likely hypertensive hemorrhage.   ANTITHROMBOTIC THERAPY: None due to ICH  vEEG with no seizures or epileptiform discharges, reveals focal cerebral dysfunction or structural abnormality in the left hemisphere region    TTE: EF 60%, normal LA.   Renal artery doppler: no evidence of KAITLYN.    BLE dopplers (10/14): no evidence of dvt    , continue high intensity statin, titrate to LDL goal < 70.    Disposition: Evaluated by PT/OT and recommended for AR. Medically cleared for discharge. HPI: 44M no AC/AP hx uncontrolled HTN tx LVS s/p flu-like symptoms and headache; CTH w/ left BG IPH w/ IVH extension. Exam: Romanian speaking, Ox3, PERRL, no drift, CHAPA 5/5, SILT (14 Oct 2024 04:30)    Hospital Course:  The patient presented to the hospital for headache, dizziness, and vomiting. CT revealed L intracerebral hemorrhage. MR showed R corona radiata ischemic infarct. Vascular neuro was consulted, and had c/f infectious etiology vs. cardiac cause. Serial CTH were performed to monitor for changes. EEG was also done and did not show any seizure activity.  Cards and EP were consulted and did not recc any intervention at this time. HTN meds were continued and recc to f/u outpt for possible ILR.  Heme/onc was consulted for hypercoagulable work up. Results are incomplete, but are negative thus far. Can f/u outpt.  Patient was found to have leukocytosis with fever. CTAP neg as well as BCx. Patient was started on empiric Zosyn. UCx came back positive for Klebsiella. ID was consulted and patient switched to Ertapenem.  ABD US showed hepatic steatosis. LFTs elevated on labs. GI consulted. No evidence of acute tacho. GI recc holding statin and monitoring.  Patient also found to be hyponatremic to 120s. Started on NaCl tabs with improvement.  MRSA Swab +. Started on Mupirocin.    Imaging:  CTH 10/19/24: No change since 10/15/2024. Left corona radiata hemorrhage with intraventricular extension. Mild midline shift to the right.    MRI Brain w/wo 10/18/24:  1. Stable parenchymal hematoma in the left basal ganglia, without evidence of an underlying hemorrhagic mass.  2. Stable IVH, with mild distention of the temporal horns.    CTH 10/15/24: Interval stability compared with the prior 10/14/2024 in left basal ganglia acute hematoma with intraventricular extension. No evidence of rehemorrhage. No change in the ventricle size.    CTH 10/14/24 10am: Left basal ganglia lucency suspicious for infarct. Left corona radiata hemorrhage with intraventricular extension and mild ventricular dilatation, no change since 4:46 AM.    10/14/24 4am  CT HEAD: Stable left medial basal ganglia parenchymal hemorrhage with associated intraventricular extension of hemorrhage. Unchanged edema within the adjacent left basal ganglia as well as rightward bulging of the septum pellucidum.  CTA NECK: No evidence of significant stenosis or occlusion.  CTA HEAD: No large vessel occlusion, significant stenosis or vascular abnormality identified.    CTH 10/14/24 1am: Intraparenchymal hematoma in the medial left basal ganglia with adjacent vasogenic edema, mass effect, 7 mm of left to right midline shift, and intraventricular extension of hemorrhage predominantly in the left lateral ventricle.      Impression: AMS and dizziness due to Nontraumatic intracerebral hemorrhage subcortical w/ IVH, perihematomal cerebral edema and brain compression. Etiology Likely hypertensive hemorrhage.   ANTITHROMBOTIC THERAPY: None due to ICH  vEEG with no seizures or epileptiform discharges, reveals focal cerebral dysfunction or structural abnormality in the left hemisphere region    TTE: EF 60%, normal LA.   Renal artery doppler: no evidence of KAITLYN.    BLE dopplers (10/14): no evidence of dvt    , continue high intensity statin, titrate to LDL goal < 70.    Disposition: Evaluated by PT/OT and recommended for AR. Medically cleared for discharge with outpatient follow ups.      Important Medication Changes and Reason:  Please see medication reconciliation.    Active or Pending Issues Requiring Follow-up:  Please follow up with PCP, neuro, cards, EP, GI, and heme/onc    Advanced Directives:   [X] Full code  [ ] DNR  [ ] Hospice    Discharge Diagnoses:  Intracerebral hemorrhage  Ischemic Infarct  UTI  Transaminitis  Hyponatremia  MRSA in nares  HTN

## 2024-10-21 NOTE — PROGRESS NOTE ADULT - SUBJECTIVE AND OBJECTIVE BOX
C A R D I O L O G Y  **********************************     DATE OF SERVICE: 10-21-24    Patient denies chest pain or shortness of breath.   Review of systems otherwise negative.  	  MEDICATIONS:  MEDICATIONS  (STANDING):  amLODIPine   Tablet 10 milliGRAM(s) Oral daily  aspirin enteric coated 81 milliGRAM(s) Oral daily  atorvastatin 40 milliGRAM(s) Oral at bedtime  enoxaparin Injectable 40 milliGRAM(s) SubCutaneous <User Schedule>  hydrALAZINE 100 milliGRAM(s) Oral every 8 hours  labetalol 200 milliGRAM(s) Oral every 8 hours  losartan 100 milliGRAM(s) Oral daily  pantoprazole    Tablet 40 milliGRAM(s) Oral before breakfast  polyethylene glycol 3350 17 Gram(s) Oral every 12 hours  senna 2 Tablet(s) Oral at bedtime  sodium chloride 2 Gram(s) Oral every 6 hours      LABS:	 	    CARDIAC MARKERS:                                12.7   12.65 )-----------( 232      ( 21 Oct 2024 07:26 )             38.6     Hemoglobin: 12.7 g/dL (10-21 @ 07:26)  Hemoglobin: 13.0 g/dL (10-20 @ 06:35)  Hemoglobin: 12.4 g/dL (10-19 @ 06:04)  Hemoglobin: 12.9 g/dL (10-18 @ 05:36)      10-21    131[L]  |  100  |  24[H]  ----------------------------<  76  5.3   |  18[L]  |  0.89    Ca    9.0      21 Oct 2024 07:26    TPro  6.9  /  Alb  3.4  /  TBili  1.0  /  DBili  x   /  AST  90[H]  /  ALT  232[H]  /  AlkPhos  82  10-21    Creatinine Trend: 0.89<--, 0.83<--, 0.96<--, 1.06<--, 0.89<--, 0.87<--    COAGS:       proBNP:   Lipid Profile:   HgA1c:   TSH:       PHYSICAL EXAM:  T(C): 36.7 (10-21-24 @ 08:00), Max: 37.4 (10-20-24 @ 20:00)  HR: 63 (10-21-24 @ 10:00) (63 - 86)  BP: 118/74 (10-21-24 @ 10:00) (113/69 - 145/86)  RR: 12 (10-21-24 @ 10:00) (12 - 18)  SpO2: 97% (10-21-24 @ 10:00) (93% - 99%)  Wt(kg): --  I&O's Summary    20 Oct 2024 07:01  -  21 Oct 2024 07:00  --------------------------------------------------------  IN: 480 mL / OUT: 2900 mL / NET: -2420 mL          Gen: NAD  HEENT:  (-)icterus (-)pallor  CV: N S1 S2 1/6 SOHEILA (+)2 Pulses B/l  Resp:  Clear to auscultation B/L, normal effort  GI: (+) BS Soft, NT, ND  Lymph:  (-)Edema, (-)obvious lymphadenopathy  Skin: Warm to touch, Normal turgor  Psych: Appropriate mood and affect      TELEMETRY: NSR	      RADIOLOGY:         CXR:   < from: Xray Chest 1 View- PORTABLE-Urgent (Xray Chest 1 View- PORTABLE-Urgent .) (10.16.24 @ 05:38) >  IMPRESSION:  Clear lungs.    < end of copied text >    < from: TTE W or WO Ultrasound Enhancing Agent (10.14.24 @ 08:46) >     CONCLUSIONS:      1. Fair study quality.   2. Left ventricular systolic function is normal with an ejection fraction of 60 % by Chua's method of disks.   3. Normal right ventricular systolic function.   4. No prior echocardiogram is available for comparison.    < end of copied text >      ASSESSMENT/PLAN: Patient is a 43 y/o Male with PMH of HTN who presented with headaches admitted for ICH. Cardiology consulted for HTN.    #ICH  - Likely hypertensive hemorrhage per neuro  - Management per neuro/neurosx  - MRI Brain noted with acute/subacute infarct in R corona radiata  - If suspect embolic stroke, can consider CELENA. F/u neuro recs  - EP consult with Dr. Lizarraga called for ILR evaluation however plan for outpatient ILR    #HTN  - SBP goal <140 per neuro  - Continue Amlodipine 10mg daily, Labetalol 200mg q8hrs (can uptitrate further if remains above goal), Hydralazine 100mg q8hrs, and Losartan 100mg daily  - Suspect will improve further once weaned of sodium chloride tabs  - TTE with normal LV function  - Secondary HTN workup - Renal artery dopplers neg for KAITLYN, TSH WNL, can eventually send aldosterone/renin/metaneprhine/AM cortisol levels once off sodium chloride tabs     Davi Carrion PA-C  Pager: [phone]

## 2024-10-21 NOTE — DISCHARGE NOTE PROVIDER - PROVIDER TOKENS
PROVIDER:[TOKEN:[[id number]],FOLLOWUP:[2 weeks]],PROVIDER:[TOKEN:[[id number]],FOLLOWUP:[1 month]] PROVIDER:[TOKEN:[[id number]],FOLLOWUP:[2 weeks]],PROVIDER:[TOKEN:[[id number]],FOLLOWUP:[1 month]],PROVIDER:[TOKEN:[[id number]],FOLLOWUP:[1 week]],PROVIDER:[TOKEN:[[id number]],FOLLOWUP:[1 week]],PROVIDER:[TOKEN:[[id number]],FOLLOWUP:[1 week]],PROVIDER:[TOKEN:[[id number]],FOLLOWUP:[1 week]]

## 2024-10-21 NOTE — PROGRESS NOTE ADULT - ASSESSMENT
Patient seen and examined, agree with above assessment and plan as transcribed above.    - if embolic CVA is suspected, consider CELENA  - neuro f/u  - Renal artery dopplers noted    Bernardo Chilel MD, [facility]  BEEPER [phone]

## 2024-10-21 NOTE — DISCHARGE NOTE PROVIDER - NSDCFUADDAPPT_GEN_ALL_CORE_FT
APPTS ARE READY TO BE MADE: [X] YES    Best Family or Patient Contact (if needed):    Additional Information about above appointments (if needed):    1: Please follow up primary care within 1 week.  2: Please follow up with neurology within 1 week.  3: Please follow up with cardiology within 1 week.  4: Please follow up with EP within 1 week.  5: Please follow up with GI within 1 week.  6: Please follow up with hematology/oncology within 1 week.    Other comments or requests:    APPTS ARE READY TO BE MADE: [X] YES    Best Family or Patient Contact (if needed):    Additional Information about above appointments (if needed):    1: Please follow up primary care within 1 week.  2: Please follow up with neurology within 1 week.  3: Please follow up with cardiology within 1 week.  4: Please follow up with EP within 1 week.  5: Please follow up with GI within 1 week.  6: Please follow up with hematology/oncology within 1 week.    Other comments or requests:     Patient is being discharged to [facility]. Caregiver will arrange follow up.

## 2024-10-21 NOTE — DISCHARGE NOTE PROVIDER - NPI NUMBER (FOR SYSADMIN USE ONLY) :
[[id number]]

## 2024-10-21 NOTE — DISCHARGE NOTE PROVIDER - NSDCMRMEDTOKEN_GEN_ALL_CORE_FT
amLODIPine 5 mg oral tablet: 1 tab(s) orally once a day  Lipitor 20 mg oral tablet: 1 tab(s) orally once a day  losartan 50 mg oral tablet: 1 tab(s) orally once a day  Protonix 40 mg oral delayed release tablet: 1 tab(s) orally once a day   amLODIPine 10 mg oral tablet: 1 tab(s) orally once a day  aspirin 81 mg oral delayed release tablet: 1 tab(s) orally once a day  ezetimibe 10 mg oral tablet: 1 tab(s) orally once a day  FLUoxetine 20 mg oral capsule: 1 cap(s) orally once a day  labetalol 200 mg oral tablet: 1 tab(s) orally every 8 hours  losartan 100 mg oral tablet: 1 tab(s) orally once a day  modafinil 100 mg oral tablet: 1 tab(s) orally once a day  pantoprazole 40 mg oral delayed release tablet: 1 tab(s) orally once a day (before a meal)  sodium chloride 1 g oral tablet: 2 tab(s) orally every 6 hours   amLODIPine 10 mg oral tablet: 1 tab(s) orally once a day  aspirin 81 mg oral delayed release tablet: 1 tab(s) orally once a day  ezetimibe 10 mg oral tablet: 1 tab(s) orally once a day  FLUoxetine 20 mg oral capsule: 1 cap(s) orally once a day  labetalol 200 mg oral tablet: 1 tab(s) orally every 8 hours  levoFLOXacin 500 mg oral tablet: 1 tab(s) orally once a day  losartan 100 mg oral tablet: 1 tab(s) orally once a day  modafinil 100 mg oral tablet: 1 tab(s) orally once a day  mupirocin 2% topical ointment: Apply topically to affected area 2 times a day ends after 11/2  pantoprazole 40 mg oral delayed release tablet: 1 tab(s) orally once a day (before a meal)  sodium chloride 1 g oral tablet: 2 tab(s) orally every 6 hours

## 2024-10-21 NOTE — PROGRESS NOTE ADULT - SUBJECTIVE AND OBJECTIVE BOX
THE PATIENT WAS SEEN AND EXAMINED BY ME WITH THE HOUSESTAFF AND STROKE TEAM DURING MORNING ROUNDS.   HPI:  44M no AC/AP hx uncontrolled HTN tx LVS s/p flu-like symptoms and headache; CTH w/ left BG IPH w/ IVH extension. Exam: Syriac speaking, Ox3, PERRL, no drift, CHAPA 5/5, SILT (14 Oct 2024 04:30)      SUBJECTIVE: No events overnight.  No new neurologic complaints.      amLODIPine   Tablet 10 milliGRAM(s) Oral daily  atorvastatin 40 milliGRAM(s) Oral at bedtime  enoxaparin Injectable 40 milliGRAM(s) SubCutaneous <User Schedule>  hydrALAZINE 100 milliGRAM(s) Oral every 8 hours  labetalol 200 milliGRAM(s) Oral every 8 hours  losartan 100 milliGRAM(s) Oral daily  pantoprazole    Tablet 40 milliGRAM(s) Oral before breakfast  polyethylene glycol 3350 17 Gram(s) Oral every 12 hours  senna 2 Tablet(s) Oral at bedtime  sodium chloride 2 Gram(s) Oral every 6 hours  traMADol 25 milliGRAM(s) Oral every 4 hours PRN  traMADol 50 milliGRAM(s) Oral every 6 hours PRN      PHYSICAL EXAM:   Vital Signs Last 24 Hrs  T(C): 36.7 (21 Oct 2024 06:00), Max: 37.4 (20 Oct 2024 20:00)  T(F): 98 (21 Oct 2024 06:00), Max: 99.3 (20 Oct 2024 20:00)  HR: 64 (21 Oct 2024 06:00) (64 - 86)  BP: 145/86 (21 Oct 2024 06:00) (116/66 - 145/86)  BP(mean): 110 (21 Oct 2024 06:00) (83 - 110)  RR: 14 (21 Oct 2024 06:00) (12 - 21)  SpO2: 97% (21 Oct 2024 06:00) (93% - 99%)  Patient On (Oxygen Delivery Method): room air        General: No acute distress  HEENT: EOM intact, visual fields full  Abdomen: Soft, nontender, nondistended   Extremities: No edema    NEUROLOGICAL EXAM:  Mental status: Awake, alert, oriented to self. Minimal verbal output, hypophonic speech. Follows simple commands.  Cranial Nerves: No facial asymmetry, mild left gaze preference, no nystagmus, no dysarthria,  tongue midline  Motor exam: Normal tone, no drift, 5/5 RUE, 5/5 RLE, 5/5 LUE, 5/5 LLE, normal fine finger movements.  Sensation: Intact to light touch   Coordination/ Gait: No dysmetria, LORA intact and symmetric bilaterally    LABS:                        13.0   12.46 )-----------( 220      ( 20 Oct 2024 06:35 )             39.1    10-20    134[L]  |  101  |  22  ----------------------------<  103[H]  3.9   |  20[L]  |  0.83    Ca    8.8      20 Oct 2024 06:35    TPro  6.9  /  Alb  3.7  /  TBili  1.1  /  DBili  x   /  AST  75[H]  /  ALT  208[H]  /  AlkPhos  85  10-20        IMAGING: Reviewed by me.   CTH 10/19/24: No change since 10/15/2024. Left corona radiata hemorrhage with intraventricular extension. Mild midline shift to the right.    MRI Brain w/wo 10/18/24:  1. Stable parenchymal hematoma in the left basal ganglia, without evidence of an underlying hemorrhagic mass.  2. Stable IVH, with mild distention of the temporal horns.      CTH 10/15/24: Interval stability compared with the prior 10/14/2024 in left basal ganglia acute hematoma with intraventricular extension. No evidence of rehemorrhage. No change in the ventricle size.      CTH 10/14/24 10am: Left basal ganglia lucency suspicious for infarct. Left corona radiata hemorrhage with intraventricular extension and mild ventricular dilatation, no change since 4:46 AM.    10/14/24 4am  CT HEAD: Stable left medial basal ganglia parenchymal hemorrhage with associated intraventricular extension of hemorrhage. Unchanged edema within the adjacent left basal ganglia as well as rightward bulging of the septum pellucidum.  CTA NECK: No evidence of significant stenosis or occlusion.  CTA HEAD: No large vessel occlusion, significant stenosis or vascular abnormality identified.    CTH 10/14/24 1am: Intraparenchymal hematoma in the medial left basal ganglia with adjacent vasogenic edema, mass effect, 7 mm of left to right midline shift, and intraventricular extension of hemorrhage predominantly in the left lateral ventricle.   THE PATIENT WAS SEEN AND EXAMINED BY ME WITH THE HOUSESTAFF AND STROKE TEAM DURING MORNING ROUNDS.   HPI:  44M no AC/AP hx uncontrolled HTN tx LVS s/p flu-like symptoms and headache; CTH w/ left BG IPH w/ IVH extension. Exam: Yakut speaking, Ox3, PERRL, no drift, CHAPA 5/5, SILT (14 Oct 2024 04:30)      SUBJECTIVE: No events overnight.  No new neurologic complaints.      amLODIPine   Tablet 10 milliGRAM(s) Oral daily  atorvastatin 40 milliGRAM(s) Oral at bedtime  enoxaparin Injectable 40 milliGRAM(s) SubCutaneous <User Schedule>  hydrALAZINE 100 milliGRAM(s) Oral every 8 hours  labetalol 200 milliGRAM(s) Oral every 8 hours  losartan 100 milliGRAM(s) Oral daily  pantoprazole    Tablet 40 milliGRAM(s) Oral before breakfast  polyethylene glycol 3350 17 Gram(s) Oral every 12 hours  senna 2 Tablet(s) Oral at bedtime  sodium chloride 2 Gram(s) Oral every 6 hours  traMADol 25 milliGRAM(s) Oral every 4 hours PRN  traMADol 50 milliGRAM(s) Oral every 6 hours PRN      PHYSICAL EXAM:   Vital Signs Last 24 Hrs  T(C): 36.7 (21 Oct 2024 06:00), Max: 37.4 (20 Oct 2024 20:00)  T(F): 98 (21 Oct 2024 06:00), Max: 99.3 (20 Oct 2024 20:00)  HR: 64 (21 Oct 2024 06:00) (64 - 86)  BP: 145/86 (21 Oct 2024 06:00) (116/66 - 145/86)  BP(mean): 110 (21 Oct 2024 06:00) (83 - 110)  RR: 14 (21 Oct 2024 06:00) (12 - 21)  SpO2: 97% (21 Oct 2024 06:00) (93% - 99%)  Patient On (Oxygen Delivery Method): room air        General: No acute distress  HEENT: EOM intact, visual fields full  Abdomen: Soft, nontender, nondistended   Extremities: No edema    NEUROLOGICAL EXAM:  Mental status: Eyes closed, open to voice. Oriented to self, place and year. Hypophonic speech. Identifies pen. Follows some simple commands, unable to follow cross commands.  Cranial Nerves: No facial asymmetry, mild left gaze preference, no nystagmus, no dysarthria,  tongue midline  Motor exam: Normal tone, no drift, 5/5 RUE, 5/5 RLE, 5/5 LUE, 5/5 LLE, normal fine finger movements.  Sensation: Intact to light touch   Coordination/ Gait: No dysmetria, LORA intact and symmetric bilaterally    LABS:                        13.0   12.46 )-----------( 220      ( 20 Oct 2024 06:35 )             39.1    10-20    134[L]  |  101  |  22  ----------------------------<  103[H]  3.9   |  20[L]  |  0.83    Ca    8.8      20 Oct 2024 06:35    TPro  6.9  /  Alb  3.7  /  TBili  1.1  /  DBili  x   /  AST  75[H]  /  ALT  208[H]  /  AlkPhos  85  10-20        IMAGING: Reviewed by me.   CTH 10/19/24: No change since 10/15/2024. Left corona radiata hemorrhage with intraventricular extension. Mild midline shift to the right.    MRI Brain w/wo 10/18/24:  1. Stable parenchymal hematoma in the left basal ganglia, without evidence of an underlying hemorrhagic mass.  2. Stable IVH, with mild distention of the temporal horns.  *** ADDENDUM # 1 ***  On further review, there is a 5 mm acute/subacute infarct in the right corona radiata. The lesion shows low ADC values and T2/FLAIR   hyperintensity, consistent with an event between 24 hours and 7 days in age.      CTH 10/15/24: Interval stability compared with the prior 10/14/2024 in left basal ganglia acute hematoma with intraventricular extension. No evidence of rehemorrhage. No change in the ventricle size.      CTH 10/14/24 10am: Left basal ganglia lucency suspicious for infarct. Left corona radiata hemorrhage with intraventricular extension and mild ventricular dilatation, no change since 4:46 AM.    10/14/24 4am  CT HEAD: Stable left medial basal ganglia parenchymal hemorrhage with associated intraventricular extension of hemorrhage. Unchanged edema within the adjacent left basal ganglia as well as rightward bulging of the septum pellucidum.  CTA NECK: No evidence of significant stenosis or occlusion.  CTA HEAD: No large vessel occlusion, significant stenosis or vascular abnormality identified.    CTH 10/14/24 1am: Intraparenchymal hematoma in the medial left basal ganglia with adjacent vasogenic edema, mass effect, 7 mm of left to right midline shift, and intraventricular extension of hemorrhage predominantly in the left lateral ventricle.

## 2024-10-21 NOTE — DISCHARGE NOTE PROVIDER - CARE PROVIDERS DIRECT ADDRESSES
,[email or web address],DirectAddress_Unknown [email or web address],DirectAddress_Unknown,DirectAddress_Unknown,DirectAddress_Unknown,DirectAddress_Unknown,DirectAddress_Unknown

## 2024-10-21 NOTE — DISCHARGE NOTE PROVIDER - NSDCCPCAREPLAN_GEN_ALL_CORE_FT
PRINCIPAL DISCHARGE DIAGNOSIS  Diagnosis: Basal ganglia hemorrhage  Assessment and Plan of Treatment: -Your MRI revealed a brain bleed, likely due to uncontrolled high blood pressure. Avoid any medications that could increase risk of bleeding (NSAIDs, aspirin), until intsructed otherwise by your neurologist. You had an EEG performed, which was negative for seizures.  Follow up with your neurologist in 2-4 weeks.   -Continue taking medications as prescribed. Monitor your blood pressure. Reduce fat, cholesterol and salt in your diet. Increase intake of fruits and vegetables. Limit alcohol to minimum and do not smoke. You may be at risk for falling, make changes to your home to help you walk easier. Keep up to date on vaccinations.  If you experience any symptoms of facial drooping, slurred speech, arm or leg weakness, severe headache, vision changes or any worsening symptoms, notify provider immediatley and return to ER.     PRINCIPAL DISCHARGE DIAGNOSIS  Diagnosis: Basal ganglia hemorrhage  Assessment and Plan of Treatment: -Your MRI revealed a brain bleed, likely due to uncontrolled high blood pressure. Avoid any medications that could increase risk of bleeding (NSAIDs), until intsructed otherwise by your neurologist. You had an EEG performed, which was negative for seizures.  Follow up with your neurologist upon hospital discharge.  -Continue taking medications as prescribed. Monitor your blood pressure. Reduce fat, cholesterol and salt in your diet. Increase intake of fruits and vegetables. Limit alcohol to minimum and do not smoke. You may be at risk for falling, make changes to your home to help you walk easier. Keep up to date on vaccinations.  If you experience any symptoms of facial drooping, slurred speech, arm or leg weakness, severe headache, vision changes or any worsening symptoms, notify provider immediatley and return to ER.      SECONDARY DISCHARGE DIAGNOSES  Diagnosis: Sequela of ischemic cerebral infarction  Assessment and Plan of Treatment: -Your MRI revealed a corona radiata ischemic infarct. Please take all medications as prescribed. You had an EEG performed, which was negative for seizures.  Follow up with your neurologist upon hospital discharge.  Please follow up with hematology/oncology for the remainder of lab results and any further testing.  You must also follow up with EP for possible implantable loop recorder placement.  -Continue taking medications as prescribed. Monitor your blood pressure. Reduce fat, cholesterol and salt in your diet. Increase intake of fruits and vegetables. Limit alcohol to minimum and do not smoke. You may be at risk for falling, make changes to your home to help you walk easier. Keep up to date on vaccinations.  If you experience any symptoms of facial drooping, slurred speech, arm or leg weakness, severe headache, vision changes or any worsening symptoms, notify provider immediatley and return to ER.    Diagnosis: Acute UTI  Assessment and Plan of Treatment: You were prescribed antibiotics, take them exactly as your caregiver instructs you. Finish the medication even if you feel better after you have only taken some of the medication.  Please follow up with primary care upon hospital discharge.  Drink enough water and fluids to keep your urine clear or pale yellow.  Avoid caffeine, tea, and carbonated beverages. They tend to irritate your bladder.  Empty your bladder often. Avoid holding urine for long periods of time.  Use each tissue only once.  SEEK MEDICAL CARE IF:  You have back pain.  You develop a fever.  Your symptoms do not begin to resolve within 3 days.  SEEK IMMEDIATE MEDICAL CARE IF:  You have severe back pain or lower abdominal pain.  You develop chills.  You have nausea or vomiting.  You have continued burning or discomfort with urination.      Diagnosis: Transaminitis  Assessment and Plan of Treatment: Please follow up with GI upon hospital discharge.  Please take all medications as prescribed, and avoid hepatotoxins, such as alcohol.  Please seek prompt medical attention for any new or worsening symptoms.    Diagnosis: MRSA infection  Assessment and Plan of Treatment: Continue Bactroban to both nares until 11/2.    Diagnosis: HTN (hypertension)  Assessment and Plan of Treatment: Please continue all medications as prescribed.  Please follow up with primary care and cardiology upon hospital discharge.  Please seek prompt medical attention for any new or worsening symptoms.    Diagnosis: Hyponatremia  Assessment and Plan of Treatment: Please continue sodium tablets as prescribed.  Please follow up with primary care upon hospital discharge.  Please seek prompt medical attention for any new or worsening symptoms.

## 2024-10-21 NOTE — EEG REPORT - NS EEG TEXT BOX
REPORT OF CONTINUOUS VIDEO EEG      [facility]: [address], [place] [address], Phone: [phone]  [facility]: [address], [place] [address], Phone: [phone]  [facility]: [address] [place] [address], Phone: [phone]    Patient Name: Von Ferro   Age: 44 year, : 1980  Grace: -4  W      Study Date: 10/20/2024	Start Time: 6:48:40 PM      End Date:  10/21/2023	End Time: 07:59:59 AM     Study Duration: 12 hr  47 min    Study Information:    EEG Recording Technique:  The patient underwent continuous Video-EEG monitoring, using Telemetry System hardware on the XLTek Digital System. EEG and video data were stored on a computer hard drive with important events saved in digital archive files. The material was reviewed by a physician (electroencephalographer / epileptologist) on a daily basis. Fan and seizure detection algorithms were utilized and reviewed. An EEG Technician attended to the patient, and was available throughout daytime work hours.  The epilepsy center neurologist was available in person or on call 24-hours per day.    EEG Placement and Labeling of Electrodes:  The EEG was performed utilizing 20 channel referential EEG connections (coronal over temporal over parasagittal montage) using all standard 10-20 electrode placements with EKG, with additional electrodes placed in the inferior temporal region using the modified 10-10 montage electrode placements for elective admissions, or if deemed necessary. Recording was at a sampling rate of 256 samples per second per channel. Time synchronized digital video recording was done simultaneously with EEG recording. A low light infrared camera was used for low light recording.     History: -  44 year old Male is here to rule out seizures    Medication  ULTRAM    NORVASC    COZAAR      Interpretation:    [[[Abbreviation Key:  PDR=alpha rhythm/posterior dominant rhythm. A-P=anterior posterior.  Amplitude: ‘very low’:<20; ‘low’:20-49; ‘medium’:; ‘high’:>150uV.  Persistence for periodic/rhythmic patterns (% of epoch) ‘rare’:<1%; ‘occasional’:1-10%; ‘frequent’:10-50%; ‘abundant’:50-90%; ‘continuous’:>90%.  Persistence for sporadic discharges: ‘rare’:<1/hr; ‘occasional’:1/min-1/hr; ‘frequent’:>1/min; ‘abundant’:>1/10 sec.  RPP=rhythmic and periodic patterns; GRDA=generalized rhythmic delta activity; FIRDA=frontal intermittent GRDA; LRDA=lateralized rhythmic delta activity; TIRDA=temporal intermittent rhythmic delta activity;  LPD=PLED=lateralized periodic discharges; GPD=generalized periodic discharges; BIPDs =bilateral independent periodic discharges; Mf=multifocal; SIRPDs=stimulus induced rhythmic, periodic, or ictal appearing discharges; BIRDs=brief potentially ictal rhythmic discharges >4 Hz, lasting .5-10s; PFA (paroxysmal bursts >13 Hz or =8 Hz <10s).  Modifiers: +F=with fast component; +S=with spike component; +R=with rhythmic component.  S-B=burst suppression pattern.  Max=maximal. N1-drowsy; N2-stage II sleep; N3-slow wave sleep. SSS/BETS=small sharp spikes/benign epileptiform transients of sleep. HV=hyperventilation; PS=photic stimulation]]]    Daily EEG Visual Analysis    FINDINGS:      Background:  Symmetry: asymmetric  Continuous: continuous  PDR: asymmetric, well-modulated 8-9 Hz activity over right hemisphere (slower and less well formed over left hemisphere), with amplitude to 40 uV, that attenuated to eye opening.  Low amplitude frontal beta noted in wakefulness.  Reactivity: present  Voltage: normal, [defined typically between 20-150uV]  Anterior Posterior Gradient: present  Breach: absent    Background Slowing:  Generalized slowing: present. Background is mildly slow consisting of intermittent polymorphic delta theta activity     Focal slowing: present. Continuous focal polymorphic delta theta slowing in left hemisphere region      State Changes:   Drowsiness was characterized by fragmentation, attenuation, and slowing of the background activity.    N2 sleep transients with asymmetric spindles and K-complexes ( better formed in right hemisphere).      Sporadic Epileptiform Discharges:   None    Rhythmic and Periodic Patterns (RPPs):  None     Electrographic and Electroclinical seizures:  None    Other Clinical Events:  None    Activation Procedures:   -Hyperventilation was not performed.    -Photic stimulation was not performed.      Artifacts:  Intermittent myogenic and movement artifacts were noted.    ECG:  No significant abnormality    ________________________________________  EEG Summary / Classification:    Abnormal EEG in awake / drowsy / asleep states.  1.	Continuous focal polymorphic delta theta slowing in the left hemisphere region  2-         Mild diffuse background slowing      ________________________________________  EEG Impression / Clinical Correlate:  Abnormal prolonged EEG study due to   1- Focal cerebral dysfunction or structural abnormality in the left hemisphere region.  2- Mild diffuse cerebral dysfunction that is not specific in etiology    No epileptic discharges recorded.  No seizures recorded.    ________________________________________    NEVAEH Melara  Attending Physician, [facility]    ------------------------------------  EEG Reading Room: [phone]  On Call Service After Hours: [phone]

## 2024-10-22 LAB
ALBUMIN SERPL ELPH-MCNC: 3.6 G/DL — SIGNIFICANT CHANGE UP (ref 3.3–5)
ALP SERPL-CCNC: 85 U/L — SIGNIFICANT CHANGE UP (ref 40–120)
ALT FLD-CCNC: 225 U/L — HIGH (ref 10–45)
AMMONIA BLD-MCNC: 29 UMOL/L — SIGNIFICANT CHANGE UP (ref 11–55)
ANION GAP SERPL CALC-SCNC: 14 MMOL/L — SIGNIFICANT CHANGE UP (ref 5–17)
APPEARANCE UR: CLEAR — SIGNIFICANT CHANGE UP
AST SERPL-CCNC: 60 U/L — HIGH (ref 10–40)
BILIRUB SERPL-MCNC: 0.9 MG/DL — SIGNIFICANT CHANGE UP (ref 0.2–1.2)
BILIRUB UR-MCNC: NEGATIVE — SIGNIFICANT CHANGE UP
BUN SERPL-MCNC: 22 MG/DL — SIGNIFICANT CHANGE UP (ref 7–23)
CALCIUM SERPL-MCNC: 9.2 MG/DL — SIGNIFICANT CHANGE UP (ref 8.4–10.5)
CHLORIDE SERPL-SCNC: 98 MMOL/L — SIGNIFICANT CHANGE UP (ref 96–108)
CO2 SERPL-SCNC: 20 MMOL/L — LOW (ref 22–31)
COLOR SPEC: YELLOW — SIGNIFICANT CHANGE UP
CREAT SERPL-MCNC: 0.92 MG/DL — SIGNIFICANT CHANGE UP (ref 0.5–1.3)
DIFF PNL FLD: NEGATIVE — SIGNIFICANT CHANGE UP
EGFR: 106 ML/MIN/1.73M2 — SIGNIFICANT CHANGE UP
GLUCOSE SERPL-MCNC: 84 MG/DL — SIGNIFICANT CHANGE UP (ref 70–99)
GLUCOSE UR QL: NEGATIVE MG/DL — SIGNIFICANT CHANGE UP
HCT VFR BLD CALC: 38.3 % — LOW (ref 39–50)
HGB BLD-MCNC: 12.7 G/DL — LOW (ref 13–17)
KETONES UR-MCNC: NEGATIVE MG/DL — SIGNIFICANT CHANGE UP
LEUKOCYTE ESTERASE UR-ACNC: NEGATIVE — SIGNIFICANT CHANGE UP
MCHC RBC-ENTMCNC: 28.4 PG — SIGNIFICANT CHANGE UP (ref 27–34)
MCHC RBC-ENTMCNC: 33.2 GM/DL — SIGNIFICANT CHANGE UP (ref 32–36)
MCV RBC AUTO: 85.7 FL — SIGNIFICANT CHANGE UP (ref 80–100)
NITRITE UR-MCNC: NEGATIVE — SIGNIFICANT CHANGE UP
NRBC # BLD: 0 /100 WBCS — SIGNIFICANT CHANGE UP (ref 0–0)
PH UR: 6 — SIGNIFICANT CHANGE UP (ref 5–8)
PLATELET # BLD AUTO: 292 K/UL — SIGNIFICANT CHANGE UP (ref 150–400)
POTASSIUM SERPL-MCNC: 4.4 MMOL/L — SIGNIFICANT CHANGE UP (ref 3.5–5.3)
POTASSIUM SERPL-SCNC: 4.4 MMOL/L — SIGNIFICANT CHANGE UP (ref 3.5–5.3)
PROT SERPL-MCNC: 6.9 G/DL — SIGNIFICANT CHANGE UP (ref 6–8.3)
PROT UR-MCNC: NEGATIVE MG/DL — SIGNIFICANT CHANGE UP
RBC # BLD: 4.47 M/UL — SIGNIFICANT CHANGE UP (ref 4.2–5.8)
RBC # FLD: 12.9 % — SIGNIFICANT CHANGE UP (ref 10.3–14.5)
SODIUM SERPL-SCNC: 132 MMOL/L — LOW (ref 135–145)
SP GR SPEC: 1.02 — SIGNIFICANT CHANGE UP (ref 1–1.03)
UROBILINOGEN FLD QL: 1 MG/DL — SIGNIFICANT CHANGE UP (ref 0.2–1)
WBC # BLD: 14.03 K/UL — HIGH (ref 3.8–10.5)
WBC # FLD AUTO: 14.03 K/UL — HIGH (ref 3.8–10.5)

## 2024-10-22 PROCEDURE — 99222 1ST HOSP IP/OBS MODERATE 55: CPT

## 2024-10-22 PROCEDURE — 71045 X-RAY EXAM CHEST 1 VIEW: CPT | Mod: 26

## 2024-10-22 PROCEDURE — 95718 EEG PHYS/QHP 2-12 HR W/VEEG: CPT

## 2024-10-22 PROCEDURE — 99233 SBSQ HOSP IP/OBS HIGH 50: CPT

## 2024-10-22 RX ORDER — EZETIMIBE 10 MG/1
10 TABLET ORAL DAILY
Refills: 0 | Status: DISCONTINUED | OUTPATIENT
Start: 2024-10-22 | End: 2024-11-04

## 2024-10-22 RX ADMIN — Medication 81 MILLIGRAM(S): at 11:48

## 2024-10-22 RX ADMIN — SODIUM CHLORIDE 2 GRAM(S): 9 INJECTION, SOLUTION INTRAMUSCULAR; INTRAVENOUS; SUBCUTANEOUS at 19:59

## 2024-10-22 RX ADMIN — Medication 2 TABLET(S): at 22:21

## 2024-10-22 RX ADMIN — Medication 200 MILLIGRAM(S): at 14:15

## 2024-10-22 RX ADMIN — Medication 40 MILLIGRAM(S): at 17:12

## 2024-10-22 RX ADMIN — Medication 200 MILLIGRAM(S): at 05:25

## 2024-10-22 RX ADMIN — HYDRALAZINE HYDROCHLORIDE 100 MILLIGRAM(S): 50 TABLET, FILM COATED ORAL at 14:15

## 2024-10-22 RX ADMIN — HYDRALAZINE HYDROCHLORIDE 100 MILLIGRAM(S): 50 TABLET, FILM COATED ORAL at 05:24

## 2024-10-22 RX ADMIN — POLYETHYLENE GLYCOL 3350 17 GRAM(S): 17 POWDER, FOR SOLUTION ORAL at 17:12

## 2024-10-22 RX ADMIN — EZETIMIBE 10 MILLIGRAM(S): 10 TABLET ORAL at 17:12

## 2024-10-22 RX ADMIN — Medication 10 MILLIGRAM(S): at 05:24

## 2024-10-22 RX ADMIN — SODIUM CHLORIDE 2 GRAM(S): 9 INJECTION, SOLUTION INTRAMUSCULAR; INTRAVENOUS; SUBCUTANEOUS at 09:32

## 2024-10-22 RX ADMIN — LOSARTAN POTASSIUM 100 MILLIGRAM(S): 25 TABLET ORAL at 05:25

## 2024-10-22 RX ADMIN — SODIUM CHLORIDE 2 GRAM(S): 9 INJECTION, SOLUTION INTRAMUSCULAR; INTRAVENOUS; SUBCUTANEOUS at 02:05

## 2024-10-22 RX ADMIN — Medication 200 MILLIGRAM(S): at 22:22

## 2024-10-22 RX ADMIN — SODIUM CHLORIDE 2 GRAM(S): 9 INJECTION, SOLUTION INTRAMUSCULAR; INTRAVENOUS; SUBCUTANEOUS at 14:15

## 2024-10-22 RX ADMIN — HYDRALAZINE HYDROCHLORIDE 100 MILLIGRAM(S): 50 TABLET, FILM COATED ORAL at 22:22

## 2024-10-22 RX ADMIN — PANTOPRAZOLE SODIUM 40 MILLIGRAM(S): 40 TABLET, DELAYED RELEASE ORAL at 05:24

## 2024-10-22 RX ADMIN — POLYETHYLENE GLYCOL 3350 17 GRAM(S): 17 POWDER, FOR SOLUTION ORAL at 05:24

## 2024-10-22 NOTE — PROGRESS NOTE ADULT - SUBJECTIVE AND OBJECTIVE BOX
C A R D I O L O G Y  **********************************     DATE OF SERVICE: 10-22-24    Patient has EEG in place. denies chest pain or shortness of breath.   Review of symptoms otherwise negative.    MEDICATIONS:  amLODIPine   Tablet 10 milliGRAM(s) Oral daily  aspirin enteric coated 81 milliGRAM(s) Oral daily  atorvastatin 40 milliGRAM(s) Oral at bedtime  enoxaparin Injectable 40 milliGRAM(s) SubCutaneous <User Schedule>  hydrALAZINE 100 milliGRAM(s) Oral every 8 hours  labetalol 200 milliGRAM(s) Oral every 8 hours  losartan 100 milliGRAM(s) Oral daily  pantoprazole    Tablet 40 milliGRAM(s) Oral before breakfast  polyethylene glycol 3350 17 Gram(s) Oral every 12 hours  senna 2 Tablet(s) Oral at bedtime  sodium chloride 2 Gram(s) Oral every 6 hours      LABS:                        12.7   14.03 )-----------( 292      ( 22 Oct 2024 06:49 )             38.3       Hemoglobin: 12.7 g/dL (10-22 @ 06:49)  Hemoglobin: 12.7 g/dL (10-21 @ 07:26)  Hemoglobin: 13.0 g/dL (10-20 @ 06:35)  Hemoglobin: 12.4 g/dL (10-19 @ 06:04)  Hemoglobin: 12.9 g/dL (10-18 @ 05:36)      10-22    132[L]  |  98  |  22  ----------------------------<  84  4.4   |  20[L]  |  0.92    Ca    9.2      22 Oct 2024 06:53    TPro  6.9  /  Alb  3.6  /  TBili  0.9  /  DBili  x   /  AST  60[H]  /  ALT  225[H]  /  AlkPhos  85  10-22    Creatinine Trend: 0.92<--, 0.89<--, 0.83<--, 0.96<--, 1.06<--, 0.89<--    COAGS:           PHYSICAL EXAM:  T(C): 37.1 (10-22-24 @ 08:00), Max: 37.2 (10-21-24 @ 20:00)  HR: 89 (10-22-24 @ 10:30) (66 - 89)  BP: 109/67 (10-22-24 @ 10:30) (105/65 - 149/67)  RR: 18 (10-22-24 @ 10:30) (12 - 20)  SpO2: 85% (10-22-24 @ 10:30) (85% - 100%)  Wt(kg): --    I&O's Summary    21 Oct 2024 07:01  -  22 Oct 2024 07:00  --------------------------------------------------------  IN: 440 mL / OUT: 2950 mL / NET: -2510 mL          Gen: NAD  HEENT:  (-)icterus (-)pallor  CV: N S1 S2 1/6 SOHEILA (+)2 Pulses B/l  Resp:  Clear to auscultation B/L, normal effort  GI: (+) BS Soft, NT, ND  Lymph:  (-)Edema, (-)obvious lymphadenopathy  Skin: Warm to touch, Normal turgor  Psych: Appropriate mood and affect      TELEMETRY: NSR	      RADIOLOGY:         CXR:   < from: Xray Chest 1 View- PORTABLE-Urgent (Xray Chest 1 View- PORTABLE-Urgent .) (10.16.24 @ 05:38) >  IMPRESSION:  Clear lungs.    < end of copied text >    < from: TTE W or WO Ultrasound Enhancing Agent (10.14.24 @ 08:46) >     CONCLUSIONS:      1. Fair study quality.   2. Left ventricular systolic function is normal with an ejection fraction of 60 % by Chua's method of disks.   3. Normal right ventricular systolic function.   4. No prior echocardiogram is available for comparison.    < end of copied text >      ASSESSMENT/PLAN: Patient is a 43 y/o Male with PMH of HTN who presented with headaches admitted for ICH. Cardiology consulted for HTN.    #ICH  - Likely hypertensive hemorrhage per neuro  - Management per neuro/neurosx  - MRI Brain noted with acute/subacute infarct in R corona radiata  - D/w neuro, concern for embolic stroke - keep NPO after midnight for CELENA  - EP consult appreciated - Outpatient surveillance for AFib with MCOT or Loop recorder    #HTN  - SBP goal <140 per neuro  - Continue Amlodipine 10mg daily, Labetalol 200mg q8hrs (can uptitrate further if remains above goal), Hydralazine 100mg q8hrs, and Losartan 100mg daily  - Suspect will improve further once weaned of sodium chloride tabs  - TTE with normal LV function  - Secondary HTN workup - Renal artery dopplers neg for KAITLYN, TSH WNL, can eventually send aldosterone/renin/metaneprhine/AM cortisol levels once off sodium chloride tabs     Davi Carrion PA-C  Pager: [phone]

## 2024-10-22 NOTE — CONSULT NOTE ADULT - SUBJECTIVE AND OBJECTIVE BOX
EP Attending  HISTORY OF PRESENT ILLNESS: HPI:  44M no AC/AP hx uncontrolled HTN tx LVS s/p flu-like symptoms and headache; CTH w/ left BG IPH w/ IVH extension. Exam: Estonian speaking, Ox3, PERRL, no drift, CHAPA 5/5, SILJAY (14 Oct 2024 04:30)    Had ischemic stroke w/ hemorrhagic conversion.  Too sleepy to participate in HPI/ROS.  Family in room.  We discussed long term AFib surveillance after large stroke.    PAST MEDICAL & SURGICAL HISTORY:  HTN      MEDICATIONS  (STANDING):  amLODIPine   Tablet 10 milliGRAM(s) Oral daily  aspirin enteric coated 81 milliGRAM(s) Oral daily  atorvastatin 40 milliGRAM(s) Oral at bedtime  enoxaparin Injectable 40 milliGRAM(s) SubCutaneous <User Schedule>  hydrALAZINE 100 milliGRAM(s) Oral every 8 hours  labetalol 200 milliGRAM(s) Oral every 8 hours  losartan 100 milliGRAM(s) Oral daily  pantoprazole    Tablet 40 milliGRAM(s) Oral before breakfast  polyethylene glycol 3350 17 Gram(s) Oral every 12 hours  senna 2 Tablet(s) Oral at bedtime  sodium chloride 2 Gram(s) Oral every 6 hours      Allergies    No Known Allergies    Intolerances    FAMILY HISTORY:    Non-contributary for premature coronary disease or sudden cardiac death    SOCIAL HISTORY:    [ x] Non-smoker  [ ] Smoker  [ ] Alcohol  PHYSICAL EXAM:  T(C): 37.1 (10-22-24 @ 08:00), Max: 37.2 (10-21-24 @ 20:00)  HR: 74 (10-22-24 @ 08:00) (63 - 83)  BP: 111/64 (10-22-24 @ 08:00) (107/69 - 149/67)  RR: 13 (10-22-24 @ 08:00) (12 - 20)  SpO2: 91% (10-22-24 @ 08:00) (91% - 100%)  Wt(kg): --    General: Well nourished, no acute distress, alert and oriented x 3  Head: normocephalic, no trauma  Neck: no JVD, no bruit, supple, not enlarged  CV: S1S2, no S3, regular rate, rhythm is SINUS, no murmurs.    Lungs: clear BL, no rales or wheezes  Abdomen: bowel sounds +, soft, nontender, nondistended  Extremities: no clubbing, cyanosis or edema  Neuro: Moves all 4 extremities, sensation intact x 4 extremities  Skin: warm and moist, normal turgor  Psych: Mood and affect are appropriate for circumstances  MSK: normal range of motion and strength x4 extremities.      TELEMETRY: NSR	    ECG: NSR  Echo:  < from: TTE W or WO Ultrasound Enhancing Agent (10.14.24 @ 08:46) >  CONCLUSIONS:      1. Fair study quality.   2. Left ventricular systolic function is normal with an ejection fraction of 60 % by Chau's method of disks.   3. Normal right ventricular systolic function.   4. No prior echocardiogram is available for comparison.    < end of copied text >    < from: MR Head w/wo IV Cont (10.18.24 @ 16:32) >  FINDINGS:    Multiple images are degraded by motion, but are nonetheless diagnostic.    A parenchymal hematoma noted in the left basal ganglia measuring   approximately 2.7 x 2.2 x 2.2 cm. There is vasogenic edema surrounding   the clot. There is no enhancement. There is no evidence of an underlying   hemorrhagic tumor at this time, although follow-up to resolution is   recommended to exclude a lesion obscured by the blood products in the   acute phase. The location of the lesion is suggestive of hypertensive   hemorrhage, correlate clinically.    There is associated ventricular breakthrough, with a large cast of clot   in the left lateral ventricle, which is expanded. There is layering blood   at both occipital horns. There is midline shift at the level of the   septum pellucidum measuring 8 mm left to right. All of these findings   appear unchanged, without interval rebleeding.    The third ventricle is effaced and the temporal horns are slightly   distended, possibly low grade hydrocephalus. This has not progressed, and   the sulci are noteffaced. There is mild periventricular T2   hyperintensity which could represent small vessel disease or   transependymal CSF flow resorption. There are also mild nonspecific T2   hyperintensities in the subcortical white matter.    No acute ischemia. Intracranial flow voids are patent. The cerebellar   tonsils are normally positioned.    Limited views of the sinuses and mastoids show mild to moderate mucosal   thickening without air-fluid levels, likely chronic.    Limited views of the orbits and visualized soft tissues of the neck,   face, scalp, skull base, and calvarium are otherwise unremarkable.    IMPRESSION:    1.  Stable parenchymal hematoma in the left basal ganglia, without   evidence of an underlying hemorrhagic mass.  2.  Stable IVH, with mild distention of the temporal horns.  < end of copied text >  		  LABS:	 	                          12.7   14.03 )-----------( 292      ( 22 Oct 2024 06:49 )             38.3     10-22    132[L]  |  98  |  22  ----------------------------<  84  4.4   |  20[L]  |  0.92    Ca    9.2      22 Oct 2024 06:53    TPro  6.9  /  Alb  3.6  /  TBili  0.9  /  DBili  x   /  AST  60[H]  /  ALT  225[H]  /  AlkPhos  85  10-22    ASSESSMENT/PLAN:  Mr Ferro is a 44y Male here w/ stroke.  Has history of uncontrolled hypertension. Continue multi-drug regimen above to keep SBP ~140-160, unless lower target granted by neurology.  Outpatient surveillance for AFib with MCOT or Loop recorder, given significant stroke in young aged patient.  Will follow with you.      Hawk Lizarraga M.D.  Cardiac Electrophysiology    office [phone]  pager [phone]

## 2024-10-22 NOTE — EEG REPORT - NS EEG TEXT BOX
REPORT OF CONTINUOUS VIDEO EEG      [facility]: [address], [place] [address], Phone: [phone]  [facility]: [address], [place] [address], Phone: [phone]  [facility]: [address] [place] [address], Phone: [phone]    Patient Name: Von Ferro   Age: 44 year, : 1980  Grace: -4 [facility] [address]      Study Date: 10/21/2024	Start Time: 08:00   End Date:  10/22/2024	End Time: 08:00. EEG is off for one hour   Study Duration: 24 hr      Study Information:    EEG Recording Technique:  The patient underwent continuous Video-EEG monitoring, using Telemetry System hardware on the XLTek Digital System. EEG and video data were stored on a computer hard drive with important events saved in digital archive files. The material was reviewed by a physician (electroencephalographer / epileptologist) on a daily basis. Fan and seizure detection algorithms were utilized and reviewed. An EEG Technician attended to the patient, and was available throughout daytime work hours.  The epilepsy center neurologist was available in person or on call 24-hours per day.    EEG Placement and Labeling of Electrodes:  The EEG was performed utilizing 20 channel referential EEG connections (coronal over temporal over parasagittal montage) using all standard 10-20 electrode placements with EKG, with additional electrodes placed in the inferior temporal region using the modified 10-10 montage electrode placements for elective admissions, or if deemed necessary. Recording was at a sampling rate of 256 samples per second per channel. Time synchronized digital video recording was done simultaneously with EEG recording. A low light infrared camera was used for low light recording.     History: -  44 year old Male is here to rule out seizures    Medication  ULTRAM    NORVASC    COZAAR      Interpretation:    [[[Abbreviation Key:  PDR=alpha rhythm/posterior dominant rhythm. A-P=anterior posterior.  Amplitude: ‘very low’:<20; ‘low’:20-49; ‘medium’:; ‘high’:>150uV.  Persistence for periodic/rhythmic patterns (% of epoch) ‘rare’:<1%; ‘occasional’:1-10%; ‘frequent’:10-50%; ‘abundant’:50-90%; ‘continuous’:>90%.  Persistence for sporadic discharges: ‘rare’:<1/hr; ‘occasional’:1/min-1/hr; ‘frequent’:>1/min; ‘abundant’:>1/10 sec.  RPP=rhythmic and periodic patterns; GRDA=generalized rhythmic delta activity; FIRDA=frontal intermittent GRDA; LRDA=lateralized rhythmic delta activity; TIRDA=temporal intermittent rhythmic delta activity;  LPD=PLED=lateralized periodic discharges; GPD=generalized periodic discharges; BIPDs =bilateral independent periodic discharges; Mf=multifocal; SIRPDs=stimulus induced rhythmic, periodic, or ictal appearing discharges; BIRDs=brief potentially ictal rhythmic discharges >4 Hz, lasting .5-10s; PFA (paroxysmal bursts >13 Hz or =8 Hz <10s).  Modifiers: +F=with fast component; +S=with spike component; +R=with rhythmic component.  S-B=burst suppression pattern.  Max=maximal. N1-drowsy; N2-stage II sleep; N3-slow wave sleep. SSS/BETS=small sharp spikes/benign epileptiform transients of sleep. HV=hyperventilation; PS=photic stimulation]]]    Daily EEG Visual Analysis    FINDINGS:      Background:  Symmetry: asymmetric  Continuous: continuous  PDR: asymmetric, well-modulated 8-9 Hz activity over right hemisphere (slower and less well formed over left hemisphere), with amplitude to 40 uV, that attenuated to eye opening.  Low amplitude frontal beta noted in wakefulness.  Reactivity: present  Voltage: normal, [defined typically between 20-150uV]  Anterior Posterior Gradient: present  Breach: absent    Background Slowing:  Generalized slowing: present. Background is mildly slow consisting of intermittent polymorphic delta theta activity     Focal slowing: present. Continuous focal polymorphic delta theta slowing in left hemisphere region      State Changes:   Drowsiness was characterized by fragmentation, attenuation, and slowing of the background activity.    N2 sleep transients with asymmetric spindles and K-complexes ( better formed in right hemisphere).      Sporadic Epileptiform Discharges:   None    Rhythmic and Periodic Patterns (RPPs):  None     Electrographic and Electroclinical seizures:  None    Other Clinical Events:  None    Activation Procedures:   -Hyperventilation was not performed.    -Photic stimulation was not performed.      Artifacts:  Intermittent myogenic and movement artifacts were noted.    ECG:  No significant abnormality    ________________________________________  EEG Summary / Classification:    Abnormal EEG in awake / drowsy / asleep states.  1.	Continuous focal polymorphic delta theta slowing in the left hemisphere region  2-         Mild diffuse background slowing      ________________________________________  EEG Impression / Clinical Correlate:  Abnormal prolonged EEG study due to   1- Focal cerebral dysfunction or structural abnormality in the left hemisphere region.  2- Mild diffuse cerebral dysfunction that is not specific in etiology    No epileptic discharges recorded.  No seizures recorded.    ________________________________________    NEVAEH Melara  Attending Physician, [facility]    ------------------------------------  EEG Reading Room: [phone]  On Call Service After Hours: [phone]            Electronic Signatures:  Sherry Huggins)  (Signed 21-Oct-2024 08:55)  	Authored: EEG REPORT      Last Updated: 21-Oct-2024 08:55 by Sherry Huggins)              REPORT OF CONTINUOUS VIDEO EEG      [facility]: [address], [place] [address], Phone: [phone]  [facility]: [address], [place] [address], Phone: [phone]  [facility]: [address] [place] [address], Phone: [phone]    Patient Name: Von Ferro   Age: 44 year, : 1980  Grace: -4 [facility] [address]      Study Date: 10/21/2024	Start Time: 08:00   End Date:  10/22/2024	End Time: 11:45. EEG is off for one hour   Study Duration: 27 hr  45 m    Study Information:    EEG Recording Technique:  The patient underwent continuous Video-EEG monitoring, using Telemetry System hardware on the XLTek Digital System. EEG and video data were stored on a computer hard drive with important events saved in digital archive files. The material was reviewed by a physician (electroencephalographer / epileptologist) on a daily basis. Fan and seizure detection algorithms were utilized and reviewed. An EEG Technician attended to the patient, and was available throughout daytime work hours.  The epilepsy center neurologist was available in person or on call 24-hours per day.    EEG Placement and Labeling of Electrodes:  The EEG was performed utilizing 20 channel referential EEG connections (coronal over temporal over parasagittal montage) using all standard 10-20 electrode placements with EKG, with additional electrodes placed in the inferior temporal region using the modified 10-10 montage electrode placements for elective admissions, or if deemed necessary. Recording was at a sampling rate of 256 samples per second per channel. Time synchronized digital video recording was done simultaneously with EEG recording. A low light infrared camera was used for low light recording.     History: -  44 year old Male is here to rule out seizures    Medication  ULTRAM    NORVASC    COZAAR      Interpretation:    [[[Abbreviation Key:  PDR=alpha rhythm/posterior dominant rhythm. A-P=anterior posterior.  Amplitude: ‘very low’:<20; ‘low’:20-49; ‘medium’:; ‘high’:>150uV.  Persistence for periodic/rhythmic patterns (% of epoch) ‘rare’:<1%; ‘occasional’:1-10%; ‘frequent’:10-50%; ‘abundant’:50-90%; ‘continuous’:>90%.  Persistence for sporadic discharges: ‘rare’:<1/hr; ‘occasional’:1/min-1/hr; ‘frequent’:>1/min; ‘abundant’:>1/10 sec.  RPP=rhythmic and periodic patterns; GRDA=generalized rhythmic delta activity; FIRDA=frontal intermittent GRDA; LRDA=lateralized rhythmic delta activity; TIRDA=temporal intermittent rhythmic delta activity;  LPD=PLED=lateralized periodic discharges; GPD=generalized periodic discharges; BIPDs =bilateral independent periodic discharges; Mf=multifocal; SIRPDs=stimulus induced rhythmic, periodic, or ictal appearing discharges; BIRDs=brief potentially ictal rhythmic discharges >4 Hz, lasting .5-10s; PFA (paroxysmal bursts >13 Hz or =8 Hz <10s).  Modifiers: +F=with fast component; +S=with spike component; +R=with rhythmic component.  S-B=burst suppression pattern.  Max=maximal. N1-drowsy; N2-stage II sleep; N3-slow wave sleep. SSS/BETS=small sharp spikes/benign epileptiform transients of sleep. HV=hyperventilation; PS=photic stimulation]]]    Daily EEG Visual Analysis    FINDINGS:      Background:  Symmetry: asymmetric  Continuous: continuous  PDR: asymmetric, well-modulated 8-9 Hz activity over right hemisphere (slower and less well formed over left hemisphere), with amplitude to 40 uV, that attenuated to eye opening.  Low amplitude frontal beta noted in wakefulness.  Reactivity: present  Voltage: normal, [defined typically between 20-150uV]  Anterior Posterior Gradient: present  Breach: absent    Background Slowing:  Generalized slowing: present. Background is mildly slow consisting of intermittent polymorphic delta theta activity     Focal slowing: present. Continuous focal polymorphic delta theta slowing in left hemisphere region      State Changes:   Drowsiness was characterized by fragmentation, attenuation, and slowing of the background activity.    N2 sleep transients with asymmetric spindles and K-complexes ( better formed in right hemisphere).      Sporadic Epileptiform Discharges:   None    Rhythmic and Periodic Patterns (RPPs):  None     Electrographic and Electroclinical seizures:  None    Other Clinical Events:  None    Activation Procedures:   -Hyperventilation was not performed.    -Photic stimulation was not performed.      Artifacts:  Intermittent myogenic and movement artifacts were noted.    ECG:  No significant abnormality    ________________________________________  EEG Summary / Classification:    Abnormal EEG in awake / drowsy / asleep states.  1.	Continuous focal polymorphic delta theta slowing in the left hemisphere region  2-         Mild diffuse background slowing      ________________________________________  EEG Impression / Clinical Correlate:  Abnormal prolonged EEG study due to   1- Focal cerebral dysfunction or structural abnormality in the left hemisphere region.  2- Mild diffuse cerebral dysfunction that is not specific in etiology    No epileptic discharges recorded.  No seizures recorded.    ________________________________________    NEVAEH Melara  Attending Physician, [facility]    ------------------------------------  EEG Reading Room: [phone]  On Call Service After Hours: [phone]            Electronic Signatures:  Sherry Huggins)  (Signed 21-Oct-2024 08:55)  	Authored: EEG REPORT      Last Updated: 21-Oct-2024 08:55 by Sherry Huggins)

## 2024-10-22 NOTE — PROGRESS NOTE ADULT - SUBJECTIVE AND OBJECTIVE BOX
THE PATIENT WAS SEEN AND EXAMINED BY ME WITH THE HOUSESTAFF AND STROKE TEAM DURING MORNING ROUNDS.   HPI: 44M no AC/AP hx uncontrolled HTN tx LVS s/p flu-like symptoms and headache; CTH w/ left BG IPH w/ IVH extension. Exam: Amharic speaking, Ox3, PERRL, no drift, CHAPA 5/5, SILT (14 Oct 2024 04:30)      SUBJECTIVE: No events overnight.  No new neurologic complaints.  ROS negative unless otherwise noted.    amLODIPine   Tablet 10 milliGRAM(s) Oral daily  aspirin enteric coated 81 milliGRAM(s) Oral daily  atorvastatin 40 milliGRAM(s) Oral at bedtime  enoxaparin Injectable 40 milliGRAM(s) SubCutaneous <User Schedule>  hydrALAZINE 100 milliGRAM(s) Oral every 8 hours  labetalol 200 milliGRAM(s) Oral every 8 hours  losartan 100 milliGRAM(s) Oral daily  pantoprazole    Tablet 40 milliGRAM(s) Oral before breakfast  polyethylene glycol 3350 17 Gram(s) Oral every 12 hours  senna 2 Tablet(s) Oral at bedtime  sodium chloride 2 Gram(s) Oral every 6 hours      PHYSICAL EXAM:   Vital Signs Last 24 Hrs  T(C): 37.2 (21 Oct 2024 20:00), Max: 37.2 (21 Oct 2024 20:00)  T(F): 98.9 (21 Oct 2024 20:00), Max: 98.9 (21 Oct 2024 20:00)  HR: 78 (22 Oct 2024 06:00) (63 - 83)  BP: 142/74 (22 Oct 2024 06:00) (107/69 - 149/67)  BP(mean): 99 (22 Oct 2024 06:00) (85 - 107)  RR: 19 (22 Oct 2024 06:00) (12 - 20)  SpO2: 100% (22 Oct 2024 06:00) (93% - 100%)  Patient On (Oxygen Delivery Method): room air        General: No acute distress  HEENT: EOM intact, visual fields full  Abdomen: Soft, nontender, nondistended   Extremities: No edema    NEUROLOGICAL EXAM:  Mental status: Eyes closed, open to voice. Oriented to self, place and year. Hypophonic speech. Identifies pen. Follows some simple commands, unable to follow cross commands.  Cranial Nerves: No facial asymmetry, mild left gaze preference, no nystagmus, no dysarthria,  tongue midline  Motor exam: Normal tone, no drift, 5/5 RUE, 5/5 RLE, 5/5 LUE, 5/5 LLE, normal fine finger movements.  Sensation: Intact to light touch   Coordination/ Gait: No dysmetria, LORA intact and symmetric bilaterally    LABS:                        12.7   14.03 )-----------( 292      ( 22 Oct 2024 06:49 )             38.3    10-22    132[L]  |  98  |  22  ----------------------------<  84  4.4   |  20[L]  |  0.92    Ca    9.2      22 Oct 2024 06:53    TPro  6.9  /  Alb  3.6  /  TBili  0.9  /  DBili  x   /  AST  60[H]  /  ALT  225[H]  /  AlkPhos  85  10-22        IMAGING: Reviewed by me.   CTH 10/19/24: No change since 10/15/2024. Left corona radiata hemorrhage with intraventricular extension. Mild midline shift to the right.    MRI Brain w/wo 10/18/24:  1. Stable parenchymal hematoma in the left basal ganglia, without evidence of an underlying hemorrhagic mass.  2. Stable IVH, with mild distention of the temporal horns.  *** ADDENDUM # 1 ***  On further review, there is a 5 mm acute/subacute infarct in the right corona radiata. The lesion shows low ADC values and T2/FLAIR   hyperintensity, consistent with an event between 24 hours and 7 days in age.      CTH 10/15/24: Interval stability compared with the prior 10/14/2024 in left basal ganglia acute hematoma with intraventricular extension. No evidence of rehemorrhage. No change in the ventricle size.      CTH 10/14/24 10am: Left basal ganglia lucency suspicious for infarct. Left corona radiata hemorrhage with intraventricular extension and mild ventricular dilatation, no change since 4:46 AM.    10/14/24 4am  CT HEAD: Stable left medial basal ganglia parenchymal hemorrhage with associated intraventricular extension of hemorrhage. Unchanged edema within the adjacent left basal ganglia as well as rightward bulging of the septum pellucidum.  CTA NECK: No evidence of significant stenosis or occlusion.  CTA HEAD: No large vessel occlusion, significant stenosis or vascular abnormality identified.    CTH 10/14/24 1am: Intraparenchymal hematoma in the medial left basal ganglia with adjacent vasogenic edema, mass effect, 7 mm of left to right midline shift, and intraventricular extension of hemorrhage predominantly in the left lateral ventricle.     THE PATIENT WAS SEEN AND EXAMINED BY ME WITH THE HOUSESTAFF AND STROKE TEAM DURING MORNING ROUNDS.   HPI: 44M no AC/AP hx uncontrolled HTN tx LVS s/p flu-like symptoms and headache; CTH w/ left BG IPH w/ IVH extension. Exam: Vietnamese speaking, Ox3, PERRL, no drift, CHAPA 5/5, SILT (14 Oct 2024 04:30)      SUBJECTIVE: No events overnight.  No new neurologic complaints.  ROS negative unless otherwise noted.    amLODIPine   Tablet 10 milliGRAM(s) Oral daily  aspirin enteric coated 81 milliGRAM(s) Oral daily  atorvastatin 40 milliGRAM(s) Oral at bedtime  enoxaparin Injectable 40 milliGRAM(s) SubCutaneous <User Schedule>  hydrALAZINE 100 milliGRAM(s) Oral every 8 hours  labetalol 200 milliGRAM(s) Oral every 8 hours  losartan 100 milliGRAM(s) Oral daily  pantoprazole    Tablet 40 milliGRAM(s) Oral before breakfast  polyethylene glycol 3350 17 Gram(s) Oral every 12 hours  senna 2 Tablet(s) Oral at bedtime  sodium chloride 2 Gram(s) Oral every 6 hours      PHYSICAL EXAM:   Vital Signs Last 24 Hrs  T(C): 37.2 (21 Oct 2024 20:00), Max: 37.2 (21 Oct 2024 20:00)  T(F): 98.9 (21 Oct 2024 20:00), Max: 98.9 (21 Oct 2024 20:00)  HR: 78 (22 Oct 2024 06:00) (63 - 83)  BP: 142/74 (22 Oct 2024 06:00) (107/69 - 149/67)  BP(mean): 99 (22 Oct 2024 06:00) (85 - 107)  RR: 19 (22 Oct 2024 06:00) (12 - 20)  SpO2: 100% (22 Oct 2024 06:00) (93% - 100%)  Patient On (Oxygen Delivery Method): room air        General: No acute distress  HEENT: EOM intact, visual fields full  Abdomen: Soft, nontender, nondistended   Extremities: No edema    NEUROLOGICAL EXAM:  Mental status: Eyes closed, open to voice. Oriented to self, place and year. Hypophonic speech, minimal verbal output. Identifies pen. Follows some simple commands, unable to follow cross commands.  Cranial Nerves: No facial asymmetry, mild left gaze preference, no nystagmus, no dysarthria,  tongue midline  Motor exam: Normal tone, no drift, 5/5 RUE, 5/5 RLE, 5/5 LUE, 5/5 LLE, normal fine finger movements.  Sensation: Intact to light touch   Coordination/ Gait: No dysmetria, LORA intact and symmetric bilaterally    LABS:                        12.7   14.03 )-----------( 292      ( 22 Oct 2024 06:49 )             38.3    10-22    132[L]  |  98  |  22  ----------------------------<  84  4.4   |  20[L]  |  0.92    Ca    9.2      22 Oct 2024 06:53    TPro  6.9  /  Alb  3.6  /  TBili  0.9  /  DBili  x   /  AST  60[H]  /  ALT  225[H]  /  AlkPhos  85  10-22        IMAGING: Reviewed by me.   CTH 10/19/24: No change since 10/15/2024. Left corona radiata hemorrhage with intraventricular extension. Mild midline shift to the right.    MRI Brain w/wo 10/18/24:  1. Stable parenchymal hematoma in the left basal ganglia, without evidence of an underlying hemorrhagic mass.  2. Stable IVH, with mild distention of the temporal horns.  *** ADDENDUM # 1 ***  On further review, there is a 5 mm acute/subacute infarct in the right corona radiata. The lesion shows low ADC values and T2/FLAIR   hyperintensity, consistent with an event between 24 hours and 7 days in age.      CTH 10/15/24: Interval stability compared with the prior 10/14/2024 in left basal ganglia acute hematoma with intraventricular extension. No evidence of rehemorrhage. No change in the ventricle size.      CTH 10/14/24 10am: Left basal ganglia lucency suspicious for infarct. Left corona radiata hemorrhage with intraventricular extension and mild ventricular dilatation, no change since 4:46 AM.    10/14/24 4am  CT HEAD: Stable left medial basal ganglia parenchymal hemorrhage with associated intraventricular extension of hemorrhage. Unchanged edema within the adjacent left basal ganglia as well as rightward bulging of the septum pellucidum.  CTA NECK: No evidence of significant stenosis or occlusion.  CTA HEAD: No large vessel occlusion, significant stenosis or vascular abnormality identified.    CTH 10/14/24 1am: Intraparenchymal hematoma in the medial left basal ganglia with adjacent vasogenic edema, mass effect, 7 mm of left to right midline shift, and intraventricular extension of hemorrhage predominantly in the left lateral ventricle.

## 2024-10-22 NOTE — PROGRESS NOTE ADULT - ASSESSMENT
Patient seen and examined, agree with above assessment and plan as transcribed above.    - plan for CELENA  - secondary causes of HTN w/u    Bernardo Chilel MD, [facility]  BEEPER [phone]

## 2024-10-22 NOTE — CONSULT NOTE ADULT - SUBJECTIVE AND OBJECTIVE BOX
Patient is a 44y old  Male who presents with a chief complaint of left basal ganglia IPH with IVH (17 Oct 2024 08:04)    HPI:  44M no AC/AP hx uncontrolled HTN tx LVS s/p flu-like symptoms and headache; CTH w/ left BG IPH w/ IVH extension. Exam: Yakut speaking, Ox3, PERRL, no drift, CHAPA 5/5, SILT (14 Oct 2024 04:30)    Patient was admitted on 10/14, seen today, lethargic. Family, sister and her  at bedside.     REVIEW OF SYSTEMS  unable to obtain     VITALS  T(C): 37.1 (10-22-24 @ 08:00), Max: 37.2 (10-21-24 @ 20:00)  HR: 89 (10-22-24 @ 10:30) (66 - 89)  BP: 109/67 (10-22-24 @ 10:30) (105/65 - 149/67)  RR: 18 (10-22-24 @ 10:30) (12 - 20)  SpO2: 85% (10-22-24 @ 10:30) (85% - 100%)  Wt(kg): --    PAST MEDICAL & SURGICAL HISTORY  see HPI    FUNCTIONAL HISTORY  Lives with family, works as   Independent AMB and ADLs PTA     CURRENT FUNCTIONAL STATUS  SLP 10/15  hypophonia, suspected cognitive linguistic impairment   swallow WFL  regular/thin     PT  10/20  bed mobility CG  transfers min assist with RW   gait CG with RW x 40 feet     OT 10/18  bed mobility min assist   transfers min assist     RECENT LABS/IMAGING  CBC Full  -  ( 22 Oct 2024 06:49 )  WBC Count : 14.03 K/uL  RBC Count : 4.47 M/uL  Hemoglobin : 12.7 g/dL  Hematocrit : 38.3 %  Platelet Count - Automated : 292 K/uL  Mean Cell Volume : 85.7 fl  Mean Cell Hemoglobin : 28.4 pg  Mean Cell Hemoglobin Concentration : 33.2 gm/dL  Auto Neutrophil # : x  Auto Lymphocyte # : x  Auto Monocyte # : x  Auto Eosinophil # : x  Auto Basophil # : x  Auto Neutrophil % : x  Auto Lymphocyte % : x  Auto Monocyte % : x  Auto Eosinophil % : x  Auto Basophil % : x    10-22    132[L]  |  98  |  22  ----------------------------<  84  4.4   |  20[L]  |  0.92    Ca    9.2      22 Oct 2024 06:53    TPro  6.9  /  Alb  3.6  /  TBili  0.9  /  DBili  x   /  AST  60[H]  /  ALT  225[H]  /  AlkPhos  85  10-22    Urinalysis Basic - ( 22 Oct 2024 06:53 )    Color: x / Appearance: x / SG: x / pH: x  Gluc: 84 mg/dL / Ketone: x  / Bili: x / Urobili: x   Blood: x / Protein: x / Nitrite: x   Leuk Esterase: x / RBC: x / WBC x   Sq Epi: x / Non Sq Epi: x / Bacteria: x    < from: CT Head No Cont (10.19.24 @ 22:36) >    IMPRESSION: No change since 10/15/2024. Left corona radiata hemorrhage   with intraventricular extension. Mild midline shift to the right.      < end of copied text >    < from: MR Head w/wo IV Cont (10.18.24 @ 16:32) >    IMPRESSION:    1.  Stable parenchymal hematoma in the left basal ganglia, without   evidence of an underlying hemorrhagic mass.  2.  Stable IVH, with mild distention of the temporal horns.      < end of copied text >    ALLERGIES  No Known Allergies      MEDICATIONS   amLODIPine   Tablet 10 milliGRAM(s) Oral daily  aspirin enteric coated 81 milliGRAM(s) Oral daily  atorvastatin 40 milliGRAM(s) Oral at bedtime  enoxaparin Injectable 40 milliGRAM(s) SubCutaneous <User Schedule>  hydrALAZINE 100 milliGRAM(s) Oral every 8 hours  labetalol 200 milliGRAM(s) Oral every 8 hours  losartan 100 milliGRAM(s) Oral daily  pantoprazole    Tablet 40 milliGRAM(s) Oral before breakfast  polyethylene glycol 3350 17 Gram(s) Oral every 12 hours  senna 2 Tablet(s) Oral at bedtime  sodium chloride 2 Gram(s) Oral every 6 hours      ----------------------------------------------------------------------------------------  PHYSICAL EXAM  Constitutional - NAD, Comfortable, in bed   HEENT - +EEG  Neck - Supple, No limited ROM  Chest - Breathing comfortably on room air   Cardiovascular - S1S2   Extremities - No C/C/E, No calf tenderness   Neurologic Exam -    follows commands                 Cognitive - Awake, Alert, AAO to self, place: [facility], year     Communication - hypophonic      Cranial Nerves - CN 2-12 intact     Motor - No focal deficits                    LEFT    UE - ShAB 5/5, EF 5/5, EE 5/5, WE 5/5,  5/5                    RIGHT UE - ShAB 5/5, EF 5/5, EE 5/5, WE 5/5,  5/5                    LEFT    LE - HF 5/5, KE 5/5, DF 5/5, PF 5/5                    RIGHT LE - HF 5/5, KE 5/5, DF 5/5, PF 5/5        Sensory - Intact to LT     Psychiatric - Mood stable, Affect WNL  ----------------------------------------------------------------------------------------  ASSESSMENT/PLAN  44yMale h/o HTN with functional deficits after CVA  MRI with acute/subacute right corona radiata infarct, stable left corona radiata bleed with mid line shift  EEG negative  HTN, on multiple medications, continue to monitor   plan for ILR   DVT PPX - SCDs lovenox   Rehab - Will continue to follow for ongoing rehab needs and recommendations.    continue bedside therapy while admitted to prevent secondary complications of immobility, bed mobility, transfer training, progressive ambulation, equipment evaluation, ADLs, bracing/splinting   OOB throughout the day with staff, OOB to chair with meals          Recommend ACUTE inpatient rehabilitation for the functional deficits consisting of 3 hours of multidisciplinary intense therapy/day x 1-2 weeks depending on progress at rehabilitation facility, 24 hour RN/daily PMR physician for comorbid medical management. Patient will be able to participate in and benefit from intense rehabilitation therapies for 3 hours a day to maximize independence.        55 minutes spent, reviewing hospital course, therapy notes, relevant imaging, previous hospitalizations, exam, education about inpatient rehabilitation, documentation, and discussion with  and rehabilitation team

## 2024-10-22 NOTE — PROGRESS NOTE ADULT - ASSESSMENT
44 Right handed man with HTN HLD p/w headache, emesis, dizziness found to have left IPH w/ IVH. VS in triage 133/88. Exam vairying from awake  +somnolence to lethargic +somnolence, some confusion/disorientation. Labs w/ PMN predomn leukocytosis to 16K, normal coags, mild hyponatremia improved.  LDL/HDL: 146/43. A1C 5. CXR clear lungs. VA duplex neg for dvt in b/l LE. CTH with left BG IPH and IVH stable @ 4 hours.     Impression: AMS and dizziness due to Nontraumatic intracerebral hemorrhage subcortical w/ IVH, perihematomal cerebral edema and brain compression. Etiology Likely hypertensive hemorrhage. MRI also reveals acute/subacute R corona radiata ischemic infarct, etiology likely ESUS    NEURO: Neurologic examination with fluctuations in mental status, now overall stable. Repeat CTH 10/19 stable. EEG report negative. Continue monitoring for neurologic deterioration in the setting of cerebral edema and compression. Keep strict normotension. , continue high intensity statin, titrate to LDL goal < 70. MRI brain w/wo results as above. PT/OT recommend AR.  Q4 neurochecks at night for protected sleep    ANTITHROMBOTIC THERAPY: Aspirin 81mg daily for secondary stroke prevention started 10/21/24    PULMONARY: CXR (10/16) clear, protecting airway, saturating well     CARDIOVASCULAR: TTE: EF 60%, normal LA. Continue cardiac monitoring. Cardiology consulted for HTN management. Continue amlodipine, labetalol, hydralazine and losartan. Renal artery doppler: no evidence of KAITLYN. TSH wnl. Consider CELENA inpatient vs outpatient. Recommend ILR to screen for arrhythmia like Afib.      SBP goal: <160     GASTROINTESTINAL:  dysphagia screen  - passed, tolerating diet. Elevated LFTs, continue to trend, internal medicine recs appreciated     Diet: regular    RENAL: BUN/Cr stable, good urine output. Weaned off of 2% HTS, continue NaCl tabs 2g q6h for hyponatremia    HEMATOLOGY: H/H stable, Platelets normal . BLE dopplers (10/14): no evidence of dvt.     DVT ppx: Lovenox     ID: Febrile on 10/16 (tmax 100.9F), now afebrile. Mild leukocytosis. Trend leukocytosis and fever curve, no localized infectious symptoms noted. UA negative, blood cultures x2 NGTD.    OTHER: Acute rehab per PT/OT eval once stable and workup is complete    CORE MEASURES:        Admission NIHSS: 1     ICH 1     TPA: [] YES [X] NO      LDL/HDL: 146/43     Depression Screen:      Statin Therapy: YES     Dysphagia Screen: [X] PASS [] FAIL     Smoking [] YES [X] NO      Afib [] YES [X] NO     Stroke Education [] YES [] NO   44 Right handed man with HTN HLD p/w headache, emesis, dizziness found to have left IPH w/ IVH. VS in triage 133/88. Exam vairying from awake  +somnolence to lethargic +somnolence, some confusion/disorientation. Labs w/ PMN predomn leukocytosis to 16K, normal coags, mild hyponatremia improved.  LDL/HDL: 146/43. A1C 5. CXR clear lungs. VA duplex neg for dvt in b/l LE. CTH with left BG IPH and IVH stable @ 4 hours.     Impression: AMS and dizziness due to Nontraumatic intracerebral hemorrhage subcortical w/ IVH, perihematomal cerebral edema and brain compression. Etiology Likely hypertensive hemorrhage. MRI also reveals acute/subacute R corona radiata ischemic infarct, etiology likely ESUS    NEURO: Neurologic examination with fluctuations in mental status, now overall stable. Repeat CTH 10/19 stable. EEG report negative, now discontinued. Continue monitoring for neurologic deterioration in the setting of cerebral edema and compression. Keep strict normotension. , continue high intensity statin, titrate to LDL goal < 70. MRI brain w/wo results as above. PT/OT recommend AR.  Q4 neurochecks at night for protected sleep    ANTITHROMBOTIC THERAPY: Aspirin 81mg daily for secondary stroke prevention started 10/21/24    PULMONARY: CXR (10/16) clear, protecting airway, saturating well     CARDIOVASCULAR: TTE: EF 60%, normal LA. Continue cardiac monitoring. Cardiology consulted for HTN management. Continue amlodipine, labetalol, hydralazine and losartan. Renal artery doppler: no evidence of KAITLYN. TSH wnl. Recommend ILR to screen for arrhythmia like Afib, can obtain outpatient. Recommend CELENA, no neurological contraindications to CELENA procedure.      SBP goal: <160     GASTROINTESTINAL:  dysphagia screen  - passed, tolerating diet. Elevated LFTs, continue to trend, internal medicine recs appreciated. US abdomen with no evidence of acute cholecystitis, hepatic steatosis. Discontinued statin, will switch to zetia.      Diet: regular    RENAL: BUN/Cr stable, good urine output. Weaned off of 2% HTS, continue NaCl tabs 2g q6h for hyponatremia    HEMATOLOGY: H/H stable, Platelets normal . BLE dopplers (10/14): no evidence of dvt.     DVT ppx: Lovenox     ID: Febrile on 10/16 (tmax 100.9F), now afebrile. Mild leukocytosis, now uptrending. Trend leukocytosis and fever curve, no localized infectious symptoms noted. UA negative, blood cultures x2 NGTD. Repeat UA, blood cultures, ammonia level and LE dopplers pending.     OTHER: Acute rehab per PT/OT eval once stable and workup is complete    CORE MEASURES:        Admission NIHSS: 1     ICH 1     TPA: [] YES [X] NO      LDL/HDL: 146/43     Depression Screen:      Statin Therapy: YES     Dysphagia Screen: [X] PASS [] FAIL     Smoking [] YES [X] NO      Afib [] YES [X] NO     Stroke Education [] YES [] NO

## 2024-10-23 ENCOUNTER — APPOINTMENT (OUTPATIENT)
Dept: NEUROSURGERY | Facility: HOSPITAL | Age: 44
End: 2024-10-23

## 2024-10-23 ENCOUNTER — RESULT REVIEW (OUTPATIENT)
Age: 44
End: 2024-10-23

## 2024-10-23 LAB
ALBUMIN SERPL ELPH-MCNC: 3.7 G/DL — SIGNIFICANT CHANGE UP (ref 3.3–5)
ALP SERPL-CCNC: 86 U/L — SIGNIFICANT CHANGE UP (ref 40–120)
ALT FLD-CCNC: 231 U/L — HIGH (ref 10–45)
ANION GAP SERPL CALC-SCNC: 13 MMOL/L — SIGNIFICANT CHANGE UP (ref 5–17)
AST SERPL-CCNC: 57 U/L — HIGH (ref 10–40)
BILIRUB SERPL-MCNC: 0.9 MG/DL — SIGNIFICANT CHANGE UP (ref 0.2–1.2)
BUN SERPL-MCNC: 23 MG/DL — SIGNIFICANT CHANGE UP (ref 7–23)
CALCIUM SERPL-MCNC: 9.3 MG/DL — SIGNIFICANT CHANGE UP (ref 8.4–10.5)
CHLORIDE SERPL-SCNC: 101 MMOL/L — SIGNIFICANT CHANGE UP (ref 96–108)
CO2 SERPL-SCNC: 21 MMOL/L — LOW (ref 22–31)
CREAT SERPL-MCNC: 0.91 MG/DL — SIGNIFICANT CHANGE UP (ref 0.5–1.3)
CRP SERPL-MCNC: 4 MG/L — SIGNIFICANT CHANGE UP (ref 0–4)
EGFR: 107 ML/MIN/1.73M2 — SIGNIFICANT CHANGE UP
GLUCOSE SERPL-MCNC: 97 MG/DL — SIGNIFICANT CHANGE UP (ref 70–99)
HCT VFR BLD CALC: 37.9 % — LOW (ref 39–50)
HGB BLD-MCNC: 12.5 G/DL — LOW (ref 13–17)
MCHC RBC-ENTMCNC: 28.2 PG — SIGNIFICANT CHANGE UP (ref 27–34)
MCHC RBC-ENTMCNC: 33 GM/DL — SIGNIFICANT CHANGE UP (ref 32–36)
MCV RBC AUTO: 85.4 FL — SIGNIFICANT CHANGE UP (ref 80–100)
NRBC # BLD: 0 /100 WBCS — SIGNIFICANT CHANGE UP (ref 0–0)
PLATELET # BLD AUTO: 325 K/UL — SIGNIFICANT CHANGE UP (ref 150–400)
POTASSIUM SERPL-MCNC: 4.4 MMOL/L — SIGNIFICANT CHANGE UP (ref 3.5–5.3)
POTASSIUM SERPL-SCNC: 4.4 MMOL/L — SIGNIFICANT CHANGE UP (ref 3.5–5.3)
PROT SERPL-MCNC: 7 G/DL — SIGNIFICANT CHANGE UP (ref 6–8.3)
RBC # BLD: 4.44 M/UL — SIGNIFICANT CHANGE UP (ref 4.2–5.8)
RBC # FLD: 12.8 % — SIGNIFICANT CHANGE UP (ref 10.3–14.5)
SODIUM SERPL-SCNC: 135 MMOL/L — SIGNIFICANT CHANGE UP (ref 135–145)
T PALLIDUM AB TITR SER: NEGATIVE — SIGNIFICANT CHANGE UP
WBC # BLD: 13.39 K/UL — HIGH (ref 3.8–10.5)
WBC # FLD AUTO: 13.39 K/UL — HIGH (ref 3.8–10.5)

## 2024-10-23 PROCEDURE — 36227 PLACE CATH XTRNL CAROTID: CPT | Mod: 50

## 2024-10-23 PROCEDURE — 76377 3D RENDER W/INTRP POSTPROCES: CPT | Mod: 26

## 2024-10-23 PROCEDURE — 36224 PLACE CATH CAROTD ART: CPT | Mod: 50

## 2024-10-23 PROCEDURE — 93320 DOPPLER ECHO COMPLETE: CPT | Mod: 26

## 2024-10-23 PROCEDURE — 93325 DOPPLER ECHO COLOR FLOW MAPG: CPT | Mod: 26

## 2024-10-23 PROCEDURE — 93970 EXTREMITY STUDY: CPT | Mod: 26

## 2024-10-23 PROCEDURE — 99233 SBSQ HOSP IP/OBS HIGH 50: CPT | Mod: 25

## 2024-10-23 PROCEDURE — 36226 PLACE CATH VERTEBRAL ART: CPT | Mod: 50

## 2024-10-23 PROCEDURE — 93312 ECHO TRANSESOPHAGEAL: CPT | Mod: 26

## 2024-10-23 RX ADMIN — POLYETHYLENE GLYCOL 3350 17 GRAM(S): 17 POWDER, FOR SOLUTION ORAL at 17:18

## 2024-10-23 RX ADMIN — Medication 81 MILLIGRAM(S): at 13:45

## 2024-10-23 RX ADMIN — SODIUM CHLORIDE 2 GRAM(S): 9 INJECTION, SOLUTION INTRAMUSCULAR; INTRAVENOUS; SUBCUTANEOUS at 09:50

## 2024-10-23 RX ADMIN — Medication 200 MILLIGRAM(S): at 05:48

## 2024-10-23 RX ADMIN — SODIUM CHLORIDE 2 GRAM(S): 9 INJECTION, SOLUTION INTRAMUSCULAR; INTRAVENOUS; SUBCUTANEOUS at 22:39

## 2024-10-23 RX ADMIN — SODIUM CHLORIDE 2 GRAM(S): 9 INJECTION, SOLUTION INTRAMUSCULAR; INTRAVENOUS; SUBCUTANEOUS at 13:45

## 2024-10-23 RX ADMIN — HYDRALAZINE HYDROCHLORIDE 100 MILLIGRAM(S): 50 TABLET, FILM COATED ORAL at 05:47

## 2024-10-23 RX ADMIN — Medication 200 MILLIGRAM(S): at 13:45

## 2024-10-23 RX ADMIN — Medication 10 MILLIGRAM(S): at 05:47

## 2024-10-23 RX ADMIN — Medication 40 MILLIGRAM(S): at 17:18

## 2024-10-23 RX ADMIN — SODIUM CHLORIDE 2 GRAM(S): 9 INJECTION, SOLUTION INTRAMUSCULAR; INTRAVENOUS; SUBCUTANEOUS at 01:45

## 2024-10-23 RX ADMIN — LOSARTAN POTASSIUM 100 MILLIGRAM(S): 25 TABLET ORAL at 05:47

## 2024-10-23 RX ADMIN — EZETIMIBE 10 MILLIGRAM(S): 10 TABLET ORAL at 13:45

## 2024-10-23 RX ADMIN — HYDRALAZINE HYDROCHLORIDE 100 MILLIGRAM(S): 50 TABLET, FILM COATED ORAL at 13:45

## 2024-10-23 RX ADMIN — Medication 2 TABLET(S): at 22:39

## 2024-10-23 RX ADMIN — Medication 200 MILLIGRAM(S): at 22:39

## 2024-10-23 RX ADMIN — PANTOPRAZOLE SODIUM 40 MILLIGRAM(S): 40 TABLET, DELAYED RELEASE ORAL at 05:47

## 2024-10-23 RX ADMIN — HYDRALAZINE HYDROCHLORIDE 100 MILLIGRAM(S): 50 TABLET, FILM COATED ORAL at 22:39

## 2024-10-23 NOTE — PROGRESS NOTE ADULT - SUBJECTIVE AND OBJECTIVE BOX
EP Attending  HISTORY OF PRESENT ILLNESS: HPI:  44M no AC/AP hx uncontrolled HTN tx LVS s/p flu-like symptoms and headache; CTH w/ left BG IPH w/ IVH extension. Exam: Latvian speaking, Ox3, PERRL, no drift, CHAPA 5/5, SILT (14 Oct 2024 04:30)    Had ischemic stroke w/ hemorrhagic conversion.  Too sleepy to participate in HPI/ROS.  Family in room.  We discussed long term AFib surveillance after large stroke.  Date of service 10/23- resting in bed, no new complaints.    PAST MEDICAL & SURGICAL HISTORY:  HTN    amLODIPine   Tablet 10 milliGRAM(s) Oral daily  aspirin enteric coated 81 milliGRAM(s) Oral daily  enoxaparin Injectable 40 milliGRAM(s) SubCutaneous <User Schedule>  ezetimibe 10 milliGRAM(s) Oral daily  hydrALAZINE 100 milliGRAM(s) Oral every 8 hours  labetalol 200 milliGRAM(s) Oral every 8 hours  losartan 100 milliGRAM(s) Oral daily  pantoprazole    Tablet 40 milliGRAM(s) Oral before breakfast  polyethylene glycol 3350 17 Gram(s) Oral every 12 hours  senna 2 Tablet(s) Oral at bedtime  sodium chloride 2 Gram(s) Oral every 6 hours                        12.5   13.39 )-----------( 325      ( 23 Oct 2024 06:06 )             37.9     10-23    135  |  101  |  23  ----------------------------<  97  4.4   |  21[L]  |  0.91    Ca    9.3      23 Oct 2024 06:09    TPro  7.0  /  Alb  3.7  /  TBili  0.9  /  DBili  x   /  AST  57[H]  /  ALT  231[H]  /  AlkPhos  86  10-23    T(C): 37.1 (10-23-24 @ 11:35), Max: 37.1 (10-23-24 @ 08:00)  HR: 71 (10-23-24 @ 11:35) (67 - 90)  BP: 144/86 (10-23-24 @ 11:35) (101/72 - 144/86)  RR: 13 (10-23-24 @ 11:35) (11 - 16)  SpO2: 98% (10-23-24 @ 11:35) (94% - 99%)  Wt(kg): --    I&O's Summary    22 Oct 2024 07:01  -  23 Oct 2024 07:00  --------------------------------------------------------  IN: 435 mL / OUT: 1150 mL / NET: -715 mL    General: Well nourished, no acute distress, alert and oriented x 3  Head: normocephalic, no trauma  Neck: no JVD, no bruit, supple, not enlarged  CV: S1S2, no S3, regular rate, rhythm is SINUS, no murmurs.    Lungs: clear BL, no rales or wheezes  Abdomen: bowel sounds +, soft, nontender, nondistended  Extremities: no clubbing, cyanosis or edema  Neuro: Moves all 4 extremities, sensation intact x 4 extremities  Skin: warm and moist, normal turgor  Psych: Mood and affect are appropriate for circumstances  MSK: normal range of motion and strength x4 extremities.    TELEMETRY: NSR	    ECG: NSR  Echo:  < from: TTE W or WO Ultrasound Enhancing Agent (10.14.24 @ 08:46) >  CONCLUSIONS:      1. Fair study quality.   2. Left ventricular systolic function is normal with an ejection fraction of 60 % by Chua's method of disks.   3. Normal right ventricular systolic function.   4. No prior echocardiogram is available for comparison.    < end of copied text >    < from: MR Head w/wo IV Cont (10.18.24 @ 16:32) >  FINDINGS:    Multiple images are degraded by motion, but are nonetheless diagnostic.    A parenchymal hematoma noted in the left basal ganglia measuring   approximately 2.7 x 2.2 x 2.2 cm. There is vasogenic edema surrounding   the clot. There is no enhancement. There is no evidence of an underlying   hemorrhagic tumor at this time, although follow-up to resolution is   recommended to exclude a lesion obscured by the blood products in the   acute phase. The location of the lesion is suggestive of hypertensive   hemorrhage, correlate clinically.    There is associated ventricular breakthrough, with a large cast of clot   in the left lateral ventricle, which is expanded. There is layering blood   at both occipital horns. There is midline shift at the level of the   septum pellucidum measuring 8 mm left to right. All of these findings   appear unchanged, without interval rebleeding.    The third ventricle is effaced and the temporal horns are slightly   distended, possibly low grade hydrocephalus. This has not progressed, and   the sulci are noteffaced. There is mild periventricular T2   hyperintensity which could represent small vessel disease or   transependymal CSF flow resorption. There are also mild nonspecific T2   hyperintensities in the subcortical white matter.    No acute ischemia. Intracranial flow voids are patent. The cerebellar   tonsils are normally positioned.    Limited views of the sinuses and mastoids show mild to moderate mucosal   thickening without air-fluid levels, likely chronic.    Limited views of the orbits and visualized soft tissues of the neck,   face, scalp, skull base, and calvarium are otherwise unremarkable.    IMPRESSION:    1.  Stable parenchymal hematoma in the left basal ganglia, without   evidence of an underlying hemorrhagic mass.  2.  Stable IVH, with mild distention of the temporal horns.  < end of copied text >    ASSESSMENT/PLAN:  Mr Ferro is a 44y Male here w/ stroke.  Has history of uncontrolled hypertension. Continue multi-drug regimen above to keep SBP ~140-160, unless lower target granted by neurology.  Outpatient surveillance for AFib with MCOT or Loop recorder, given significant stroke in young aged patient.  Awaiting CELENA prior to discharge.  Will follow with you.      Hawk Lizarraga M.D.  Cardiac Electrophysiology    office [phone]  pager [phone]

## 2024-10-23 NOTE — PROGRESS NOTE ADULT - SUBJECTIVE AND OBJECTIVE BOX
C A R D I O L O G Y  **********************************     DATE OF SERVICE: 10-23-24    Patient awaiting CELENA. Resting comfortably in no distress. No reported chest pain or shortness of breath.   Review of symptoms otherwise negative.    MEDICATIONS:  amLODIPine   Tablet 10 milliGRAM(s) Oral daily  aspirin enteric coated 81 milliGRAM(s) Oral daily  enoxaparin Injectable 40 milliGRAM(s) SubCutaneous <User Schedule>  ezetimibe 10 milliGRAM(s) Oral daily  hydrALAZINE 100 milliGRAM(s) Oral every 8 hours  labetalol 200 milliGRAM(s) Oral every 8 hours  losartan 100 milliGRAM(s) Oral daily  pantoprazole    Tablet 40 milliGRAM(s) Oral before breakfast  polyethylene glycol 3350 17 Gram(s) Oral every 12 hours  senna 2 Tablet(s) Oral at bedtime  sodium chloride 2 Gram(s) Oral every 6 hours      LABS:                        12.5   13.39 )-----------( 325      ( 23 Oct 2024 06:06 )             37.9       Hemoglobin: 12.5 g/dL (10-23 @ 06:06)  Hemoglobin: 12.7 g/dL (10-22 @ 06:49)  Hemoglobin: 12.7 g/dL (10-21 @ 07:26)  Hemoglobin: 13.0 g/dL (10-20 @ 06:35)  Hemoglobin: 12.4 g/dL (10-19 @ 06:04)      10-23    135  |  101  |  23  ----------------------------<  97  4.4   |  21[L]  |  0.91    Ca    9.3      23 Oct 2024 06:09    TPro  7.0  /  Alb  3.7  /  TBili  0.9  /  DBili  x   /  AST  57[H]  /  ALT  231[H]  /  AlkPhos  86  10-23    Creatinine Trend: 0.91<--, 0.92<--, 0.89<--, 0.83<--, 0.96<--, 1.06<--    COAGS:           PHYSICAL EXAM:  T(C): 37.1 (10-23-24 @ 11:35), Max: 37.1 (10-23-24 @ 08:00)  HR: 71 (10-23-24 @ 11:35) (67 - 90)  BP: 144/86 (10-23-24 @ 11:35) (101/72 - 144/86)  RR: 13 (10-23-24 @ 11:35) (11 - 16)  SpO2: 98% (10-23-24 @ 11:35) (94% - 99%)  Wt(kg): --    I&O's Summary    22 Oct 2024 07:01  -  23 Oct 2024 07:00  --------------------------------------------------------  IN: 435 mL / OUT: 1150 mL / NET: -715 mL        Gen: NAD  HEENT:  (-)icterus (-)pallor  CV: N S1 S2 1/6 SOHEILA (+)2 Pulses B/l  Resp:  Clear to auscultation B/L, normal effort  GI: (+) BS Soft, NT, ND  Lymph:  (-)Edema, (-)obvious lymphadenopathy  Skin: Warm to touch, Normal turgor  Psych: Appropriate mood and affect      TELEMETRY: NSR	      RADIOLOGY:         CXR:   < from: Xray Chest 1 View- PORTABLE-Urgent (Xray Chest 1 View- PORTABLE-Urgent .) (10.16.24 @ 05:38) >  IMPRESSION:  Clear lungs.    < end of copied text >    < from: TTE W or WO Ultrasound Enhancing Agent (10.14.24 @ 08:46) >     CONCLUSIONS:      1. Fair study quality.   2. Left ventricular systolic function is normal with an ejection fraction of 60 % by Chua's method of disks.   3. Normal right ventricular systolic function.   4. No prior echocardiogram is available for comparison.    < end of copied text >      ASSESSMENT/PLAN: Patient is a 45 y/o Male with PMH of HTN who presented with headaches admitted for ICH. Cardiology consulted for HTN.    #ICH  - Likely hypertensive hemorrhage per neuro  - Management per neuro/neurosx  - MRI Brain noted with acute/subacute infarct in R corona radiata  - D/w neuro, concern for embolic stroke - NPO for CELENA today  - EP consult appreciated - Outpatient surveillance for AFib with MCOT or Loop recorder    #HTN  - SBP goal <140 per neuro  - Continue Amlodipine 10mg daily, Labetalol 200mg q8hrs (can uptitrate further if remains above goal), Hydralazine 100mg q8hrs, and Losartan 100mg daily. Not on thiazide diuretics due to strict NA control per neuro.  - Suspect will improve further once weaned of sodium chloride tabs  - TTE with normal LV function  - Secondary HTN workup - Renal artery dopplers neg for KAITLYN, TSH WNL, can eventually send aldosterone/renin/metaneprhine/AM cortisol levels once off sodium chloride tabs     Davi Carrion PA-C  Pager: [phone]

## 2024-10-23 NOTE — PRE PROCEDURE NOTE - PRE PROCEDURE EVALUATION
Interventional Neuro Radiology  Pre-Procedure Note    HPI: Patient is a 44 year old male with a PMHx of uncontrolled HTN, HLD, who presented with a chief complaint of headache, emesis, dizziness and was found to have left IPH with IVH. CTH with left BG IPH and IVH. Plan today for catheter cerebral angiogram in neuro IR today.        Neuro Exam: Awake and alert. Oriented to self. States year with choices. States "[facility] office" for location. Hypophonic speech. Identifies objects. Follows some simple commands, No facial asymmetry, mild left gaze preference, no nystagmus, no dysarthria,  tongue midline, Normal tone, no drift, 5/5 RUE, 5/5 RLE, 5/5 LUE, 5/5 LLE, normal fine finger movements.    PAST MEDICAL & SURGICAL HISTORY:  Social History:   Denies tobacco use    FAMILY HISTORY:  No pertinent family history    Allergies: No Known Allergies    Current Medications: amLODIPine   Tablet 10 milliGRAM(s) Oral daily  aspirin enteric coated 81 milliGRAM(s) Oral daily  enoxaparin Injectable 40 milliGRAM(s) SubCutaneous <User Schedule>  ezetimibe 10 milliGRAM(s) Oral daily  hydrALAZINE 100 milliGRAM(s) Oral every 8 hours  labetalol 200 milliGRAM(s) Oral every 8 hours  losartan 100 milliGRAM(s) Oral daily  pantoprazole    Tablet 40 milliGRAM(s) Oral before breakfast  polyethylene glycol 3350 17 Gram(s) Oral every 12 hours  senna 2 Tablet(s) Oral at bedtime  sodium chloride 2 Gram(s) Oral every 6 hours      Labs:                         12.5   13.39 )-----------( 325      ( 23 Oct 2024 06:06 )             37.9       10-23    135  |  101  |  23  ----------------------------<  97  4.4   |  21[L]  |  0.91    Ca    9.3      23 Oct 2024 06:09  Phos  2.9     10-18  Mg     2.4     10-18    TPro  7.0  /  Alb  3.7  /  TBili  0.9  /  DBili  x   /  AST  57[H]  /  ALT  231[H]  /  AlkPhos  86  10-23      Assessment/Plan: This is a 44y Male presents with left BG IPH and IVH. . Patient presents to neuro-IR for catheter cerebral angiography. Procedure/ risks/ benefits/ goals/ alternatives were explained. Risks include but are not limited to stroke/ vessel injury/ hemorrhage/ groin hematoma. All questions answered. Informed content obtained. Consent placed in chart.     H&P reviewed by me which was completed on 10/14/23    
all other ROS negative except as per HPI

## 2024-10-23 NOTE — CHART NOTE - NSCHARTNOTEFT_GEN_A_CORE
Interventional Neuro- Radiology   Procedure Note STEPHEN    Procedure: Catheter Cerebral Angiogram   Pre- Procedure Diagnosis:  Post- Procedure Diagnosis:    : Dr Arlene Arias   Fellow:   Fellow:  Physician Assistant: Nichelle Brooks PA-C    Nurse:  Radiologic Tech:  Anesthesiologist:  Sheath:        I/Os: EBL less than 10cc  IV fluids:     cc  Urine output     cc    Contrast Visi             Vitals: BP         HR      Spo2     %          Preliminary Report:  Using a 5 Wol[place] short sheath to the right groin under MAC sedation via left vertebral artery, left internal carotid artery, left external carotid artery, right vertebral artery, right internal carotid artery, right external carotid artery a selective cerebral angiography was performed and demonstrated                       Official note to follow.  Patient tolerated procedure well, hemodynamically stable, no change in neurological status compared to baseline. Results discussed with neuro ICU team, patient and patient's family. Right groin sheath was removed, manual compression held to hemostasis for 20 minutes, no active bleeding, no hematoma, quick clot and safeguard balloon dressing applied at Interventional Neuro- Radiology   Procedure Note PA-C    Procedure: Catheter Cerebral Angiogram   Pre- Procedure Diagnosis:  Post- Procedure Diagnosis:    : Dr Arlene Arias   Fellow:    Dr Satya Diggs   Fellow:   Dr Birdie Call   Physician Assistant: Mehrdad Brooks PA-C    Nurse:                  Aide Quiroz RN   Radiologic Tech:  Monik Fernandez LRT,  Braxton Almeida LRT  Anesthesiologist: Dr Ben Aguiar   Sheath:               5 Colombian short  sheath         I/Os: EBL less than 10cc  IV fluids: 100cc  Urine due to void  Contrast         Vitals: /80        HR 76      Spo2 100%          Preliminary Report:  Using a 5 [place] short sheath to the right groin by Dr Birdie Call, under MAC sedation via left vertebral artery, left internal carotid artery, left external carotid artery, right vertebral artery, right internal carotid artery, right external carotid artery a selective cerebral angiography was performed and demonstrated                       Official note to follow.  Patient tolerated procedure well, hemodynamically stable, no change in neurological status compared to baseline. Results discussed with stroke team, patient and patient's family. Right groin sheath was removed, a vascade device and manual compression held to hemostasis for 20 minutes, no active bleeding, no hematoma, quick clot and safeguard balloon dressing applied at Interventional Neuro- Radiology   Procedure Note PA-C    Procedure: Catheter Cerebral Angiogram   Pre- Procedure Diagnosis: Left corona radiata hemorrhage   Post- Procedure Diagnosis: No source for hemorrhage     : Dr Arlene Arias   Fellow:    Dr Satya Diggs   Fellow:   Dr Birdie Call   Physician Assistant: Mehrdad Brooks PA-C    Nurse:                  Aide Quiroz RN   Radiologic Tech:  Monik Fernandez LRT,  Braxton Almeida LRT  Anesthesiologist: Dr Ben Aguiar   Sheath:               5 [place] short  sheath     I/Os: EBL less than 10cc  IV fluids: 100cc  Urine due to void  Contrast  91cc        Vitals: /80        HR 76      Spo2 100%          Preliminary Report:  Using a 5 [place] short sheath to the right groin by Dr Birdie Call, under MAC sedation via left vertebral artery, left internal carotid artery, left external carotid artery, right vertebral artery, right internal carotid artery, right external carotid artery a selective cerebral angiography was performed and demonstrated no source for hemorrhage. Official note to follow.  Patient tolerated procedure well, hemodynamically stable, no change in neurological status compared to baseline. Results discussed with stroke team, patient and patient's family. Right groin sheath was removed, a Vascade device and manual compression held to hemostasis for 20 minutes, no active bleeding, no hematoma, quick clot and safeguard balloon dressing applied at 9:00pm

## 2024-10-23 NOTE — PRE-ANESTHESIA EVALUATION ADULT - NSRADCARDRESULTSFT_GEN_ALL_CORE
TTE 10/14/2024  CONCLUSIONS:      1. Fair study quality.   2. Left ventricular systolic function is normal with an ejection fraction of 60 % by Chua's method of disks.   3. Normal right ventricular systolic function.   4. No prior echocardiogram is available for comparison.
< from: CELENA W or WO Ultrasound Enhancing Agent (10.23.24 @ 11:47) >       CONCLUSIONS:      1. Agitated saline injection was negative for intracardiac shunt.   2. No thrombus seen in the left atrial, left atrial appendage, right atrial or right atrial appendage.   3. No pericardial effusion seen.   4. Normal right ventricular cavity size and normal right ventricular systolic function.   5. No evidence of left atrial or left atrial appendage thrombus.   6. Left ventricular systolic function is normal with an ejection fraction visually estimated at 55 to 60 %.    < end of copied text >

## 2024-10-23 NOTE — PROGRESS NOTE ADULT - ATTENDING COMMENTS
I reviewed available diagnostic studies, and reviewed images personally. I agree with resident's history, exam, orders placed, and plan of care. Thirty minutes of critical care time was spent in caring for this patient who has concern of sudden and clinically significant deterioration requiring a high level of physician preparedness, management of brain supporting interventions, and frequent physician assessments. This excludes any time spent on separate procedures or teaching.    Medical issues needing to be addressed include: Nontraumatic intracerebral hemorrhage subcortical w/ IVH, perihematomal cerebral edema and brain compression, HTN, medication noncompliance per brother, HA, AMS, n/v, dizziness. Plan & exam as above. Likely hypertensive hemorrhage, will f up tox screen. Hydro watch.  Cont to titrate up antihypertensives to wean off cardene.
Young patient with vascular risk factors including hypertension and hyperlipidemia with MRI of the brain showing a small RIGHT corona radiata infarct and a LEFT basal ganglia hemorrhage. Among the etiologies that can cause ischemia and bleed in different vascular territories are either cardiac source of embolism vs something systemic inflammatory or infectious. Difficult to distinguish perhaps if the basal ganglia hemorrhage was an ischemic lesion that bled however in this particular age population same work up is granted. Will await CELENA results and angiogram to exclude any vasculitic inflammatory pattern, inflammatory markers ESR and CRP requested, along with screen for inflammatory conditions, hypercoagulable panel will be completed and infectious work up including HIV, VDRL /RPR and Hepatitis panel.
ok for q4h neuro checks.  continue 2% @ 75 cc/hr.  will get follow-up BMP now.  stroke neurology accepted, pending transfer out of NSCU.
CT head stable.  continue on hydro watch.  neurology follow-up.  repeat BMP this afternoon.  increase hydralazine to 100 q8.
43yo man PMH HTN noncompliant with meds, no AC/AP  p/w HA, flu-like symptoms, CTH done L BG heme with IVH; ICH score 1; NIHSS 0  alert, oriented x2 for self/time, confused for place, CHAPA no drift    vitals/labs/meds reviewed  hypertensive on nicardipine gtt  Na 132    Q2 checks  stroke core measures  stroke neurology consult  tramadol prn for pain control  PT/OT OOB as tolerated  -160, increase amlodipine and losartan, wean off nicardipine gtt  increase atorvastatin to 40mg, slightly elevated ALT, avoid other hepatotoxic   RA/IS   2% @ 50c for now, check 1pm BMP   ADAT  bowel regimen   DELFINO med, maintain euglycemia, f/u A1C  hold chemoppx fresh ICH/IVH  doppers pending  monitor for fevers  45min cc
febrile overnight, pan cultured.  currently off cardene GTT.  on multiple antihypertensives, will start PO labetalol as well.  continue 3%.  pending transfer to neurology service.

## 2024-10-23 NOTE — CHART NOTE - NSCHARTNOTEFT_GEN_A_CORE
Interventional Neuro- Radiology   Procedure Note      Procedure: Catheter Cerebral Angiography   Pre- Procedure Diagnosis:  Post- Procedure Diagnosis:    : Dr. Hugo MD  Fellow: MD Dr. Karol Barrientos MD Dr. White, MD Werner, MD Mehta  ACP: Mehrdad GARAY    RN:  Tech:    Anesthesia: (MAC)   (general anesthesia)    I/Os:  Fluids:  [place]:  Contrast:  Estimated Blood Loss: <10cc    Preliminary Report:  Under MAC/ general anesthesia, using a ___Fr short/long sheath to the right/ left/ bilateral groin examination of left vertebral artery/ left internal carotid artery/ left external carotid artery/ right vertebral artery/ right internal carotid artery/ right external carotid artery via selective cerebral angiography demonstrates ________. ( Official note to follow).    Patient tolerated procedure well, vital signs stable, hemodynamically stable, no change in neurological status compared to baseline. Results discussed with neurosurgery/ patient and their family.  Patient transferred to PACU/ NSCU/ IR recovery for further care/ monitoring.     Vascular access site:  Ultrasound guidance- yes  Fluoroscopy before procedure- yes   Fluoroscopy to confirm correct needle position after puncture and before advancing sheath- yes  Femoral artery angiography before sheath removal- yes  Closure device used- Vascade/ Celt  Closure device appropriately deployed- yes  Manual pressure- held for 10minutes until hemostasis, no active bleeding or hematoma  Safeguard dressing applied- yes, applied at ___h.

## 2024-10-23 NOTE — PRE-ANESTHESIA EVALUATION ADULT - NSANTHAIRWAYFT_ENT_ALL_CORE
Good mouth opening, FROM at neck, TM distance > 6 cm
Limited exam due to somnolence  No cervical spine issues per brother

## 2024-10-23 NOTE — PROGRESS NOTE ADULT - ASSESSMENT
44 Right handed man with HTN HLD p/w headache, emesis, dizziness found to have left IPH w/ IVH. VS in triage 133/88. Exam vairying from awake  +somnolence to lethargic +somnolence, some confusion/disorientation. Labs w/ PMN predomn leukocytosis to 16K, normal coags, mild hyponatremia improved.  LDL/HDL: 146/43. A1C 5. CXR clear lungs. VA duplex neg for dvt in b/l LE. CTH with left BG IPH and IVH stable @ 4 hours.     Impression: AMS and dizziness due to nontraumatic L BG intracerebral hemorrhage w/ IVH, perihematomal cerebral edema and brain compression. Etiology Likely hypertensive hemorrhage. MRI also reveals acute/subacute R corona radiata ischemic infarct, etiology likely ESUS    NEURO: Neurologic examination with fluctuations in mental status, now overall stable. Repeat CTH 10/19 stable. EEG report negative, now discontinued. Continue monitoring for neurologic deterioration in the setting of cerebral edema and compression. Keep strict normotension. , continue high intensity statin, titrate to LDL goal < 70. MRI brain w/wo results as above. PT/OT recommend AR.  Q4 neurochecks at night for protected sleep    ANTITHROMBOTIC THERAPY: Aspirin 81mg daily for secondary stroke prevention started 10/21/24    PULMONARY: CXR (10/16) clear, protecting airway, saturating well     CARDIOVASCULAR: TTE: EF 60%, normal LA. Continue cardiac monitoring. Cardiology consulted for HTN management. Continue amlodipine, labetalol, hydralazine and losartan. Renal artery doppler: no evidence of KAITLYN. TSH wnl. Recommend ILR to screen for arrhythmia like Afib, can obtain outpatient. Recommend CELENA, no neurological contraindications to CELENA procedure.      SBP goal: <160     GASTROINTESTINAL:  dysphagia screen  - passed, tolerating diet. Elevated LFTs, continue to trend, internal medicine recs appreciated. US abdomen with  hepatic steatosis, no evidence of acute cholecystitis. Discontinued statin, will switch to zetia.      Diet: regular    RENAL: BUN/Cr stable, good urine output. Weaned off of 2% HTS, continue NaCl tabs 2g q6h for hyponatremia, improving    HEMATOLOGY: H/H stable, Platelets normal . BLE dopplers (10/14): no evidence of dvt. Repeat LE dopplers pending.      DVT ppx: Lovenox     ID: Febrile on 10/16 (tmax 100.9F), now afebrile. Trend leukocytosis and fever curve, no localized infectious symptoms noted. UA negative, blood cultures x2 (10/16) NGTD. Mild leukocytosis, now downtrending. Repeat UA neg, pending repeat blood cultures, ammonia level WNL and LE dopplers pending.     OTHER: Acute rehab per PT/OT eval once stable and workup is complete    CORE MEASURES:        Admission NIHSS: 1     ICH 1     TPA: [] YES [X] NO      LDL/HDL: 146/43     Depression Screen:      Statin Therapy: YES     Dysphagia Screen: [X] PASS [] FAIL     Smoking [] YES [X] NO      Afib [] YES [X] NO     Stroke Education [] YES [] N 44 Right handed man with HTN HLD p/w headache, emesis, dizziness found to have left IPH w/ IVH. VS in triage 133/88. Exam vairying from awake  +somnolence to lethargic +somnolence, some confusion/disorientation. Labs w/ PMN predomn leukocytosis to 16K, normal coags, mild hyponatremia improved.  LDL/HDL: 146/43. A1C 5. CXR clear lungs. VA duplex neg for dvt in b/l LE. CTH with left BG IPH and IVH stable @ 4 hours.     Impression: AMS and dizziness due to nontraumatic L BG intracerebral hemorrhage w/ IVH, perihematomal cerebral edema and brain compression. Etiology Likely hypertensive hemorrhage. MRI also reveals acute/subacute R corona radiata ischemic infarct, etiology likely ESUS    NEURO: Neurologic examination with fluctuations in mental status, now overall stable. Repeat CTH 10/19 stable. EEG report negative, now discontinued. Concern for inflammatory process. Plan for diagnostic cerebral angiogram today. Inflammatory labs sent (ESR/CRP, HIV, syphilis and hepatitis panel). Continue monitoring for neurologic deterioration in the setting of cerebral edema and compression. Keep strict normotension. , continue high intensity statin, titrate to LDL goal < 70. MRI brain w/wo results as above. PT/OT recommend AR.  Q4 neurochecks at night for protected sleep    ANTITHROMBOTIC THERAPY: Aspirin 81mg daily for secondary stroke prevention started 10/21/24    PULMONARY: CXR (10/16) clear, protecting airway, saturating well     CARDIOVASCULAR: TTE: EF 60%, normal LA. Continue cardiac monitoring. Cardiology consulted for HTN management. Continue amlodipine, labetalol, hydralazine and losartan. Renal artery doppler: no evidence of KAITLYN. TSH wnl. Recommend ILR to screen for arrhythmia like Afib, can obtain outpatient. Recommend CELENA, no neurological contraindications to CELENA procedure. Planned for today.      SBP goal: <160     GASTROINTESTINAL:  dysphagia screen  - passed, tolerating diet. Elevated LFTs, continue to trend, internal medicine recs appreciated. US abdomen with  hepatic steatosis, no evidence of acute cholecystitis. Discontinued statin, will switch to zetia. Hepatits panel ordered.      Diet: regular    RENAL: BUN/Cr stable, good urine output. Weaned off of 2% HTS, continue NaCl tabs 2g q6h for hyponatremia, improving    HEMATOLOGY: H/H stable, Platelets normal . BLE dopplers (10/14): no evidence of dvt. Repeat LE dopplers pending.      DVT ppx: Lovenox     ID: Febrile on 10/16 (tmax 100.9F), now afebrile. Trend leukocytosis and fever curve, no localized infectious symptoms noted. UA negative, blood cultures x2 (10/16) NGTD. Mild leukocytosis, now downtrending. Repeat UA neg, pending repeat blood cultures, ammonia level WNL and LE dopplers pending.     OTHER: Acute rehab per PT/OT eval once stable and workup is complete.    CORE MEASURES:        Admission NIHSS: 1     ICH 1     TPA: [] YES [X] NO      LDL/HDL: 146/43     Depression Screen:      Statin Therapy: YES     Dysphagia Screen: [X] PASS [] FAIL     Smoking [] YES [X] NO      Afib [] YES [X] NO     Stroke Education [] YES [] N 44 Right handed man with HTN HLD p/w headache, emesis, dizziness found to have left IPH w/ IVH. VS in triage 133/88. Exam vairying from awake  +somnolence to lethargic +somnolence, some confusion/disorientation. Labs w/ PMN predomn leukocytosis to 16K, normal coags, mild hyponatremia improved.  LDL/HDL: 146/43. A1C 5. CXR clear lungs. VA duplex neg for dvt in b/l LE. CTH with left BG IPH and IVH stable @ 4 hours.     Impression: AMS and dizziness due to nontraumatic L BG intracerebral hemorrhage w/ IVH, perihematomal cerebral edema and brain compression. Etiology Likely hypertensive hemorrhage. 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 Young patient with vascular risk factors including hypertension and hyperlipidemia with MRI of the brain showing a small RIGHT corona radiata infarct and a LEFT basal ganglia hemorrhage. Among the etiologies that can cause ischemia and bleed in different vascular territories are either cardiac source of embolism vs something systemic inflammatory or infectious. Difficult to distinguish perhaps if the basal ganglia hemorrhage was an ischemic lesion that bled however in this particular age population same work up is granted. Will await CELENA results and angiogram to exclude any vasculitic inflammatory pattern, inflammatory markers ESR and CRP requested, along with screen for inflammatory conditions, hypercoagulable panel will be completed and infectious work up including HIV, VDRL /RPR and Hepatitis panel.     NEURO: Neurologic examination with fluctuations in mental status, now overall stable. Repeat CTH 10/19 stable. EEG report negative, now discontinued. Concern for inflammatory process. Plan for diagnostic cerebral angiogram today. Inflammatory labs sent (ESR/CRP, HIV, syphilis and hepatitis panel). Continue monitoring for neurologic deterioration in the setting of cerebral edema and compression. Keep strict normotension. , continue high intensity statin, titrate to LDL goal < 70. MRI brain w/wo results as above. PT/OT recommend AR.  Q4 neurochecks at night for protected sleep    ANTITHROMBOTIC THERAPY: Aspirin 81mg daily for secondary stroke prevention started 10/21/24    PULMONARY: CXR (10/16) clear, protecting airway, saturating well     CARDIOVASCULAR: TTE: EF 60%, normal LA. Continue cardiac monitoring. Cardiology consulted for HTN management. Continue amlodipine, labetalol, hydralazine and losartan. Renal artery doppler: no evidence of KAITLYN. TSH wnl. Recommend ILR to screen for arrhythmia like Afib, can obtain outpatient. Recommend CELENA, no neurological contraindications to CELENA procedure. Planned for today.      SBP goal: <160     GASTROINTESTINAL:  dysphagia screen  - passed, tolerating diet. Elevated LFTs, continue to trend, internal medicine recs appreciated. US abdomen with  hepatic steatosis, no evidence of acute cholecystitis. Discontinued statin, will switch to zetia. Hepatits panel ordered.      Diet: regular    RENAL: BUN/Cr stable, good urine output. Weaned off of 2% HTS, continue NaCl tabs 2g q6h for hyponatremia, improving    HEMATOLOGY: H/H stable, Platelets normal . BLE dopplers (10/14): no evidence of dvt. Repeat LE dopplers pending.      DVT ppx: Lovenox     ID: Febrile on 10/16 (tmax 100.9F), now afebrile. Trend leukocytosis and fever curve, no localized infectious symptoms noted. UA negative, blood cultures x2 (10/16) NGTD. Mild leukocytosis, now downtrending. Repeat UA neg, pending repeat blood cultures, ammonia level WNL and LE dopplers pending.     OTHER: Acute rehab per PT/OT eval once stable and workup is complete.    CORE MEASURES:        Admission NIHSS: 1     ICH 1     TPA: [] YES [X] NO      LDL/HDL: 146/43     Depression Screen:      Statin Therapy: YES     Dysphagia Screen: [X] PASS [] FAIL     Smoking [] YES [X] NO      Afib [] YES [X] NO     Stroke Education [] YES [] N

## 2024-10-23 NOTE — PRE-ANESTHESIA EVALUATION ADULT - NSANTHOSAYNRD_GEN_A_CORE
No. URIEL screening performed.  STOP BANG Legend: 0-2 = LOW Risk; 3-4 = INTERMEDIATE Risk; 5-8 = HIGH Risk
No. URIEL screening performed.  STOP BANG Legend: 0-2 = LOW Risk; 3-4 = INTERMEDIATE Risk; 5-8 = HIGH Risk

## 2024-10-23 NOTE — CONSULT NOTE ADULT - SUBJECTIVE AND OBJECTIVE BOX
HPI: Patient is a 44 year old male with a PMHx of uncontrolled HTN, HLD, who presented with a chief complaint of headache, emesis, dizziness and was found to have left IPH with IVH. CTH with left BG IPH and IVH. Internal Medicine has been consulted on Mr. Silva's care for medical management.       REVIEW OF SYSTEMS:  CONSTITUTIONAL: Generalized weakness   Limited ROS     MEDICATIONS:  MEDICATIONS  (STANDING):  amLODIPine   Tablet 10 milliGRAM(s) Oral daily  aspirin enteric coated 81 milliGRAM(s) Oral daily  enoxaparin Injectable 40 milliGRAM(s) SubCutaneous <User Schedule>  ezetimibe 10 milliGRAM(s) Oral daily  hydrALAZINE 100 milliGRAM(s) Oral every 8 hours  labetalol 200 milliGRAM(s) Oral every 8 hours  losartan 100 milliGRAM(s) Oral daily  pantoprazole    Tablet 40 milliGRAM(s) Oral before breakfast  polyethylene glycol 3350 17 Gram(s) Oral every 12 hours  senna 2 Tablet(s) Oral at bedtime  sodium chloride 2 Gram(s) Oral every 6 hours      PHYSICAL EXAM:  T(C): 36.1 (10-23-24 @ 12:20), Max: 37.1 (10-23-24 @ 08:00)  HR: 73 (10-23-24 @ 13:15) (67 - 90)  BP: 110/71 (10-23-24 @ 13:15) (91/51 - 144/86)  RR: 16 (10-23-24 @ 13:15) (11 - 16)  SpO2: 96% (10-23-24 @ 13:15) (94% - 99%)  Wt(kg): --  I&O's Summary    22 Oct 2024 07:01  -  23 Oct 2024 07:00  --------------------------------------------------------  IN: 435 mL / OUT: 1150 mL / NET: -715 mL      Height (cm): 172.7 (10-23 @ 11:35)  Weight (kg): 81.6 (10-23 @ 11:35)  BMI (kg/m2): 27.4 (10-23 @ 11:35)  BSA (m2): 1.95 (10-23 @ 11:35)    Appearance: Awake, lying down in bed, generalized weakness 	  HEENT:  Eyes are open   Lymphatic: No lymphadenopathy grossly   Cardiovascular: Normal    Respiratory: normal effort, clear  Gastrointestinal:  Soft, Non-tender  Skin: No rashes,  warm to touch  Psychiatry:  Calm  Musculoskeletal: No edema        10-22-24 @ 07:01  -  10-23-24 @ 07:00  --------------------------------------------------------  IN: 435 mL / OUT: 1150 mL / NET: -715 mL                              12.5   13.39 )-----------( 325      ( 23 Oct 2024 06:06 )             37.9               10-23    135  |  101  |  23  ----------------------------<  97  4.4   |  21[L]  |  0.91    Ca    9.3      23 Oct 2024 06:09    TPro  7.0  /  Alb  3.7  /  TBili  0.9  /  DBili  x   /  AST  57[H]  /  ALT  231[H]  /  AlkPhos  86  10-23                       Urinalysis Basic - ( 23 Oct 2024 06:09 )    Color: x / Appearance: x / SG: x / pH: x  Gluc: 97 mg/dL / Ketone: x  / Bili: x / Urobili: x   Blood: x / Protein: x / Nitrite: x   Leuk Esterase: x / RBC: x / WBC x   Sq Epi: x / Non Sq Epi: x / Bacteria: x          < from: US Abdomen Upper Quadrant Right (10.21.24 @ 19:27) >  IMPRESSION:  No sonographic evidence of acute cholecystitis.  Hepatic steatosis.      < end of copied text >        < from: [facility] Duplex Lower Ext Vein Scan, Bilat (10.23.24 @ 09:07) >  IMPRESSION:  No evidence of deep venous thrombosis in either lower extremity.      < end of copied text >      < from: US Duplex Kidneys (10.18.24 @ 17:16) >    IMPRESSION:    No evidence of a significant renal artery stenosis.  No Eagle catheter is seen within the bladder despite one being present on   examination of the patient.    < end of copied text >      < from: VA Duplex Lower Ext Vein Scan, Bilat (10.14.24 @ 11:47) >    IMPRESSION:  No evidence of deep venous thrombosis in either lower extremity.      < end of copied text >      < from: MR Head w/wo IV Cont (10.18.24 @ 16:32) >    ACC: [id number] EXAM:  MR BRAIN WAW IC   ORDERED BY:  ROOSEVELT MAJANO     *** ADDENDUM # 1 ***    On further review, there is a 5 mm acute/subacute infarct in the right   corona radiata. The lesion shows low ADC values and T2/FLAIR   hyperintensity, consistent with an event between 24 hours and 7 days in   age.    --- End of Report ---    *** END OF ADDENDUM # 1 ***      PROCEDURE DATE:  10/18/2024          INTERPRETATION:  EXAMINATION: MR BRAIN WITHOUT AND WITH IV CONTRAST    CLINICAL INDICATION: LBG IPH  TECHNIQUE: Multiplanar MRI images of the head were obtained before and   after IV injection of gadolinium, 8.0cc cc administered.  COMPARISON: Head CT 10/15/2024 and CT CTA 10/14/2024.    FINDINGS:    Multiple images are degraded by motion, but are nonetheless diagnostic.    A parenchymal hematoma noted in the left basal ganglia measuring   approximately 2.7 x 2.2 x 2.2 cm. There is vasogenic edema surrounding   the clot. There is no enhancement. There is no evidence of an underlying   hemorrhagic tumor at this time, although follow-up to resolution is   recommended to exclude a lesion obscured by the blood products in the   acute phase. The location of the lesion is suggestive of hypertensive   hemorrhage, correlate clinically.    There is associated ventricular breakthrough, with a large cast of clot   in the left lateral ventricle, which is expanded. There is layering blood   at both occipital horns. There is midline shift at the level of the   septum pellucidum measuring 8 mm left to right. All of these findings   appear unchanged, without interval rebleeding.    The third ventricle is effaced and the temporal horns are slightly   distended, possibly low grade hydrocephalus. This has not progressed, and   the sulci are noteffaced. There is mild periventricular T2   hyperintensity which could represent small vessel disease or   transependymal CSF flow resorption. There are also mild nonspecific T2   hyperintensities in the subcortical white matter.    No acute ischemia. Intracranial flow voids are patent. The cerebellar   tonsils are normally positioned.    Limited views of the sinuses and mastoids show mild to moderate mucosal   thickening without air-fluid levels, likely chronic.    Limited views of the orbits and visualized soft tissues of the neck,   face, scalp, skull base, and calvarium are otherwise unremarkable.    IMPRESSION:    1.  Stable parenchymal hematoma in the left basal ganglia, without   evidence of an underlying hemorrhagic mass.  2.  Stable IVH, with mild distention of the temporal horns.        Patient Name: PACO SILVA  MRN: [id number], : [birth year]    --- End of Report ---    ***Please see the addendum at the top of this report. It may contain   additional important information or changes.****        KAILASH PRATT MD; Attending Radiologist  This document has been electronically signed. Oct 18 2024  5:20PM  1st Addendum: KAILASH PRATT MD; Attending Radiologist  The first addendum was electronically signed on: Oct 21 [id number].    < end of copied text >          Culture - Blood (10.16.24 @ 04:12)   Specimen Source: .Blood BLOOD  Culture Results:   No growth at 5 days    Culture - Blood (10.16.24 @ 04:07)   Specimen Source: .Blood BLOOD  Culture Results:   No growth at 5 days

## 2024-10-23 NOTE — PROGRESS NOTE ADULT - SUBJECTIVE AND OBJECTIVE BOX
THE PATIENT WAS SEEN AND EXAMINED BY ME WITH THE HOUSESTAFF AND STROKE TEAM DURING MORNING ROUNDS.   HPI: 44M no AC/AP hx uncontrolled HTN tx LVS s/p flu-like symptoms and headache; CTH w/ left BG IPH w/ IVH extension. Exam: Romansh speaking, Ox3, PERRL, no drift, CHAPA 5/5, SILT (14 Oct 2024 04:30)      SUBJECTIVE: No events overnight.  No new neurologic complaints.  ROS negative unless otherwise noted.    amLODIPine   Tablet 10 milliGRAM(s) Oral daily  aspirin enteric coated 81 milliGRAM(s) Oral daily  enoxaparin Injectable 40 milliGRAM(s) SubCutaneous <User Schedule>  ezetimibe 10 milliGRAM(s) Oral daily  hydrALAZINE 100 milliGRAM(s) Oral every 8 hours  labetalol 200 milliGRAM(s) Oral every 8 hours  losartan 100 milliGRAM(s) Oral daily  pantoprazole    Tablet 40 milliGRAM(s) Oral before breakfast  polyethylene glycol 3350 17 Gram(s) Oral every 12 hours  senna 2 Tablet(s) Oral at bedtime  sodium chloride 2 Gram(s) Oral every 6 hours      PHYSICAL EXAM:   Vital Signs Last 24 Hrs  T(C): 36.9 (23 Oct 2024 06:00), Max: 37.1 (22 Oct 2024 08:00)  T(F): 98.5 (23 Oct 2024 06:00), Max: 98.7 (22 Oct 2024 08:00)  HR: 72 (23 Oct 2024 06:00) (67 - 90)  BP: 143/89 (23 Oct 2024 06:00) (101/72 - 143/89)  BP(mean): 110 (23 Oct 2024 06:00) (80 - 110)  RR: 13 (23 Oct 2024 06:00) (11 - 18)  SpO2: 99% (23 Oct 2024 06:00) (85% - 99%)  Patient On (Oxygen Delivery Method): room air        General: No acute distress  HEENT: EOM intact, visual fields full  Abdomen: Soft, nontender, nondistended   Extremities: No edema    NEUROLOGICAL EXAM:  Mental status: Eyes closed, open to voice. Oriented to self and place. Hypophonic speech, minimal verbal output. Identifies pen. Follows some simple commands, unable to follow cross commands.  Cranial Nerves: No facial asymmetry, mild left gaze preference, no nystagmus, no dysarthria,  tongue midline  Motor exam: Normal tone, no drift, 5/5 RUE, 5/5 RLE, 5/5 LUE, 5/5 LLE, normal fine finger movements.  Sensation: Intact to light touch   Coordination/ Gait: No dysmetria, LORA intact and symmetric bilaterally    LABS:                        12.5   13.39 )-----------( 325      ( 23 Oct 2024 06:06 )             37.9    10-23    135  |  101  |  23  ----------------------------<  97  4.4   |  21[L]  |  0.91    Ca    9.3      23 Oct 2024 06:09    TPro  7.0  /  Alb  3.7  /  TBili  0.9  /  DBili  x   /  AST  57[H]  /  ALT  231[H]  /  AlkPhos  86  10-23        IMAGING: Reviewed by me.   CTH 10/19/24: No change since 10/15/2024. Left corona radiata hemorrhage with intraventricular extension. Mild midline shift to the right.    MRI Brain w/wo 10/18/24:  1. Stable parenchymal hematoma in the left basal ganglia, without evidence of an underlying hemorrhagic mass.  2. Stable IVH, with mild distention of the temporal horns.  *** ADDENDUM # 1 ***  On further review, there is a 5 mm acute/subacute infarct in the right corona radiata. The lesion shows low ADC values and T2/FLAIR   hyperintensity, consistent with an event between 24 hours and 7 days in age.      CTH 10/15/24: Interval stability compared with the prior 10/14/2024 in left basal ganglia acute hematoma with intraventricular extension. No evidence of rehemorrhage. No change in the ventricle size.      CTH 10/14/24 10am: Left basal ganglia lucency suspicious for infarct. Left corona radiata hemorrhage with intraventricular extension and mild ventricular dilatation, no change since 4:46 AM.    10/14/24 4am  CT HEAD: Stable left medial basal ganglia parenchymal hemorrhage with associated intraventricular extension of hemorrhage. Unchanged edema within the adjacent left basal ganglia as well as rightward bulging of the septum pellucidum.  CTA NECK: No evidence of significant stenosis or occlusion.  CTA HEAD: No large vessel occlusion, significant stenosis or vascular abnormality identified.    CTH 10/14/24 1am: Intraparenchymal hematoma in the medial left basal ganglia with adjacent vasogenic edema, mass effect, 7 mm of left to right midline shift, and intraventricular extension of hemorrhage predominantly in the left lateral ventricle.   THE PATIENT WAS SEEN AND EXAMINED BY ME WITH THE HOUSESTAFF AND STROKE TEAM DURING MORNING ROUNDS.   HPI: 44M no AC/AP hx uncontrolled HTN tx LVS s/p flu-like symptoms and headache; CTH w/ left BG IPH w/ IVH extension. Exam: Hebrew speaking, Ox3, PERRL, no drift, CHAPA 5/5, SILT (14 Oct 2024 04:30)      SUBJECTIVE: No events overnight.  No new neurologic complaints.  ROS negative unless otherwise noted.    amLODIPine   Tablet 10 milliGRAM(s) Oral daily  aspirin enteric coated 81 milliGRAM(s) Oral daily  enoxaparin Injectable 40 milliGRAM(s) SubCutaneous <User Schedule>  ezetimibe 10 milliGRAM(s) Oral daily  hydrALAZINE 100 milliGRAM(s) Oral every 8 hours  labetalol 200 milliGRAM(s) Oral every 8 hours  losartan 100 milliGRAM(s) Oral daily  pantoprazole    Tablet 40 milliGRAM(s) Oral before breakfast  polyethylene glycol 3350 17 Gram(s) Oral every 12 hours  senna 2 Tablet(s) Oral at bedtime  sodium chloride 2 Gram(s) Oral every 6 hours      PHYSICAL EXAM:   Vital Signs Last 24 Hrs  T(C): 36.9 (23 Oct 2024 06:00), Max: 37.1 (22 Oct 2024 08:00)  T(F): 98.5 (23 Oct 2024 06:00), Max: 98.7 (22 Oct 2024 08:00)  HR: 72 (23 Oct 2024 06:00) (67 - 90)  BP: 143/89 (23 Oct 2024 06:00) (101/72 - 143/89)  BP(mean): 110 (23 Oct 2024 06:00) (80 - 110)  RR: 13 (23 Oct 2024 06:00) (11 - 18)  SpO2: 99% (23 Oct 2024 06:00) (85% - 99%)  Patient On (Oxygen Delivery Method): room air        General: No acute distress  HEENT: EOM intact, visual fields full  Abdomen: Soft, nontender, nondistended   Extremities: No edema    NEUROLOGICAL EXAM:  Mental status: Awake and alert. Oriented to self. States year with choices. States "[facility]" for location. Hypophonic speech. Identifies objects. Follows some simple commands, unable to follow cross commands.  Cranial Nerves: No facial asymmetry, mild left gaze preference, no nystagmus, no dysarthria,  tongue midline  Motor exam: Normal tone, no drift, 5/5 RUE, 5/5 RLE, 5/5 LUE, 5/5 LLE, normal fine finger movements.  Sensation: Intact to light touch   Coordination/ Gait: No dysmetria, LORA intact and symmetric bilaterally    LABS:                        12.5   13.39 )-----------( 325      ( 23 Oct 2024 06:06 )             37.9    10-23    135  |  101  |  23  ----------------------------<  97  4.4   |  21[L]  |  0.91    Ca    9.3      23 Oct 2024 06:09    TPro  7.0  /  Alb  3.7  /  TBili  0.9  /  DBili  x   /  AST  57[H]  /  ALT  231[H]  /  AlkPhos  86  10-23        IMAGING: Reviewed by me.   CTH 10/19/24: No change since 10/15/2024. Left corona radiata hemorrhage with intraventricular extension. Mild midline shift to the right.    MRI Brain w/wo 10/18/24:  1. Stable parenchymal hematoma in the left basal ganglia, without evidence of an underlying hemorrhagic mass.  2. Stable IVH, with mild distention of the temporal horns.  *** ADDENDUM # 1 ***  On further review, there is a 5 mm acute/subacute infarct in the right corona radiata. The lesion shows low ADC values and T2/FLAIR   hyperintensity, consistent with an event between 24 hours and 7 days in age.      CTH 10/15/24: Interval stability compared with the prior 10/14/2024 in left basal ganglia acute hematoma with intraventricular extension. No evidence of rehemorrhage. No change in the ventricle size.      CTH 10/14/24 10am: Left basal ganglia lucency suspicious for infarct. Left corona radiata hemorrhage with intraventricular extension and mild ventricular dilatation, no change since 4:46 AM.    10/14/24 4am  CT HEAD: Stable left medial basal ganglia parenchymal hemorrhage with associated intraventricular extension of hemorrhage. Unchanged edema within the adjacent left basal ganglia as well as rightward bulging of the septum pellucidum.  CTA NECK: No evidence of significant stenosis or occlusion.  CTA HEAD: No large vessel occlusion, significant stenosis or vascular abnormality identified.    CTH 10/14/24 1am: Intraparenchymal hematoma in the medial left basal ganglia with adjacent vasogenic edema, mass effect, 7 mm of left to right midline shift, and intraventricular extension of hemorrhage predominantly in the left lateral ventricle.

## 2024-10-23 NOTE — CONSULT NOTE ADULT - ASSESSMENT
Patient is a 44 year old male with a PMHx of uncontrolled HTN, HLD, who presented with a chief complaint of headache, emesis, dizziness and was found to have left IPH with IVH. CTH with left BG IPH and IVH. Internal Medicine has been consulted on Mr. Ferro's care for medical management.       CVA  - Per Neuro, likely 2/2 hypertensive hemorrhage with etiology likely ESUS  - CTs as noted   - MR w/ parenchymal hematoma in L basal ganglia, IVH, with mild distention of temporal horns, 5 mm acute/subacute infarct in R corona radiata   - TTE w/ EF 60%, normal LA  - Planned for CELENA, F/u results   - EEG negative per Neuro   - Recommend ILR to screen for occult arrhythmia  - W/ concern for inflammatory process, F/u labs sent  - Planned for diagnostic cerebral angiogram   - On ASA and Statin therapy   - Neuro checks per protocol   - Monitor on tele   - PT / OT / S+S   - Fall, Seizure, Aspiration precautions   - Per neurology   - EP and Cardio eval appreciated; F/u recs    Fever, Leukocytosis   - Patient febrile 10/16 w/ Tmax of 100.9F  - BCx2 w/ NGTD   - 10/14 and 10/23 LE Duplex negative for DVT   - Trend CBC, temp curve, VS and monitor patient     Transaminitis  - US ABD w/ hepatic steatosis, no evidence of acute cholecystitis  - Statin DC'd and ordered for Zetia  - F/u hepatitis panel    - Continue to monitor and trend  - Serial abdominal examinations  - If up-trends, GI eval may be of benefit     Hyponatremia   - Weaned off of 2% HTS, On NaCl tabs 2g  - Na parameters as per Stroke Neurology   - Avoid overcorrection > 6-8 mEq in 24 hours     HTN   - On amlodipine, labetalol, hydralazine and losartan.   - Renal artery doppler: no evidence of KAITLYN.   - TSH WNL           PPX      Discussed with Attending and Neuro

## 2024-10-24 LAB
ALBUMIN SERPL ELPH-MCNC: 3.9 G/DL — SIGNIFICANT CHANGE UP (ref 3.3–5)
ALP SERPL-CCNC: 97 U/L — SIGNIFICANT CHANGE UP (ref 40–120)
ALT FLD-CCNC: 223 U/L — HIGH (ref 10–45)
ANION GAP SERPL CALC-SCNC: 15 MMOL/L — SIGNIFICANT CHANGE UP (ref 5–17)
AST SERPL-CCNC: 44 U/L — HIGH (ref 10–40)
AT III ACT/NOR PPP CHRO: 124 % — SIGNIFICANT CHANGE UP (ref 85–135)
B2 GLYCOPROT1 IGA SER QL: <2 U/ML — SIGNIFICANT CHANGE UP
B2 GLYCOPROT1 IGG SER-ACNC: <1.4 U/ML — SIGNIFICANT CHANGE UP
B2 GLYCOPROT1 IGM SER-ACNC: 4.7 U/ML — SIGNIFICANT CHANGE UP
BILIRUB SERPL-MCNC: 0.9 MG/DL — SIGNIFICANT CHANGE UP (ref 0.2–1.2)
BUN SERPL-MCNC: 28 MG/DL — HIGH (ref 7–23)
CALCIUM SERPL-MCNC: 9.3 MG/DL — SIGNIFICANT CHANGE UP (ref 8.4–10.5)
CARDIOLIPIN IGM SER-MCNC: 6.7 MPL U/ML — SIGNIFICANT CHANGE UP
CARDIOLIPIN IGM SER-MCNC: <1.6 GPL U/ML — SIGNIFICANT CHANGE UP
CHLORIDE SERPL-SCNC: 98 MMOL/L — SIGNIFICANT CHANGE UP (ref 96–108)
CO2 SERPL-SCNC: 20 MMOL/L — LOW (ref 22–31)
CREAT SERPL-MCNC: 0.89 MG/DL — SIGNIFICANT CHANGE UP (ref 0.5–1.3)
DEPRECATED CARDIOLIPIN IGA SER: <2 APL U/ML — SIGNIFICANT CHANGE UP
DRVVT RATIO: 1.05 RATIO — SIGNIFICANT CHANGE UP (ref 0–1.21)
DRVVT SCREEN TO CONFIRM RATIO: SIGNIFICANT CHANGE UP
EGFR: 109 ML/MIN/1.73M2 — SIGNIFICANT CHANGE UP
ERYTHROCYTE [SEDIMENTATION RATE] IN BLOOD: 39 MM/HR — HIGH (ref 0–15)
GLUCOSE SERPL-MCNC: 150 MG/DL — HIGH (ref 70–99)
HAV IGM SER-ACNC: SIGNIFICANT CHANGE UP
HBV CORE IGM SER-ACNC: SIGNIFICANT CHANGE UP
HCT VFR BLD CALC: 38.1 % — LOW (ref 39–50)
HCV AB S/CO SERPL IA: 0.23 S/CO — SIGNIFICANT CHANGE UP (ref 0–0.99)
HCV AB SERPL-IMP: SIGNIFICANT CHANGE UP
HCYS SERPL-MCNC: 7.5 UMOL/L — SIGNIFICANT CHANGE UP
HGB BLD-MCNC: 12.5 G/DL — LOW (ref 13–17)
HIV 1+2 AB+HIV1 P24 AG SERPL QL IA: SIGNIFICANT CHANGE UP
MCHC RBC-ENTMCNC: 28.3 PG — SIGNIFICANT CHANGE UP (ref 27–34)
MCHC RBC-ENTMCNC: 32.8 GM/DL — SIGNIFICANT CHANGE UP (ref 32–36)
MCV RBC AUTO: 86.2 FL — SIGNIFICANT CHANGE UP (ref 80–100)
NRBC # BLD: 0 /100 WBCS — SIGNIFICANT CHANGE UP (ref 0–0)
PLATELET # BLD AUTO: 395 K/UL — SIGNIFICANT CHANGE UP (ref 150–400)
POTASSIUM SERPL-MCNC: 4 MMOL/L — SIGNIFICANT CHANGE UP (ref 3.5–5.3)
POTASSIUM SERPL-SCNC: 4 MMOL/L — SIGNIFICANT CHANGE UP (ref 3.5–5.3)
PROT C ACT/NOR PPP: 164 % — HIGH (ref 74–150)
PROT SERPL-MCNC: 7.3 G/DL — SIGNIFICANT CHANGE UP (ref 6–8.3)
RBC # BLD: 4.42 M/UL — SIGNIFICANT CHANGE UP (ref 4.2–5.8)
RBC # FLD: 12.9 % — SIGNIFICANT CHANGE UP (ref 10.3–14.5)
SODIUM SERPL-SCNC: 133 MMOL/L — LOW (ref 135–145)
WBC # BLD: 13.65 K/UL — HIGH (ref 3.8–10.5)
WBC # FLD AUTO: 13.65 K/UL — HIGH (ref 3.8–10.5)

## 2024-10-24 PROCEDURE — 99233 SBSQ HOSP IP/OBS HIGH 50: CPT

## 2024-10-24 PROCEDURE — G0452: CPT | Mod: 26

## 2024-10-24 RX ORDER — MODAFINIL 200 MG/1
100 TABLET ORAL
Refills: 0 | Status: DISCONTINUED | OUTPATIENT
Start: 2024-10-25 | End: 2024-11-01

## 2024-10-24 RX ORDER — FLUOXETINE HCL 10 MG
20 CAPSULE ORAL DAILY
Refills: 0 | Status: DISCONTINUED | OUTPATIENT
Start: 2024-10-24 | End: 2024-11-04

## 2024-10-24 RX ADMIN — Medication 40 MILLIGRAM(S): at 17:32

## 2024-10-24 RX ADMIN — LOSARTAN POTASSIUM 100 MILLIGRAM(S): 25 TABLET ORAL at 10:29

## 2024-10-24 RX ADMIN — HYDRALAZINE HYDROCHLORIDE 100 MILLIGRAM(S): 50 TABLET, FILM COATED ORAL at 22:09

## 2024-10-24 RX ADMIN — Medication 200 MILLIGRAM(S): at 22:08

## 2024-10-24 RX ADMIN — EZETIMIBE 10 MILLIGRAM(S): 10 TABLET ORAL at 12:45

## 2024-10-24 RX ADMIN — POLYETHYLENE GLYCOL 3350 17 GRAM(S): 17 POWDER, FOR SOLUTION ORAL at 05:45

## 2024-10-24 RX ADMIN — PANTOPRAZOLE SODIUM 40 MILLIGRAM(S): 40 TABLET, DELAYED RELEASE ORAL at 05:46

## 2024-10-24 RX ADMIN — SODIUM CHLORIDE 2 GRAM(S): 9 INJECTION, SOLUTION INTRAMUSCULAR; INTRAVENOUS; SUBCUTANEOUS at 12:44

## 2024-10-24 RX ADMIN — Medication 20 MILLIGRAM(S): at 12:45

## 2024-10-24 RX ADMIN — SODIUM CHLORIDE 2 GRAM(S): 9 INJECTION, SOLUTION INTRAMUSCULAR; INTRAVENOUS; SUBCUTANEOUS at 23:04

## 2024-10-24 RX ADMIN — SODIUM CHLORIDE 2 GRAM(S): 9 INJECTION, SOLUTION INTRAMUSCULAR; INTRAVENOUS; SUBCUTANEOUS at 05:46

## 2024-10-24 RX ADMIN — SODIUM CHLORIDE 2 GRAM(S): 9 INJECTION, SOLUTION INTRAMUSCULAR; INTRAVENOUS; SUBCUTANEOUS at 17:32

## 2024-10-24 RX ADMIN — Medication 81 MILLIGRAM(S): at 12:45

## 2024-10-24 RX ADMIN — Medication 2 TABLET(S): at 22:08

## 2024-10-24 RX ADMIN — Medication 200 MILLIGRAM(S): at 05:47

## 2024-10-24 NOTE — PROGRESS NOTE ADULT - SUBJECTIVE AND OBJECTIVE BOX
Name of Patient : PACO SILVA  MRN: [id number]      Subjective: Patient seen and examined. No new events except as noted.     REVIEW OF SYSTEMS:  limited     MEDICATIONS:  MEDICATIONS  (STANDING):  amLODIPine   Tablet 10 milliGRAM(s) Oral daily  aspirin enteric coated 81 milliGRAM(s) Oral daily  enoxaparin Injectable 40 milliGRAM(s) SubCutaneous <User Schedule>  ezetimibe 10 milliGRAM(s) Oral daily  FLUoxetine 20 milliGRAM(s) Oral daily  hydrALAZINE 100 milliGRAM(s) Oral every 8 hours  labetalol 200 milliGRAM(s) Oral every 8 hours  losartan 100 milliGRAM(s) Oral daily  modafinil 100 milliGRAM(s) Oral <User Schedule>  pantoprazole    Tablet 40 milliGRAM(s) Oral before breakfast  polyethylene glycol 3350 17 Gram(s) Oral every 12 hours  senna 2 Tablet(s) Oral at bedtime  sodium chloride 2 Gram(s) Oral every 6 hours      PHYSICAL EXAM:  T(C): 36.7 (10-24-24 @ 20:00), Max: 37.1 (10-24-24 @ 08:00)  HR: 81 (10-24-24 @ 22:00) (72 - 97)  BP: 116/67 (10-24-24 @ 22:00) (104/59 - 123/72)  RR: 16 (10-24-24 @ 22:00) (12 - 20)  SpO2: 95% (10-24-24 @ 22:00) (95% - 97%)  Wt(kg): --  I&O's Summary    23 Oct 2024 07:01  -  24 Oct 2024 07:00  --------------------------------------------------------  IN: 720 mL / OUT: 1730 mL / NET: -1010 mL    24 Oct 2024 07:01  -  25 Oct 2024 00:32  --------------------------------------------------------  IN: 0 mL / OUT: 1250 mL / NET: -1250 mL          Appearance: Normal	  HEENT:  PERRLA   Lymphatic: No lymphadenopathy   Cardiovascular: Normal S1 S2, no JVD  Respiratory: normal effort , clear  Gastrointestinal:  Soft, Non-tender  Skin: No rashes,  warm to touch  Psychiatry:  Mood & affect appropriate  Musculuskeletal: No edema    recent labs, Imaging and EKGs personally reviewed     10-23-24 @ 07:01  -  10-24-24 @ 07:00  --------------------------------------------------------  IN: 720 mL / OUT: 1730 mL / NET: -1010 mL    10-24-24 @ 07:01  -  10-25-24 @ 00:32  --------------------------------------------------------  IN: 0 mL / OUT: 1250 mL / NET: -1250 mL                          12.5   13.65 )-----------( 395      ( 24 Oct 2024 04:57 )             38.1               10-24    133[L]  |  98  |  28[H]  ----------------------------<  150[H]  4.0   |  20[L]  |  0.89    Ca    9.3      24 Oct 2024 04:58    TPro  7.3  /  Alb  3.9  /  TBili  0.9  /  DBili  x   /  AST  44[H]  /  ALT  223[H]  /  AlkPhos  97  10-24                       Urinalysis Basic - ( 24 Oct 2024 04:58 )    Color: x / Appearance: x / SG: x / pH: x  Gluc: 150 mg/dL / Ketone: x  / Bili: x / Urobili: x   Blood: x / Protein: x / Nitrite: x   Leuk Esterase: x / RBC: x / WBC x   Sq Epi: x / Non Sq Epi: x / Bacteria: x

## 2024-10-24 NOTE — PROGRESS NOTE ADULT - ASSESSMENT
44 Right handed man with HTN HLD p/w headache, emesis, dizziness found to have left IPH w/ IVH. VS in triage 133/88. Exam vairying from awake  +somnolence to lethargic +somnolence, some confusion/disorientation. Labs w/ PMN predomn leukocytosis to 16K, normal coags, mild hyponatremia improved.  LDL/HDL: 146/43. A1C 5. CXR clear lungs. VA duplex neg for dvt in b/l LE. CTH with left BG IPH and IVH stable @ 4 hours.     Impression: AMS and dizziness due to nontraumatic L BG intracerebral hemorrhage w/ IVH, perihematomal cerebral edema and brain compression. Etiology Likely hypertensive hemorrhage. 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 Young patient with vascular risk factors including hypertension and hyperlipidemia with MRI of the brain showing a small RIGHT corona radiata infarct and a LEFT basal ganglia hemorrhage. Among the etiologies that can cause ischemia and bleed in different vascular territories are either cardiac source of embolism vs something systemic inflammatory or infectious. Difficult to distinguish perhaps if the basal ganglia hemorrhage was an ischemic lesion that bled however in this particular age population same work up is granted. Will await CELENA results and angiogram to exclude any vasculitic inflammatory pattern, inflammatory markers ESR and CRP requested, along with screen for inflammatory conditions, hypercoagulable panel will be completed and infectious work up including HIV, VDRL /RPR and Hepatitis panel.     NEURO: Neurologic examination with fluctuations in mental status, now overall stable. S/P cerebral angiogram on 10/23 which showed no source of the lesion. Repeat CTH 10/19 stable. EEG report negative, now discontinued. Concern for inflammatory process. Plan for diagnostic cerebral angiogram today. Inflammatory labs sent (ESR/CRP, HIV, syphilis and hepatitis panel). Continue monitoring for neurologic deterioration in the setting of cerebral edema and compression. Keep strict normotension. , continue high intensity statin, titrate to LDL goal < 70. MRI brain w/wo results as above. PT/OT recommend AR.  Q4 neurochecks at night for protected sleep    ANTITHROMBOTIC THERAPY: Aspirin 81mg daily for secondary stroke prevention started 10/21/24    PULMONARY: CXR (10/16) clear, protecting airway, saturating well     CARDIOVASCULAR: TTE: EF 60%, normal LA. Continue cardiac monitoring. Cardiology consulted for HTN management. Continue amlodipine, labetalol, hydralazine and losartan. Renal artery doppler: no evidence of KAITLYN. TSH wnl. Recommend ILR to screen for arrhythmia like Afib, can obtain outpatient. Recommend CELENA, no neurological contraindications to CELENA, CELENA shows no thrombus.       SBP goal: <160     GASTROINTESTINAL:  dysphagia screen  - passed, tolerating diet. Elevated LFTs, continue to trend, internal medicine recs appreciated. US abdomen with  hepatic steatosis, no evidence of acute cholecystitis. Discontinued statin, will switch to zetia. Hepatits panel ordered.      Diet: regular    RENAL: BUN/Cr stable, good urine output. Weaned off of 2% HTS, continue NaCl tabs 2g q6h for hyponatremia, stable    HEMATOLOGY: H/H stable, Platelets 395 . BLE dopplers (10/14): no evidence of dvt. Repeat LE dopplers on 10/23 negative for DVT.        DVT ppx: Lovenox     ID: Febrile on 10/16 (tmax 100.9F), now afebrile. Trend leukocytosis and fever curve, no localized infectious symptoms noted. UA negative, blood cultures x2 (10/16) NGTD. Mild leukocytosis, now downtrending. Repeat UA neg, pending repeat blood cultures, ammonia level WNL and LE dopplers pending.     OTHER: Acute rehab per PT/OT eval once stable and workup is complete.    CORE MEASURES:        Admission NIHSS: 1     ICH 1     TPA: [] YES [X] NO      LDL/HDL: 146/43     Depression Screen:      Statin Therapy: YES     Dysphagia Screen: [X] PASS [] FAIL     Smoking [] YES [X] NO      Afib [] YES [X] NO     Stroke Education [] YES [] N   44 Right handed man with HTN HLD p/w headache, emesis, dizziness found to have left IPH w/ IVH. VS in triage 133/88. Exam vairying from awake  +somnolence to lethargic +somnolence, some confusion/disorientation. Labs w/ PMN predomn leukocytosis to 16K, normal coags, mild hyponatremia improved.  LDL/HDL: 146/43. A1C 5. CXR clear lungs. VA duplex neg for dvt in b/l LE. CTH with left BG IPH and IVH stable @ 4 hours.     Impression: AMS and dizziness due to nontraumatic L BG intracerebral hemorrhage w/ IVH, perihematomal cerebral edema and brain compression. Etiology Likely hypertensive hemorrhage. 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 Young patient with vascular risk factors including hypertension and hyperlipidemia with MRI of the brain showing a small RIGHT corona radiata infarct and a LEFT basal ganglia hemorrhage. Among the etiologies that can cause ischemia and bleed in different vascular territories are either cardiac source of embolism vs something systemic inflammatory or infectious. Difficult to distinguish perhaps if the basal ganglia hemorrhage was an ischemic lesion that bled however in this particular age population same work up is granted. Will await CELENA results and angiogram to exclude any vasculitic inflammatory pattern, inflammatory markers ESR and CRP requested, along with screen for inflammatory conditions, hypercoagulable panel will be completed and infectious work up including HIV, VDRL /RPR and Hepatitis panel.     NEURO: Neurologic examination with fluctuations in mental status, now overall stable. S/P cerebral angiogram on 10/23 which showed no source of the lesion. Repeat CTH 10/19 stable. EEG report negative, now discontinued. Concern for inflammatory process. Inflammatory labs sent (ESR/CRP, HIV, syphilis and hepatitis panel - HIV, hepatitis negative).  CT CAP pending to r/o malignancy. Continue monitoring for neurologic deterioration in the setting of cerebral edema and compression. Keep strict normotension. , continue high intensity statin, titrate to LDL goal < 70. MRI brain w/wo results as above. PT/OT recommend AR.  Q4 neurochecks at night for protected sleep    ANTITHROMBOTIC THERAPY: Aspirin 81mg daily for secondary stroke prevention started 10/21/24    PULMONARY: CXR (10/16) clear, protecting airway, saturating well     CARDIOVASCULAR: TTE: EF 60%, normal LA. Continue cardiac monitoring. Cardiology consulted for HTN management. Continue amlodipine, labetalol, hydralazine and losartan. Renal artery doppler: no evidence of KAITLYN. TSH wnl. Recommend ILR to screen for arrhythmia like Afib, can obtain outpatient. Recommend CELENA, no neurological contraindications to CELENA, CELENA shows no thrombus, done on 10/23.      SBP goal: <160     GASTROINTESTINAL:  dysphagia screen  - passed, tolerating diet. Elevated LFTs, continue to trend, internal medicine recs appreciated. US abdomen with  hepatic steatosis, no evidence of acute cholecystitis. Discontinued statin, will switch to zetia. Hepatits panel ordered.      Diet: regular    RENAL: BUN/Cr stable, good urine output. Weaned off of 2% HTS, continue NaCl tabs 2g q6h for hyponatremia, stable    HEMATOLOGY: H/H stable, Platelets 395 . BLE dopplers (10/14): no evidence of dvt. Repeat LE dopplers on 10/23 negative for DVT.   Hypercoagulable labs sent.       DVT ppx: Lovenox     ID: Febrile on 10/16 (tmax 100.9F), now afebrile. Trend leukocytosis and fever curve, no localized infectious symptoms noted. UA negative, blood cultures x2 (10/16) NGTD. Mild leukocytosis, now downtrending. Repeat UA neg, pending repeat blood cultures, ammonia level WNL and LE dopplers pending.     OTHER: Acute rehab per PT/OT eval once stable and workup is complete.  Added fluoxitine 20mg qd for 3 months per [facility], can be titrated off as outpatient.  Added modafinil 100mg qd to help with lethargy.      CORE MEASURES:        Admission NIHSS: 1     ICH 1     TPA: [] YES [X] NO      LDL/HDL: 146/43     Depression Screen:      Statin Therapy: YES     Dysphagia Screen: [X] PASS [] FAIL     Smoking [] YES [X] NO      Afib [] YES [X] NO     Stroke Education [] YES [] N

## 2024-10-24 NOTE — PROGRESS NOTE ADULT - SUBJECTIVE AND OBJECTIVE BOX
EP Attending  HISTORY OF PRESENT ILLNESS: HPI:  44M no AC/AP hx uncontrolled HTN tx LVS s/p flu-like symptoms and headache; CTH w/ left BG IPH w/ IVH extension. Exam: Armenian speaking, Ox3, PERRL, no drift, CHAPA 5/5, SILT (14 Oct 2024 04:30)    Had ischemic stroke w/ hemorrhagic conversion.  Too sleepy to participate in HPI/ROS.  Family in room.  We discussed long term AFib surveillance after large stroke.  Date of service 10/24- resting in bed, no new complaints.    PAST MEDICAL & SURGICAL HISTORY:  HTN    amLODIPine   Tablet 10 milliGRAM(s) Oral daily  aspirin enteric coated 81 milliGRAM(s) Oral daily  enoxaparin Injectable 40 milliGRAM(s) SubCutaneous <User Schedule>  ezetimibe 10 milliGRAM(s) Oral daily  FLUoxetine 20 milliGRAM(s) Oral daily  hydrALAZINE 100 milliGRAM(s) Oral every 8 hours  labetalol 200 milliGRAM(s) Oral every 8 hours  losartan 100 milliGRAM(s) Oral daily  pantoprazole    Tablet 40 milliGRAM(s) Oral before breakfast  polyethylene glycol 3350 17 Gram(s) Oral every 12 hours  senna 2 Tablet(s) Oral at bedtime  sodium chloride 2 Gram(s) Oral every 6 hours                            12.5   13.65 )-----------( 395      ( 24 Oct 2024 04:57 )             38.1       10-24    133[L]  |  98  |  28[H]  ----------------------------<  150[H]  4.0   |  20[L]  |  0.89    Ca    9.3      24 Oct 2024 04:58    TPro  7.3  /  Alb  3.9  /  TBili  0.9  /  DBili  x   /  AST  44[H]  /  ALT  223[H]  /  AlkPhos  97  10-24      T(C): 37.1 (10-24-24 @ 08:00), Max: 37.1 (10-24-24 @ 08:00)  HR: 81 (10-24-24 @ 10:00) (64 - 89)  BP: 114/67 (10-24-24 @ 10:00) (91/51 - 150/86)  RR: 15 (10-24-24 @ 10:00) (12 - 16)  SpO2: 95% (10-24-24 @ 10:00) (95% - 100%)  Wt(kg): --    I&O's Summary    23 Oct 2024 07:01  -  24 Oct 2024 07:00  --------------------------------------------------------  IN: 720 mL / OUT: 1730 mL / NET: -1010 mL    General: Well nourished, no acute distress, alert and oriented x 3  Head: normocephalic, no trauma  Neck: no JVD, no bruit, supple, not enlarged  CV: S1S2, no S3, regular rate, rhythm is SINUS, no murmurs.    Lungs: clear BL, no rales or wheezes  Abdomen: bowel sounds +, soft, nontender, nondistended  Extremities: no clubbing, cyanosis or edema  Neuro: Moves all 4 extremities, sensation intact x 4 extremities  Skin: warm and moist, normal turgor  Psych: Mood and affect are appropriate for circumstances  MSK: normal range of motion and strength x4 extremities.    TELEMETRY: NSR	    ECG: NSR  Echo:  < from: TTE W or WO Ultrasound Enhancing Agent (10.14.24 @ 08:46) >  CONCLUSIONS:      1. Fair study quality.   2. Left ventricular systolic function is normal with an ejection fraction of 60 % by Chua's method of disks.   3. Normal right ventricular systolic function.   4. No prior echocardiogram is available for comparison.    < end of copied text >    < from: MR Head w/wo IV Cont (10.18.24 @ 16:32) >  FINDINGS:    Multiple images are degraded by motion, but are nonetheless diagnostic.    A parenchymal hematoma noted in the left basal ganglia measuring   approximately 2.7 x 2.2 x 2.2 cm. There is vasogenic edema surrounding   the clot. There is no enhancement. There is no evidence of an underlying   hemorrhagic tumor at this time, although follow-up to resolution is   recommended to exclude a lesion obscured by the blood products in the   acute phase. The location of the lesion is suggestive of hypertensive   hemorrhage, correlate clinically.    There is associated ventricular breakthrough, with a large cast of clot   in the left lateral ventricle, which is expanded. There is layering blood   at both occipital horns. There is midline shift at the level of the   septum pellucidum measuring 8 mm left to right. All of these findings   appear unchanged, without interval rebleeding.    The third ventricle is effaced and the temporal horns are slightly   distended, possibly low grade hydrocephalus. This has not progressed, and   the sulci are noteffaced. There is mild periventricular T2   hyperintensity which could represent small vessel disease or   transependymal CSF flow resorption. There are also mild nonspecific T2   hyperintensities in the subcortical white matter.    No acute ischemia. Intracranial flow voids are patent. The cerebellar   tonsils are normally positioned.    Limited views of the sinuses and mastoids show mild to moderate mucosal   thickening without air-fluid levels, likely chronic.    Limited views of the orbits and visualized soft tissues of the neck,   face, scalp, skull base, and calvarium are otherwise unremarkable.    IMPRESSION:    1.  Stable parenchymal hematoma in the left basal ganglia, without   evidence of an underlying hemorrhagic mass.  2.  Stable IVH, with mild distention of the temporal horns.  < end of copied text >    < from: CELENA W or WO Ultrasound Enhancing Agent (10.23.24 @ 11:47) >  CONCLUSIONS:      1. Agitated saline injection was negative for intracardiac shunt.   2. No thrombus seen in the left atrial, left atrial appendage, right atrial or right atrial appendage.   3. No pericardial effusion seen.   4. Normal right ventricular cavity size and normal right ventricular systolic function.   5. No evidence of left atrial or left atrial appendage thrombus.   6. Left ventricular systolic function is normal with an ejection fraction visually estimated at 55 to 60 %.      < end of copied text >      ASSESSMENT/PLAN:  Mr Ferro is a 44y Male here w/ stroke.  Has history of uncontrolled hypertension. Continue multi-drug regimen above to keep SBP ~140-160, unless lower target granted by neurology.  CELENA was reassuringly normal.  Outpatient surveillance for AFib with MCOT or Loop recorder, given significant stroke in young aged patient.  Awaiting DC home.      Hawk Lizarraga M.D.  Cardiac Electrophysiology    office [phone]  pager [phone]

## 2024-10-24 NOTE — CONSULT NOTE ADULT - SUBJECTIVE AND OBJECTIVE BOX
Reason for consult: Hypercoagulable work up    HPI: 44M no AC/AP hx uncontrolled HTN tx LVS s/p flu-like symptoms and headache; CTH w/ left BG IPH w/ IVH extension. Exam: Telugu speaking, Ox3, PERRL, no drift, CHAPA 5/5, SILT. Per neurology, Etiology Likely hypertensive hemorrhage. 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 Heme/onc consulted for further evaluation, awaiting remainder of Hypercoagulable work up.    PAST MEDICAL & SURGICAL HISTORY:      FAMILY HISTORY:      Alochol: Denied  Smoking: Nonsmoker  Drug Use: Denied  Marital Status:         Allergies    No Known Allergies    Intolerances        MEDICATIONS  (STANDING):  amLODIPine   Tablet 10 milliGRAM(s) Oral daily  aspirin enteric coated 81 milliGRAM(s) Oral daily  enoxaparin Injectable 40 milliGRAM(s) SubCutaneous <User Schedule>  ezetimibe 10 milliGRAM(s) Oral daily  FLUoxetine 20 milliGRAM(s) Oral daily  hydrALAZINE 100 milliGRAM(s) Oral every 8 hours  labetalol 200 milliGRAM(s) Oral every 8 hours  losartan 100 milliGRAM(s) Oral daily  pantoprazole    Tablet 40 milliGRAM(s) Oral before breakfast  polyethylene glycol 3350 17 Gram(s) Oral every 12 hours  senna 2 Tablet(s) Oral at bedtime  sodium chloride 2 Gram(s) Oral every 6 hours    MEDICATIONS  (PRN):      ROS  No fever, sweats, chills  No epistaxis, HA, sore throat  No CP, SOB, cough, sputum  No n/v/d, abd pain, melena, hematochezia  No edema  No rash  No anxiety  No back pain, joint pain  No bleeding, bruising  No dysuria, hematuria    T(C): 37.1 (10-24-24 @ 08:00), Max: 37.1 (10-24-24 @ 08:00)  HR: 76 (10-24-24 @ 12:00) (64 - 89)  BP: 123/72 (10-24-24 @ 12:00) (105/64 - 139/85)  RR: 16 (10-24-24 @ 12:00) (12 - 16)  SpO2: 96% (10-24-24 @ 12:00) (95% - 100%)  Wt(kg): --    PE  NAD  Awake, alert  Anicteric, MMM  RRR  CTAB  Abd soft, NT, ND  No c/c/e  No rash grossly  FROM                          12.5   13.65 )-----------( 395      ( 24 Oct 2024 04:57 )             38.1       10-24    133[L]  |  98  |  28[H]  ----------------------------<  150[H]  4.0   |  20[L]  |  0.89    Ca    9.3      24 Oct 2024 04:58    TPro  7.3  /  Alb  3.9  /  TBili  0.9  /  DBili  x   /  AST  44[H]  /  ALT  223[H]  /  AlkPhos  97  10-24   Reason for consult: Hypercoagulable work up    HPI: 44M no AC/AP hx uncontrolled HTN tx LVS s/p flu-like symptoms and headache; CTH w/ left BG IPH w/ IVH extension. Exam: German speaking, Ox3, PERRL, no drift, CHAPA 5/5, SILT. Per neurology, Etiology Likely hypertensive hemorrhage. 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 Heme/onc consulted for further evaluation, awaiting remainder of Hypercoagulable work up. History obtained from sister at bedside. Reports his mental status has been up and down, has some forgetfulness.     PAST MEDICAL & SURGICAL HISTORY:      FAMILY HISTORY:      Alochol: Denied  Smoking: Nonsmoker  Drug Use: Denied  Marital Status:         Allergies    No Known Allergies    Intolerances        MEDICATIONS  (STANDING):  amLODIPine   Tablet 10 milliGRAM(s) Oral daily  aspirin enteric coated 81 milliGRAM(s) Oral daily  enoxaparin Injectable 40 milliGRAM(s) SubCutaneous <User Schedule>  ezetimibe 10 milliGRAM(s) Oral daily  FLUoxetine 20 milliGRAM(s) Oral daily  hydrALAZINE 100 milliGRAM(s) Oral every 8 hours  labetalol 200 milliGRAM(s) Oral every 8 hours  losartan 100 milliGRAM(s) Oral daily  pantoprazole    Tablet 40 milliGRAM(s) Oral before breakfast  polyethylene glycol 3350 17 Gram(s) Oral every 12 hours  senna 2 Tablet(s) Oral at bedtime  sodium chloride 2 Gram(s) Oral every 6 hours    MEDICATIONS  (PRN):      ROS  No fever, sweats, chills  No epistaxis, HA, sore throat  No CP, SOB, cough, sputum  No n/v/d, abd pain, melena, hematochezia  No edema  No rash  No anxiety  No back pain, joint pain  No bleeding, bruising  No dysuria, hematuria    T(C): 37.1 (10-24-24 @ 08:00), Max: 37.1 (10-24-24 @ 08:00)  HR: 76 (10-24-24 @ 12:00) (64 - 89)  BP: 123/72 (10-24-24 @ 12:00) (105/64 - 139/85)  RR: 16 (10-24-24 @ 12:00) (12 - 16)  SpO2: 96% (10-24-24 @ 12:00) (95% - 100%)  Wt(kg): --    PE  NAD  Awake, alert  Anicteric, MMM  Abd soft, NT  No c/c/e  No rash grossly                        12.5   13.65 )-----------( 395      ( 24 Oct 2024 04:57 )             38.1     10-24    133[L]  |  98  |  28[H]  ----------------------------<  150[H]  4.0   |  20[L]  |  0.89    Ca    9.3      24 Oct 2024 04:58    TPro  7.3  /  Alb  3.9  /  TBili  0.9  /  DBili  x   /  AST  44[H]  /  ALT  223[H]  /  AlkPhos  97  10-24

## 2024-10-24 NOTE — CONSULT NOTE ADULT - NS ATTEND AMEND GEN_ALL_CORE FT
Agree with above assessment and plan. 44 year old male with uncontrolled HTN now with hemorrhagic CVA. Hematology requested for hypercoag work up. LAC, protein c and ATIII negative. The elevation of protein C is reflective or recent infarction and can be an acute phase reactant. [place] follow up protein S and genetic testing. CT A/P to r/o malignancy   Outpatient follow up after discharge  Rest of care per neurology

## 2024-10-24 NOTE — PROGRESS NOTE ADULT - ASSESSMENT
Patient seen and examined, agree with above assessment and plan as transcribed above.    - CELENA with no pertinent findings    Bernardo Chilel MD, [facility]  BEEPER [phone]

## 2024-10-24 NOTE — CONSULT NOTE ADULT - ASSESSMENT
44M no AC/AP hx uncontrolled HTN tx LVS s/p flu-like symptoms and headache; CTH w/ left BG IPH w/ IVH extension and MRI w/ acute/subacute R corona radiata ischemic infarct.    Stroke  - CT w/ Intraparenchymal hematoma in the medial left basal ganglia with adjacent vasogenic edema, mass effect, 7 mm of left to right midline shift, and intraventricular extension of hemorrhage predominantly in the left lateral ventricle. Etiology Likely hypertensive hemorrhage.  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   - S/P cerebral angiogram on 10/23 which showed no source of the lesion.   - Hypercoagulable work up sent and CT CAP pending to r/o malignancy  - So far LA neg, protein C 164, ATIII normal  - LE duplex neg    Will continue to follow.    Himanshu Jeffries PA-C  Hematology/Oncology  [facility]  [phone] (office) 44M no AC/AP hx uncontrolled HTN tx LVS s/p flu-like symptoms and headache; CTH w/ left BG IPH w/ IVH extension and MRI w/ acute/subacute R corona radiata ischemic infarct.    Stroke  - CT w/ Intraparenchymal hematoma in the medial left basal ganglia with adjacent vasogenic edema, mass effect, 7 mm of left to right midline shift, and intraventricular extension of hemorrhage predominantly in the left lateral ventricle. Etiology Likely hypertensive hemorrhage.  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   - S/P cerebral angiogram on 10/23 which showed no source of the lesion.   - Hypercoagulable work up sent and CT CAP pending to r/o malignancy  - So far LA neg, protein C 164, ATIII normal  - LE duplex neg    Case to be reviewed w/ Dr. Gulilen.     Himanshu Jeffries PA-C  Hematology/Oncology  [facility]  [phone] (office) 44M no AC/AP hx uncontrolled HTN tx LVS s/p flu-like symptoms and headache; CTH w/ left BG IPH w/ IVH extension and MRI w/ acute/subacute R corona radiata ischemic infarct.    Stroke  - CT w/ Intraparenchymal hematoma in the medial left basal ganglia with adjacent vasogenic edema, mass effect, 7 mm of left to right midline shift, and intraventricular extension of hemorrhage predominantly in the left lateral ventricle. Etiology Likely hypertensive hemorrhage.  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   - S/P cerebral angiogram on 10/23 which showed no source of the lesion.   - Hypercoagulable work up sent and CT CAP pending to r/o malignancy  - So far LA neg, protein C 164, ATIII normal  - LE duplex neg        Himanshu Jeffries PA-C  Hematology/Oncology  [facility]  [phone] (office)

## 2024-10-24 NOTE — PROGRESS NOTE ADULT - SUBJECTIVE AND OBJECTIVE BOX
C A R D I O L O G Y  **********************************     DATE OF SERVICE: 10-24-24    Patient denies chest pain or shortness of breath.   Review of symptoms otherwise negative.    MEDICATIONS:  amLODIPine   Tablet 10 milliGRAM(s) Oral daily  aspirin enteric coated 81 milliGRAM(s) Oral daily  enoxaparin Injectable 40 milliGRAM(s) SubCutaneous <User Schedule>  ezetimibe 10 milliGRAM(s) Oral daily  FLUoxetine 20 milliGRAM(s) Oral daily  hydrALAZINE 100 milliGRAM(s) Oral every 8 hours  labetalol 200 milliGRAM(s) Oral every 8 hours  losartan 100 milliGRAM(s) Oral daily  pantoprazole    Tablet 40 milliGRAM(s) Oral before breakfast  polyethylene glycol 3350 17 Gram(s) Oral every 12 hours  senna 2 Tablet(s) Oral at bedtime  sodium chloride 2 Gram(s) Oral every 6 hours      LABS:                        12.5   13.65 )-----------( 395      ( 24 Oct 2024 04:57 )             38.1       Hemoglobin: 12.5 g/dL (10-24 @ 04:57)  Hemoglobin: 12.5 g/dL (10-23 @ 06:06)  Hemoglobin: 12.7 g/dL (10-22 @ 06:49)  Hemoglobin: 12.7 g/dL (10-21 @ 07:26)  Hemoglobin: 13.0 g/dL (10-20 @ 06:35)      10-24    133[L]  |  98  |  28[H]  ----------------------------<  150[H]  4.0   |  20[L]  |  0.89    Ca    9.3      24 Oct 2024 04:58    TPro  7.3  /  Alb  3.9  /  TBili  0.9  /  DBili  x   /  AST  44[H]  /  ALT  223[H]  /  AlkPhos  97  10-24    Creatinine Trend: 0.89<--, 0.91<--, 0.92<--, 0.89<--, 0.83<--, 0.96<--    COAGS:           PHYSICAL EXAM:  T(C): 37.1 (10-24-24 @ 08:00), Max: 37.1 (10-23-24 @ 11:35)  HR: 86 (10-24-24 @ 08:00) (64 - 89)  BP: 109/62 (10-24-24 @ 08:00) (91/51 - 150/86)  RR: 16 (10-24-24 @ 08:00) (12 - 16)  SpO2: 96% (10-24-24 @ 08:00) (95% - 100%)  Wt(kg): --    I&O's Summary    23 Oct 2024 07:01  -  24 Oct 2024 07:00  --------------------------------------------------------  IN: 720 mL / OUT: 1730 mL / NET: -1010 mL        Gen: NAD  HEENT:  (-)icterus (-)pallor  CV: N S1 S2 1/6 SOHEILA (+)2 Pulses B/l  Resp:  Clear to auscultation B/L, normal effort  GI: (+) BS Soft, NT, ND  Lymph:  (-)Edema, (-)obvious lymphadenopathy  Skin: Warm to touch, Normal turgor  Psych: Appropriate mood and affect      TELEMETRY: NSR	      RADIOLOGY:         CXR:   < from: Xray Chest 1 View- PORTABLE-Urgent (Xray Chest 1 View- PORTABLE-Urgent .) (10.16.24 @ 05:38) >  IMPRESSION:  Clear lungs.    < end of copied text >    < from: TTE W or WO Ultrasound Enhancing Agent (10.14.24 @ 08:46) >     CONCLUSIONS:      1. Fair study quality.   2. Left ventricular systolic function is normal with an ejection fraction of 60 % by Chua's method of disks.   3. Normal right ventricular systolic function.   4. No prior echocardiogram is available for comparison.    < end of copied text >      < from: CELENA W or WO Ultrasound Enhancing Agent (10.23.24 @ 11:47) >     CONCLUSIONS:      1. Agitated saline injection was negative for intracardiac shunt.   2. No thrombus seen in the left atrial, left atrial appendage, right atrial or right atrial appendage.   3. No pericardial effusion seen.   4. Normal right ventricular cavity size and normal right ventricular systolic function.   5. No evidence of left atrial or left atrial appendage thrombus.   6. Left ventricular systolic function is normal with an ejection fraction visually estimated at 55 to 60 %.    < end of copied text >    ASSESSMENT/PLAN: Patient is a 43 y/o Male with PMH of HTN who presented with headaches admitted for ICH. Cardiology consulted for HTN.    #ICH  - Likely hypertensive hemorrhage per neuro  - Management per neuro/neurosx  - MRI Brain noted with acute/subacute infarct in R corona radiata  - D/w neuro, concern for embolic stroke - CELENA with no thrombus, neg for PFO  - EP consult appreciated - Outpatient surveillance for AFib with MCOT or Loop recorder  - s/p negative cerebral angio    #HTN  - SBP goal <140 per neuro  - Continue Amlodipine 10mg daily, Labetalol 200mg q8hrs (can uptitrate further if remains above goal), Hydralazine 100mg q8hrs, and Losartan 100mg daily. Not on thiazide diuretics due to strict NA control per neuro.  - Suspect will improve further once weaned of sodium chloride tabs  - TTE with normal LV function  - Secondary HTN workup - Renal artery dopplers neg for KAITLYN, TSH WNL, can eventually send aldosterone/renin/metaneprhine/AM cortisol levels once off sodium chloride tabs     - No further inpatient cardiac w/u planned  - Will setup patient for office follow up prior to discharge    Davi Carrion PA-C  Pager: [phone]   C A R D I O L O G Y  **********************************     DATE OF SERVICE: 10-24-24    Patient denies chest pain or shortness of breath.   Review of symptoms otherwise negative.    MEDICATIONS:  amLODIPine   Tablet 10 milliGRAM(s) Oral daily  aspirin enteric coated 81 milliGRAM(s) Oral daily  enoxaparin Injectable 40 milliGRAM(s) SubCutaneous <User Schedule>  ezetimibe 10 milliGRAM(s) Oral daily  FLUoxetine 20 milliGRAM(s) Oral daily  hydrALAZINE 100 milliGRAM(s) Oral every 8 hours  labetalol 200 milliGRAM(s) Oral every 8 hours  losartan 100 milliGRAM(s) Oral daily  pantoprazole    Tablet 40 milliGRAM(s) Oral before breakfast  polyethylene glycol 3350 17 Gram(s) Oral every 12 hours  senna 2 Tablet(s) Oral at bedtime  sodium chloride 2 Gram(s) Oral every 6 hours      LABS:                        12.5   13.65 )-----------( 395      ( 24 Oct 2024 04:57 )             38.1       Hemoglobin: 12.5 g/dL (10-24 @ 04:57)  Hemoglobin: 12.5 g/dL (10-23 @ 06:06)  Hemoglobin: 12.7 g/dL (10-22 @ 06:49)  Hemoglobin: 12.7 g/dL (10-21 @ 07:26)  Hemoglobin: 13.0 g/dL (10-20 @ 06:35)      10-24    133[L]  |  98  |  28[H]  ----------------------------<  150[H]  4.0   |  20[L]  |  0.89    Ca    9.3      24 Oct 2024 04:58    TPro  7.3  /  Alb  3.9  /  TBili  0.9  /  DBili  x   /  AST  44[H]  /  ALT  223[H]  /  AlkPhos  97  10-24    Creatinine Trend: 0.89<--, 0.91<--, 0.92<--, 0.89<--, 0.83<--, 0.96<--    COAGS:           PHYSICAL EXAM:  T(C): 37.1 (10-24-24 @ 08:00), Max: 37.1 (10-23-24 @ 11:35)  HR: 86 (10-24-24 @ 08:00) (64 - 89)  BP: 109/62 (10-24-24 @ 08:00) (91/51 - 150/86)  RR: 16 (10-24-24 @ 08:00) (12 - 16)  SpO2: 96% (10-24-24 @ 08:00) (95% - 100%)  Wt(kg): --    I&O's Summary    23 Oct 2024 07:01  -  24 Oct 2024 07:00  --------------------------------------------------------  IN: 720 mL / OUT: 1730 mL / NET: -1010 mL        Gen: NAD  HEENT:  (-)icterus (-)pallor  CV: N S1 S2 1/6 SOHEILA (+)2 Pulses B/l  Resp:  Clear to auscultation B/L, normal effort  GI: (+) BS Soft, NT, ND  Lymph:  (-)Edema, (-)obvious lymphadenopathy  Skin: Warm to touch, Normal turgor  Psych: Appropriate mood and affect      TELEMETRY: NSR	      RADIOLOGY:         CXR:   < from: Xray Chest 1 View- PORTABLE-Urgent (Xray Chest 1 View- PORTABLE-Urgent .) (10.16.24 @ 05:38) >  IMPRESSION:  Clear lungs.    < end of copied text >    < from: TTE W or WO Ultrasound Enhancing Agent (10.14.24 @ 08:46) >     CONCLUSIONS:      1. Fair study quality.   2. Left ventricular systolic function is normal with an ejection fraction of 60 % by Chua's method of disks.   3. Normal right ventricular systolic function.   4. No prior echocardiogram is available for comparison.    < end of copied text >      < from: CELENA W or WO Ultrasound Enhancing Agent (10.23.24 @ 11:47) >     CONCLUSIONS:      1. Agitated saline injection was negative for intracardiac shunt.   2. No thrombus seen in the left atrial, left atrial appendage, right atrial or right atrial appendage.   3. No pericardial effusion seen.   4. Normal right ventricular cavity size and normal right ventricular systolic function.   5. No evidence of left atrial or left atrial appendage thrombus.   6. Left ventricular systolic function is normal with an ejection fraction visually estimated at 55 to 60 %.    < end of copied text >    ASSESSMENT/PLAN: Patient is a 43 y/o Male with PMH of HTN who presented with headaches admitted for ICH. Cardiology consulted for HTN.    #ICH  - Likely hypertensive hemorrhage per neuro  - Management per neuro/neurosx  - MRI Brain noted with acute/subacute infarct in R corona radiata  - D/w neuro, concern for embolic stroke - CELENA with no thrombus, neg for PFO  - EP consult appreciated - Outpatient surveillance for AFib with MCOT or Loop recorder  - s/p negative cerebral angio  - CT C/A/P pending r/o malignancy    #HTN  - SBP goal <140 per neuro  - Continue Amlodipine 10mg daily, Labetalol 200mg q8hrs (can uptitrate further if remains above goal), Hydralazine 100mg q8hrs, and Losartan 100mg daily. Not on thiazide diuretics due to strict NA control per neuro.  - Suspect will improve further once weaned of sodium chloride tabs  - TTE with normal LV function  - Secondary HTN workup - Renal artery dopplers neg for KAITLYN, TSH WNL, can eventually send aldosterone/renin/metaneprhine/AM cortisol levels once off sodium chloride tabs     - No further inpatient cardiac w/u planned  - Will setup patient for office follow up prior to discharge    Davi Carrion PA-C  Pager: [phone]

## 2024-10-24 NOTE — PROGRESS NOTE ADULT - ASSESSMENT
Patient is a 44 year old male with a PMHx of uncontrolled HTN, HLD, who presented with a chief complaint of headache, emesis, dizziness and was found to have left IPH with IVH. CTH with left BG IPH and IVH. Internal Medicine has been consulted on Mr. Ferro's care for medical management.       CVA  - Per Neuro, likely 2/2 hypertensive hemorrhage with etiology likely ESUS  - CTs as noted   - MR w/ parenchymal hematoma in L basal ganglia, IVH, with mild distention of temporal horns, 5 mm acute/subacute infarct in R corona radiata   - TTE w/ EF 60%, normal LA  - Planned for CELENA, F/u results   - EEG negative per Neuro   - Recommend ILR to screen for occult arrhythmia  - W/ concern for inflammatory process, F/u labs sent  - Planned for diagnostic cerebral angiogram   - On ASA and Statin therapy   - Neuro checks per protocol   - Monitor on tele   - PT / OT / S+S   - Fall, Seizure, Aspiration precautions   - Per neurology   - EP and Cardio eval appreciated; F/u recs    Fever, Leukocytosis   - Patient febrile 10/16 w/ Tmax of 100.9F  - BCx2 w/ NGTD   - 10/14 and 10/23 LE Duplex negative for DVT   - Trend CBC, temp curve, VS and monitor patient     Transaminitis  - US ABD w/ hepatic steatosis, no evidence of acute cholecystitis  - Statin DC'd and ordered for Zetia  - F/u hepatitis panel    - Continue to monitor and trend  - Serial abdominal examinations  - If up-trends, GI eval may be of benefit     Hyponatremia   - Weaned off of 2% HTS, On NaCl tabs 2g  - Na parameters as per Stroke Neurology   - Avoid overcorrection > 6-8 mEq in 24 hours     HTN   - On amlodipine, labetalol, hydralazine and losartan.   - Renal artery doppler: no evidence of KAITLYN.   - TSH WNL           PPX

## 2024-10-24 NOTE — PROGRESS NOTE ADULT - SUBJECTIVE AND OBJECTIVE BOX
THE PATIENT WAS SEEN AND EXAMINED BY ME WITH THE HOUSESTAFF AND STROKE TEAM DURING MORNING ROUNDS.   HPI:44M no AC/AP hx uncontrolled HTN tx LVS s/p flu-like symptoms and headache; CTH w/ left BG IPH w/ IVH extension. Exam: Frisian speaking, Ox3, PERRL, no drift, CHAPA 5/5, SILT (14 Oct 2024 04:30)    SUBJECTIVE: No events overnight.  No new neurologic complaints.      amLODIPine   Tablet 10 milliGRAM(s) Oral daily  aspirin enteric coated 81 milliGRAM(s) Oral daily  enoxaparin Injectable 40 milliGRAM(s) SubCutaneous <User Schedule>  ezetimibe 10 milliGRAM(s) Oral daily  hydrALAZINE 100 milliGRAM(s) Oral every 8 hours  labetalol 200 milliGRAM(s) Oral every 8 hours  losartan 100 milliGRAM(s) Oral daily  pantoprazole    Tablet 40 milliGRAM(s) Oral before breakfast  polyethylene glycol 3350 17 Gram(s) Oral every 12 hours  senna 2 Tablet(s) Oral at bedtime  sodium chloride 2 Gram(s) Oral every 6 hours      PHYSICAL EXAM:   Vital Signs Last 24 Hrs  T(C): 36.8 (24 Oct 2024 03:00), Max: 37.1 (23 Oct 2024 11:35)  T(F): 98.2 (24 Oct 2024 03:00), Max: 98.5 (23 Oct 2024 13:28)  HR: 85 (24 Oct 2024 05:00) (64 - 89)  BP: 118/66 (24 Oct 2024 05:00) (91/51 - 150/86)  BP(mean): 84 (24 Oct 2024 05:00) (78 - 111)  RR: 14 (24 Oct 2024 05:00) (12 - 16)  SpO2: 96% (24 Oct 2024 05:00) (95% - 100%)    Parameters below as of 24 Oct 2024 05:00  Patient On (Oxygen Delivery Method): room air    General: No acute distress  HEENT: EOM intact, visual fields full  Abdomen: Soft, nontender, nondistended   Extremities: No edema    NEUROLOGICAL EXAM:  Mental status: Awake and alert. Oriented to self. States year with choices. States "doctors office" for location. Hypophonic speech. Identifies objects. Follows some simple commands, unable to follow cross commands.  Cranial Nerves: No facial asymmetry, mild left gaze preference, no nystagmus, no dysarthria,  tongue midline  Motor exam: Normal tone, no drift, 5/5 RUE, 5/5 RLE, 5/5 LUE, 5/5 LLE, normal fine finger movements.  Sensation: Intact to light touch   Coordination/ Gait: No dysmetria, LORA intact and symmetric bilaterally    LABS:                        12.5   13.65 )-----------( 395      ( 24 Oct 2024 04:57 )             38.1    10-24    133[L]  |  98  |  28[H]  ----------------------------<  150[H]  4.0   |  20[L]  |  0.89    Ca    9.3      24 Oct 2024 04:58    TPro  7.3  /  Alb  3.9  /  TBili  0.9  /  DBili  x   /  AST  44[H]  /  ALT  223[H]  /  AlkPhos  97  10-24        IMAGING: Reviewed by me.     CTH 10/19/24: No change since 10/15/2024. Left corona radiata hemorrhage with intraventricular extension. Mild midline shift to the right.    MRI Brain w/wo 10/18/24:  1. Stable parenchymal hematoma in the left basal ganglia, without evidence of an underlying hemorrhagic mass.  2. Stable IVH, with mild distention of the temporal horns.  *** ADDENDUM # 1 ***  On further review, there is a 5 mm acute/subacute infarct in the right corona radiata. The lesion shows low ADC values and T2/FLAIR   hyperintensity, consistent with an event between 24 hours and 7 days in age.      CTH 10/15/24: Interval stability compared with the prior 10/14/2024 in left basal ganglia acute hematoma with intraventricular extension. No evidence of rehemorrhage. No change in the ventricle size.      CTH 10/14/24 10am: Left basal ganglia lucency suspicious for infarct. Left corona radiata hemorrhage with intraventricular extension and mild ventricular dilatation, no change since 4:46 AM.    10/14/24 4am  CT HEAD: Stable left medial basal ganglia parenchymal hemorrhage with associated intraventricular extension of hemorrhage. Unchanged edema within the adjacent left basal ganglia as well as rightward bulging of the septum pellucidum.  CTA NECK: No evidence of significant stenosis or occlusion.  CTA HEAD: No large vessel occlusion, significant stenosis or vascular abnormality identified.    CTH 10/14/24 1am: Intraparenchymal hematoma in the medial left basal ganglia with adjacent vasogenic edema, mass effect, 7 mm of left to right midline shift, and intraventricular extension of hemorrhage predominantly in the left lateral ventricle.    CELENA:     1. Agitated saline injection was negative for intracardiac shunt.   2. No thrombus seen in the left atrial, left atrial appendage, right atrial or right atrial appendage.   3. No pericardial effusion seen.   4. Normal right ventricular cavity size and normal right ventricular systolic function.   5. No evidence of left atrial or left atrial appendage thrombus.   6. Left ventricular systolic function is normal with an ejection fraction visually estimated at 55 to 60 %.       THE PATIENT WAS SEEN AND EXAMINED BY ME WITH THE HOUSESTAFF AND STROKE TEAM DURING MORNING ROUNDS.   HPI:44M no AC/AP hx uncontrolled HTN tx LVS s/p flu-like symptoms and headache; CTH w/ left BG IPH w/ IVH extension. Exam: Maltese speaking, Ox3, PERRL, no drift, CHAPA 5/5, SILT (14 Oct 2024 04:30)    SUBJECTIVE: No events overnight.  No new neurologic complaints.      amLODIPine   Tablet 10 milliGRAM(s) Oral daily  aspirin enteric coated 81 milliGRAM(s) Oral daily  enoxaparin Injectable 40 milliGRAM(s) SubCutaneous <User Schedule>  ezetimibe 10 milliGRAM(s) Oral daily  hydrALAZINE 100 milliGRAM(s) Oral every 8 hours  labetalol 200 milliGRAM(s) Oral every 8 hours  losartan 100 milliGRAM(s) Oral daily  pantoprazole    Tablet 40 milliGRAM(s) Oral before breakfast  polyethylene glycol 3350 17 Gram(s) Oral every 12 hours  senna 2 Tablet(s) Oral at bedtime  sodium chloride 2 Gram(s) Oral every 6 hours      PHYSICAL EXAM:   Vital Signs Last 24 Hrs  T(C): 36.8 (24 Oct 2024 03:00), Max: 37.1 (23 Oct 2024 11:35)  T(F): 98.2 (24 Oct 2024 03:00), Max: 98.5 (23 Oct 2024 13:28)  HR: 85 (24 Oct 2024 05:00) (64 - 89)  BP: 118/66 (24 Oct 2024 05:00) (91/51 - 150/86)  BP(mean): 84 (24 Oct 2024 05:00) (78 - 111)  RR: 14 (24 Oct 2024 05:00) (12 - 16)  SpO2: 96% (24 Oct 2024 05:00) (95% - 100%)    Parameters below as of 24 Oct 2024 05:00  Patient On (Oxygen Delivery Method): room air     ID #: [id number]  General: No acute distress  HEENT: EOM intact, visual fields full  Abdomen: Soft, nontender, nondistended   Extremities: No edema    NEUROLOGICAL EXAM:  Mental status: Eyes closed, open to verbal stimuli.  Oriented to self. States year with choices. States "doctors office" for location. Hypophonic speech. Identifies objects. Follows some simple commands, unable to follow cross commands.  Cranial Nerves: No facial asymmetry, mild left gaze preference, no nystagmus, no dysarthria,  tongue midline  Motor exam: Normal tone, no drift, 5/5 RUE, 5/5 RLE, 5/5 LUE, 5/5 LLE, normal fine finger movements.  Sensation: Intact to light touch   Coordination/ Gait: No dysmetria, gait deferred.     LABS:                        12.5   13.65 )-----------( 395      ( 24 Oct 2024 04:57 )             38.1    10-24    133[L]  |  98  |  28[H]  ----------------------------<  150[H]  4.0   |  20[L]  |  0.89    Ca    9.3      24 Oct 2024 04:58    TPro  7.3  /  Alb  3.9  /  TBili  0.9  /  DBili  x   /  AST  44[H]  /  ALT  223[H]  /  AlkPhos  97  10-24        IMAGING: Reviewed by me.     CTH 10/19/24: No change since 10/15/2024. Left corona radiata hemorrhage with intraventricular extension. Mild midline shift to the right.    MRI Brain w/wo 10/18/24:  1. Stable parenchymal hematoma in the left basal ganglia, without evidence of an underlying hemorrhagic mass.  2. Stable IVH, with mild distention of the temporal horns.  *** ADDENDUM # 1 ***  On further review, there is a 5 mm acute/subacute infarct in the right corona radiata. The lesion shows low ADC values and T2/FLAIR   hyperintensity, consistent with an event between 24 hours and 7 days in age.      CTH 10/15/24: Interval stability compared with the prior 10/14/2024 in left basal ganglia acute hematoma with intraventricular extension. No evidence of rehemorrhage. No change in the ventricle size.      CTH 10/14/24 10am: Left basal ganglia lucency suspicious for infarct. Left corona radiata hemorrhage with intraventricular extension and mild ventricular dilatation, no change since 4:46 AM.    10/14/24 4am  CT HEAD: Stable left medial basal ganglia parenchymal hemorrhage with associated intraventricular extension of hemorrhage. Unchanged edema within the adjacent left basal ganglia as well as rightward bulging of the septum pellucidum.  CTA NECK: No evidence of significant stenosis or occlusion.  CTA HEAD: No large vessel occlusion, significant stenosis or vascular abnormality identified.    CTH 10/14/24 1am: Intraparenchymal hematoma in the medial left basal ganglia with adjacent vasogenic edema, mass effect, 7 mm of left to right midline shift, and intraventricular extension of hemorrhage predominantly in the left lateral ventricle.    CELENA:     1. Agitated saline injection was negative for intracardiac shunt.   2. No thrombus seen in the left atrial, left atrial appendage, right atrial or right atrial appendage.   3. No pericardial effusion seen.   4. Normal right ventricular cavity size and normal right ventricular systolic function.   5. No evidence of left atrial or left atrial appendage thrombus.   6. Left ventricular systolic function is normal with an ejection fraction visually estimated at 55 to 60 %.

## 2024-10-24 NOTE — CHART NOTE - NSCHARTNOTEFT_GEN_A_CORE
Safeguard removed per IR instructions at 2 am. Patient tolerated procedure well. Manual compression held to hemostasis, no active bleeding, no hematoma or pain. Dressing placed. Pulses palpable. Continue pulse checks per order. Discussed with RN.

## 2024-10-25 LAB
ALBUMIN SERPL ELPH-MCNC: 3.8 G/DL — SIGNIFICANT CHANGE UP (ref 3.3–5)
ALP SERPL-CCNC: 98 U/L — SIGNIFICANT CHANGE UP (ref 40–120)
ALT FLD-CCNC: 202 U/L — HIGH (ref 10–45)
ANION GAP SERPL CALC-SCNC: 14 MMOL/L — SIGNIFICANT CHANGE UP (ref 5–17)
APCR PPP: 2.6 RATIO — SIGNIFICANT CHANGE UP
AST SERPL-CCNC: 41 U/L — HIGH (ref 10–40)
BILIRUB SERPL-MCNC: 0.8 MG/DL — SIGNIFICANT CHANGE UP (ref 0.2–1.2)
BUN SERPL-MCNC: 24 MG/DL — HIGH (ref 7–23)
CALCIUM SERPL-MCNC: 9.4 MG/DL — SIGNIFICANT CHANGE UP (ref 8.4–10.5)
CHLORIDE SERPL-SCNC: 99 MMOL/L — SIGNIFICANT CHANGE UP (ref 96–108)
CO2 SERPL-SCNC: 21 MMOL/L — LOW (ref 22–31)
CREAT SERPL-MCNC: 0.96 MG/DL — SIGNIFICANT CHANGE UP (ref 0.5–1.3)
EGFR: 101 ML/MIN/1.73M2 — SIGNIFICANT CHANGE UP
GLUCOSE SERPL-MCNC: 89 MG/DL — SIGNIFICANT CHANGE UP (ref 70–99)
HBV SURFACE AG SER-ACNC: SIGNIFICANT CHANGE UP
HCT VFR BLD CALC: 36.9 % — LOW (ref 39–50)
HGB BLD-MCNC: 12.1 G/DL — LOW (ref 13–17)
MCHC RBC-ENTMCNC: 28.3 PG — SIGNIFICANT CHANGE UP (ref 27–34)
MCHC RBC-ENTMCNC: 32.8 GM/DL — SIGNIFICANT CHANGE UP (ref 32–36)
MCV RBC AUTO: 86.2 FL — SIGNIFICANT CHANGE UP (ref 80–100)
NRBC # BLD: 0 /100 WBCS — SIGNIFICANT CHANGE UP (ref 0–0)
PLATELET # BLD AUTO: 367 K/UL — SIGNIFICANT CHANGE UP (ref 150–400)
POTASSIUM SERPL-MCNC: 4.3 MMOL/L — SIGNIFICANT CHANGE UP (ref 3.5–5.3)
POTASSIUM SERPL-SCNC: 4.3 MMOL/L — SIGNIFICANT CHANGE UP (ref 3.5–5.3)
PROT SERPL-MCNC: 7.3 G/DL — SIGNIFICANT CHANGE UP (ref 6–8.3)
RBC # BLD: 4.28 M/UL — SIGNIFICANT CHANGE UP (ref 4.2–5.8)
RBC # FLD: 12.8 % — SIGNIFICANT CHANGE UP (ref 10.3–14.5)
SODIUM SERPL-SCNC: 134 MMOL/L — LOW (ref 135–145)
WBC # BLD: 13.44 K/UL — HIGH (ref 3.8–10.5)
WBC # FLD AUTO: 13.44 K/UL — HIGH (ref 3.8–10.5)

## 2024-10-25 PROCEDURE — 71260 CT THORAX DX C+: CPT | Mod: 26

## 2024-10-25 PROCEDURE — 99233 SBSQ HOSP IP/OBS HIGH 50: CPT

## 2024-10-25 PROCEDURE — 70450 CT HEAD/BRAIN W/O DYE: CPT | Mod: 26

## 2024-10-25 PROCEDURE — 74177 CT ABD & PELVIS W/CONTRAST: CPT | Mod: 26

## 2024-10-25 PROCEDURE — 99232 SBSQ HOSP IP/OBS MODERATE 35: CPT

## 2024-10-25 RX ORDER — HYDRALAZINE HYDROCHLORIDE 50 MG/1
25 TABLET, FILM COATED ORAL EVERY 8 HOURS
Refills: 0 | Status: DISCONTINUED | OUTPATIENT
Start: 2024-10-25 | End: 2024-10-30

## 2024-10-25 RX ORDER — ACETAMINOPHEN 500 MG
650 TABLET ORAL EVERY 6 HOURS
Refills: 0 | Status: DISCONTINUED | OUTPATIENT
Start: 2024-10-25 | End: 2024-11-04

## 2024-10-25 RX ADMIN — HYDRALAZINE HYDROCHLORIDE 25 MILLIGRAM(S): 50 TABLET, FILM COATED ORAL at 21:52

## 2024-10-25 RX ADMIN — SODIUM CHLORIDE 2 GRAM(S): 9 INJECTION, SOLUTION INTRAMUSCULAR; INTRAVENOUS; SUBCUTANEOUS at 18:05

## 2024-10-25 RX ADMIN — SODIUM CHLORIDE 2 GRAM(S): 9 INJECTION, SOLUTION INTRAMUSCULAR; INTRAVENOUS; SUBCUTANEOUS at 05:35

## 2024-10-25 RX ADMIN — Medication 200 MILLIGRAM(S): at 21:52

## 2024-10-25 RX ADMIN — Medication 650 MILLIGRAM(S): at 22:26

## 2024-10-25 RX ADMIN — LOSARTAN POTASSIUM 100 MILLIGRAM(S): 25 TABLET ORAL at 05:38

## 2024-10-25 RX ADMIN — POLYETHYLENE GLYCOL 3350 17 GRAM(S): 17 POWDER, FOR SOLUTION ORAL at 05:36

## 2024-10-25 RX ADMIN — PANTOPRAZOLE SODIUM 40 MILLIGRAM(S): 40 TABLET, DELAYED RELEASE ORAL at 05:35

## 2024-10-25 RX ADMIN — Medication 200 MILLIGRAM(S): at 05:35

## 2024-10-25 RX ADMIN — HYDRALAZINE HYDROCHLORIDE 100 MILLIGRAM(S): 50 TABLET, FILM COATED ORAL at 05:36

## 2024-10-25 RX ADMIN — Medication 10 MILLIGRAM(S): at 05:35

## 2024-10-25 RX ADMIN — Medication 40 MILLIGRAM(S): at 18:05

## 2024-10-25 RX ADMIN — SODIUM CHLORIDE 2 GRAM(S): 9 INJECTION, SOLUTION INTRAMUSCULAR; INTRAVENOUS; SUBCUTANEOUS at 12:12

## 2024-10-25 RX ADMIN — EZETIMIBE 10 MILLIGRAM(S): 10 TABLET ORAL at 12:12

## 2024-10-25 RX ADMIN — Medication 20 MILLIGRAM(S): at 12:12

## 2024-10-25 RX ADMIN — Medication 2 TABLET(S): at 21:52

## 2024-10-25 RX ADMIN — MODAFINIL 100 MILLIGRAM(S): 200 TABLET ORAL at 08:39

## 2024-10-25 RX ADMIN — Medication 81 MILLIGRAM(S): at 12:12

## 2024-10-25 RX ADMIN — SODIUM CHLORIDE 2 GRAM(S): 9 INJECTION, SOLUTION INTRAMUSCULAR; INTRAVENOUS; SUBCUTANEOUS at 23:13

## 2024-10-25 NOTE — PROGRESS NOTE ADULT - SUBJECTIVE AND OBJECTIVE BOX
Patient seen for rehab follow up  CC: CVA    no new complaints  family at bedside    REVIEW OF SYSTEMS  Constitutional - No fever,  No fatigue  HEENT - No vertigo, No neck pain  Neurological - No headaches, No memory loss  Psychiatric - No depression, No anxiety    FUNCTIONAL PROGRESS  PT 10/24  bed mobility CG  transfers CG with RW  gait CG with RW x 45 feet     OT 10/22  transfers min assist with RW   dressing  min assist     VITALS  T(C): 36.9 (10-25-24 @ 08:00), Max: 37.1 (10-24-24 @ 16:00)  HR: 64 (10-25-24 @ 08:00) (64 - 97)  BP: 115/65 (10-25-24 @ 08:00) (104/59 - 126/76)  RR: 12 (10-25-24 @ 08:00) (12 - 20)  SpO2: 97% (10-25-24 @ 08:00) (95% - 99%)  Wt(kg): --    MEDICATIONS   amLODIPine   Tablet 10 milliGRAM(s) daily  aspirin enteric coated 81 milliGRAM(s) daily  enoxaparin Injectable 40 milliGRAM(s) <User Schedule>  ezetimibe 10 milliGRAM(s) daily  FLUoxetine 20 milliGRAM(s) daily  hydrALAZINE 100 milliGRAM(s) every 8 hours  labetalol 200 milliGRAM(s) every 8 hours  losartan 100 milliGRAM(s) daily  modafinil 100 milliGRAM(s) <User Schedule>  pantoprazole    Tablet 40 milliGRAM(s) before breakfast  polyethylene glycol 3350 17 Gram(s) every 12 hours  senna 2 Tablet(s) at bedtime  sodium chloride 2 Gram(s) every 6 hours      RECENT LABS - Reviewed                        12.1   13.44 )-----------( 367      ( 25 Oct 2024 06:59 )             36.9     10-25    134[L]  |  99  |  24[H]  ----------------------------<  89  4.3   |  21[L]  |  0.96    Ca    9.4      25 Oct 2024 06:58    TPro  7.3  /  Alb  3.8  /  TBili  0.8  /  DBili  x   /  AST  41[H]  /  ALT  202[H]  /  AlkPhos  98  10-25      Urinalysis Basic - ( 25 Oct 2024 06:58 )    Color: x / Appearance: x / SG: x / pH: x  Gluc: 89 mg/dL / Ketone: x  / Bili: x / Urobili: x   Blood: x / Protein: x / Nitrite: x   Leuk Esterase: x / RBC: x / WBC x   Sq Epi: x / Non Sq Epi: x / Bacteria: x        < from: CT Head No Cont (10.19.24 @ 22:36) >    IMPRESSION: No change since 10/15/2024. Left corona radiata hemorrhage   with intraventricular extension. Mild midline shift to the right.      < end of copied text >    < from: MR Head w/wo IV Cont (10.18.24 @ 16:32) >    IMPRESSION:    1.  Stable parenchymal hematoma in the left basal ganglia, without   evidence of an underlying hemorrhagic mass.  2.  Stable IVH, with mild distention of the temporal horns.      < end of copied text >      ----------------------------------------------------------------------------------------  PHYSICAL EXAM  Constitutional - NAD, Comfortable, in bed   Chest - Breathing comfortably on room air   Cardiovascular - S1S2   Extremities - No C/C/E, No calf tenderness   Neurologic Exam -    follows commands                 Cognitive - Awake, Alert, AAO to self, place, year     Communication - hypophonic, decreased attention?        Motor - moves all ext      Sensory - Intact to LT     Psychiatric - Mood stable, Affect WNL  ----------------------------------------------------------------------------------------  ASSESSMENT/PLAN  44yMale h/o HTN with functional deficits after CVA  MRI with acute/subacute right corona radiata infarct, stable left corona radiata bleed with mid line shift  EEG negative, on modafinil and fluoxetine   HTN, on multiple medications, continue to monitor   seen by EP for ILR, follow up as outpatient   DVT PPX - SCDs lovenox   Rehab - Will continue to follow for ongoing rehab needs and recommendations.    continue bedside therapy while admitted to prevent secondary complications of immobility, bed mobility, transfer training, progressive ambulation, equipment evaluation, ADLs, bracing/splinting   OOB throughout the day with staff, OOB to chair with meals          Recommend ACUTE inpatient rehabilitation for the functional deficits consisting of 3 hours of multidisciplinary intense therapy/day x 1-2 weeks depending on progress at rehabilitation facility, 24 hour RN/daily PMR physician for comorbid medical management. Patient will be able to participate in and benefit from intense rehabilitation therapies for 3 hours a day to maximize independence.          35 minutes spent on total encounter  with chart review of PT/OT/SLP notes, exam, imaging, counseling and education on inpatient rehabilitation, coordination of care with rehab team and

## 2024-10-25 NOTE — PROGRESS NOTE ADULT - ASSESSMENT
44M no AC/AP hx uncontrolled HTN transferred to [facility] s/p flu-like symptoms and headache; found to have a acute/subacute R corona radiata ischemic infarct.    Stroke, history of HTN  - CT w/ Intraparenchymal hematoma in the medial left basal ganglia with adjacent vasogenic edema, mass effect, 7 mm of left to right midline shift, and intraventricular extension of hemorrhage predominantly in the left lateral ventricle. Etiology Likely hypertensive hemorrhage.  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   - S/P cerebral angiogram on 10/23 which showed no source of the lesion.   - Hypercoagulable work up and CT CAP pending to r/o malignancy  - LA and beta 2 glycoprotein negative, protein C 164, ATIII normal. The elevation of protein C is reflective or recent infarction and can be an acute phase reactant.  - LE duplex neg    Will continue to follow.    Himanshu Jeffries PA-C  Hematology/Oncology  [facility]  [phone] (office)

## 2024-10-25 NOTE — PROGRESS NOTE ADULT - ASSESSMENT
Agree with above assessment and plan as outlined above.    - plan for oupt [facility]    Bernardo Chilel MD, [facility]  BEEPER [phone]

## 2024-10-25 NOTE — PROGRESS NOTE ADULT - SUBJECTIVE AND OBJECTIVE BOX
C A R D I O L O G Y  **********************************     DATE OF SERVICE: 10-25-24    Patient denies chest pain or shortness of breath.   Review of symptoms otherwise negative.    MEDICATIONS:  amLODIPine   Tablet 10 milliGRAM(s) Oral daily  aspirin enteric coated 81 milliGRAM(s) Oral daily  enoxaparin Injectable 40 milliGRAM(s) SubCutaneous <User Schedule>  ezetimibe 10 milliGRAM(s) Oral daily  FLUoxetine 20 milliGRAM(s) Oral daily  hydrALAZINE 100 milliGRAM(s) Oral every 8 hours  labetalol 200 milliGRAM(s) Oral every 8 hours  losartan 100 milliGRAM(s) Oral daily  modafinil 100 milliGRAM(s) Oral <User Schedule>  pantoprazole    Tablet 40 milliGRAM(s) Oral before breakfast  polyethylene glycol 3350 17 Gram(s) Oral every 12 hours  senna 2 Tablet(s) Oral at bedtime  sodium chloride 2 Gram(s) Oral every 6 hours                            12.1   13.44 )-----------( 367      ( 25 Oct 2024 06:59 )             36.9       134[L]  |  99  |  24[H]  ----------------------------<  89  4.3   |  21[L]  |  0.96    Ca    9.4      25 Oct 2024 06:58    TPro  7.3  /  Alb  3.8  /  TBili  0.8  /  DBili  x   /  AST  41[H]  /  ALT  202[H]  /  AlkPhos  98  10-25      T(C): 36.9 (10-25-24 @ 08:00), Max: 37.1 (10-24-24 @ 16:00)  HR: 64 (10-25-24 @ 08:00) (64 - 97)  BP: 115/65 (10-25-24 @ 08:00) (104/59 - 126/76)  RR: 12 (10-25-24 @ 08:00) (12 - 20)  SpO2: 97% (10-25-24 @ 08:00) (95% - 99%)  Wt(kg): --    I&O's Summary    24 Oct 2024 07:01  -  25 Oct 2024 07:00  --------------------------------------------------------  IN: 0 mL / OUT: 2250 mL / NET: -2250 mL    Gen: NAD  HEENT:  (-)icterus (-)pallor  CV: N S1 S2 1/6 SOHEILA (+)2 Pulses B/l  Resp:  Clear to auscultation B/L, normal effort  GI: (+) BS Soft, NT, ND  Lymph:  (-)Edema, (-)obvious lymphadenopathy  Skin: Warm to touch, Normal turgor  Psych: Appropriate mood and affect      TELEMETRY: NSR	      RADIOLOGY:  < from: Xray Chest 1 View- PORTABLE-Urgent (Xray Chest 1 View- PORTABLE-Urgent .) (10.16.24 @ 05:38) >  IMPRESSION:  Clear lungs.  < end of copied text >    < from: TTE W or WO Ultrasound Enhancing Agent (10.14.24 @ 08:46) >  CONCLUSIONS:   1. Fair study quality.   2. Left ventricular systolic function is normal with an ejection fraction of 60 % by Chua's method of disks.   3. Normal right ventricular systolic function.   4. No prior echocardiogram is available for comparison.  < end of copied text >      < from: CELENA W or WO Ultrasound Enhancing Agent (10.23.24 @ 11:47) >  CONCLUSIONS:     1. Agitated saline injection was negative for intracardiac shunt.   2. No thrombus seen in the left atrial, left atrial appendage, right atrial or right atrial appendage.   3. No pericardial effusion seen.   4. Normal right ventricular cavity size and normal right ventricular systolic function.   5. No evidence of left atrial or left atrial appendage thrombus.   6. Left ventricular systolic function is normal with an ejection fraction visually estimated at 55 to 60 %.  < end of copied text >    ASSESSMENT/PLAN: Patient is a 43 y/o Male with PMH of HTN who presented with headaches admitted for ICH. Cardiology consulted for HTN.    #ICH  - Likely hypertensive hemorrhage per neuro  - Management per neuro/neurosx  - MRI Brain noted with acute/subacute infarct in R corona radiata  - D/w neuro, concern for embolic stroke - CELENA with no thrombus, neg for PFO  - EP consult appreciated - Outpatient surveillance for AFib with [facility] or [facility]  - s/p negative cerebral angio  - CT C/A/P pending r/o malignancy    #HTN  - SBP goal <140 per neuro  - Continue Amlodipine 10mg daily, Labetalol 200mg q8hrs (can uptitrate further if remains above goal), Hydralazine 100mg q8hrs, and Losartan 100mg daily. Not on thiazide diuretics due to strict NA control per neuro.  - Suspect will improve further once weaned of sodium chloride tabs  - TTE with normal LV function  - Secondary HTN workup - Renal artery dopplers neg for KAITLYN, TSH WNL, can eventually send aldosterone/renin/metaneprhine/AM cortisol levels once off sodium chloride tabs     - No further inpatient cardiac w/u planned  - Will setup patient for office follow up prior to discharge    Enedina GARAY  [phone]

## 2024-10-25 NOTE — PROGRESS NOTE ADULT - NS ATTEND AMEND GEN_ALL_CORE FT
COUNSELING: Agree with above assessment and plan. 44 year old male with uncontrolled HTN now with hemorrhagic CVA. Hematology requested for hypercoag work up. LAC, protein c and ATIII negative. The elevation of protein C is reflective or recent infarction and can be an acute phase reactant. [place] follow up protein S and genetic testing. CT A/P to r/o malignancy   Outpatient follow up after discharge  Rest of care per neurology Agree with above assessment and plan.   44 year old male with uncontrolled HTN now with hemorrhagic CVA. Hematology requested for hypercoag work up. APLS labs, protein c and ATIII negative. The elevation of protein C is reflective or recent infarction and can be an acute phase reactant. [place] follow up protein S and genetic testing. CT A/P to r/o malignancy   Outpatient follow up after discharge  Rest of care per neurology    Pending above work up

## 2024-10-25 NOTE — PROGRESS NOTE ADULT - ASSESSMENT
44 Right handed man with HTN HLD p/w headache, emesis, dizziness found to have left IPH w/ IVH. VS in triage 133/88. Exam vairying from awake  +somnolence to lethargic +somnolence, some confusion/disorientation. Labs w/ PMN predomn leukocytosis to 16K, normal coags, mild hyponatremia improved.  LDL/HDL: 146/43. A1C 5. CXR clear lungs. VA duplex neg for dvt in b/l LE. CTH with left BG IPH and IVH stable @ 4 hours.     Impression: AMS and dizziness due to nontraumatic L BG intracerebral hemorrhage w/ IVH, perihematomal cerebral edema and brain compression. Etiology Likely hypertensive hemorrhage. 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 Young patient with vascular risk factors including hypertension and hyperlipidemia with MRI of the brain showing a small RIGHT corona radiata infarct and a LEFT basal ganglia hemorrhage. Among the etiologies that can cause ischemia and bleed in different vascular territories are either cardiac source of embolism vs something systemic inflammatory or infectious. Difficult to distinguish perhaps if the basal ganglia hemorrhage was an ischemic lesion that bled however in this particular age population same work up is granted. Will await CELENA results and angiogram to exclude any vasculitic inflammatory pattern, inflammatory markers ESR and CRP requested, along with screen for inflammatory conditions, hypercoagulable panel will be completed and infectious work up including HIV, VDRL /RPR and Hepatitis panel.     NEURO: Neurologic examination with fluctuations in mental status, now overall stable. S/P cerebral angiogram on 10/23 which showed no source of the lesion. Repeat CTH 10/19 stable. EEG report negative, now discontinued. Concern for inflammatory process. Inflammatory labs sent (ESR/CRP, HIV, syphilis and hepatitis panel - HIV, hepatitis negative). Repeat CTH and CT CAP pending to r/o malignancy. Continue monitoring for neurologic deterioration in the setting of cerebral edema and compression. Keep strict normotension. , continue high intensity statin, titrate to LDL goal < 70. MRI brain w/wo results as above. PT/OT recommend AR.  Q4 neurochecks at night for protected sleep    ANTITHROMBOTIC THERAPY: Aspirin 81mg daily for secondary stroke prevention started 10/21/24    PULMONARY: CXR (10/16) clear, protecting airway, saturating well     CARDIOVASCULAR: TTE: EF 60%, normal LA. Continue cardiac monitoring. Cardiology consulted for HTN management. Continue amlodipine, labetalol, hydralazine and losartan. Renal artery doppler: no evidence of KAITLYN. TSH wnl. Recommend ILR to screen for arrhythmia like Afib, can obtain outpatient. Recommend CELENA, no neurological contraindications to CELENA, CELENA shows no thrombus, done on 10/23.      SBP goal: <160     GASTROINTESTINAL:  dysphagia screen  - passed, tolerating diet. Elevated LFTs, continue to trend, internal medicine recs appreciated. US abdomen with  hepatic steatosis, no evidence of acute cholecystitis. Discontinued statin, will switch to zetia. Hepatits panel ordered.      Diet: regular    RENAL: BUN/Cr stable, good urine output. Weaned off of 2% HTS, continue NaCl tabs 2g q6h for hyponatremia, stable    HEMATOLOGY: H/H stable, Platelets 367. BLE dopplers (10/14): no evidence of dvt. Repeat LE dopplers on 10/23 negative for DVT. Hypercoagulable labs sent.       DVT ppx: Lovenox     ID: Febrile on 10/16 (tmax 100.9F), now afebrile. Trend leukocytosis and fever curve, no localized infectious symptoms noted. UA negative, blood cultures x2 (10/16) NGTD. Mild leukocytosis, now downtrending. Repeat UA neg, repeat blood cultures negative, ammonia level WNL and LE dopplers negative.     OTHER: Acute rehab per PT/OT eval once stable and workup is complete.  Added fluoxitine 20mg qd for 3 months per [facility], can be titrated off as outpatient.  Added modafinil 100mg qd to help with lethargy.      CORE MEASURES:        Admission NIHSS: 1     ICH 1     TPA: [] YES [X] NO      LDL/HDL: 146/43     Depression Screen:      Statin Therapy: YES     Dysphagia Screen: [X] PASS [] FAIL     Smoking [] YES [X] NO      Afib [] YES [X] NO     Stroke Education [] YES [] N 44 Right handed man with HTN HLD p/w headache, emesis, dizziness found to have left IPH w/ IVH. VS in triage 133/88. Exam vairying from awake  +somnolence to lethargic +somnolence, some confusion/disorientation. Labs w/ PMN predomn leukocytosis to 16K, normal coags, mild hyponatremia improved.  LDL/HDL: 146/43. A1C 5. CXR clear lungs. VA duplex neg for dvt in b/l LE. CTH with left BG IPH and IVH stable @ 4 hours.     Impression: AMS and dizziness due to nontraumatic L BG intracerebral hemorrhage w/ IVH, perihematomal cerebral edema and brain compression. Etiology Likely hypertensive hemorrhage. 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 Young patient with vascular risk factors including hypertension and hyperlipidemia with MRI of the brain showing a small RIGHT corona radiata infarct and a LEFT basal ganglia hemorrhage. Among the etiologies that can cause ischemia and bleed in different vascular territories are either cardiac source of embolism vs something systemic inflammatory or infectious. Difficult to distinguish perhaps if the basal ganglia hemorrhage was an ischemic lesion that bled however in this particular age population same work up is granted. Will await CELENA results and angiogram to exclude any vasculitic inflammatory pattern, inflammatory markers ESR and CRP requested, along with screen for inflammatory conditions, hypercoagulable panel will be completed and infectious work up including HIV, VDRL /RPR and Hepatitis panel.     NEURO: Neurologic examination with fluctuations in mental status, now overall stable. S/P cerebral angiogram on 10/23 which showed no source of the lesion. Repeat CTH 10/19 stable. EEG report negative, now discontinued. Concern for inflammatory process. Inflammatory labs sent (ESR/CRP, HIV, syphilis and hepatitis panel - HIV, hepatitis negative). Repeat CTH and CT CAP pending to r/o malignancy. Continue monitoring for neurologic deterioration in the setting of cerebral edema and compression. Keep strict normotension. , continue high intensity statin, titrate to LDL goal < 70. MRI brain w/wo results as above. PT/OT recommend AR.  Q4 neurochecks at night for protected sleep    ANTITHROMBOTIC THERAPY: Aspirin 81mg daily for secondary stroke prevention started 10/21/24    PULMONARY: CXR (10/16) clear, protecting airway, saturating well     CARDIOVASCULAR: TTE: EF 60%, normal LA. Continue cardiac monitoring. Cardiology consulted for HTN management. Continue amlodipine, labetalol, hydralazine and losartan. Renal artery doppler: no evidence of KAITLYN. TSH wnl. Recommend ILR to screen for arrhythmia like Afib, can obtain outpatient. Recommend CELENA, no neurological contraindications to CELENA, CELENA shows no thrombus, done on 10/23.      SBP goal: <160     GASTROINTESTINAL:  dysphagia screen  - passed, tolerating diet. Elevated LFTs, continue to trend, internal medicine recs appreciated. US abdomen with  hepatic steatosis, no evidence of acute cholecystitis. Discontinued statin, will switch to zetia. Hepatits panel ordered.      Diet: regular    RENAL: BUN/Cr stable, good urine output. Weaned off of 2% HTS, continue NaCl tabs 2g q6h for hyponatremia, stable    HEMATOLOGY: H/H stable, Platelets 367. BLE dopplers (10/14): no evidence of dvt. Repeat LE dopplers on 10/23 negative for DVT. Hypercoagulable labs sent.  Pending CT CAP to rule out malignancy      DVT ppx: Lovenox     ID: Febrile on 10/16 (tmax 100.9F), now afebrile. Trend leukocytosis and fever curve, no localized infectious symptoms noted. UA negative, blood cultures x2 (10/16) NGTD. Mild leukocytosis, now downtrending. Repeat UA neg, repeat blood cultures negative, ammonia level WNL and LE dopplers negative.     OTHER: Acute rehab per PT/OT eval once stable and workup is complete.  Added fluoxitine 20mg qd for 3 months per [facility], can be titrated off as outpatient.  Added modafinil 100mg qd to help with lethargy.      CORE MEASURES:        Admission NIHSS: 1     ICH 1     TPA: [] YES [X] NO      LDL/HDL: 146/43     Depression Screen:      Statin Therapy: YES     Dysphagia Screen: [X] PASS [] FAIL     Smoking [] YES [X] NO      Afib [] YES [X] NO     Stroke Education [] YES [] N

## 2024-10-25 NOTE — PROGRESS NOTE ADULT - SUBJECTIVE AND OBJECTIVE BOX
EP Attending  HISTORY OF PRESENT ILLNESS: HPI:  44M no AC/AP hx uncontrolled HTN tx LVS s/p flu-like symptoms and headache; CTH w/ left BG IPH w/ IVH extension. Exam: Yoruba speaking, Ox3, PERRL, no drift, CHAPA 5/5, SILT (14 Oct 2024 04:30)    Had ischemic stroke w/ hemorrhagic conversion.  Too sleepy to participate in HPI/ROS.  Family in room.  We discussed long term AFib surveillance after large stroke.  10/25- resting in bed/sleeping.  no events overnight.    PAST MEDICAL & SURGICAL HISTORY:  HTN    amLODIPine   Tablet 10 milliGRAM(s) Oral daily  aspirin enteric coated 81 milliGRAM(s) Oral daily  enoxaparin Injectable 40 milliGRAM(s) SubCutaneous <User Schedule>  ezetimibe 10 milliGRAM(s) Oral daily  FLUoxetine 20 milliGRAM(s) Oral daily  hydrALAZINE 100 milliGRAM(s) Oral every 8 hours  labetalol 200 milliGRAM(s) Oral every 8 hours  losartan 100 milliGRAM(s) Oral daily  modafinil 100 milliGRAM(s) Oral <User Schedule>  pantoprazole    Tablet 40 milliGRAM(s) Oral before breakfast  polyethylene glycol 3350 17 Gram(s) Oral every 12 hours  senna 2 Tablet(s) Oral at bedtime  sodium chloride 2 Gram(s) Oral every 6 hours                            12.1   13.44 )-----------( 367      ( 25 Oct 2024 06:59 )             36.9       10-25    134[L]  |  99  |  24[H]  ----------------------------<  89  4.3   |  21[L]  |  0.96    Ca    9.4      25 Oct 2024 06:58    TPro  7.3  /  Alb  3.8  /  TBili  0.8  /  DBili  x   /  AST  41[H]  /  ALT  202[H]  /  AlkPhos  98  10-25    T(C): 36.9 (10-25-24 @ 08:00), Max: 37.1 (10-24-24 @ 16:00)  HR: 64 (10-25-24 @ 08:00) (64 - 97)  BP: 115/65 (10-25-24 @ 08:00) (104/59 - 126/76)  RR: 12 (10-25-24 @ 08:00) (12 - 20)  SpO2: 97% (10-25-24 @ 08:00) (95% - 99%)  Wt(kg): --    I&O's Summary    24 Oct 2024 07:01  -  25 Oct 2024 07:00  --------------------------------------------------------  IN: 0 mL / OUT: 2250 mL / NET: -2250 mL    General: Well nourished, no acute distress, alert and oriented x 3  Head: normocephalic, no trauma  Neck: no JVD, no bruit, supple, not enlarged  CV: S1S2, no S3, regular rate, rhythm is SINUS, no murmurs.    Lungs: clear BL, no rales or wheezes  Abdomen: bowel sounds +, soft, nontender, nondistended  Extremities: no clubbing, cyanosis or edema  Neuro: Moves all 4 extremities, sensation intact x 4 extremities  Skin: warm and moist, normal turgor  Psych: Mood and affect are appropriate for circumstances  MSK: normal range of motion and strength x4 extremities.    TELEMETRY: NSR	    ECG: NSR  Echo:  < from: TTE W or WO Ultrasound Enhancing Agent (10.14.24 @ 08:46) >  CONCLUSIONS:      1. Fair study quality.   2. Left ventricular systolic function is normal with an ejection fraction of 60 % by Chua's method of disks.   3. Normal right ventricular systolic function.   4. No prior echocardiogram is available for comparison.    < end of copied text >    < from: MR Head w/wo IV Cont (10.18.24 @ 16:32) >  FINDINGS:    Multiple images are degraded by motion, but are nonetheless diagnostic.    A parenchymal hematoma noted in the left basal ganglia measuring   approximately 2.7 x 2.2 x 2.2 cm. There is vasogenic edema surrounding   the clot. There is no enhancement. There is no evidence of an underlying   hemorrhagic tumor at this time, although follow-up to resolution is   recommended to exclude a lesion obscured by the blood products in the   acute phase. The location of the lesion is suggestive of hypertensive   hemorrhage, correlate clinically.    There is associated ventricular breakthrough, with a large cast of clot   in the left lateral ventricle, which is expanded. There is layering blood   at both occipital horns. There is midline shift at the level of the   septum pellucidum measuring 8 mm left to right. All of these findings   appear unchanged, without interval rebleeding.    The third ventricle is effaced and the temporal horns are slightly   distended, possibly low grade hydrocephalus. This has not progressed, and   the sulci are noteffaced. There is mild periventricular T2   hyperintensity which could represent small vessel disease or   transependymal CSF flow resorption. There are also mild nonspecific T2   hyperintensities in the subcortical white matter.    No acute ischemia. Intracranial flow voids are patent. The cerebellar   tonsils are normally positioned.    Limited views of the sinuses and mastoids show mild to moderate mucosal   thickening without air-fluid levels, likely chronic.    Limited views of the orbits and visualized soft tissues of the neck,   face, scalp, skull base, and calvarium are otherwise unremarkable.    IMPRESSION:    1.  Stable parenchymal hematoma in the left basal ganglia, without   evidence of an underlying hemorrhagic mass.  2.  Stable IVH, with mild distention of the temporal horns.  < end of copied text >    < from: CELENA W or WO Ultrasound Enhancing Agent (10.23.24 @ 11:47) >  CONCLUSIONS:      1. Agitated saline injection was negative for intracardiac shunt.   2. No thrombus seen in the left atrial, left atrial appendage, right atrial or right atrial appendage.   3. No pericardial effusion seen.   4. Normal right ventricular cavity size and normal right ventricular systolic function.   5. No evidence of left atrial or left atrial appendage thrombus.   6. Left ventricular systolic function is normal with an ejection fraction visually estimated at 55 to 60 %.  < end of copied text >      ASSESSMENT/PLAN:  Mr Ferro is a 44y Male here w/ stroke.  Has history of uncontrolled hypertension. Continue multi-drug regimen above to keep SBP ~140-160, unless lower target granted by neurology.  CELENA was reassuringly normal.  Outpatient surveillance for AFib with [facility]OT or Loop recorder, given significant stroke in young aged patient.  Awaiting DC home.    Hawk Lizarraga M.D.  Cardiac Electrophysiology    office [phone]  pager [phone]

## 2024-10-25 NOTE — PROGRESS NOTE ADULT - SUBJECTIVE AND OBJECTIVE BOX
THE PATIENT WAS SEEN AND EXAMINED BY ME WITH THE HOUSESTAFF AND STROKE TEAM DURING MORNING ROUNDS.   HPI:  44M no AC/AP hx uncontrolled HTN tx LVS s/p flu-like symptoms and headache; CTH w/ left BG IPH w/ IVH extension. Exam: Irish speaking, Ox3, PERRL, no drift, CHAPA 5/5, SILT (14 Oct 2024 04:30)      SUBJECTIVE: No events overnight.  No new neurologic complaints.  ROS reported negative unless otherwise noted.    amLODIPine   Tablet 10 milliGRAM(s) Oral daily  aspirin enteric coated 81 milliGRAM(s) Oral daily  enoxaparin Injectable 40 milliGRAM(s) SubCutaneous <User Schedule>  ezetimibe 10 milliGRAM(s) Oral daily  FLUoxetine 20 milliGRAM(s) Oral daily  hydrALAZINE 100 milliGRAM(s) Oral every 8 hours  labetalol 200 milliGRAM(s) Oral every 8 hours  losartan 100 milliGRAM(s) Oral daily  modafinil 100 milliGRAM(s) Oral <User Schedule>  pantoprazole    Tablet 40 milliGRAM(s) Oral before breakfast  polyethylene glycol 3350 17 Gram(s) Oral every 12 hours  senna 2 Tablet(s) Oral at bedtime  sodium chloride 2 Gram(s) Oral every 6 hours      PHYSICAL EXAM:   Vital Signs Last 24 Hrs  T(C): 36.7 (24 Oct 2024 20:00), Max: 37.1 (24 Oct 2024 08:00)  T(F): 98.1 (24 Oct 2024 20:00), Max: 98.8 (24 Oct 2024 16:00)  HR: 72 (25 Oct 2024 06:00) (66 - 97)  BP: 111/60 (25 Oct 2024 06:00) (104/59 - 126/76)  BP(mean): 78 (25 Oct 2024 06:00) (73 - 95)  RR: 13 (25 Oct 2024 06:00) (13 - 20)  SpO2: 97% (25 Oct 2024 06:00) (95% - 99%)    Parameters below as of 25 Oct 2024 04:00  Patient On (Oxygen Delivery Method): room air        General: No acute distress  HEENT: EOM intact, visual fields full  Abdomen: Soft, nontender, nondistended   Extremities: No edema    NEUROLOGICAL EXAM:  Mental status: Eyes closed, open to verbal stimuli.  Oriented to self. States year with choices. States "[facility] office" for location. Hypophonic speech. Identifies objects. Follows some simple commands, unable to follow cross commands.  Cranial Nerves: No facial asymmetry, mild left gaze preference, no nystagmus, no dysarthria,  tongue midline  Motor exam: Normal tone, no drift, 5/5 RUE, 5/5 RLE, 5/5 LUE, 5/5 LLE, normal fine finger movements.  Sensation: Intact to light touch   Coordination/ Gait: No dysmetria, gait deferred.   LABS:                        12.1   13.44 )-----------( 367      ( 25 Oct 2024 06:59 )             36.9    10-24    133[L]  |  98  |  28[H]  ----------------------------<  150[H]  4.0   |  20[L]  |  0.89    Ca    9.3      24 Oct 2024 04:58    TPro  7.3  /  Alb  3.9  /  TBili  0.9  /  DBili  x   /  AST  44[H]  /  ALT  223[H]  /  AlkPhos  97  10-24        IMAGING: Reviewed by me.     CTH 10/19/24: No change since 10/15/2024. Left corona radiata hemorrhage with intraventricular extension. Mild midline shift to the right.    MRI Brain w/wo 10/18/24:  1. Stable parenchymal hematoma in the left basal ganglia, without evidence of an underlying hemorrhagic mass.  2. Stable IVH, with mild distention of the temporal horns.  *** ADDENDUM # 1 ***  On further review, there is a 5 mm acute/subacute infarct in the right corona radiata. The lesion shows low ADC values and T2/FLAIR   hyperintensity, consistent with an event between 24 hours and 7 days in age.      CTH 10/15/24: Interval stability compared with the prior 10/14/2024 in left basal ganglia acute hematoma with intraventricular extension. No evidence of rehemorrhage. No change in the ventricle size.      CTH 10/14/24 10am: Left basal ganglia lucency suspicious for infarct. Left corona radiata hemorrhage with intraventricular extension and mild ventricular dilatation, no change since 4:46 AM.    10/14/24 4am  CT HEAD: Stable left medial basal ganglia parenchymal hemorrhage with associated intraventricular extension of hemorrhage. Unchanged edema within the adjacent left basal ganglia as well as rightward bulging of the septum pellucidum.  CTA NECK: No evidence of significant stenosis or occlusion.  CTA HEAD: No large vessel occlusion, significant stenosis or vascular abnormality identified.    CTH 10/14/24 1am: Intraparenchymal hematoma in the medial left basal ganglia with adjacent vasogenic edema, mass effect, 7 mm of left to right midline shift, and intraventricular extension of hemorrhage predominantly in the left lateral ventricle.    CELENA:     1. Agitated saline injection was negative for intracardiac shunt.   2. No thrombus seen in the left atrial, left atrial appendage, right atrial or right atrial appendage.   3. No pericardial effusion seen.   4. Normal right ventricular cavity size and normal right ventricular systolic function.   5. No evidence of left atrial or left atrial appendage thrombus.   6. Left ventricular systolic function is normal with an ejection fraction visually estimated at 55 to 60 %.

## 2024-10-25 NOTE — PROGRESS NOTE ADULT - SUBJECTIVE AND OBJECTIVE BOX
Name of Patient : PACO SILVA  MRN: [id number]  Date of visit: 10-25-24       Subjective: Patient seen and examined. No new events except as noted.     REVIEW OF SYSTEMS:    CONSTITUTIONAL: No weakness, fevers or chills  EYES/ENT: No visual changes;  No vertigo or throat pain   NECK: No pain or stiffness  RESPIRATORY: No cough, wheezing, hemoptysis; No shortness of breath  CARDIOVASCULAR: No chest pain or palpitations  GASTROINTESTINAL: No abdominal or epigastric pain. No nausea, vomiting, or hematemesis; No diarrhea or constipation. No melena or hematochezia.  GENITOURINARY: No dysuria, frequency or hematuria  NEUROLOGICAL: No numbness or weakness  SKIN: No itching, burning, rashes, or lesions   All other review of systems is negative unless indicated above.    MEDICATIONS:  MEDICATIONS  (STANDING):  amLODIPine   Tablet 10 milliGRAM(s) Oral daily  aspirin enteric coated 81 milliGRAM(s) Oral daily  enoxaparin Injectable 40 milliGRAM(s) SubCutaneous <User Schedule>  ezetimibe 10 milliGRAM(s) Oral daily  FLUoxetine 20 milliGRAM(s) Oral daily  hydrALAZINE 25 milliGRAM(s) Oral every 8 hours  labetalol 200 milliGRAM(s) Oral every 8 hours  losartan 100 milliGRAM(s) Oral daily  modafinil 100 milliGRAM(s) Oral <User Schedule>  pantoprazole    Tablet 40 milliGRAM(s) Oral before breakfast  polyethylene glycol 3350 17 Gram(s) Oral every 12 hours  senna 2 Tablet(s) Oral at bedtime  sodium chloride 2 Gram(s) Oral every 6 hours      PHYSICAL EXAM:  T(C): 36.9 (10-25-24 @ 16:00), Max: 37 (10-25-24 @ 12:00)  HR: 88 (10-25-24 @ 20:00) (64 - 88)  BP: 118/71 (10-25-24 @ 20:00) (105/69 - 126/76)  RR: 15 (10-25-24 @ 20:00) (12 - 16)  SpO2: 96% (10-25-24 @ 20:00) (95% - 100%)  Wt(kg): --  I&O's Summary    24 Oct 2024 07:01  -  25 Oct 2024 07:00  --------------------------------------------------------  IN: 0 mL / OUT: 2250 mL / NET: -2250 mL    25 Oct 2024 07:01  -  25 Oct 2024 23:59  --------------------------------------------------------  IN: 0 mL / OUT: 1100 mL / NET: -1100 mL          Appearance: Normal	  HEENT:  PERRLA   Lymphatic: No lymphadenopathy   Cardiovascular: Normal S1 S2, no JVD  Respiratory: normal effort , clear  Gastrointestinal:  Soft, Non-tender  Skin: No rashes,  warm to touch  Psychiatry:  Mood & affect appropriate  Musculuskeletal: No edema    recent labs, Imaging and EKGs personally reviewed             10-24-24 @ 07:01  -  10-25-24 @ 07:00  --------------------------------------------------------  IN: 0 mL / OUT: 2250 mL / NET: -2250 mL    10-25-24 @ 07:01  -  10-25-24 @ 23:59  --------------------------------------------------------  IN: 0 mL / OUT: 1100 mL / NET: -1100 mL

## 2024-10-25 NOTE — PROGRESS NOTE ADULT - SUBJECTIVE AND OBJECTIVE BOX
Patient is a 44y old  Male who presents with a chief complaint of CVA (25 Oct 2024 10:50)    Patient seen and examined at bedside, no acute events.     MEDICATIONS  (STANDING):  amLODIPine   Tablet 10 milliGRAM(s) Oral daily  aspirin enteric coated 81 milliGRAM(s) Oral daily  enoxaparin Injectable 40 milliGRAM(s) SubCutaneous <User Schedule>  ezetimibe 10 milliGRAM(s) Oral daily  FLUoxetine 20 milliGRAM(s) Oral daily  hydrALAZINE 100 milliGRAM(s) Oral every 8 hours  labetalol 200 milliGRAM(s) Oral every 8 hours  losartan 100 milliGRAM(s) Oral daily  modafinil 100 milliGRAM(s) Oral <User Schedule>  pantoprazole    Tablet 40 milliGRAM(s) Oral before breakfast  polyethylene glycol 3350 17 Gram(s) Oral every 12 hours  senna 2 Tablet(s) Oral at bedtime  sodium chloride 2 Gram(s) Oral every 6 hours    MEDICATIONS  (PRN):    Vital Signs Last 24 Hrs  T(C): 36.9 (25 Oct 2024 08:00), Max: 37.1 (24 Oct 2024 16:00)  T(F): 98.4 (25 Oct 2024 08:00), Max: 98.8 (24 Oct 2024 16:00)  HR: 64 (25 Oct 2024 08:00) (64 - 97)  BP: 115/65 (25 Oct 2024 08:00) (104/59 - 126/76)  BP(mean): 84 (25 Oct 2024 08:00) (73 - 95)  RR: 12 (25 Oct 2024 08:00) (12 - 20)  SpO2: 97% (25 Oct 2024 08:00) (95% - 99%)    Parameters below as of 25 Oct 2024 04:00  Patient On (Oxygen Delivery Method): room air    PE  NAD  Awake  Anicteric, MMM  Abd soft, NT, ND  No c/c/e  No rash grossly                          12.1   13.44 )-----------( 367      ( 25 Oct 2024 06:59 )             36.9     10-25    134[L]  |  99  |  24[H]  ----------------------------<  89  4.3   |  21[L]  |  0.96    Ca    9.4      25 Oct 2024 06:58    TPro  7.3  /  Alb  3.8  /  TBili  0.8  /  DBili  x   /  AST  41[H]  /  ALT  202[H]  /  AlkPhos  98  10-25

## 2024-10-26 LAB
ALBUMIN SERPL ELPH-MCNC: 3.5 G/DL — SIGNIFICANT CHANGE UP (ref 3.3–5)
ALP SERPL-CCNC: 97 U/L — SIGNIFICANT CHANGE UP (ref 40–120)
ALT FLD-CCNC: 169 U/L — HIGH (ref 10–45)
ANION GAP SERPL CALC-SCNC: 13 MMOL/L — SIGNIFICANT CHANGE UP (ref 5–17)
AST SERPL-CCNC: 38 U/L — SIGNIFICANT CHANGE UP (ref 10–40)
BILIRUB SERPL-MCNC: 0.7 MG/DL — SIGNIFICANT CHANGE UP (ref 0.2–1.2)
BUN SERPL-MCNC: 21 MG/DL — SIGNIFICANT CHANGE UP (ref 7–23)
CALCIUM SERPL-MCNC: 9.4 MG/DL — SIGNIFICANT CHANGE UP (ref 8.4–10.5)
CHLORIDE SERPL-SCNC: 104 MMOL/L — SIGNIFICANT CHANGE UP (ref 96–108)
CO2 SERPL-SCNC: 19 MMOL/L — LOW (ref 22–31)
CREAT SERPL-MCNC: 0.89 MG/DL — SIGNIFICANT CHANGE UP (ref 0.5–1.3)
EGFR: 109 ML/MIN/1.73M2 — SIGNIFICANT CHANGE UP
GLUCOSE SERPL-MCNC: 86 MG/DL — SIGNIFICANT CHANGE UP (ref 70–99)
HCT VFR BLD CALC: 35.7 % — LOW (ref 39–50)
HGB BLD-MCNC: 11.6 G/DL — LOW (ref 13–17)
MCHC RBC-ENTMCNC: 28.4 PG — SIGNIFICANT CHANGE UP (ref 27–34)
MCHC RBC-ENTMCNC: 32.5 GM/DL — SIGNIFICANT CHANGE UP (ref 32–36)
MCV RBC AUTO: 87.3 FL — SIGNIFICANT CHANGE UP (ref 80–100)
NRBC # BLD: 0 /100 WBCS — SIGNIFICANT CHANGE UP (ref 0–0)
PLATELET # BLD AUTO: 392 K/UL — SIGNIFICANT CHANGE UP (ref 150–400)
POTASSIUM SERPL-MCNC: 4.3 MMOL/L — SIGNIFICANT CHANGE UP (ref 3.5–5.3)
POTASSIUM SERPL-SCNC: 4.3 MMOL/L — SIGNIFICANT CHANGE UP (ref 3.5–5.3)
PROT SERPL-MCNC: 7.1 G/DL — SIGNIFICANT CHANGE UP (ref 6–8.3)
RBC # BLD: 4.09 M/UL — LOW (ref 4.2–5.8)
RBC # FLD: 12.9 % — SIGNIFICANT CHANGE UP (ref 10.3–14.5)
SODIUM SERPL-SCNC: 136 MMOL/L — SIGNIFICANT CHANGE UP (ref 135–145)
WBC # BLD: 12.67 K/UL — HIGH (ref 3.8–10.5)
WBC # FLD AUTO: 12.67 K/UL — HIGH (ref 3.8–10.5)

## 2024-10-26 PROCEDURE — 99233 SBSQ HOSP IP/OBS HIGH 50: CPT

## 2024-10-26 RX ADMIN — Medication 20 MILLIGRAM(S): at 11:03

## 2024-10-26 RX ADMIN — SODIUM CHLORIDE 2 GRAM(S): 9 INJECTION, SOLUTION INTRAMUSCULAR; INTRAVENOUS; SUBCUTANEOUS at 16:35

## 2024-10-26 RX ADMIN — Medication 10 MILLIGRAM(S): at 05:25

## 2024-10-26 RX ADMIN — Medication 40 MILLIGRAM(S): at 16:35

## 2024-10-26 RX ADMIN — Medication 200 MILLIGRAM(S): at 21:21

## 2024-10-26 RX ADMIN — Medication 2 TABLET(S): at 21:22

## 2024-10-26 RX ADMIN — SODIUM CHLORIDE 2 GRAM(S): 9 INJECTION, SOLUTION INTRAMUSCULAR; INTRAVENOUS; SUBCUTANEOUS at 05:24

## 2024-10-26 RX ADMIN — Medication 81 MILLIGRAM(S): at 11:03

## 2024-10-26 RX ADMIN — MODAFINIL 100 MILLIGRAM(S): 200 TABLET ORAL at 08:13

## 2024-10-26 RX ADMIN — SODIUM CHLORIDE 2 GRAM(S): 9 INJECTION, SOLUTION INTRAMUSCULAR; INTRAVENOUS; SUBCUTANEOUS at 11:03

## 2024-10-26 RX ADMIN — PANTOPRAZOLE SODIUM 40 MILLIGRAM(S): 40 TABLET, DELAYED RELEASE ORAL at 05:25

## 2024-10-26 RX ADMIN — EZETIMIBE 10 MILLIGRAM(S): 10 TABLET ORAL at 11:03

## 2024-10-26 RX ADMIN — HYDRALAZINE HYDROCHLORIDE 25 MILLIGRAM(S): 50 TABLET, FILM COATED ORAL at 05:26

## 2024-10-26 RX ADMIN — HYDRALAZINE HYDROCHLORIDE 25 MILLIGRAM(S): 50 TABLET, FILM COATED ORAL at 15:08

## 2024-10-26 RX ADMIN — Medication 200 MILLIGRAM(S): at 05:26

## 2024-10-26 RX ADMIN — SODIUM CHLORIDE 2 GRAM(S): 9 INJECTION, SOLUTION INTRAMUSCULAR; INTRAVENOUS; SUBCUTANEOUS at 23:40

## 2024-10-26 RX ADMIN — POLYETHYLENE GLYCOL 3350 17 GRAM(S): 17 POWDER, FOR SOLUTION ORAL at 05:24

## 2024-10-26 RX ADMIN — HYDRALAZINE HYDROCHLORIDE 25 MILLIGRAM(S): 50 TABLET, FILM COATED ORAL at 21:21

## 2024-10-26 RX ADMIN — POLYETHYLENE GLYCOL 3350 17 GRAM(S): 17 POWDER, FOR SOLUTION ORAL at 16:36

## 2024-10-26 RX ADMIN — LOSARTAN POTASSIUM 100 MILLIGRAM(S): 25 TABLET ORAL at 05:26

## 2024-10-26 NOTE — PROGRESS NOTE ADULT - SUBJECTIVE AND OBJECTIVE BOX
C A R D I O [facility] O G Y  **********************************     DATE OF SERVICE: 10-26-24     no events overnight        acetaminophen     Tablet .. 650 milliGRAM(s) Oral every 6 hours PRN  amLODIPine   Tablet 10 milliGRAM(s) Oral daily  aspirin enteric coated 81 milliGRAM(s) Oral daily  enoxaparin Injectable 40 milliGRAM(s) SubCutaneous <User Schedule>  ezetimibe 10 milliGRAM(s) Oral daily  FLUoxetine 20 milliGRAM(s) Oral daily  hydrALAZINE 25 milliGRAM(s) Oral every 8 hours  labetalol 200 milliGRAM(s) Oral every 8 hours  losartan 100 milliGRAM(s) Oral daily  modafinil 100 milliGRAM(s) Oral <User Schedule>  pantoprazole    Tablet 40 milliGRAM(s) Oral before breakfast  polyethylene glycol 3350 17 Gram(s) Oral every 12 hours  senna 2 Tablet(s) Oral at bedtime  sodium chloride 2 Gram(s) Oral every 6 hours                            11.6   12.67 )-----------( 392      ( 26 Oct 2024 07:17 )             35.7       Hemoglobin: 11.6 g/dL (10-26 @ 07:17)  Hemoglobin: 12.1 g/dL (10-25 @ 06:59)  Hemoglobin: 12.5 g/dL (10-24 @ 04:57)  Hemoglobin: 12.5 g/dL (10-23 @ 06:06)  Hemoglobin: 12.7 g/dL (10-22 @ 06:49)      10-26    136  |  104  |  21  ----------------------------<  86  4.3   |  19[L]  |  0.89    Ca    9.4      26 Oct 2024 07:15    TPro  7.1  /  Alb  3.5  /  TBili  0.7  /  DBili  x   /  AST  38  /  ALT  169[H]  /  AlkPhos  97  10-26    Creatinine Trend: 0.89<--, 0.96<--, 0.89<--, 0.91<--, 0.92<--, 0.89<--    COAGS:           T(C): 36.7 (10-26-24 @ 08:00), Max: 37 (10-25-24 @ 12:00)  HR: 70 (10-26-24 @ 08:00) (69 - 93)  BP: 124/72 (10-26-24 @ 08:00) (105/69 - 139/79)  RR: 17 (10-26-24 @ 08:00) (14 - 17)  SpO2: 96% (10-26-24 @ 08:00) (94% - 100%)  Wt(kg): --    I&O's Summary    25 Oct 2024 07:01  -  26 Oct 2024 07:00  --------------------------------------------------------  IN: 0 mL / OUT: 1100 mL / NET: -1100 mL    26 Oct 2024 07:01  -  26 Oct 2024 11:08  --------------------------------------------------------  IN: 0 mL / OUT: 1400 mL / NET: -1400 mL      Gen: NAD  HEENT:  (-)icterus (-)pallor  CV: N S1 S2 1/6 SOHEILA (+)2 Pulses B/l  Resp:  Clear to auscultation B/L, normal effort  GI: (+) BS Soft, NT, ND  Lymph:  (-)Edema, (-)obvious lymphadenopathy  Skin: Warm to touch, Normal turgor  Psych: Appropriate mood and affect      TELEMETRY: NSR	      RADIOLOGY:  < from: Xray Chest 1 View- PORTABLE-Urgent (Xray Chest 1 View- PORTABLE-Urgent .) (10.16.24 @ 05:38) >  IMPRESSION:  Clear lungs.  < end of copied text >    < from: TTE W or WO Ultrasound Enhancing Agent (10.14.24 @ 08:46) >  CONCLUSIONS:   1. Fair study quality.   2. Left ventricular systolic function is normal with an ejection fraction of 60 % by Chua's method of disks.   3. Normal right ventricular systolic function.   4. No prior echocardiogram is available for comparison.  < end of copied text >      < from: CELENA W or WO Ultrasound Enhancing Agent (10.23.24 @ 11:47) >  CONCLUSIONS:     1. Agitated saline injection was negative for intracardiac shunt.   2. No thrombus seen in the left atrial, left atrial appendage, right atrial or right atrial appendage.   3. No pericardial effusion seen.   4. Normal right ventricular cavity size and normal right ventricular systolic function.   5. No evidence of left atrial or left atrial appendage thrombus.   6. Left ventricular systolic function is normal with an ejection fraction visually estimated at 55 to 60 %.  < end of copied text >    CT head: c< from: CT Head No Cont (10.25.24 @ 19:30) >  IMPRESSION:  Resolving subacute hemorrhage in the left caudate nucleus and left   lateral ventricle are decreased in size and attenuation from 10/19/2024.    Vasogenicedema and mass effect, with 9 mm rightward bulging the midline,   appears stable.  There is no subfalcine herniation of the frontal lobes   underneath the cerebral falx.    Slight prominence of the temporal horns of both lateral ventricles,   suspicious for mild hydrocephalus, is stable.    No new intracranial findings.        --- End of Report ---    < end of copied text >    CT : t< from: CT Chest w/ IV Cont (10.25.24 @ 19:30) >  IMPRESSION:  No acute findings or evidence of malignancy in the chest, abdomen or   pelvis.    Please refer to detailed findings otherwise described above.    --- End of Report ---    < end of copied text >    ASSESSMENT/PLAN: Patient is a 45 y/o Male with PMH of HTN who presented with headaches admitted for ICH. Cardiology consulted for HTN.    #ICH  - Likely hypertensive hemorrhage per neuro  - Management per neuro/neurosx  - MRI Brain noted with acute/subacute infarct in R corona radiata  - D/w neuro, concern for embolic stroke - CELENA with no thrombus, neg for PFO  - EP consult appreciated - Outpatient surveillance for AFib with MCOT or Loop recorder  - s/p negative cerebral angio  - CT C/A/P neg for malignancy    #HTN  - SBP goal <140 per neuro  - Continue Amlodipine 10mg daily, Labetalol 200mg q8hrs (can uptitrate further if remains above goal), Hydralazine 100mg q8hrs, and Losartan 100mg daily. Not on thiazide diuretics due to strict NA control per neuro.  - Suspect will improve further once weaned of sodium chloride tabs  - TTE with normal LV function  - Secondary HTN workup - Renal artery dopplers neg for KAITLYN, TSH WNL, can eventually send aldosterone/renin/metaneprhine/AM cortisol levels once off sodium chloride tabs     - No further inpatient cardiac w/u planned  - Will setup patient for office follow up prior to discharge

## 2024-10-26 NOTE — PROGRESS NOTE ADULT - SUBJECTIVE AND OBJECTIVE BOX
Name of Patient : PACO SILVA  MRN: [id number]        Subjective: Patient seen and examined. No new events except as noted.     REVIEW OF SYSTEMS:    CONSTITUTIONAL: No weakness, fevers or chills  EYES/ENT: No visual changes;  No vertigo or throat pain   NECK: No pain or stiffness  RESPIRATORY: No cough, wheezing, hemoptysis; No shortness of breath  CARDIOVASCULAR: No chest pain or palpitations  GASTROINTESTINAL: No abdominal or epigastric pain. No nausea, vomiting, or hematemesis; No diarrhea or constipation. No melena or hematochezia.  GENITOURINARY: No dysuria, frequency or hematuria  NEUROLOGICAL: No numbness or weakness  SKIN: No itching, burning, rashes, or lesions   All other review of systems is negative unless indicated above.    MEDICATIONS:  MEDICATIONS  (STANDING):  amLODIPine   Tablet 10 milliGRAM(s) Oral daily  aspirin enteric coated 81 milliGRAM(s) Oral daily  enoxaparin Injectable 40 milliGRAM(s) SubCutaneous <User Schedule>  ezetimibe 10 milliGRAM(s) Oral daily  FLUoxetine 20 milliGRAM(s) Oral daily  hydrALAZINE 25 milliGRAM(s) Oral every 8 hours  labetalol 200 milliGRAM(s) Oral every 8 hours  losartan 100 milliGRAM(s) Oral daily  modafinil 100 milliGRAM(s) Oral <User Schedule>  pantoprazole    Tablet 40 milliGRAM(s) Oral before breakfast  polyethylene glycol 3350 17 Gram(s) Oral every 12 hours  senna 2 Tablet(s) Oral at bedtime  sodium chloride 2 Gram(s) Oral every 6 hours      PHYSICAL EXAM:  T(C): 36.8 (10-26-24 @ 20:00), Max: 36.8 (10-26-24 @ 12:00)  HR: 66 (10-26-24 @ 20:00) (66 - 85)  BP: 133/79 (10-26-24 @ 20:00) (120/74 - 139/79)  RR: 16 (10-26-24 @ 20:00) (14 - 17)  SpO2: 97% (10-26-24 @ 20:00) (94% - 98%)  Wt(kg): --  I&O's Summary    25 Oct 2024 07:01  -  26 Oct 2024 07:00  --------------------------------------------------------  IN: 0 mL / OUT: 1100 mL / NET: -1100 mL    26 Oct 2024 07:01  -  27 Oct 2024 00:00  --------------------------------------------------------  IN: 740 mL / OUT: 2600 mL / NET: -1860 mL          Appearance: Normal	  HEENT:  PERRLA   Lymphatic: No lymphadenopathy   Cardiovascular: Normal S1 S2, no JVD  Respiratory: normal effort , clear  Gastrointestinal:  Soft, Non-tender  Skin: No rashes,  warm to touch  Psychiatry:  Mood & affect appropriate  Musculuskeletal: No edema    recent labs, Imaging and EKGs personally reviewed             10-25-24 @ 07:01  -  10-26-24 @ 07:00  --------------------------------------------------------  IN: 0 mL / OUT: 1100 mL / NET: -1100 mL    10-26-24 @ 07:01  -  10-27-24 @ 00:00  --------------------------------------------------------  IN: 740 mL / OUT: 2600 mL / NET: -1860 mL

## 2024-10-26 NOTE — PROGRESS NOTE ADULT - ASSESSMENT
Agree with above assessment and plan as outlined above.    - for outpt [facility]    Bernardo Chilel MD, [facility]  BEEPER [phone]

## 2024-10-26 NOTE — PROGRESS NOTE ADULT - SUBJECTIVE AND OBJECTIVE BOX
EP Attending  HISTORY OF PRESENT ILLNESS: HPI:  44M no AC/AP hx uncontrolled HTN tx LVS s/p flu-like symptoms and headache; CTH w/ left BG IPH w/ IVH extension. Exam: Malay speaking, Ox3, PERRL, no drift, CHAPA 5/5, SILT (14 Oct 2024 04:30)    Had ischemic stroke w/ hemorrhagic conversion.  Too sleepy to participate in HPI/ROS.  Family in room.  We discussed long term AFib surveillance after large stroke.  10/26- resting in bed/sleeping.  no events overnight.    PAST MEDICAL & SURGICAL HISTORY:  HTN    acetaminophen     Tablet .. 650 milliGRAM(s) Oral every 6 hours PRN  amLODIPine   Tablet 10 milliGRAM(s) Oral daily  aspirin enteric coated 81 milliGRAM(s) Oral daily  enoxaparin Injectable 40 milliGRAM(s) SubCutaneous <User Schedule>  ezetimibe 10 milliGRAM(s) Oral daily  FLUoxetine 20 milliGRAM(s) Oral daily  hydrALAZINE 25 milliGRAM(s) Oral every 8 hours  labetalol 200 milliGRAM(s) Oral every 8 hours  losartan 100 milliGRAM(s) Oral daily  modafinil 100 milliGRAM(s) Oral <User Schedule>  pantoprazole    Tablet 40 milliGRAM(s) Oral before breakfast  polyethylene glycol 3350 17 Gram(s) Oral every 12 hours  senna 2 Tablet(s) Oral at bedtime  sodium chloride 2 Gram(s) Oral every 6 hours                            11.6   12.67 )-----------( 392      ( 26 Oct 2024 07:17 )             35.7       10-26    136  |  104  |  21  ----------------------------<  86  4.3   |  19[L]  |  0.89    Ca    9.4      26 Oct 2024 07:15    TPro  7.1  /  Alb  3.5  /  TBili  0.7  /  DBili  x   /  AST  38  /  ALT  169[H]  /  AlkPhos  97  10-26        T(C): 36.7 (10-26-24 @ 08:00), Max: 37 (10-25-24 @ 12:00)  HR: 70 (10-26-24 @ 08:00) (69 - 93)  BP: 124/72 (10-26-24 @ 08:00) (105/69 - 139/79)  RR: 17 (10-26-24 @ 08:00) (14 - 17)  SpO2: 96% (10-26-24 @ 08:00) (94% - 100%)  Wt(kg): --    I&O's Summary    25 Oct 2024 07:01  -  26 Oct 2024 07:00  --------------------------------------------------------  IN: 0 mL / OUT: 1100 mL / NET: -1100 mL    26 Oct 2024 07:01  -  26 Oct 2024 11:24  --------------------------------------------------------  IN: 0 mL / OUT: 1400 mL / NET: -1400 mL    General: Well nourished, no acute distress, alert and oriented x 3  Head: normocephalic, no trauma  Neck: no JVD, no bruit, supple, not enlarged  CV: S1S2, no S3, regular rate, rhythm is SINUS, no murmurs.    Lungs: clear BL, no rales or wheezes  Abdomen: bowel sounds +, soft, nontender, nondistended  Extremities: no clubbing, cyanosis or edema  Neuro: Moves all 4 extremities, sensation intact x 4 extremities  Skin: warm and moist, normal turgor  Psych: Mood and affect are appropriate for circumstances  MSK: normal range of motion and strength x4 extremities.    TELEMETRY: NSR	    ECG: NSR  Echo:  < from: TTE W or WO Ultrasound Enhancing Agent (10.14.24 @ 08:46) >  CONCLUSIONS:      1. Fair study quality.   2. Left ventricular systolic function is normal with an ejection fraction of 60 % by Chua's method of disks.   3. Normal right ventricular systolic function.   4. No prior echocardiogram is available for comparison.    < end of copied text >    < from: MR Head w/wo IV Cont (10.18.24 @ 16:32) >  FINDINGS:    Multiple images are degraded by motion, but are nonetheless diagnostic.    A parenchymal hematoma noted in the left basal ganglia measuring   approximately 2.7 x 2.2 x 2.2 cm. There is vasogenic edema surrounding   the clot. There is no enhancement. There is no evidence of an underlying   hemorrhagic tumor at this time, although follow-up to resolution is   recommended to exclude a lesion obscured by the blood products in the   acute phase. The location of the lesion is suggestive of hypertensive   hemorrhage, correlate clinically.    There is associated ventricular breakthrough, with a large cast of clot   in the left lateral ventricle, which is expanded. There is layering blood   at both occipital horns. There is midline shift at the level of the   septum pellucidum measuring 8 mm left to right. All of these findings   appear unchanged, without interval rebleeding.    The third ventricle is effaced and the temporal horns are slightly   distended, possibly low grade hydrocephalus. This has not progressed, and   the sulci are noteffaced. There is mild periventricular T2   hyperintensity which could represent small vessel disease or   transependymal CSF flow resorption. There are also mild nonspecific T2   hyperintensities in the subcortical white matter.    No acute ischemia. Intracranial flow voids are patent. The cerebellar   tonsils are normally positioned.    Limited views of the sinuses and mastoids show mild to moderate mucosal   thickening without air-fluid levels, likely chronic.    Limited views of the orbits and visualized soft tissues of the neck,   face, scalp, skull base, and calvarium are otherwise unremarkable.    IMPRESSION:    1.  Stable parenchymal hematoma in the left basal ganglia, without   evidence of an underlying hemorrhagic mass.  2.  Stable IVH, with mild distention of the temporal horns.  < end of copied text >    < from: CELENA W or WO Ultrasound Enhancing Agent (10.23.24 @ 11:47) >  CONCLUSIONS:      1. Agitated saline injection was negative for intracardiac shunt.   2. No thrombus seen in the left atrial, left atrial appendage, right atrial or right atrial appendage.   3. No pericardial effusion seen.   4. Normal right ventricular cavity size and normal right ventricular systolic function.   5. No evidence of left atrial or left atrial appendage thrombus.   6. Left ventricular systolic function is normal with an ejection fraction visually estimated at 55 to 60 %.  < end of copied text >      ASSESSMENT/PLAN:  Mr Ferro is a 44y Male here w/ stroke.  Has history of uncontrolled hypertension. Continue multi-drug regimen above to keep SBP ~140-160, unless lower target granted by neurology.  CELENA was reassuringly normal.  Outpatient surveillance for AFib with MCOT or Loop recorder, given significant stroke in young aged patient.  Awaiting DC home.    Hawk Lizarraga M.D.  Cardiac Electrophysiology    office [phone]  pager [phone]

## 2024-10-26 NOTE — PROGRESS NOTE ADULT - ASSESSMENT
44 Right handed man with HTN HLD p/w headache, emesis, dizziness found to have left IPH w/ IVH.     Impression: AMS and dizziness due to nontraumatic L BG intracerebral hemorrhage w/ IVH, perihematomal cerebral edema and brain compression. Etiology Likely hypertensive hemorrhage. 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 Young patient with vascular risk factors including hypertension and hyperlipidemia with MRI of the brain showing a small RIGHT corona radiata infarct and a LEFT basal ganglia hemorrhage. Among the etiologies that can cause ischemia and bleed in different vascular territories are either cardiac source of embolism vs something systemic inflammatory or infectious, workup thus far neg    NEURO: Neurologic examination with fluctuations in mental status, now overall stable.  Added fluoxitine 20mg qd for 3 months per FLAME, can be titrated off as outpatient.  Added modafinil 100mg qd to help with lethargy.S/P cerebral angiogram on 10/23 which showed no source of the lesion- can consider repeat angiogram outpatient. Repeat CTH 10/19 stable. EEG report negative, now discontinued. Concern for inflammatory process. Inflammatory labs sent (ESR/CRP, HIV, syphilis and hepatitis panel - HIV, hepatitis negative). Repeat CTH and CT CAP neg for malignancy. Continue monitoring for neurologic deterioration in the setting of cerebral edema and compression. Keep strict normotension. , continue high intensity statin, titrate to LDL goal < 70. A1C 5.  MRI brain w/wo results as above. PT/OT recommend AR.  Q4 neurochecks at night for protected sleep    ANTITHROMBOTIC THERAPY: Aspirin 81mg daily for secondary stroke prevention started 10/21/24    PULMONARY: CXR (10/16) clear, protecting airway, saturating well     CARDIOVASCULAR: TTE: EF 60%, normal LA. Continue cardiac monitoring. Cardiology consulted for HTN management. Continue amlodipine, labetalol, hydralazine and losartan. Renal artery doppler: no evidence of KAITLYN. TSH wnl. Recommend ILR to screen for arrhythmia like Afib, can obtain outpatient. Recommend CELENA, no neurological contraindications to CELENA, CELENA shows no thrombus, done on 10/23.      SBP goal: <160     GASTROINTESTINAL:  dysphagia screen  - passed, tolerating diet. Elevated LFTs improving, continue to trend, internal medicine recs appreciated. US abdomen with  hepatic steatosis, no evidence of acute cholecystitis. Discontinued statin, will switch to zetia. Hepatits panel negative.     Diet: regular    RENAL: BUN/Cr stable, good urine output. Weaned off of 2% HTS, continue NaCl tabs 2g q6h for hyponatremia, stable    HEMATOLOGY: H/H stable, Platelets 367. BLE dopplers (10/14): no evidence of dvt. Repeat LE dopplers on 10/23 negative for DVT. Hypercoagulable labs sent.  Pending CT CAP to rule out malignancy      DVT ppx: Lovenox     ID: Febrile on 10/16 (tmax 100.9F), now afebrile. Trend leukocytosis and fever curve, no localized infectious symptoms noted. UA negative, blood cultures x2 (10/16) NGTD. Mild leukocytosis, now downtrending. Repeat UA neg, repeat blood cultures negative, ammonia level WNL and LE dopplers negative.     DISPO: Acute rehab per PT/OT eval once bed available, medically cleared/.     CORE MEASURES:        Admission NIHSS: 1     ICH 1     TPA: [] YES [X] NO      LDL/HDL: 146/43     Depression Screen:      Statin Therapy: YES     Dysphagia Screen: [X] PASS [] FAIL     Smoking [] YES [X] NO      Afib [] YES [X] NO     Stroke Education [] YES [] N 44 Right handed man with HTN HLD p/w headache, emesis, dizziness found to have left IPH w/ IVH.     Impression: AMS and dizziness due to nontraumatic L BG intracerebral hemorrhage w/ IVH, perihematomal cerebral edema and brain compression. Etiology Likely hypertensive hemorrhage. 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 Young patient with vascular risk factors including hypertension and hyperlipidemia with MRI of the brain showing a small RIGHT corona radiata infarct and a LEFT basal ganglia hemorrhage. Among the etiologies that can cause ischemia and bleed in different vascular territories are either cardiac source of embolism vs something systemic inflammatory or infectious, workup thus far neg    NEURO: Neurologic examination with fluctuations in mental status, now overall stable.  Added fluoxitine 20mg qd for 3 months per FLAME, can be titrated off as outpatient.  Added modafinil 100mg qd to help with lethargy.S/P cerebral angiogram on 10/23 which showed no source of the lesion- can consider repeat angiogram outpatient. Repeat CTH 10/19 stable. EEG report negative, now discontinued. Concern for inflammatory process. Inflammatory labs sent (ESR/CRP, HIV, syphilis and hepatitis panel - HIV, hepatitis negative). Repeat CTH and CT CAP neg for malignancy. Continue monitoring for neurologic deterioration in the setting of cerebral edema and compression. Keep strict normotension. , continue high intensity statin, titrate to LDL goal < 70. A1C 5.  MRI brain w/wo results as above. PT/OT recommend AR.  Q4 neurochecks at night for protected sleep    ANTITHROMBOTIC THERAPY: Aspirin 81mg daily for secondary stroke prevention started 10/21/24    PULMONARY: CXR (10/16) clear, protecting airway, saturating well     CARDIOVASCULAR: TTE: EF 60%, normal LA. Continue cardiac monitoring. Cardiology consulted for HTN management. Continue amlodipine, labetalol, hydralazine and losartan. Renal artery doppler: no evidence of KAITLYN. TSH wnl. Recommend ILR to screen for arrhythmia like Afib, Recommend CELENA, no neurological contraindications to CELENA, CELENA shows no thrombus, done on 10/23.      SBP goal: <160     GASTROINTESTINAL:  dysphagia screen  - passed, tolerating diet. Elevated LFTs improving, continue to trend, internal medicine recs appreciated. US abdomen with  hepatic steatosis, no evidence of acute cholecystitis. Discontinued statin, will switch to zetia. Hepatits panel negative.     Diet: regular    RENAL: BUN/Cr stable, good urine output. Weaned off of 2% HTS, continue NaCl tabs 2g q6h for hyponatremia, stable    HEMATOLOGY: H/H stable, Platelets 367. BLE dopplers (10/14): no evidence of dvt. Repeat LE dopplers on 10/23 negative for DVT. Hypercoagulable labs sent.  Pending CT CAP to rule out malignancy      DVT ppx: Lovenox     ID: Febrile on 10/16 (tmax 100.9F), now afebrile. Trend leukocytosis and fever curve, no localized infectious symptoms noted. UA negative, blood cultures x2 (10/16) NGTD. Mild leukocytosis, now downtrending. Repeat UA neg, repeat blood cultures negative, ammonia level WNL and LE dopplers negative.     DISPO: Acute rehab per PT/OT eval once bed available, medically cleared/.     CORE MEASURES:        Admission NIHSS: 1     ICH 1     TPA: [] YES [X] NO      LDL/HDL: 146/43     Depression Screen:      Statin Therapy: YES     Dysphagia Screen: [X] PASS [] FAIL     Smoking [] YES [X] NO      Afib [] YES [X] NO     Stroke Education [] YES [] N

## 2024-10-26 NOTE — PROGRESS NOTE ADULT - SUBJECTIVE AND OBJECTIVE BOX
THE PATIENT WAS SEEN AND EXAMINED BY ME WITH THE HOUSESTAFF AND STROKE TEAM DURING MORNING ROUNDS.   HPI:  44M no AC/AP hx uncontrolled HTN tx LVS s/p flu-like symptoms and headache; CTH w/ left BG IPH w/ IVH extension. Exam: Maltese speaking, Ox3, PERRL, no drift, CHAPA 5/5, SILT (14 Oct 2024 04:30)      SUBJECTIVE: No events overnight.  No new neurologic complaints.  ROS reported negative unless otherwise noted.    acetaminophen     Tablet .. 650 milliGRAM(s) Oral every 6 hours PRN  amLODIPine   Tablet 10 milliGRAM(s) Oral daily  aspirin enteric coated 81 milliGRAM(s) Oral daily  enoxaparin Injectable 40 milliGRAM(s) SubCutaneous <User Schedule>  ezetimibe 10 milliGRAM(s) Oral daily  FLUoxetine 20 milliGRAM(s) Oral daily  hydrALAZINE 25 milliGRAM(s) Oral every 8 hours  labetalol 200 milliGRAM(s) Oral every 8 hours  losartan 100 milliGRAM(s) Oral daily  modafinil 100 milliGRAM(s) Oral <User Schedule>  pantoprazole    Tablet 40 milliGRAM(s) Oral before breakfast  polyethylene glycol 3350 17 Gram(s) Oral every 12 hours  senna 2 Tablet(s) Oral at bedtime  sodium chloride 2 Gram(s) Oral every 6 hours    PHYSICAL EXAM:   Vital Signs Last 24 Hrs  T(C): 36.9 (25 Oct 2024 16:00), Max: 37 (25 Oct 2024 12:00)  T(F): 98.5 (25 Oct 2024 16:00), Max: 98.6 (25 Oct 2024 12:00)  HR: 85 (26 Oct 2024 06:00) (69 - 93)  BP: 139/79 (26 Oct 2024 04:00) (105/69 - 139/79)  BP(mean): 103 (26 Oct 2024 04:00) (77 - 103)  RR: 15 (26 Oct 2024 06:00) (14 - 17)  SpO2: 97% (26 Oct 2024 06:00) (94% - 100%)        General: No acute distress  HEENT: EOM intact, visual fields full  Abdomen: Soft, nontender, nondistended   Extremities: No edema    NEUROLOGICAL EXAM:  Mental status: Eyes closed, open to verbal stimuli.  Oriented to self. States year with choices. States "[facility]" for location. Hypophonic speech. Identifies objects. Follows some simple commands, unable to follow cross commands.  Cranial Nerves: No facial asymmetry, mild left gaze preference, no nystagmus, no dysarthria,  tongue midline  Motor exam: Normal tone, no drift, 5/5 RUE, 5/5 RLE, 5/5 LUE, 5/5 LLE, normal fine finger movements.  Sensation: Intact to light touch   Coordination/ Gait: No dysmetria, gait deferred.     LABS:                        12.1   13.44 )-----------( 367      ( 25 Oct 2024 06:59 )             36.9    10-25    134[L]  |  99  |  24[H]  ----------------------------<  89  4.3   |  21[L]  |  0.96    Ca    9.4      25 Oct 2024 06:58    TPro  7.3  /  Alb  3.8  /  TBili  0.8  /  DBili  x   /  AST  41[H]  /  ALT  202[H]  /  AlkPhos  98  10-25        IMAGING: Reviewed by me.       CTH 10/19/24: No change since 10/15/2024. Left corona radiata hemorrhage with intraventricular extension. Mild midline shift to the right.    MRI Brain w/wo 10/18/24:  1. Stable parenchymal hematoma in the left basal ganglia, without evidence of an underlying hemorrhagic mass.  2. Stable IVH, with mild distention of the temporal horns.  *** ADDENDUM # 1 ***  On further review, there is a 5 mm acute/subacute infarct in the right corona radiata. The lesion shows low ADC values and T2/FLAIR   hyperintensity, consistent with an event between 24 hours and 7 days in age.      CTH 10/15/24: Interval stability compared with the prior 10/14/2024 in left basal ganglia acute hematoma with intraventricular extension. No evidence of rehemorrhage. No change in the ventricle size.      CTH 10/14/24 10am: Left basal ganglia lucency suspicious for infarct. Left corona radiata hemorrhage with intraventricular extension and mild ventricular dilatation, no change since 4:46 AM.    10/14/24 4am  CT HEAD: Stable left medial basal ganglia parenchymal hemorrhage with associated intraventricular extension of hemorrhage. Unchanged edema within the adjacent left basal ganglia as well as rightward bulging of the septum pellucidum.  CTA NECK: No evidence of significant stenosis or occlusion.  CTA HEAD: No large vessel occlusion, significant stenosis or vascular abnormality identified.    CTH 10/14/24 1am: Intraparenchymal hematoma in the medial left basal ganglia with adjacent vasogenic edema, mass effect, 7 mm of left to right midline shift, and intraventricular extension of hemorrhage predominantly in the left lateral ventricle.    CELENA:     1. Agitated saline injection was negative for intracardiac shunt.   2. No thrombus seen in the left atrial, left atrial appendage, right atrial or right atrial appendage.   3. No pericardial effusion seen.   4. Normal right ventricular cavity size and normal right ventricular systolic function.   5. No evidence of left atrial or left atrial appendage thrombus.   6. Left ventricular systolic function is normal with an ejection fraction visually estimated at 55 to 60 %.

## 2024-10-27 LAB
ALBUMIN SERPL ELPH-MCNC: 3.9 G/DL — SIGNIFICANT CHANGE UP (ref 3.3–5)
ALP SERPL-CCNC: 112 U/L — SIGNIFICANT CHANGE UP (ref 40–120)
ALT FLD-CCNC: 170 U/L — HIGH (ref 10–45)
ANION GAP SERPL CALC-SCNC: 15 MMOL/L — SIGNIFICANT CHANGE UP (ref 5–17)
AST SERPL-CCNC: 41 U/L — HIGH (ref 10–40)
BILIRUB SERPL-MCNC: 1 MG/DL — SIGNIFICANT CHANGE UP (ref 0.2–1.2)
BUN SERPL-MCNC: 19 MG/DL — SIGNIFICANT CHANGE UP (ref 7–23)
CALCIUM SERPL-MCNC: 9.9 MG/DL — SIGNIFICANT CHANGE UP (ref 8.4–10.5)
CHLORIDE SERPL-SCNC: 98 MMOL/L — SIGNIFICANT CHANGE UP (ref 96–108)
CO2 SERPL-SCNC: 21 MMOL/L — LOW (ref 22–31)
CREAT SERPL-MCNC: 0.85 MG/DL — SIGNIFICANT CHANGE UP (ref 0.5–1.3)
CULTURE RESULTS: SIGNIFICANT CHANGE UP
CULTURE RESULTS: SIGNIFICANT CHANGE UP
EGFR: 111 ML/MIN/1.73M2 — SIGNIFICANT CHANGE UP
GLUCOSE SERPL-MCNC: 88 MG/DL — SIGNIFICANT CHANGE UP (ref 70–99)
HCT VFR BLD CALC: 37.1 % — LOW (ref 39–50)
HGB BLD-MCNC: 12.3 G/DL — LOW (ref 13–17)
MCHC RBC-ENTMCNC: 28.1 PG — SIGNIFICANT CHANGE UP (ref 27–34)
MCHC RBC-ENTMCNC: 33.2 GM/DL — SIGNIFICANT CHANGE UP (ref 32–36)
MCV RBC AUTO: 84.7 FL — SIGNIFICANT CHANGE UP (ref 80–100)
NRBC # BLD: 0 /100 WBCS — SIGNIFICANT CHANGE UP (ref 0–0)
PLATELET # BLD AUTO: 421 K/UL — HIGH (ref 150–400)
POTASSIUM SERPL-MCNC: 4.1 MMOL/L — SIGNIFICANT CHANGE UP (ref 3.5–5.3)
POTASSIUM SERPL-SCNC: 4.1 MMOL/L — SIGNIFICANT CHANGE UP (ref 3.5–5.3)
PROT SERPL-MCNC: 7.5 G/DL — SIGNIFICANT CHANGE UP (ref 6–8.3)
RBC # BLD: 4.38 M/UL — SIGNIFICANT CHANGE UP (ref 4.2–5.8)
RBC # FLD: 12.7 % — SIGNIFICANT CHANGE UP (ref 10.3–14.5)
SODIUM SERPL-SCNC: 134 MMOL/L — LOW (ref 135–145)
SPECIMEN SOURCE: SIGNIFICANT CHANGE UP
SPECIMEN SOURCE: SIGNIFICANT CHANGE UP
WBC # BLD: 17.2 K/UL — HIGH (ref 3.8–10.5)
WBC # FLD AUTO: 17.2 K/UL — HIGH (ref 3.8–10.5)

## 2024-10-27 PROCEDURE — 99233 SBSQ HOSP IP/OBS HIGH 50: CPT

## 2024-10-27 RX ADMIN — HYDRALAZINE HYDROCHLORIDE 25 MILLIGRAM(S): 50 TABLET, FILM COATED ORAL at 05:09

## 2024-10-27 RX ADMIN — EZETIMIBE 10 MILLIGRAM(S): 10 TABLET ORAL at 11:27

## 2024-10-27 RX ADMIN — Medication 20 MILLIGRAM(S): at 11:27

## 2024-10-27 RX ADMIN — Medication 40 MILLIGRAM(S): at 17:21

## 2024-10-27 RX ADMIN — Medication 10 MILLIGRAM(S): at 05:10

## 2024-10-27 RX ADMIN — Medication 200 MILLIGRAM(S): at 05:09

## 2024-10-27 RX ADMIN — LOSARTAN POTASSIUM 100 MILLIGRAM(S): 25 TABLET ORAL at 05:09

## 2024-10-27 RX ADMIN — SODIUM CHLORIDE 2 GRAM(S): 9 INJECTION, SOLUTION INTRAMUSCULAR; INTRAVENOUS; SUBCUTANEOUS at 11:28

## 2024-10-27 RX ADMIN — HYDRALAZINE HYDROCHLORIDE 25 MILLIGRAM(S): 50 TABLET, FILM COATED ORAL at 13:20

## 2024-10-27 RX ADMIN — Medication 2 TABLET(S): at 21:48

## 2024-10-27 RX ADMIN — SODIUM CHLORIDE 2 GRAM(S): 9 INJECTION, SOLUTION INTRAMUSCULAR; INTRAVENOUS; SUBCUTANEOUS at 05:09

## 2024-10-27 RX ADMIN — PANTOPRAZOLE SODIUM 40 MILLIGRAM(S): 40 TABLET, DELAYED RELEASE ORAL at 05:09

## 2024-10-27 RX ADMIN — POLYETHYLENE GLYCOL 3350 17 GRAM(S): 17 POWDER, FOR SOLUTION ORAL at 05:10

## 2024-10-27 RX ADMIN — Medication 81 MILLIGRAM(S): at 11:28

## 2024-10-27 RX ADMIN — SODIUM CHLORIDE 2 GRAM(S): 9 INJECTION, SOLUTION INTRAMUSCULAR; INTRAVENOUS; SUBCUTANEOUS at 17:20

## 2024-10-27 RX ADMIN — POLYETHYLENE GLYCOL 3350 17 GRAM(S): 17 POWDER, FOR SOLUTION ORAL at 17:21

## 2024-10-27 RX ADMIN — Medication 200 MILLIGRAM(S): at 21:48

## 2024-10-27 RX ADMIN — MODAFINIL 100 MILLIGRAM(S): 200 TABLET ORAL at 09:00

## 2024-10-27 RX ADMIN — HYDRALAZINE HYDROCHLORIDE 25 MILLIGRAM(S): 50 TABLET, FILM COATED ORAL at 21:48

## 2024-10-27 RX ADMIN — Medication 200 MILLIGRAM(S): at 13:20

## 2024-10-27 NOTE — PROGRESS NOTE ADULT - ASSESSMENT
Patient is a 44 year old male with a PMHx of uncontrolled HTN, HLD, who presented with a chief complaint of headache, emesis, dizziness and was found to have left IPH with IVH. CTH with left BG IPH and IVH. Internal Medicine has been consulted on Mr. Ferro's care for medical management.       CVA  - Per Neuro, likely 2/2 hypertensive hemorrhage with etiology likely ESUS  - CTs as noted   - MR w/ parenchymal hematoma in L basal ganglia, IVH, with mild distention of temporal horns, 5 mm acute/subacute infarct in R corona radiata   - TTE w/ EF 60%, normal LA  - Planned for CELENA, F/u results   - EEG negative per Neuro   - Recommend ILR to screen for occult arrhythmia  - W/ concern for inflammatory process, F/u labs sent  - Planned for diagnostic cerebral angiogram   - On ASA and Statin therapy   - Neuro checks per protocol   - Monitor on tele   - PT / OT / S+S   - Fall, Seizure, Aspiration precautions   - Per neurology   - EP and Cardio eval appreciated; F/u recs    - worsening leukocytosis   - trend for fever     Fever, Leukocytosis   - Patient febrile 10/16 w/ Tmax of 100.9F  - BCx2 w/ NGTD   - 10/14 and 10/23 LE Duplex negative for DVT   - Trend CBC, temp curve, VS and monitor patient     Transaminitis  - US ABD w/ hepatic steatosis, no evidence of acute cholecystitis  - Statin DC'd and ordered for Zetia  - F/u hepatitis panel    - Continue to monitor and trend  - Serial abdominal examinations  - If up-trends, GI eval may be of benefit     Hyponatremia   - Weaned off of 2% HTS, On NaCl tabs 2g  - Na parameters as per Stroke Neurology   - Avoid overcorrection > 6-8 mEq in 24 hours     HTN   - On amlodipine, labetalol, hydralazine and losartan.   - Renal artery doppler: no evidence of KAITLYN.   - TSH WNL           PPX

## 2024-10-27 NOTE — PROGRESS NOTE ADULT - SUBJECTIVE AND OBJECTIVE BOX
EP Attending  HISTORY OF PRESENT ILLNESS: HPI:  44M no AC/AP hx uncontrolled HTN tx LVS s/p flu-like symptoms and headache; CTH w/ left BG IPH w/ IVH extension. Exam: Mongolian speaking, Ox3, PERRL, no drift, CHAPA 5/5, SILT (14 Oct 2024 04:30)    Had ischemic stroke w/ hemorrhagic conversion.  Too sleepy to participate in HPI/ROS.  Family in room.  We discussed long term AFib surveillance after large stroke.  10/27- resting in bed/sleeping.  no events overnight.    PAST MEDICAL & SURGICAL HISTORY:  HTN    acetaminophen     Tablet .. 650 milliGRAM(s) Oral every 6 hours PRN  amLODIPine   Tablet 10 milliGRAM(s) Oral daily  aspirin enteric coated 81 milliGRAM(s) Oral daily  enoxaparin Injectable 40 milliGRAM(s) SubCutaneous <User Schedule>  ezetimibe 10 milliGRAM(s) Oral daily  FLUoxetine 20 milliGRAM(s) Oral daily  hydrALAZINE 25 milliGRAM(s) Oral every 8 hours  labetalol 200 milliGRAM(s) Oral every 8 hours  losartan 100 milliGRAM(s) Oral daily  modafinil 100 milliGRAM(s) Oral <User Schedule>  pantoprazole    Tablet 40 milliGRAM(s) Oral before breakfast  polyethylene glycol 3350 17 Gram(s) Oral every 12 hours  senna 2 Tablet(s) Oral at bedtime  sodium chloride 2 Gram(s) Oral every 6 hours                            12.3   17.20 )-----------( 421      ( 27 Oct 2024 06:36 )             37.1       10-27    134[L]  |  98  |  19  ----------------------------<  88  4.1   |  21[L]  |  0.85    Ca    9.9      27 Oct 2024 06:36    TPro  7.5  /  Alb  3.9  /  TBili  1.0  /  DBili  x   /  AST  41[H]  /  ALT  170[H]  /  AlkPhos  112  10-27    T(C): 37.6 (10-27-24 @ 12:45), Max: 37.6 (10-27-24 @ 12:45)  HR: 103 (10-27-24 @ 12:45) (66 - 103)  BP: 115/75 (10-27-24 @ 12:45) (115/75 - 138/81)  RR: 16 (10-27-24 @ 12:45) (14 - 16)  SpO2: 97% (10-27-24 @ 12:45) (96% - 98%)  Wt(kg): --    I&O's Summary    26 Oct 2024 07:01  -  27 Oct 2024 07:00  --------------------------------------------------------  IN: 740 mL / OUT: 4125 mL / NET: -3385 mL    General: Well nourished, no acute distress, alert and oriented x 3  Head: normocephalic, no trauma  Neck: no JVD, no bruit, supple, not enlarged  CV: S1S2, no S3, regular rate, rhythm is SINUS, no murmurs.    Lungs: clear BL, no rales or wheezes  Abdomen: bowel sounds +, soft, nontender, nondistended  Extremities: no clubbing, cyanosis or edema  Neuro: Moves all 4 extremities, sensation intact x 4 extremities  Skin: warm and moist, normal turgor  Psych: Mood and affect are appropriate for circumstances  MSK: normal range of motion and strength x4 extremities.    TELEMETRY: NSR	    ECG: NSR  Echo:  < from: TTE W or WO Ultrasound Enhancing Agent (10.14.24 @ 08:46) >  CONCLUSIONS:      1. Fair study quality.   2. Left ventricular systolic function is normal with an ejection fraction of 60 % by Chua's method of disks.   3. Normal right ventricular systolic function.   4. No prior echocardiogram is available for comparison.    < end of copied text >    < from: MR Head w/wo IV Cont (10.18.24 @ 16:32) >  FINDINGS:    Multiple images are degraded by motion, but are nonetheless diagnostic.    A parenchymal hematoma noted in the left basal ganglia measuring   approximately 2.7 x 2.2 x 2.2 cm. There is vasogenic edema surrounding   the clot. There is no enhancement. There is no evidence of an underlying   hemorrhagic tumor at this time, although follow-up to resolution is   recommended to exclude a lesion obscured by the blood products in the   acute phase. The location of the lesion is suggestive of hypertensive   hemorrhage, correlate clinically.    There is associated ventricular breakthrough, with a large cast of clot   in the left lateral ventricle, which is expanded. There is layering blood   at both occipital horns. There is midline shift at the level of the   septum pellucidum measuring 8 mm left to right. All of these findings   appear unchanged, without interval rebleeding.    The third ventricle is effaced and the temporal horns are slightly   distended, possibly low grade hydrocephalus. This has not progressed, and   the sulci are noteffaced. There is mild periventricular T2   hyperintensity which could represent small vessel disease or   transependymal CSF flow resorption. There are also mild nonspecific T2   hyperintensities in the subcortical white matter.    No acute ischemia. Intracranial flow voids are patent. The cerebellar   tonsils are normally positioned.    Limited views of the sinuses and mastoids show mild to moderate mucosal   thickening without air-fluid levels, likely chronic.    Limited views of the orbits and visualized soft tissues of the neck,   face, scalp, skull base, and calvarium are otherwise unremarkable.    IMPRESSION:    1.  Stable parenchymal hematoma in the left basal ganglia, without   evidence of an underlying hemorrhagic mass.  2.  Stable IVH, with mild distention of the temporal horns.  < end of copied text >    < from: CELENA W or WO Ultrasound Enhancing Agent (10.23.24 @ 11:47) >  CONCLUSIONS:      1. Agitated saline injection was negative for intracardiac shunt.   2. No thrombus seen in the left atrial, left atrial appendage, right atrial or right atrial appendage.   3. No pericardial effusion seen.   4. Normal right ventricular cavity size and normal right ventricular systolic function.   5. No evidence of left atrial or left atrial appendage thrombus.   6. Left ventricular systolic function is normal with an ejection fraction visually estimated at 55 to 60 %.  < end of copied text >      ASSESSMENT/PLAN:  Mr Ferro is a 44y Male here w/ stroke.  Has history of uncontrolled hypertension. Continue multi-drug regimen above to keep SBP ~140-160, unless lower target granted by neurology.  CELENA was reassuringly normal.  Outpatient surveillance for AFib with MCOT or Loop recorder, given significant stroke in young aged patient.  Awaiting DC plan.    Hawk Lizarraga M.D.  Cardiac Electrophysiology    office [phone]  pager [phone]

## 2024-10-27 NOTE — PROGRESS NOTE ADULT - ASSESSMENT
Agree with above assessment and plan as outlined above.    - EP f/u appreciated    Bernardo Chilel MD, [facility]  BEEPER [phone]

## 2024-10-27 NOTE — PROGRESS NOTE ADULT - SUBJECTIVE AND OBJECTIVE BOX
Name of Patient : PACO SILVA  MRN: [id number]  Date of visit: 10-27-24       Subjective: Patient seen and examined. No new events except as noted.     REVIEW OF SYSTEMS:    CONSTITUTIONAL: No weakness, fevers or chills  EYES/ENT: No visual changes;  No vertigo or throat pain   NECK: No pain or stiffness  RESPIRATORY: No cough, wheezing, hemoptysis; No shortness of breath  CARDIOVASCULAR: No chest pain or palpitations  GASTROINTESTINAL: No abdominal or epigastric pain. No nausea, vomiting, or hematemesis; No diarrhea or constipation. No melena or hematochezia.  GENITOURINARY: No dysuria, frequency or hematuria  NEUROLOGICAL: No numbness or weakness  SKIN: No itching, burning, rashes, or lesions   All other review of systems is negative unless indicated above.    MEDICATIONS:  MEDICATIONS  (STANDING):  amLODIPine   Tablet 10 milliGRAM(s) Oral daily  aspirin enteric coated 81 milliGRAM(s) Oral daily  enoxaparin Injectable 40 milliGRAM(s) SubCutaneous <User Schedule>  ezetimibe 10 milliGRAM(s) Oral daily  FLUoxetine 20 milliGRAM(s) Oral daily  hydrALAZINE 25 milliGRAM(s) Oral every 8 hours  labetalol 200 milliGRAM(s) Oral every 8 hours  losartan 100 milliGRAM(s) Oral daily  modafinil 100 milliGRAM(s) Oral <User Schedule>  pantoprazole    Tablet 40 milliGRAM(s) Oral before breakfast  polyethylene glycol 3350 17 Gram(s) Oral every 12 hours  senna 2 Tablet(s) Oral at bedtime  sodium chloride 2 Gram(s) Oral every 6 hours      PHYSICAL EXAM:  T(C): 37.4 (10-27-24 @ 20:16), Max: 37.6 (10-27-24 @ 12:45)  HR: 91 (10-27-24 @ 20:16) (71 - 104)  BP: 122/77 (10-27-24 @ 20:16) (111/71 - 138/81)  RR: 18 (10-27-24 @ 20:16) (14 - 18)  SpO2: 96% (10-27-24 @ 20:16) (96% - 98%)  Wt(kg): --  I&O's Summary    26 Oct 2024 07:01  -  27 Oct 2024 07:00  --------------------------------------------------------  IN: 740 mL / OUT: 4125 mL / NET: -3385 mL    27 Oct 2024 07:01  -  27 Oct 2024 23:29  --------------------------------------------------------  IN: 0 mL / OUT: 400 mL / NET: -400 mL          Appearance: Normal	  HEENT:  PERRLA   Lymphatic: No lymphadenopathy   Cardiovascular: Normal S1 S2, no JVD  Respiratory: normal effort , clear  Gastrointestinal:  Soft, Non-tender  Skin: No rashes,  warm to touch  Psychiatry:  Mood & affect appropriate  Musculuskeletal: No edema    recent labs, Imaging and EKGs personally reviewed             10-26-24 @ 07:01  -  10-27-24 @ 07:00  --------------------------------------------------------  IN: 740 mL / OUT: 4125 mL / NET: -3385 mL    10-27-24 @ 07:01  -  10-27-24 @ 23:29  --------------------------------------------------------  IN: 0 mL / OUT: 400 mL / NET: -400 mL

## 2024-10-27 NOTE — PROGRESS NOTE ADULT - SUBJECTIVE AND OBJECTIVE BOX
C A R D I O L O G Y  **********************************     DATE OF SERVICE: 10-27-24       pt offers no concerns        acetaminophen     Tablet .. 650 milliGRAM(s) Oral every 6 hours PRN  amLODIPine   Tablet 10 milliGRAM(s) Oral daily  aspirin enteric coated 81 milliGRAM(s) Oral daily  enoxaparin Injectable 40 milliGRAM(s) SubCutaneous <User Schedule>  ezetimibe 10 milliGRAM(s) Oral daily  FLUoxetine 20 milliGRAM(s) Oral daily  hydrALAZINE 25 milliGRAM(s) Oral every 8 hours  labetalol 200 milliGRAM(s) Oral every 8 hours  losartan 100 milliGRAM(s) Oral daily  modafinil 100 milliGRAM(s) Oral <User Schedule>  pantoprazole    Tablet 40 milliGRAM(s) Oral before breakfast  polyethylene glycol 3350 17 Gram(s) Oral every 12 hours  senna 2 Tablet(s) Oral at bedtime  sodium chloride 2 Gram(s) Oral every 6 hours                            12.3   17.20 )-----------( 421      ( 27 Oct 2024 06:36 )             37.1       Hemoglobin: 12.3 g/dL (10-27 @ 06:36)  Hemoglobin: 11.6 g/dL (10-26 @ 07:17)  Hemoglobin: 12.1 g/dL (10-25 @ 06:59)  Hemoglobin: 12.5 g/dL (10-24 @ 04:57)  Hemoglobin: 12.5 g/dL (10-23 @ 06:06)      10-27    134[L]  |  98  |  19  ----------------------------<  88  4.1   |  21[L]  |  0.85    Ca    9.9      27 Oct 2024 06:36    TPro  7.5  /  Alb  3.9  /  TBili  1.0  /  DBili  x   /  AST  41[H]  /  ALT  170[H]  /  AlkPhos  112  10-27    Creatinine Trend: 0.85<--, 0.89<--, 0.96<--, 0.89<--, 0.91<--, 0.92<--    COAGS:           T(C): 36.8 (10-27-24 @ 08:23), Max: 37 (10-27-24 @ 04:51)  HR: 83 (10-27-24 @ 08:23) (66 - 83)  BP: 119/77 (10-27-24 @ 08:23) (119/77 - 138/81)  RR: 16 (10-27-24 @ 08:23) (14 - 16)  SpO2: 97% (10-27-24 @ 08:23) (96% - 98%)  Wt(kg): --    I&O's Summary    26 Oct 2024 07:01  -  27 Oct 2024 07:00  --------------------------------------------------------  IN: 740 mL / OUT: 4125 mL / NET: -3385 mL        Gen: NAD  HEENT:  (-)icterus (-)pallor  CV: N S1 S2 1/6 SOHEILA (+)2 Pulses B/l  Resp:  Clear to auscultation B/L, normal effort  GI: (+) BS Soft, NT, ND  Lymph:  (-)Edema, (-)obvious lymphadenopathy  Skin: Warm to touch, Normal turgor  Psych: Appropriate mood and affect      TELEMETRY: NSR	      RADIOLOGY:  < from: Xray Chest 1 View- PORTABLE-Urgent (Xray Chest 1 View- PORTABLE-Urgent .) (10.16.24 @ 05:38) >  IMPRESSION:  Clear lungs.  < end of copied text >    < from: TTE W or WO Ultrasound Enhancing Agent (10.14.24 @ 08:46) >  CONCLUSIONS:   1. Fair study quality.   2. Left ventricular systolic function is normal with an ejection fraction of 60 % by Chua's method of disks.   3. Normal right ventricular systolic function.   4. No prior echocardiogram is available for comparison.  < end of copied text >      < from: CELENA W or WO Ultrasound Enhancing Agent (10.23.24 @ 11:47) >  CONCLUSIONS:     1. Agitated saline injection was negative for intracardiac shunt.   2. No thrombus seen in the left atrial, left atrial appendage, right atrial or right atrial appendage.   3. No pericardial effusion seen.   4. Normal right ventricular cavity size and normal right ventricular systolic function.   5. No evidence of left atrial or left atrial appendage thrombus.   6. Left ventricular systolic function is normal with an ejection fraction visually estimated at 55 to 60 %.  < end of copied text >    CT head: c< from: CT Head No Cont (10.25.24 @ 19:30) >  IMPRESSION:  Resolving subacute hemorrhage in the left caudate nucleus and left   lateral ventricle are decreased in size and attenuation from 10/19/2024.    Vasogenicedema and mass effect, with 9 mm rightward bulging the midline,   appears stable.  There is no subfalcine herniation of the frontal lobes   underneath the cerebral falx.    Slight prominence of the temporal horns of both lateral ventricles,   suspicious for mild hydrocephalus, is stable.    No new intracranial findings.        --- End of Report ---    < end of copied text >    CT : t< from: CT Chest w/ IV Cont (10.25.24 @ 19:30) >  IMPRESSION:  No acute findings or evidence of malignancy in the chest, abdomen or   pelvis.    Please refer to detailed findings otherwise described above.    --- End of Report ---    < end of copied text >    ASSESSMENT/PLAN: Patient is a 43 y/o Male with PMH of HTN who presented with headaches admitted for ICH. Cardiology consulted for HTN.    #ICH  - Likely hypertensive hemorrhage per neuro  - Management per neuro/neurosx  - MRI Brain noted with acute/subacute infarct in R corona radiata  - D/w neuro, concern for embolic stroke - CELENA with no thrombus, neg for PFO  - EP consult appreciated - Outpatient surveillance for AFib with MCOT or Loop recorder  - s/p negative cerebral angio  - CT C/A/P neg for malignancy    #HTN  - SBP goal <160 per neuro  - Continue Amlodipine 10mg daily, Labetalol 200mg q8hrs (can uptitrate further if remains above goal), Hydralazine 100mg q8hrs, and Losartan 100mg daily. Not on thiazide diuretics due to strict NA control per neuro.  - Suspect will improve further once weaned of sodium chloride tabs  - TTE with normal LV function  - Secondary HTN workup - Renal artery dopplers neg for KAITLYN, TSH WNL, can eventually send aldosterone/renin/metaneprhine/AM cortisol levels once off sodium chloride tabs     - No further inpatient cardiac w/u planned  - Will setup patient for office follow up prior to discharge

## 2024-10-27 NOTE — PROGRESS NOTE ADULT - ASSESSMENT
44 Right handed man with HTN HLD p/w headache, emesis, dizziness found to have left IPH w/ IVH.     Impression: AMS and dizziness due to nontraumatic L BG intracerebral hemorrhage w/ IVH, perihematomal cerebral edema and brain compression. Etiology Likely hypertensive hemorrhage. 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 Young patient with vascular risk factors including hypertension and hyperlipidemia with MRI of the brain showing a small RIGHT corona radiata infarct and a LEFT basal ganglia hemorrhage. Among the etiologies that can cause ischemia and bleed in different vascular territories are either cardiac source of embolism vs something systemic inflammatory or infectious, workup thus far neg    NEURO: Neurologic examination with fluctuations in mental status, now overall stable. On 10/24: added fluoxitine 20mg qd for 3 months per FLAME, can be titrated off as outpatient and added modafinil 100mg qd to help with lethargy. S/P cerebral angiogram on 10/23 which showed no source of the lesion- can consider repeat angiogram outpatient. Repeat CTH 10/19 stable. EEG report negative, now discontinued. Concern for inflammatory process. Inflammatory labs sent (ESR/CRP, HIV, syphilis and hepatitis panel - HIV, hepatitis negative). Repeat CTH and CT CAP neg for malignancy. Continue monitoring for neurologic deterioration in the setting of cerebral edema and compression, stable to move to neurology floor. Keep strict normotension. , continue high intensity statin, titrate to LDL goal < 70. A1C 5.  MRI brain w/wo results as above. PT/OT recommend AR.  Q4 neurochecks at night for protected sleep    ANTITHROMBOTIC THERAPY: Aspirin 81mg daily for secondary stroke prevention started 10/21/24    PULMONARY: CXR (10/16) clear, protecting airway, saturating well     CARDIOVASCULAR: TTE: EF 60%, normal LA. Continue cardiac monitoring. Cardiology consulted for HTN management. Continue amlodipine, labetalol, hydralazine and losartan. Renal artery doppler: no evidence of KAITLYN. TSH wnl. Recommend ILR to screen for arrhythmia like Afib as outpatient, Recommend CELENA, no neurological contraindications to CELENA, CELENA shows no thrombus, done on 10/23.      SBP goal: <160     GASTROINTESTINAL:  dysphagia screen  - passed, tolerating diet. Elevated LFTs improving, continue to trend, internal medicine recs appreciated. US abdomen with  hepatic steatosis, no evidence of acute cholecystitis. Discontinued statin, will switch to zetia. Hepatits panel negative.     Diet: regular    RENAL: BUN/Cr stable, good urine output. Weaned off of 2% HTS, continue NaCl tabs 2g q6h for hyponatremia: slowly titrate off at rehab    HEMATOLOGY: H/H stable, Platelets 392. BLE dopplers (10/14): no evidence of dvt. Repeat LE dopplers on 10/23 negative for DVT. Hypercoagulable labs sent. CT CAP: negative for malignancy     DVT ppx: Lovenox     ID: afebrile, no localized infectious symptoms noted. UA negative, blood cultures x2 (10/16) NGTD. Mild leukocytosis, now downtrending. Repeat UA neg, repeat blood cultures negative, ammonia level WNL and LE dopplers negative.     DISPO: Acute rehab per PT/OT eval once bed available, medically cleared    CORE MEASURES:        Admission NIHSS: 1     ICH 1     TPA: [] YES [X] NO      LDL/HDL: 146/43     Depression Screen:      Statin Therapy: YES     Dysphagia Screen: [X] PASS [] FAIL     Smoking [] YES [X] NO      Afib [] YES [X] NO     Stroke Education [] YES [] N 44 Right handed man with HTN HLD p/w headache, emesis, dizziness found to have left IPH w/ IVH.     Impression: AMS and dizziness due to nontraumatic L BG intracerebral hemorrhage w/ IVH, perihematomal cerebral edema and brain compression. Etiology Likely hypertensive hemorrhage. MRI also reveals acute/subacute R corona radiata ischemic infarct, etiology likely cryptogenic stroke Young patient with vascular risk factors including hypertension and hyperlipidemia with MRI of the brain showing a small RIGHT corona radiata infarct and a LEFT basal ganglia hemorrhage. Among the etiologies that can cause ischemia and bleed in different vascular territories are either cardiac source of embolism vs something systemic inflammatory or infectious, workup thus far neg    NEURO: Neurologic examination with fluctuations in mental status, now overall stable. On 10/24: added fluoxitine 20mg qd for 3 months per FLAME, can be titrated off as outpatient and added modafinil 100mg qd to help with lethargy. S/P cerebral angiogram on 10/23 which showed no source of the lesion- can consider repeat angiogram outpatient. Repeat CTH 10/19 stable. EEG report negative, now discontinued. Concern for inflammatory process. Inflammatory labs sent (ESR/CRP, HIV, syphilis and hepatitis panel - HIV, hepatitis negative). Repeat CTH and CT CAP neg for malignancy. Cerebral angiogram no findings of vasculitis. Continue monitoring for neurologic deterioration in the setting of cerebral edema and compression, stable to move to neurology floor. Keep strict normotension. , continue high intensity statin, titrate to LDL goal < 70. A1C 5.  MRI brain w/wo results as above. PT/OT recommend AR.  Q4 neurochecks at night for protected sleep    ANTITHROMBOTIC THERAPY: Aspirin 81mg daily for secondary stroke prevention started 10/21/24    PULMONARY: CXR (10/16) clear, protecting airway, saturating well     CARDIOVASCULAR: TTE: EF 60%, normal LA. Continue cardiac monitoring. Cardiology consulted for HTN management. Continue amlodipine, labetalol, hydralazine and losartan. Renal artery doppler: no evidence of KAITLYN. TSH wnl. Recommend ILR to screen for arrhythmia like Afib as outpatient, Recommend CELENA, no neurological contraindications to CELENA, CELENA shows no thrombus, done on 10/23.      SBP goal: <160     GASTROINTESTINAL:  dysphagia screen  - passed, tolerating diet. Elevated LFTs improving, continue to trend, internal medicine recs appreciated. US abdomen with  hepatic steatosis, no evidence of acute cholecystitis. Discontinued statin, will switch to zetia. Hepatits panel negative.     Diet: regular    RENAL: BUN/Cr stable, good urine output. Weaned off of 2% HTS, continue NaCl tabs 2g q6h for hyponatremia: slowly titrate off at rehab    HEMATOLOGY: H/H stable, Platelets 392. BLE dopplers (10/14): no evidence of dvt. Repeat LE dopplers on 10/23 negative for DVT. Hypercoagulable labs sent. CT CAP: negative for malignancy     DVT ppx: Lovenox     ID: afebrile, no localized infectious symptoms noted. UA negative, blood cultures x2 (10/16) NGTD. Mild leukocytosis, now downtrending then today elevated probably reactive. Repeat UA neg, repeat blood cultures negative, ammonia level WNL and LE dopplers negative.     DISPO: Acute rehab per PT/OT eval once bed available, medically cleared    CORE MEASURES:        Admission NIHSS: 1     ICH 1     TPA: [] YES [X] NO      LDL/HDL: 146/43     Depression Screen:      Statin Therapy: YES     Dysphagia Screen: [X] PASS [] FAIL     Smoking [] YES [X] NO      Afib [] YES [X] NO     Stroke Education [] YES [] N

## 2024-10-27 NOTE — PROGRESS NOTE ADULT - TIME BILLING
H&P PRE-OP Update    Please see additional related notes from Ortho clinic, referring provider, and pre-op H&P    Physical Exam  COR: regular, without murmur  RESP: Clear, equal breath sounds bilaterally  ABD: soft, non-tender, positive bowel sounds  SURGICAL SITE: initialed by myself    Plan  Patient's clinic notes, referring provider notes & H&P related to current orthopedic condition and the current surgical plan are reviewed, completed, and signed.  It is possible that unexpected surgical findings might require additional therapeutic intervention not discussed pre-operatively. An extensive informed consent process has been completed, explaining risks & benefits of surgery, potential complications, reasonable expectations, anticipated recovery process and outcomes.  No guarantees were made.  The patient (and/or family/power of attornery) agrees to proceed with surgery and has signed the Surgical Consent form.  Pre-op preparations have been successfully completed.  All stated questions from the patient/family are answered.  Dr. Phani Diaz  Orthopedics & Sports Medicine  [facility]  Jose Antonio Diaz MD  6/27/2022 11:51 AM    
please see assessment as above.
stroke
stroke

## 2024-10-27 NOTE — PROGRESS NOTE ADULT - SUBJECTIVE AND OBJECTIVE BOX
THE PATIENT WAS SEEN AND EXAMINED BY ME WITH THE HOUSESTAFF AND STROKE TEAM DURING MORNING ROUNDS.   HPI:  44M no AC/AP hx uncontrolled HTN tx LVS s/p flu-like symptoms and headache; CTH w/ left BG IPH w/ IVH extension. Exam: Telugu speaking, Ox3, PERRL, no drift, CHAPA 5/5, SILT (14 Oct 2024 04:30)    SUBJECTIVE: No events overnight.  No new neurologic complaints.  ROS reported negative unless otherwise noted.    acetaminophen     Tablet .. 650 milliGRAM(s) Oral every 6 hours PRN  amLODIPine   Tablet 10 milliGRAM(s) Oral daily  aspirin enteric coated 81 milliGRAM(s) Oral daily  enoxaparin Injectable 40 milliGRAM(s) SubCutaneous <User Schedule>  ezetimibe 10 milliGRAM(s) Oral daily  FLUoxetine 20 milliGRAM(s) Oral daily  hydrALAZINE 25 milliGRAM(s) Oral every 8 hours  labetalol 200 milliGRAM(s) Oral every 8 hours  losartan 100 milliGRAM(s) Oral daily  modafinil 100 milliGRAM(s) Oral <User Schedule>  pantoprazole    Tablet 40 milliGRAM(s) Oral before breakfast  polyethylene glycol 3350 17 Gram(s) Oral every 12 hours  senna 2 Tablet(s) Oral at bedtime  sodium chloride 2 Gram(s) Oral every 6 hours    PHYSICAL EXAM:   Vital Signs Last 24 Hrs  T(C): 37 (27 Oct 2024 04:51), Max: 37 (27 Oct 2024 04:51)  T(F): 98.6 (27 Oct 2024 04:51), Max: 98.6 (27 Oct 2024 04:51)  HR: 81 (27 Oct 2024 04:51) (66 - 81)  BP: 119/84 (27 Oct 2024 04:51) (119/84 - 138/81)  BP(mean): 103 (27 Oct 2024 00:00) (91 - 103)  RR: 16 (27 Oct 2024 04:51) (14 - 17)  SpO2: 97% (27 Oct 2024 04:51) (96% - 98%)    Parameters below as of 27 Oct 2024 04:51  Patient On (Oxygen Delivery Method): room air      General: No acute distress  HEENT: EOM intact, visual fields full  Abdomen: Soft, nontender, nondistended   Extremities: No edema    NEUROLOGICAL EXAM:  Mental status: Eyes closed, open to verbal stimuli.  Oriented to self. States year with choices. States "[facility] office" for location. Hypophonic speech. Identifies objects. Follows some simple commands, unable to follow cross commands.  Cranial Nerves: No facial asymmetry, mild left gaze preference, no nystagmus, no dysarthria,  tongue midline  Motor exam: Normal tone, no drift, 5/5 RUE, 5/5 RLE, 5/5 LUE, 5/5 LLE, normal fine finger movements.  Sensation: Intact to light touch   Coordination/ Gait: No dysmetria, gait deferred.       LABS:                        11.6   12.67 )-----------( 392      ( 26 Oct 2024 07:17 )             35.7    10-26    136  |  104  |  21  ----------------------------<  86  4.3   |  19[L]  |  0.89    Ca    9.4      26 Oct 2024 07:15    TPro  7.1  /  Alb  3.5  /  TBili  0.7  /  DBili  x   /  AST  38  /  ALT  169[H]  /  AlkPhos  97  10-26        IMAGING: Reviewed by me.       CTH 10/19/24: No change since 10/15/2024. Left corona radiata hemorrhage with intraventricular extension. Mild midline shift to the right.    MRI Brain w/wo 10/18/24:  1. Stable parenchymal hematoma in the left basal ganglia, without evidence of an underlying hemorrhagic mass.  2. Stable IVH, with mild distention of the temporal horns.  *** ADDENDUM # 1 ***  On further review, there is a 5 mm acute/subacute infarct in the right corona radiata. The lesion shows low ADC values and T2/FLAIR   hyperintensity, consistent with an event between 24 hours and 7 days in age.      CTH 10/15/24: Interval stability compared with the prior 10/14/2024 in left basal ganglia acute hematoma with intraventricular extension. No evidence of rehemorrhage. No change in the ventricle size.      CTH 10/14/24 10am: Left basal ganglia lucency suspicious for infarct. Left corona radiata hemorrhage with intraventricular extension and mild ventricular dilatation, no change since 4:46 AM.    10/14/24 4am  CT HEAD: Stable left medial basal ganglia parenchymal hemorrhage with associated intraventricular extension of hemorrhage. Unchanged edema within the adjacent left basal ganglia as well as rightward bulging of the septum pellucidum.  CTA NECK: No evidence of significant stenosis or occlusion.  CTA HEAD: No large vessel occlusion, significant stenosis or vascular abnormality identified.    CTH 10/14/24 1am: Intraparenchymal hematoma in the medial left basal ganglia with adjacent vasogenic edema, mass effect, 7 mm of left to right midline shift, and intraventricular extension of hemorrhage predominantly in the left lateral ventricle.    CELENA:     1. Agitated saline injection was negative for intracardiac shunt.   2. No thrombus seen in the left atrial, left atrial appendage, right atrial or right atrial appendage.   3. No pericardial effusion seen.   4. Normal right ventricular cavity size and normal right ventricular systolic function.   5. No evidence of left atrial or left atrial appendage thrombus.   6. Left ventricular systolic function is normal with an ejection fraction visually estimated at 55 to 60 %.

## 2024-10-28 LAB
ALBUMIN SERPL ELPH-MCNC: 3.7 G/DL — SIGNIFICANT CHANGE UP (ref 3.3–5)
ALP SERPL-CCNC: 125 U/L — HIGH (ref 40–120)
ALT FLD-CCNC: 170 U/L — HIGH (ref 10–45)
ANION GAP SERPL CALC-SCNC: 12 MMOL/L — SIGNIFICANT CHANGE UP (ref 5–17)
APPEARANCE UR: ABNORMAL
AST SERPL-CCNC: 45 U/L — HIGH (ref 10–40)
BACTERIA # UR AUTO: ABNORMAL /HPF
BASOPHILS # BLD AUTO: 0.1 K/UL — SIGNIFICANT CHANGE UP (ref 0–0.2)
BASOPHILS NFR BLD AUTO: 0.4 % — SIGNIFICANT CHANGE UP (ref 0–2)
BILIRUB SERPL-MCNC: 1.2 MG/DL — SIGNIFICANT CHANGE UP (ref 0.2–1.2)
BILIRUB UR-MCNC: NEGATIVE — SIGNIFICANT CHANGE UP
BUN SERPL-MCNC: 23 MG/DL — SIGNIFICANT CHANGE UP (ref 7–23)
CALCIUM SERPL-MCNC: 9.5 MG/DL — SIGNIFICANT CHANGE UP (ref 8.4–10.5)
CAST: 1 /LPF — SIGNIFICANT CHANGE UP (ref 0–4)
CHLORIDE SERPL-SCNC: 100 MMOL/L — SIGNIFICANT CHANGE UP (ref 96–108)
CO2 SERPL-SCNC: 21 MMOL/L — LOW (ref 22–31)
COLOR SPEC: SIGNIFICANT CHANGE UP
CREAT SERPL-MCNC: 0.89 MG/DL — SIGNIFICANT CHANGE UP (ref 0.5–1.3)
DIFF PNL FLD: NEGATIVE — SIGNIFICANT CHANGE UP
EGFR: 109 ML/MIN/1.73M2 — SIGNIFICANT CHANGE UP
EOSINOPHIL # BLD AUTO: 0.13 K/UL — SIGNIFICANT CHANGE UP (ref 0–0.5)
EOSINOPHIL NFR BLD AUTO: 0.6 % — SIGNIFICANT CHANGE UP (ref 0–6)
GLUCOSE SERPL-MCNC: 106 MG/DL — HIGH (ref 70–99)
GLUCOSE UR QL: NEGATIVE MG/DL — SIGNIFICANT CHANGE UP
HCT VFR BLD CALC: 33.7 % — LOW (ref 39–50)
HGB BLD-MCNC: 11.2 G/DL — LOW (ref 13–17)
IMM GRANULOCYTES NFR BLD AUTO: 0.6 % — SIGNIFICANT CHANGE UP (ref 0–0.9)
KETONES UR-MCNC: NEGATIVE MG/DL — SIGNIFICANT CHANGE UP
LEUKOCYTE ESTERASE UR-ACNC: ABNORMAL
LYMPHOCYTES # BLD AUTO: 2.23 K/UL — SIGNIFICANT CHANGE UP (ref 1–3.3)
LYMPHOCYTES # BLD AUTO: 9.8 % — LOW (ref 13–44)
MCHC RBC-ENTMCNC: 28.6 PG — SIGNIFICANT CHANGE UP (ref 27–34)
MCHC RBC-ENTMCNC: 33.2 GM/DL — SIGNIFICANT CHANGE UP (ref 32–36)
MCV RBC AUTO: 86 FL — SIGNIFICANT CHANGE UP (ref 80–100)
MONOCYTES # BLD AUTO: 1.48 K/UL — HIGH (ref 0–0.9)
MONOCYTES NFR BLD AUTO: 6.5 % — SIGNIFICANT CHANGE UP (ref 2–14)
NEUTROPHILS # BLD AUTO: 18.57 K/UL — HIGH (ref 1.8–7.4)
NEUTROPHILS NFR BLD AUTO: 82.1 % — HIGH (ref 43–77)
NITRITE UR-MCNC: POSITIVE
NRBC # BLD: 0 /100 WBCS — SIGNIFICANT CHANGE UP (ref 0–0)
PH UR: 5.5 — SIGNIFICANT CHANGE UP (ref 5–8)
PLATELET # BLD AUTO: 363 K/UL — SIGNIFICANT CHANGE UP (ref 150–400)
POTASSIUM SERPL-MCNC: 4.1 MMOL/L — SIGNIFICANT CHANGE UP (ref 3.5–5.3)
POTASSIUM SERPL-SCNC: 4.1 MMOL/L — SIGNIFICANT CHANGE UP (ref 3.5–5.3)
PROCALCITONIN SERPL-MCNC: 0.86 NG/ML — HIGH (ref 0.02–0.1)
PROT S FREE PPP-ACNC: 50 % — LOW (ref 63–140)
PROT SERPL-MCNC: 7.3 G/DL — SIGNIFICANT CHANGE UP (ref 6–8.3)
PROT UR-MCNC: SIGNIFICANT CHANGE UP MG/DL
RBC # BLD: 3.92 M/UL — LOW (ref 4.2–5.8)
RBC # FLD: 12.8 % — SIGNIFICANT CHANGE UP (ref 10.3–14.5)
RBC CASTS # UR COMP ASSIST: 2 /HPF — SIGNIFICANT CHANGE UP (ref 0–4)
REVIEW: SIGNIFICANT CHANGE UP
SODIUM SERPL-SCNC: 133 MMOL/L — LOW (ref 135–145)
SP GR SPEC: 1.02 — SIGNIFICANT CHANGE UP (ref 1–1.03)
SQUAMOUS # UR AUTO: 2 /HPF — SIGNIFICANT CHANGE UP (ref 0–5)
UROBILINOGEN FLD QL: 1 MG/DL — SIGNIFICANT CHANGE UP (ref 0.2–1)
WBC # BLD: 22.64 K/UL — HIGH (ref 3.8–10.5)
WBC # FLD AUTO: 22.64 K/UL — HIGH (ref 3.8–10.5)
WBC UR QL: 17 /HPF — HIGH (ref 0–5)

## 2024-10-28 PROCEDURE — 99233 SBSQ HOSP IP/OBS HIGH 50: CPT

## 2024-10-28 PROCEDURE — 71045 X-RAY EXAM CHEST 1 VIEW: CPT | Mod: 26

## 2024-10-28 PROCEDURE — 99232 SBSQ HOSP IP/OBS MODERATE 35: CPT

## 2024-10-28 RX ORDER — VANCOMYCIN HYDROCHLORIDE 50 MG/ML
1250 KIT ORAL EVERY 12 HOURS
Refills: 0 | Status: DISCONTINUED | OUTPATIENT
Start: 2024-10-28 | End: 2024-10-28

## 2024-10-28 RX ORDER — PIPERACILLIN AND TAZOBACTAM .5; 4 G/20ML; G/20ML
3.38 INJECTION, POWDER, LYOPHILIZED, FOR SOLUTION INTRAVENOUS ONCE
Refills: 0 | Status: COMPLETED | OUTPATIENT
Start: 2024-10-28 | End: 2024-10-28

## 2024-10-28 RX ORDER — MUPIROCIN 20 MG/G
1 OINTMENT TOPICAL EVERY 12 HOURS
Refills: 0 | Status: COMPLETED | OUTPATIENT
Start: 2024-10-28 | End: 2024-11-02

## 2024-10-28 RX ORDER — PIPERACILLIN AND TAZOBACTAM .5; 4 G/20ML; G/20ML
3.38 INJECTION, POWDER, LYOPHILIZED, FOR SOLUTION INTRAVENOUS EVERY 8 HOURS
Refills: 0 | Status: DISCONTINUED | OUTPATIENT
Start: 2024-10-28 | End: 2024-10-30

## 2024-10-28 RX ADMIN — SODIUM CHLORIDE 2 GRAM(S): 9 INJECTION, SOLUTION INTRAMUSCULAR; INTRAVENOUS; SUBCUTANEOUS at 00:25

## 2024-10-28 RX ADMIN — LOSARTAN POTASSIUM 100 MILLIGRAM(S): 25 TABLET ORAL at 05:10

## 2024-10-28 RX ADMIN — PIPERACILLIN AND TAZOBACTAM 200 GRAM(S): .5; 4 INJECTION, POWDER, LYOPHILIZED, FOR SOLUTION INTRAVENOUS at 11:04

## 2024-10-28 RX ADMIN — Medication 200 MILLIGRAM(S): at 14:29

## 2024-10-28 RX ADMIN — HYDRALAZINE HYDROCHLORIDE 25 MILLIGRAM(S): 50 TABLET, FILM COATED ORAL at 05:10

## 2024-10-28 RX ADMIN — POLYETHYLENE GLYCOL 3350 17 GRAM(S): 17 POWDER, FOR SOLUTION ORAL at 05:11

## 2024-10-28 RX ADMIN — PIPERACILLIN AND TAZOBACTAM 25 GRAM(S): .5; 4 INJECTION, POWDER, LYOPHILIZED, FOR SOLUTION INTRAVENOUS at 22:00

## 2024-10-28 RX ADMIN — MUPIROCIN 1 APPLICATION(S): 20 OINTMENT TOPICAL at 17:16

## 2024-10-28 RX ADMIN — MODAFINIL 100 MILLIGRAM(S): 200 TABLET ORAL at 09:27

## 2024-10-28 RX ADMIN — SODIUM CHLORIDE 2 GRAM(S): 9 INJECTION, SOLUTION INTRAMUSCULAR; INTRAVENOUS; SUBCUTANEOUS at 23:18

## 2024-10-28 RX ADMIN — HYDRALAZINE HYDROCHLORIDE 25 MILLIGRAM(S): 50 TABLET, FILM COATED ORAL at 21:34

## 2024-10-28 RX ADMIN — Medication 200 MILLIGRAM(S): at 05:10

## 2024-10-28 RX ADMIN — POLYETHYLENE GLYCOL 3350 17 GRAM(S): 17 POWDER, FOR SOLUTION ORAL at 17:07

## 2024-10-28 RX ADMIN — Medication 10 MILLIGRAM(S): at 05:10

## 2024-10-28 RX ADMIN — SODIUM CHLORIDE 2 GRAM(S): 9 INJECTION, SOLUTION INTRAMUSCULAR; INTRAVENOUS; SUBCUTANEOUS at 17:16

## 2024-10-28 RX ADMIN — Medication 20 MILLIGRAM(S): at 11:06

## 2024-10-28 RX ADMIN — EZETIMIBE 10 MILLIGRAM(S): 10 TABLET ORAL at 11:06

## 2024-10-28 RX ADMIN — PIPERACILLIN AND TAZOBACTAM 25 GRAM(S): .5; 4 INJECTION, POWDER, LYOPHILIZED, FOR SOLUTION INTRAVENOUS at 14:33

## 2024-10-28 RX ADMIN — HYDRALAZINE HYDROCHLORIDE 25 MILLIGRAM(S): 50 TABLET, FILM COATED ORAL at 14:28

## 2024-10-28 RX ADMIN — Medication 81 MILLIGRAM(S): at 11:06

## 2024-10-28 RX ADMIN — SODIUM CHLORIDE 2 GRAM(S): 9 INJECTION, SOLUTION INTRAMUSCULAR; INTRAVENOUS; SUBCUTANEOUS at 05:14

## 2024-10-28 RX ADMIN — Medication 40 MILLIGRAM(S): at 17:07

## 2024-10-28 RX ADMIN — PANTOPRAZOLE SODIUM 40 MILLIGRAM(S): 40 TABLET, DELAYED RELEASE ORAL at 05:10

## 2024-10-28 RX ADMIN — SODIUM CHLORIDE 2 GRAM(S): 9 INJECTION, SOLUTION INTRAMUSCULAR; INTRAVENOUS; SUBCUTANEOUS at 11:06

## 2024-10-28 NOTE — PROGRESS NOTE ADULT - NS ATTEND AMEND GEN_ALL_CORE FT
I saw and examined the patient, reviewed diagnostic studies, and reviewed images personally. I agree with NP/PA’s history, exam, orders placed, and plan of care. Total care time spent, 50 minutes. Medical issues needing to be addressed include: Nontraumatic intracerebral hemorrhage subcortical w/ IVH, perihematomal cerebral edema and brain compression, HTN, medication noncompliance per brother, HA, AMS, n/v, dizziness.  Likely hypertensive hemorrhage. small area of diffusion restriction on MRI in R subcortical region, stroke w up. Continued/worsening leukocytosis 22 (17). Vanc/zosyn.

## 2024-10-28 NOTE — PROGRESS NOTE ADULT - SUBJECTIVE AND OBJECTIVE BOX
Patient is a 44y old  Male who presents with a chief complaint of Stroke (25 Oct 2024 11:00)    Patient seen and examined at bedside, family member present. No acute change in mental status, or acute overnight events noted.     MEDICATIONS  (STANDING):  amLODIPine   Tablet 10 milliGRAM(s) Oral daily  aspirin enteric coated 81 milliGRAM(s) Oral daily  enoxaparin Injectable 40 milliGRAM(s) SubCutaneous <User Schedule>  ezetimibe 10 milliGRAM(s) Oral daily  FLUoxetine 20 milliGRAM(s) Oral daily  hydrALAZINE 25 milliGRAM(s) Oral every 8 hours  labetalol 200 milliGRAM(s) Oral every 8 hours  losartan 100 milliGRAM(s) Oral daily  modafinil 100 milliGRAM(s) Oral <User Schedule>  mupirocin 2% Nasal 1 Application(s) Both Nostrils every 12 hours  pantoprazole    Tablet 40 milliGRAM(s) Oral before breakfast  piperacillin/tazobactam IVPB.- 3.375 Gram(s) IV Intermittent once  piperacillin/tazobactam IVPB.. 3.375 Gram(s) IV Intermittent every 8 hours  polyethylene glycol 3350 17 Gram(s) Oral every 12 hours  senna 2 Tablet(s) Oral at bedtime  sodium chloride 2 Gram(s) Oral every 6 hours  vancomycin  IVPB 1250 milliGRAM(s) IV Intermittent every 12 hours    MEDICATIONS  (PRN):  acetaminophen     Tablet .. 650 milliGRAM(s) Oral every 6 hours PRN Mild Pain (1 - 3), Moderate Pain (4 - 6)    Vital Signs Last 24 Hrs  T(C): 37.1 (28 Oct 2024 08:20), Max: 37.6 (27 Oct 2024 12:45)  T(F): 98.7 (28 Oct 2024 08:20), Max: 99.7 (27 Oct 2024 12:45)  HR: 84 (28 Oct 2024 08:20) (84 - 104)  BP: 132/74 (28 Oct 2024 08:20) (111/71 - 141/80)  BP(mean): --  RR: 18 (28 Oct 2024 08:20) (16 - 18)  SpO2: 97% (28 Oct 2024 08:20) (96% - 97%)    Parameters below as of 28 Oct 2024 08:20  Patient On (Oxygen Delivery Method): room air    PE  NAD  Awake, alert  Anicteric, MMM  No c/c/e  No rash grossly                        11.2   22.64 )-----------( 363      ( 28 Oct 2024 05:18 )             33.7     10-28    133[L]  |  100  |  23  ----------------------------<  106[H]  4.1   |  21[L]  |  0.89    Ca    9.5      28 Oct 2024 05:17    TPro  7.3  /  Alb  3.7  /  TBili  1.2  /  DBili  x   /  AST  45[H]  /  ALT  170[H]  /  AlkPhos  125[H]  10-28

## 2024-10-28 NOTE — PROGRESS NOTE ADULT - ASSESSMENT
44 Right handed man with HTN HLD p/w headache, emesis, dizziness found to have left IPH w/ IVH.     Impression: AMS and dizziness due to nontraumatic L BG intracerebral hemorrhage w/ IVH, perihematomal cerebral edema and brain compression. Etiology Likely hypertensive hemorrhage. MRI also reveals acute/subacute R corona radiata ischemic infarct, etiology likely cryptogenic stroke Young patient with vascular risk factors including hypertension and hyperlipidemia with MRI of the brain showing a small RIGHT corona radiata infarct and a LEFT basal ganglia hemorrhage. Among the etiologies that can cause ischemia and bleed in different vascular territories are either cardiac source of embolism vs something systemic inflammatory or infectious, workup thus far neg    NEURO: Neurologic examination with fluctuations in mental status, now overall stable. On 10/24: added fluoxitine 20mg qd for 3 months per FLAME, can be titrated off as outpatient and added modafinil 100mg qd to help with lethargy. S/P cerebral angiogram on 10/23 which showed no source of the lesion- can consider repeat angiogram outpatient. Repeat CTH 10/19 stable. EEG report negative, now discontinued. Concern for inflammatory process. Inflammatory labs sent (ESR/CRP, HIV, syphilis and hepatitis panel - HIV, hepatitis negative). Repeat CTH and CT CAP neg for malignancy. Cerebral angiogram no findings of vasculitis. Continue monitoring for neurologic deterioration in the setting of cerebral edema and compression, stable to move to neurology floor. Keep strict normotension. , continue high intensity statin, titrate to LDL goal < 70. A1C 5.  MRI brain w/wo results as above. PT/OT recommend AR.  Q4 neurochecks at night for protected sleep    ANTITHROMBOTIC THERAPY: Aspirin 81mg daily for secondary stroke prevention started 10/21/24    PULMONARY: CXR (10/16) clear, protecting airway, saturating well     CARDIOVASCULAR: TTE: EF 60%, normal LA. Continue cardiac monitoring. Cardiology consulted for HTN management. Continue amlodipine, labetalol, hydralazine and losartan. Renal artery doppler: no evidence of KAITLYN. TSH wnl. Recommend ILR to screen for arrhythmia like Afib as outpatient, Recommend CELENA, no neurological contraindications to CELENA, CELENA shows no thrombus, done on 10/23.      SBP goal: <160     GASTROINTESTINAL:  dysphagia screen  - passed, tolerating diet. Elevated LFTs improving, continue to trend, internal medicine recs appreciated. US abdomen with  hepatic steatosis, no evidence of acute cholecystitis. Discontinued statin, will switch to zetia. Hepatits panel negative.     Diet: regular    RENAL: BUN/Cr stable, good urine output. Weaned off of 2% HTS, continue NaCl tabs 2g q6h for hyponatremia: slowly titrate off at rehab    HEMATOLOGY: H/H stable, Platelets 392. BLE dopplers (10/14): no evidence of dvt. Repeat LE dopplers on 10/23 negative for DVT. Hypercoagulable labs sent. CT CAP: negative for malignancy     DVT ppx: Lovenox     ID: afebrile, leukocytosis noted, no localized infectious symptoms noted. UA negative, blood cultures x2 (10/16) NGTD.  Repeat UA neg, repeat blood cultures negative, ammonia level WNL and LE dopplers negative. Staph PCR nasal swab positive- started on mupirocin     DISPO: Acute rehab per PT/OT eval once bed available, medically cleared    CORE MEASURES:        Admission NIHSS: 1     ICH 1     TPA: [] YES [X] NO      LDL/HDL: 146/43     Depression Screen:      Statin Therapy: YES     Dysphagia Screen: [X] PASS [] FAIL     Smoking [] YES [X] NO      Afib [] YES [X] NO     Stroke Education [] YES [] N 44 Right handed man with HTN HLD p/w headache, emesis, dizziness found to have left IPH w/ IVH.     Impression: AMS and dizziness due to nontraumatic L BG intracerebral hemorrhage w/ IVH, perihematomal cerebral edema and brain compression. Etiology Likely hypertensive hemorrhage. MRI also reveals acute/subacute R corona radiata ischemic infarct, etiology likely cryptogenic stroke Young patient with vascular risk factors including hypertension and hyperlipidemia with MRI of the brain showing a small RIGHT corona radiata infarct and a LEFT basal ganglia hemorrhage. Among the etiologies that can cause ischemia and bleed in different vascular territories are either cardiac source of embolism vs something systemic inflammatory or infectious, workup thus far neg    NEURO: Neurologic examination with fluctuations in mental status, now overall stable. On 10/24: added fluoxitine 20mg qd for 3 months per FLAME, can be titrated off as outpatient and added modafinil 100mg qd to help with lethargy. S/P cerebral angiogram on 10/23 which showed no source of the lesion- can consider repeat angiogram outpatient. Repeat CTH 10/19 stable. EEG report negative, now discontinued. Concern for inflammatory process. Inflammatory labs sent (ESR/CRP, HIV, syphilis and hepatitis panel - HIV, hepatitis negative). Repeat CTH and CT CAP neg for malignancy. Cerebral angiogram no findings of vasculitis. Continue monitoring for neurologic deterioration in the setting of cerebral edema and compression, stable to move to neurology floor. Keep strict normotension. , continue high intensity statin, titrate to LDL goal < 70. A1C 5.  MRI brain w/wo results as above. PT/OT recommend AR.  Q4 neurochecks at night for protected sleep    ANTITHROMBOTIC THERAPY: Aspirin 81mg daily for secondary stroke prevention started 10/21/24    PULMONARY: CXR (10/16) clear, protecting airway, saturating well     CARDIOVASCULAR: TTE: EF 60%, normal LA. Continue cardiac monitoring. Cardiology consulted for HTN management. Continue amlodipine, labetalol, hydralazine and losartan. Renal artery doppler: no evidence of KAITLYN. TSH wnl. Recommend ILR to screen for arrhythmia like Afib as outpatient,  CELENA shows no thrombus, done on 10/23.      SBP goal: <160     GASTROINTESTINAL:  dysphagia screen  - passed, tolerating diet. Elevated LFTs improving, continue to trend, internal medicine recs appreciated. US abdomen with  hepatic steatosis, no evidence of acute cholecystitis. Discontinued statin, will switch to zetia. Hepatits panel negative.     Diet: regular    RENAL: BUN/Cr stable, good urine output. Weaned off of 2% HTS, continue NaCl tabs 2g q6h for hyponatremia: slowly titrate off at rehab    HEMATOLOGY: H/H stable, Platelets 392. BLE dopplers (10/14): no evidence of dvt. Repeat LE dopplers on 10/23 negative for DVT. Hypercoagulable labs sent. CT CAP: negative for malignancy     DVT ppx: Lovenox     ID: afebrile, elevated WBC to 22 on 10/28: repeat UA positive, pending UC, started on abx.  initial UA negative, blood cultures x2 (10/16) NGTD.  Repeat UA neg, repeat blood cultures negative, ammonia level WNL and LE dopplers negative. Staph PCR nasal swab positive- started on mupirocin     DISPO: Acute rehab per PT/OT eval once bed available, medically cleared    CORE MEASURES:        Admission NIHSS: 1     ICH 1     TPA: [] YES [X] NO      LDL/HDL: 146/43     Depression Screen:      Statin Therapy: YES     Dysphagia Screen: [X] PASS [] FAIL     Smoking [] YES [X] NO      Afib [] YES [X] NO     Stroke Education [] YES [] N

## 2024-10-28 NOTE — PROGRESS NOTE ADULT - ASSESSMENT
Patient is a 44 year old male with a PMHx of uncontrolled HTN, HLD, who presented with a chief complaint of headache, emesis, dizziness and was found to have left IPH with IVH. CTH with left BG IPH and IVH. Internal Medicine has been consulted on Mr. Ferro's care for medical management.       CVA  - Per Neuro, likely 2/2 hypertensive hemorrhage with etiology likely ESUS  - CTs as noted   - MR w/ parenchymal hematoma in L basal ganglia, IVH, with mild distention of temporal horns, 5 mm acute/subacute infarct in R corona radiata   - TTE w/ EF 60%, normal LA; CELENA neg for Thrombus   - EEG negative per Neuro   - Recommend ILR to screen for occult arrhythmia --> EP eval appreciated   - W/ concern for inflammatory process, F/u labs sent  - Planned for diagnostic cerebral angiogram   - On ASA and Statin therapy   - Neuro checks per protocol   - Monitor on tele   - PT / OT / S+S   - Fall, Seizure, Aspiration precautions   - Per neurology   - EP and Cardio eval appreciated; F/u recs    Up-trending Leukocytosis   - CT C/A/P w/ no acute findings   - Elevated Procal, check CXR in AM and trend Procal   - Started on Empiric Zosyn   - UA +; F/u UCx and BCx2   - 10/16 and 10/22 blood cultures negative   - Trend CBC, Temp curve, VS and monitor patient     Fever, Leukocytosis   - Patient febrile 10/16 w/ Tmax of 100.9F  - BCx2 w/ Neg   - 10/14 and 10/23 LE Duplex negative for DVT   - Trend CBC, temp curve, VS and monitor patient     Transaminitis  - US ABD w/ hepatic steatosis, no evidence of acute cholecystitis  - Statin DC'd and ordered for Zetia  - F/u hepatitis panel    - Continue to monitor and trend  - Serial abdominal examinations  - If up-trends, GI eval may be of benefit     Hyponatremia   - Weaned off of 2% HTS, On NaCl tabs 2g  - Na parameters as per Stroke Neurology   - Avoid overcorrection > 6-8 mEq in 24 hours     HTN   - On amlodipine, labetalol, hydralazine and losartan.   - Renal artery doppler: no evidence of KAITLYN.   - TSH WNL      PPX    Discussed with Attending, Family and Neuro team.

## 2024-10-28 NOTE — PROGRESS NOTE ADULT - SUBJECTIVE AND OBJECTIVE BOX
EP Attending  HISTORY OF PRESENT ILLNESS: HPI:  44M no AC/AP hx uncontrolled HTN tx LVS s/p flu-like symptoms and headache; CTH w/ left BG IPH w/ IVH extension. Exam: German speaking, Ox3, PERRL, no drift, CHAPA 5/5, SILT (14 Oct 2024 04:30)    Had ischemic stroke w/ hemorrhagic conversion.  Too sleepy to participate in HPI/ROS.  Family in room.  We discussed long term AFib surveillance after large stroke.  10/28- resting in bed, family at bedside, awaiting discharge to rehab.    PAST MEDICAL & SURGICAL HISTORY:  HTN    acetaminophen     Tablet .. 650 milliGRAM(s) Oral every 6 hours PRN  amLODIPine   Tablet 10 milliGRAM(s) Oral daily  aspirin enteric coated 81 milliGRAM(s) Oral daily  enoxaparin Injectable 40 milliGRAM(s) SubCutaneous <User Schedule>  ezetimibe 10 milliGRAM(s) Oral daily  FLUoxetine 20 milliGRAM(s) Oral daily  hydrALAZINE 25 milliGRAM(s) Oral every 8 hours  labetalol 200 milliGRAM(s) Oral every 8 hours  losartan 100 milliGRAM(s) Oral daily  modafinil 100 milliGRAM(s) Oral <User Schedule>  mupirocin 2% Nasal 1 Application(s) Both Nostrils every 12 hours  pantoprazole    Tablet 40 milliGRAM(s) Oral before breakfast  piperacillin/tazobactam IVPB.- 3.375 Gram(s) IV Intermittent once  piperacillin/tazobactam IVPB.. 3.375 Gram(s) IV Intermittent every 8 hours  polyethylene glycol 3350 17 Gram(s) Oral every 12 hours  senna 2 Tablet(s) Oral at bedtime  sodium chloride 2 Gram(s) Oral every 6 hours                            11.2   22.64 )-----------( 363      ( 28 Oct 2024 05:18 )             33.7       10-28    133[L]  |  100  |  23  ----------------------------<  106[H]  4.1   |  21[L]  |  0.89    Ca    9.5      28 Oct 2024 05:17    TPro  7.3  /  Alb  3.7  /  TBili  1.2  /  DBili  x   /  AST  45[H]  /  ALT  170[H]  /  AlkPhos  125[H]  10-28            T(C): 37.1 (10-28-24 @ 08:20), Max: 37.4 (10-27-24 @ 20:16)  HR: 84 (10-28-24 @ 08:20) (84 - 104)  BP: 132/74 (10-28-24 @ 08:20) (111/71 - 141/80)  RR: 18 (10-28-24 @ 08:20) (18 - 18)  SpO2: 97% (10-28-24 @ 08:20) (96% - 97%)  Wt(kg): --    I&O's Summary    27 Oct 2024 07:01  -  28 Oct 2024 07:00  --------------------------------------------------------  IN: 0 mL / OUT: 1100 mL / NET: -1100 mL        General: Well nourished, no acute distress, alert and oriented x 3  Head: normocephalic, no trauma  Neck: no JVD, no bruit, supple, not enlarged  CV: S1S2, no S3, regular rate, rhythm is SINUS, no murmurs.    Lungs: clear BL, no rales or wheezes  Abdomen: bowel sounds +, soft, nontender, nondistended  Extremities: no clubbing, cyanosis or edema  Neuro: Moves all 4 extremities, sensation intact x 4 extremities  Skin: warm and moist, normal turgor  Psych: Mood and affect are appropriate for circumstances  MSK: normal range of motion and strength x4 extremities.    TELEMETRY: NSR	    ECG: NSR  Echo:  < from: TTE W or WO Ultrasound Enhancing Agent (10.14.24 @ 08:46) >  CONCLUSIONS:      1. Fair study quality.   2. Left ventricular systolic function is normal with an ejection fraction of 60 % by Chua's method of disks.   3. Normal right ventricular systolic function.   4. No prior echocardiogram is available for comparison.    < end of copied text >    < from: MR Head w/wo IV Cont (10.18.24 @ 16:32) >  FINDINGS:    Multiple images are degraded by motion, but are nonetheless diagnostic.    A parenchymal hematoma noted in the left basal ganglia measuring   approximately 2.7 x 2.2 x 2.2 cm. There is vasogenic edema surrounding   the clot. There is no enhancement. There is no evidence of an underlying   hemorrhagic tumor at this time, although follow-up to resolution is   recommended to exclude a lesion obscured by the blood products in the   acute phase. The location of the lesion is suggestive of hypertensive   hemorrhage, correlate clinically.    There is associated ventricular breakthrough, with a large cast of clot   in the left lateral ventricle, which is expanded. There is layering blood   at both occipital horns. There is midline shift at the level of the   septum pellucidum measuring 8 mm left to right. All of these findings   appear unchanged, without interval rebleeding.    The third ventricle is effaced and the temporal horns are slightly   distended, possibly low grade hydrocephalus. This has not progressed, and   the sulci are noteffaced. There is mild periventricular T2   hyperintensity which could represent small vessel disease or   transependymal CSF flow resorption. There are also mild nonspecific T2   hyperintensities in the subcortical white matter.    No acute ischemia. Intracranial flow voids are patent. The cerebellar   tonsils are normally positioned.    Limited views of the sinuses and mastoids show mild to moderate mucosal   thickening without air-fluid levels, likely chronic.    Limited views of the orbits and visualized soft tissues of the neck,   face, scalp, skull base, and calvarium are otherwise unremarkable.    IMPRESSION:    1.  Stable parenchymal hematoma in the left basal ganglia, without   evidence of an underlying hemorrhagic mass.  2.  Stable IVH, with mild distention of the temporal horns.  < end of copied text >    < from: CELENA W or WO Ultrasound Enhancing Agent (10.23.24 @ 11:47) >  CONCLUSIONS:      1. Agitated saline injection was negative for intracardiac shunt.   2. No thrombus seen in the left atrial, left atrial appendage, right atrial or right atrial appendage.   3. No pericardial effusion seen.   4. Normal right ventricular cavity size and normal right ventricular systolic function.   5. No evidence of left atrial or left atrial appendage thrombus.   6. Left ventricular systolic function is normal with an ejection fraction visually estimated at 55 to 60 %.  < end of copied text >      ASSESSMENT/PLAN:  Mr Ferro is a 44y Male here w/ stroke.  Has history of uncontrolled hypertension. Continue multi-drug regimen above to keep SBP ~140-160, unless lower target granted by neurology.  CELENA was reassuringly normal.  Outpatient surveillance for AFib with MCOT or Loop recorder, given significant stroke in young aged patient.  Awaiting DC plan.    Hawk Lizarraga M.D.  Cardiac Electrophysiology    office [phone]  pager [phone]

## 2024-10-28 NOTE — PROGRESS NOTE ADULT - SUBJECTIVE AND OBJECTIVE BOX
Patient seen for rehab follow up  CC:  CVA    patient seen earlier today  brother at bedside         REVIEW OF SYSTEMS  Constitutional - + fever,  No fatigue  HEENT - No vertigo, No neck pain  Neurological - No headaches, No memory loss  Psychiatric - No depression, No anxiety    FUNCTIONAL PROGRESS  PT 10/24  bed mobility CG  transfers CG with RW  gait CG with RW x 45 feet     VITALS  T(C): 37.1 (10-28-24 @ 08:20), Max: 37.4 (10-27-24 @ 20:16)  HR: 84 (10-28-24 @ 08:20) (84 - 104)  BP: 132/74 (10-28-24 @ 08:20) (111/71 - 141/80)  RR: 18 (10-28-24 @ 08:20) (18 - 18)  SpO2: 97% (10-28-24 @ 08:20) (96% - 97%)  Wt(kg): --    MEDICATIONS   acetaminophen     Tablet .. 650 milliGRAM(s) every 6 hours PRN  amLODIPine   Tablet 10 milliGRAM(s) daily  aspirin enteric coated 81 milliGRAM(s) daily  enoxaparin Injectable 40 milliGRAM(s) <User Schedule>  ezetimibe 10 milliGRAM(s) daily  FLUoxetine 20 milliGRAM(s) daily  hydrALAZINE 25 milliGRAM(s) every 8 hours  labetalol 200 milliGRAM(s) every 8 hours  losartan 100 milliGRAM(s) daily  modafinil 100 milliGRAM(s) <User Schedule>  mupirocin 2% Nasal 1 Application(s) every 12 hours  pantoprazole    Tablet 40 milliGRAM(s) before breakfast  piperacillin/tazobactam IVPB.- 3.375 Gram(s) once  piperacillin/tazobactam IVPB.. 3.375 Gram(s) every 8 hours  polyethylene glycol 3350 17 Gram(s) every 12 hours  senna 2 Tablet(s) at bedtime  sodium chloride 2 Gram(s) every 6 hours      RECENT LABS - Reviewed                        11.2   22.64 )-----------( 363      ( 28 Oct 2024 05:18 )             33.7     10-28    133[L]  |  100  |  23  ----------------------------<  106[H]  4.1   |  21[L]  |  0.89    Ca    9.5      28 Oct 2024 05:17    TPro  7.3  /  Alb  3.7  /  TBili  1.2  /  DBili  x   /  AST  45[H]  /  ALT  170[H]  /  AlkPhos  125[H]  10-28      Urinalysis Basic - ( 28 Oct 2024 11:02 )    Color: Dark Yellow / Appearance: Cloudy / S.025 / pH: x  Gluc: x / Ketone: Negative mg/dL  / Bili: Negative / Urobili: 1.0 mg/dL   Blood: x / Protein: Trace mg/dL / Nitrite: Positive   Leuk Esterase: Small / RBC: 2 /HPF / WBC 17 /HPF   Sq Epi: x / Non Sq Epi: 2 /HPF / Bacteria: Many /HPF        < from: CT Head No Cont (10.19.24 @ 22:36) >    IMPRESSION: No change since 10/15/2024. Left corona radiata hemorrhage   with intraventricular extension. Mild midline shift to the right.      < end of copied text >    < from: MR Head w/wo IV Cont (10.18.24 @ 16:32) >    IMPRESSION:    1.  Stable parenchymal hematoma in the left basal ganglia, without   evidence of an underlying hemorrhagic mass.  2.  Stable IVH, with mild distention of the temporal horns.      < end of copied text >      ----------------------------------------------------------------------------------------  PHYSICAL EXAM  Constitutional - NAD, Comfortable, in bed   Chest - Breathing comfortably on room air   Cardiovascular - S1S2   Extremities - No C/C/E, No calf tenderness   Neurologic Exam -    follows commands                 Cognitive - Awake, Alert, AAO to self, place, year     Communication - hypophonic, aphasia         Motor - moves all ext      Sensory - Intact to LT     Psychiatric - Mood stable, Affect flat   ----------------------------------------------------------------------------------------  ASSESSMENT/PLAN  44yMale h/o HTN with functional deficits after CVA  MRI with acute/subacute right corona radiata infarct, stable left corona radiata bleed with mid line shift  EEG negative, on modafinil and fluoxetine   HTN, on multiple medications, continue to monitor   seen by EP for ILR, follow up as outpatient   leukocytosis, low grade fever over weekend, cultures negative, on antibiotics   DVT PPX - SCDs lovenox   Rehab - Will continue to follow for ongoing rehab needs and recommendations.    continue bedside therapy while admitted to prevent secondary complications of immobility, bed mobility, transfer training, progressive ambulation, equipment evaluation, ADLs, bracing/splinting   OOB throughout the day with staff, OOB to chair with meals          Recommend ACUTE inpatient rehabilitation for the functional deficits consisting of 3 hours of multidisciplinary intense therapy/day x 1-2 weeks depending on progress at rehabilitation facility, 24 hour RN/daily PMR physician for comorbid medical management. Patient will be able to participate in and benefit from intense rehabilitation therapies for 3 hours a day to maximize independence.                      35 minutes spent on total encounter  with chart review of PT/OT/SLP notes, exam, imaging, counseling and education on inpatient rehabilitation, coordination of care with rehab team and

## 2024-10-28 NOTE — PROGRESS NOTE ADULT - SUBJECTIVE AND OBJECTIVE BOX
C A R D I O L O G Y  **********************************     DATE OF SERVICE: 10-28-24    Patient noted with worsening leukocytosis today. denies chest pain or shortness of breath.   Review of symptoms otherwise negative.    MEDICATIONS:  acetaminophen     Tablet .. 650 milliGRAM(s) Oral every 6 hours PRN  amLODIPine   Tablet 10 milliGRAM(s) Oral daily  aspirin enteric coated 81 milliGRAM(s) Oral daily  enoxaparin Injectable 40 milliGRAM(s) SubCutaneous <User Schedule>  ezetimibe 10 milliGRAM(s) Oral daily  FLUoxetine 20 milliGRAM(s) Oral daily  hydrALAZINE 25 milliGRAM(s) Oral every 8 hours  labetalol 200 milliGRAM(s) Oral every 8 hours  losartan 100 milliGRAM(s) Oral daily  modafinil 100 milliGRAM(s) Oral <User Schedule>  mupirocin 2% Nasal 1 Application(s) Both Nostrils every 12 hours  pantoprazole    Tablet 40 milliGRAM(s) Oral before breakfast  piperacillin/tazobactam IVPB.- 3.375 Gram(s) IV Intermittent once  piperacillin/tazobactam IVPB.. 3.375 Gram(s) IV Intermittent every 8 hours  polyethylene glycol 3350 17 Gram(s) Oral every 12 hours  senna 2 Tablet(s) Oral at bedtime  sodium chloride 2 Gram(s) Oral every 6 hours  vancomycin  IVPB 1250 milliGRAM(s) IV Intermittent every 12 hours      LABS:                        11.2   22.64 )-----------( 363      ( 28 Oct 2024 05:18 )             33.7       Hemoglobin: 11.2 g/dL (10-28 @ 05:18)  Hemoglobin: 12.3 g/dL (10-27 @ 06:36)  Hemoglobin: 11.6 g/dL (10-26 @ 07:17)  Hemoglobin: 12.1 g/dL (10-25 @ 06:59)  Hemoglobin: 12.5 g/dL (10-24 @ 04:57)      10-28    133[L]  |  100  |  23  ----------------------------<  106[H]  4.1   |  21[L]  |  0.89    Ca    9.5      28 Oct 2024 05:17    TPro  7.3  /  Alb  3.7  /  TBili  1.2  /  DBili  x   /  AST  45[H]  /  ALT  170[H]  /  AlkPhos  125[H]  10-28    Creatinine Trend: 0.89<--, 0.85<--, 0.89<--, 0.96<--, 0.89<--, 0.91<--    COAGS:           PHYSICAL EXAM:  T(C): 37.1 (10-28-24 @ 08:20), Max: 37.6 (10-27-24 @ 12:45)  HR: 84 (10-28-24 @ 08:20) (84 - 104)  BP: 132/74 (10-28-24 @ 08:20) (111/71 - 141/80)  RR: 18 (10-28-24 @ 08:20) (16 - 18)  SpO2: 97% (10-28-24 @ 08:20) (96% - 97%)  Wt(kg): --    I&O's Summary    27 Oct 2024 07:01  -  28 Oct 2024 07:00  --------------------------------------------------------  IN: 0 mL / OUT: 1100 mL / NET: -1100 mL        Gen: NAD  HEENT:  (-)icterus (-)pallor  CV: N S1 S2 1/6 SOHEILA (+)2 Pulses B/l  Resp:  Clear to auscultation B/L, normal effort  GI: (+) BS Soft, NT, ND  Lymph:  (-)Edema, (-)obvious lymphadenopathy  Skin: Warm to touch, Normal turgor  Psych: Appropriate mood and affect      TELEMETRY: NSR	      RADIOLOGY:  < from: Xray Chest 1 View- PORTABLE-Urgent (Xray Chest 1 View- PORTABLE-Urgent .) (10.16.24 @ 05:38) >  IMPRESSION:  Clear lungs.  < end of copied text >    < from: TTE W or WO Ultrasound Enhancing Agent (10.14.24 @ 08:46) >  CONCLUSIONS:   1. Fair study quality.   2. Left ventricular systolic function is normal with an ejection fraction of 60 % by Chua's method of disks.   3. Normal right ventricular systolic function.   4. No prior echocardiogram is available for comparison.  < end of copied text >      < from: CELENA W or WO Ultrasound Enhancing Agent (10.23.24 @ 11:47) >  CONCLUSIONS:     1. Agitated saline injection was negative for intracardiac shunt.   2. No thrombus seen in the left atrial, left atrial appendage, right atrial or right atrial appendage.   3. No pericardial effusion seen.   4. Normal right ventricular cavity size and normal right ventricular systolic function.   5. No evidence of left atrial or left atrial appendage thrombus.   6. Left ventricular systolic function is normal with an ejection fraction visually estimated at 55 to 60 %.  < end of copied text >    CT head: c< from: CT Head No Cont (10.25.24 @ 19:30) >  IMPRESSION:  Resolving subacute hemorrhage in the left caudate nucleus and left   lateral ventricle are decreased in size and attenuation from 10/19/2024.    Vasogenicedema and mass effect, with 9 mm rightward bulging the midline,   appears stable.  There is no subfalcine herniation of the frontal lobes   underneath the cerebral falx.    Slight prominence of the temporal horns of both lateral ventricles,   suspicious for mild hydrocephalus, is stable.    No new intracranial findings.        --- End of Report ---    < end of copied text >    CT : t< from: CT Chest w/ IV Cont (10.25.24 @ 19:30) >  IMPRESSION:  No acute findings or evidence of malignancy in the chest, abdomen or   pelvis.    Please refer to detailed findings otherwise described above.    --- End of Report ---    < end of copied text >    ASSESSMENT/PLAN: Patient is a 43 y/o Male with PMH of HTN who presented with headaches admitted for ICH. Cardiology consulted for HTN.    #ICH  - Likely hypertensive hemorrhage per neuro  - Management per neuro/neurosx  - MRI Brain noted with acute/subacute infarct in R corona radiata  - D/w neuro, concern for embolic stroke - CELENA with no thrombus, neg for PFO  - EP consult appreciated - Outpatient surveillance for AFib with MCOT or Loop recorder  - s/p negative cerebral angio  - CT C/A/P neg for malignancy    #HTN  - SBP goal <160 per neuro  - Continue Amlodipine 10mg daily, Labetalol 200mg q8hrs, Hydralazine 25mg q8hrs, and Losartan 100mg daily. Not on thiazide diuretics due to strict NA control per neuro.  - TTE with normal LV function  - Secondary HTN workup - Renal artery dopplers neg for KAITLYN, TSH WNL, can eventually send aldosterone/renin/metaneprhine/AM cortisol levels if BP refractory to meds off sodium chloride tabs    #Leukocytosis  - Workup and abx per primary team  - F/u BCx/UCx     - No further inpatient cardiac w/u planned  - Will setup patient for office follow up prior to discharge    Davi Carrion PA-C  Pager: [phone]      C A R D I O L O G Y  **********************************     DATE OF SERVICE: 10-28-24    Patient noted with worsening leukocytosis today. denies chest pain or shortness of breath.   Review of symptoms otherwise negative.    MEDICATIONS:  acetaminophen     Tablet .. 650 milliGRAM(s) Oral every 6 hours PRN  amLODIPine   Tablet 10 milliGRAM(s) Oral daily  aspirin enteric coated 81 milliGRAM(s) Oral daily  enoxaparin Injectable 40 milliGRAM(s) SubCutaneous <User Schedule>  ezetimibe 10 milliGRAM(s) Oral daily  FLUoxetine 20 milliGRAM(s) Oral daily  hydrALAZINE 25 milliGRAM(s) Oral every 8 hours  labetalol 200 milliGRAM(s) Oral every 8 hours  losartan 100 milliGRAM(s) Oral daily  modafinil 100 milliGRAM(s) Oral <User Schedule>  mupirocin 2% Nasal 1 Application(s) Both Nostrils every 12 hours  pantoprazole    Tablet 40 milliGRAM(s) Oral before breakfast  piperacillin/tazobactam IVPB.- 3.375 Gram(s) IV Intermittent once  piperacillin/tazobactam IVPB.. 3.375 Gram(s) IV Intermittent every 8 hours  polyethylene glycol 3350 17 Gram(s) Oral every 12 hours  senna 2 Tablet(s) Oral at bedtime  sodium chloride 2 Gram(s) Oral every 6 hours  vancomycin  IVPB 1250 milliGRAM(s) IV Intermittent every 12 hours      LABS:                        11.2   22.64 )-----------( 363      ( 28 Oct 2024 05:18 )             33.7       Hemoglobin: 11.2 g/dL (10-28 @ 05:18)  Hemoglobin: 12.3 g/dL (10-27 @ 06:36)  Hemoglobin: 11.6 g/dL (10-26 @ 07:17)  Hemoglobin: 12.1 g/dL (10-25 @ 06:59)  Hemoglobin: 12.5 g/dL (10-24 @ 04:57)      10-28    133[L]  |  100  |  23  ----------------------------<  106[H]  4.1   |  21[L]  |  0.89    Ca    9.5      28 Oct 2024 05:17    TPro  7.3  /  Alb  3.7  /  TBili  1.2  /  DBili  x   /  AST  45[H]  /  ALT  170[H]  /  AlkPhos  125[H]  10-28    Creatinine Trend: 0.89<--, 0.85<--, 0.89<--, 0.96<--, 0.89<--, 0.91<--    COAGS:           PHYSICAL EXAM:  T(C): 37.1 (10-28-24 @ 08:20), Max: 37.6 (10-27-24 @ 12:45)  HR: 84 (10-28-24 @ 08:20) (84 - 104)  BP: 132/74 (10-28-24 @ 08:20) (111/71 - 141/80)  RR: 18 (10-28-24 @ 08:20) (16 - 18)  SpO2: 97% (10-28-24 @ 08:20) (96% - 97%)  Wt(kg): --    I&O's Summary    27 Oct 2024 07:01  -  28 Oct 2024 07:00  --------------------------------------------------------  IN: 0 mL / OUT: 1100 mL / NET: -1100 mL        Gen: NAD  HEENT:  (-)icterus (-)pallor  CV: N S1 S2 1/6 SOHEILA (+)2 Pulses B/l  Resp:  Clear to auscultation B/L, normal effort  GI: (+) BS Soft, NT, ND  Lymph:  (-)Edema, (-)obvious lymphadenopathy  Skin: Warm to touch, Normal turgor  Psych: Appropriate mood and affect      TELEMETRY: NSR	      RADIOLOGY:  < from: Xray Chest 1 View- PORTABLE-Urgent (Xray Chest 1 View- PORTABLE-Urgent .) (10.16.24 @ 05:38) >  IMPRESSION:  Clear lungs.  < end of copied text >    < from: TTE W or WO Ultrasound Enhancing Agent (10.14.24 @ 08:46) >  CONCLUSIONS:   1. Fair study quality.   2. Left ventricular systolic function is normal with an ejection fraction of 60 % by Chua's method of disks.   3. Normal right ventricular systolic function.   4. No prior echocardiogram is available for comparison.  < end of copied text >      < from: CELENA W or WO Ultrasound Enhancing Agent (10.23.24 @ 11:47) >  CONCLUSIONS:     1. Agitated saline injection was negative for intracardiac shunt.   2. No thrombus seen in the left atrial, left atrial appendage, right atrial or right atrial appendage.   3. No pericardial effusion seen.   4. Normal right ventricular cavity size and normal right ventricular systolic function.   5. No evidence of left atrial or left atrial appendage thrombus.   6. Left ventricular systolic function is normal with an ejection fraction visually estimated at 55 to 60 %.  < end of copied text >    CT head: c< from: CT Head No Cont (10.25.24 @ 19:30) >  IMPRESSION:  Resolving subacute hemorrhage in the left caudate nucleus and left   lateral ventricle are decreased in size and attenuation from 10/19/2024.    Vasogenicedema and mass effect, with 9 mm rightward bulging the midline,   appears stable.  There is no subfalcine herniation of the frontal lobes   underneath the cerebral falx.    Slight prominence of the temporal horns of both lateral ventricles,   suspicious for mild hydrocephalus, is stable.    No new intracranial findings.        --- End of Report ---    < end of copied text >    CT : t< from: CT Chest w/ IV Cont (10.25.24 @ 19:30) >  IMPRESSION:  No acute findings or evidence of malignancy in the chest, abdomen or   pelvis.    Please refer to detailed findings otherwise described above.    --- End of Report ---    < end of copied text >    ASSESSMENT/PLAN: Patient is a 45 y/o Male with PMH of HTN who presented with headaches admitted for ICH. Cardiology consulted for HTN.    #ICH  - Likely hypertensive hemorrhage per neuro  - Management per neuro/neurosx  - MRI Brain noted with acute/subacute infarct in R corona radiata  - D/w neuro, concern for embolic stroke - CELENA with no thrombus, neg for PFO  - EP consult appreciated - Outpatient surveillance for AFib with MCOT or Loop recorder  - s/p negative cerebral angio  - CT C/A/P neg for malignancy    #HTN  - SBP goal <160 per neuro  - Continue Amlodipine 10mg daily, Labetalol 200mg q8hrs, Hydralazine 25mg q8hrs, and Losartan 100mg daily. Not on thiazide diuretics due to strict NA control per neuro.  - Suspect will improve further once weaned of sodium chloride tabs  - TTE with normal LV function  - Secondary HTN workup - Renal artery dopplers neg for KAITLYN, TSH WNL, can eventually send aldosterone/renin/metaneprhine/AM cortisol levels once off sodium chloride tabs    #Leukocytosis  - Workup and abx per primary team  - F/u BCx/UCx     - No further inpatient cardiac w/u planned  - Will setup patient for office follow up prior to discharge    Davi Carrion PA-C  Pager: [phone]

## 2024-10-28 NOTE — PROGRESS NOTE ADULT - SUBJECTIVE AND OBJECTIVE BOX
THE PATIENT WAS SEEN AND EXAMINED BY ME WITH THE HOUSESTAFF AND STROKE TEAM DURING MORNING ROUNDS.   HPI:  44M no AC/AP hx uncontrolled HTN tx LVS s/p flu-like symptoms and headache; CTH w/ left BG IPH w/ IVH extension. Exam: Slovak speaking, Ox3, PERRL, no drift, CHAPA 5/5, SILT      SUBJECTIVE: No events overnight.  No new neurologic complaints.      acetaminophen     Tablet .. 650 milliGRAM(s) Oral every 6 hours PRN  amLODIPine   Tablet 10 milliGRAM(s) Oral daily  aspirin enteric coated 81 milliGRAM(s) Oral daily  enoxaparin Injectable 40 milliGRAM(s) SubCutaneous <User Schedule>  ezetimibe 10 milliGRAM(s) Oral daily  FLUoxetine 20 milliGRAM(s) Oral daily  hydrALAZINE 25 milliGRAM(s) Oral every 8 hours  labetalol 200 milliGRAM(s) Oral every 8 hours  losartan 100 milliGRAM(s) Oral daily  modafinil 100 milliGRAM(s) Oral <User Schedule>  pantoprazole    Tablet 40 milliGRAM(s) Oral before breakfast  polyethylene glycol 3350 17 Gram(s) Oral every 12 hours  senna 2 Tablet(s) Oral at bedtime  sodium chloride 2 Gram(s) Oral every 6 hours      PHYSICAL EXAM:   Vital Signs Last 24 Hrs  T(C): 36.8 (28 Oct 2024 04:48), Max: 37.6 (27 Oct 2024 12:45)  T(F): 98.2 (28 Oct 2024 04:48), Max: 99.7 (27 Oct 2024 12:45)  HR: 89 (28 Oct 2024 04:48) (83 - 104)  BP: 141/80 (28 Oct 2024 04:48) (111/71 - 141/80)  BP(mean): --  RR: 18 (28 Oct 2024 04:48) (16 - 18)  SpO2: 96% (28 Oct 2024 04:48) (96% - 97%)    Parameters below as of 28 Oct 2024 04:48  Patient On (Oxygen Delivery Method): room air    General: No acute distress  HEENT: EOM intact, visual fields full  Abdomen: Soft, nontender, nondistended   Extremities: No edema    NEUROLOGICAL EXAM:  Mental status: Eyes closed, open to verbal stimuli.  Oriented to self. States year with choices. States "[facility]" for location. Hypophonic speech. Identifies objects. Follows some simple commands, unable to follow cross commands.  Cranial Nerves: No facial asymmetry, mild left gaze preference, no nystagmus, no dysarthria,  tongue midline  Motor exam: Normal tone, no drift, 5/5 RUE, 5/5 RLE, 5/5 LUE, 5/5 LLE, normal fine finger movements.  Sensation: Intact to light touch   Coordination/ Gait: No dysmetria, gait deferred.     LABS:                        11.2   22.64 )-----------( 363      ( 28 Oct 2024 05:18 )             33.7    10-28    133[L]  |  100  |  23  ----------------------------<  106[H]  4.1   |  21[L]  |  0.89    Ca    9.5      28 Oct 2024 05:17    TPro  7.3  /  Alb  3.7  /  TBili  1.2  /  DBili  x   /  AST  45[H]  /  ALT  170[H]  /  AlkPhos  125[H]  10-28        IMAGING: Reviewed by me.     CTH 10/19/24: No change since 10/15/2024. Left corona radiata hemorrhage with intraventricular extension. Mild midline shift to the right.    MRI Brain w/wo 10/18/24:  1. Stable parenchymal hematoma in the left basal ganglia, without evidence of an underlying hemorrhagic mass.  2. Stable IVH, with mild distention of the temporal horns.  *** ADDENDUM # 1 ***  On further review, there is a 5 mm acute/subacute infarct in the right corona radiata. The lesion shows low ADC values and T2/FLAIR   hyperintensity, consistent with an event between 24 hours and 7 days in age.      CTH 10/15/24: Interval stability compared with the prior 10/14/2024 in left basal ganglia acute hematoma with intraventricular extension. No evidence of rehemorrhage. No change in the ventricle size.      CTH 10/14/24 10am: Left basal ganglia lucency suspicious for infarct. Left corona radiata hemorrhage with intraventricular extension and mild ventricular dilatation, no change since 4:46 AM.    10/14/24 4am  CT HEAD: Stable left medial basal ganglia parenchymal hemorrhage with associated intraventricular extension of hemorrhage. Unchanged edema within the adjacent left basal ganglia as well as rightward bulging of the septum pellucidum.  CTA NECK: No evidence of significant stenosis or occlusion.  CTA HEAD: No large vessel occlusion, significant stenosis or vascular abnormality identified.    CTH 10/14/24 1am: Intraparenchymal hematoma in the medial left basal ganglia with adjacent vasogenic edema, mass effect, 7 mm of left to right midline shift, and intraventricular extension of hemorrhage predominantly in the left lateral ventricle.    CELENA:     1. Agitated saline injection was negative for intracardiac shunt.   2. No thrombus seen in the left atrial, left atrial appendage, right atrial or right atrial appendage.   3. No pericardial effusion seen.   4. Normal right ventricular cavity size and normal right ventricular systolic function.   5. No evidence of left atrial or left atrial appendage thrombus.   6. Left ventricular systolic function is normal with an ejection fraction visually estimated at 55 to 60 %.

## 2024-10-28 NOTE — PROGRESS NOTE ADULT - ASSESSMENT
Patient seen and examined, agree with above assessment and plan as transcribed above.    - leukocytosis  - f/u cx    Bernardo Chilel MD, [facility]  BEEPER [phone]

## 2024-10-28 NOTE — PROGRESS NOTE ADULT - ASSESSMENT
44M no AC/AP hx uncontrolled HTN transferred to [facility] s/p flu-like symptoms and headache; found to have a acute/subacute R corona radiata ischemic infarct.    Stroke, history of HTN  - CT w/ Intraparenchymal hematoma in the medial left basal ganglia with adjacent vasogenic edema, mass effect, 7 mm of left to right midline shift, and intraventricular extension of hemorrhage predominantly in the left lateral ventricle. Etiology Likely hypertensive hemorrhage.  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   - S/P cerebral angiogram on 10/23 which showed no source of the lesion.   - CT CAP negative for evidence of malignancy  - LA and beta 2 glycoprotein negative, protein C 164, ATIII normal. The elevation of protein C is reflective or recent infarction and can be an acute phase reactant.  - Protein S 50  - F/u Factor V leiden and prothrombin  - LE duplex neg  - Ongoing care per neurology    Will continue to follow.    Himanshu Jeffries PA-C  Hematology/Oncology  [facility]  [phone] (office)

## 2024-10-28 NOTE — PROGRESS NOTE ADULT - NS ATTEND AMEND GEN_ALL_CORE FT
Patient seen and examined by me. patient care and plan discussed and reviewed with PA. Plan as outlined above edited by me to reflect our discussion. Advanced care planning/advanced directives discussed with patient/family. DNR status including forceful chest compressions to attempt to restart the heart, ventilator support/artificial breathing, electric shock, artificial nutrition, health care proxy, Molst form all discussed with pt. More than 50% of the visit was spent counseling and/or coordinating care by the attending physician.

## 2024-10-29 LAB
ALBUMIN SERPL ELPH-MCNC: 3.6 G/DL — SIGNIFICANT CHANGE UP (ref 3.3–5)
ALP SERPL-CCNC: 169 U/L — HIGH (ref 40–120)
ALT FLD-CCNC: 178 U/L — HIGH (ref 10–45)
ANION GAP SERPL CALC-SCNC: 15 MMOL/L — SIGNIFICANT CHANGE UP (ref 5–17)
AST SERPL-CCNC: 54 U/L — HIGH (ref 10–40)
BILIRUB SERPL-MCNC: 1.4 MG/DL — HIGH (ref 0.2–1.2)
BUN SERPL-MCNC: 19 MG/DL — SIGNIFICANT CHANGE UP (ref 7–23)
CALCIUM SERPL-MCNC: 9.5 MG/DL — SIGNIFICANT CHANGE UP (ref 8.4–10.5)
CHLORIDE SERPL-SCNC: 101 MMOL/L — SIGNIFICANT CHANGE UP (ref 96–108)
CO2 SERPL-SCNC: 19 MMOL/L — LOW (ref 22–31)
CREAT SERPL-MCNC: 0.98 MG/DL — SIGNIFICANT CHANGE UP (ref 0.5–1.3)
DNA PLOIDY SPEC FC-IMP: SIGNIFICANT CHANGE UP
EGFR: 98 ML/MIN/1.73M2 — SIGNIFICANT CHANGE UP
GLUCOSE SERPL-MCNC: 97 MG/DL — SIGNIFICANT CHANGE UP (ref 70–99)
HCT VFR BLD CALC: 33.2 % — LOW (ref 39–50)
HGB BLD-MCNC: 10.8 G/DL — LOW (ref 13–17)
MCHC RBC-ENTMCNC: 28.1 PG — SIGNIFICANT CHANGE UP (ref 27–34)
MCHC RBC-ENTMCNC: 32.5 GM/DL — SIGNIFICANT CHANGE UP (ref 32–36)
MCV RBC AUTO: 86.5 FL — SIGNIFICANT CHANGE UP (ref 80–100)
NRBC # BLD: 0 /100 WBCS — SIGNIFICANT CHANGE UP (ref 0–0)
PLATELET # BLD AUTO: 369 K/UL — SIGNIFICANT CHANGE UP (ref 150–400)
POTASSIUM SERPL-MCNC: 4.7 MMOL/L — SIGNIFICANT CHANGE UP (ref 3.5–5.3)
POTASSIUM SERPL-SCNC: 4.7 MMOL/L — SIGNIFICANT CHANGE UP (ref 3.5–5.3)
PROCALCITONIN SERPL-MCNC: 0.68 NG/ML — HIGH (ref 0.02–0.1)
PROT SERPL-MCNC: 7.4 G/DL — SIGNIFICANT CHANGE UP (ref 6–8.3)
PTR INTERPRETATION: SIGNIFICANT CHANGE UP
RBC # BLD: 3.84 M/UL — LOW (ref 4.2–5.8)
RBC # FLD: 13 % — SIGNIFICANT CHANGE UP (ref 10.3–14.5)
SODIUM SERPL-SCNC: 135 MMOL/L — SIGNIFICANT CHANGE UP (ref 135–145)
WBC # BLD: 16.46 K/UL — HIGH (ref 3.8–10.5)
WBC # FLD AUTO: 16.46 K/UL — HIGH (ref 3.8–10.5)

## 2024-10-29 PROCEDURE — 99233 SBSQ HOSP IP/OBS HIGH 50: CPT

## 2024-10-29 RX ADMIN — SODIUM CHLORIDE 2 GRAM(S): 9 INJECTION, SOLUTION INTRAMUSCULAR; INTRAVENOUS; SUBCUTANEOUS at 11:21

## 2024-10-29 RX ADMIN — SODIUM CHLORIDE 2 GRAM(S): 9 INJECTION, SOLUTION INTRAMUSCULAR; INTRAVENOUS; SUBCUTANEOUS at 17:56

## 2024-10-29 RX ADMIN — PIPERACILLIN AND TAZOBACTAM 25 GRAM(S): .5; 4 INJECTION, POWDER, LYOPHILIZED, FOR SOLUTION INTRAVENOUS at 21:51

## 2024-10-29 RX ADMIN — Medication 200 MILLIGRAM(S): at 05:35

## 2024-10-29 RX ADMIN — Medication 2 TABLET(S): at 21:49

## 2024-10-29 RX ADMIN — HYDRALAZINE HYDROCHLORIDE 25 MILLIGRAM(S): 50 TABLET, FILM COATED ORAL at 05:35

## 2024-10-29 RX ADMIN — SODIUM CHLORIDE 2 GRAM(S): 9 INJECTION, SOLUTION INTRAMUSCULAR; INTRAVENOUS; SUBCUTANEOUS at 06:00

## 2024-10-29 RX ADMIN — Medication 200 MILLIGRAM(S): at 21:49

## 2024-10-29 RX ADMIN — Medication 20 MILLIGRAM(S): at 11:22

## 2024-10-29 RX ADMIN — MUPIROCIN 1 APPLICATION(S): 20 OINTMENT TOPICAL at 05:36

## 2024-10-29 RX ADMIN — PIPERACILLIN AND TAZOBACTAM 25 GRAM(S): .5; 4 INJECTION, POWDER, LYOPHILIZED, FOR SOLUTION INTRAVENOUS at 13:37

## 2024-10-29 RX ADMIN — PANTOPRAZOLE SODIUM 40 MILLIGRAM(S): 40 TABLET, DELAYED RELEASE ORAL at 05:35

## 2024-10-29 RX ADMIN — EZETIMIBE 10 MILLIGRAM(S): 10 TABLET ORAL at 11:22

## 2024-10-29 RX ADMIN — MODAFINIL 100 MILLIGRAM(S): 200 TABLET ORAL at 08:41

## 2024-10-29 RX ADMIN — Medication 40 MILLIGRAM(S): at 17:57

## 2024-10-29 RX ADMIN — Medication 81 MILLIGRAM(S): at 11:21

## 2024-10-29 RX ADMIN — HYDRALAZINE HYDROCHLORIDE 25 MILLIGRAM(S): 50 TABLET, FILM COATED ORAL at 13:36

## 2024-10-29 RX ADMIN — PIPERACILLIN AND TAZOBACTAM 25 GRAM(S): .5; 4 INJECTION, POWDER, LYOPHILIZED, FOR SOLUTION INTRAVENOUS at 05:36

## 2024-10-29 RX ADMIN — Medication 200 MILLIGRAM(S): at 13:36

## 2024-10-29 RX ADMIN — HYDRALAZINE HYDROCHLORIDE 25 MILLIGRAM(S): 50 TABLET, FILM COATED ORAL at 21:49

## 2024-10-29 NOTE — PROGRESS NOTE ADULT - SUBJECTIVE AND OBJECTIVE BOX
Patient is a 44y old  Male who presents with a chief complaint of CVA (28 Oct 2024 14:19)    Patient seen and examined at bedside, sitting in bed. Family at bedside.     MEDICATIONS  (STANDING):  amLODIPine   Tablet 10 milliGRAM(s) Oral daily  aspirin enteric coated 81 milliGRAM(s) Oral daily  enoxaparin Injectable 40 milliGRAM(s) SubCutaneous <User Schedule>  ezetimibe 10 milliGRAM(s) Oral daily  FLUoxetine 20 milliGRAM(s) Oral daily  hydrALAZINE 25 milliGRAM(s) Oral every 8 hours  labetalol 200 milliGRAM(s) Oral every 8 hours  losartan 100 milliGRAM(s) Oral daily  modafinil 100 milliGRAM(s) Oral <User Schedule>  mupirocin 2% Nasal 1 Application(s) Both Nostrils every 12 hours  pantoprazole    Tablet 40 milliGRAM(s) Oral before breakfast  piperacillin/tazobactam IVPB.. 3.375 Gram(s) IV Intermittent every 8 hours  polyethylene glycol 3350 17 Gram(s) Oral every 12 hours  senna 2 Tablet(s) Oral at bedtime  sodium chloride 2 Gram(s) Oral every 6 hours    MEDICATIONS  (PRN):  acetaminophen     Tablet .. 650 milliGRAM(s) Oral every 6 hours PRN Mild Pain (1 - 3), Moderate Pain (4 - 6)    Vital Signs Last 24 Hrs  T(C): 36.1 (29 Oct 2024 08:23), Max: 36.6 (28 Oct 2024 16:28)  T(F): 97 (29 Oct 2024 08:23), Max: 97.9 (28 Oct 2024 20:47)  HR: 93 (29 Oct 2024 08:23) (70 - 95)  BP: 110/77 (29 Oct 2024 08:23) (100/65 - 138/80)  BP(mean): --  RR: 18 (29 Oct 2024 08:23) (18 - 18)  SpO2: 95% (29 Oct 2024 08:23) (95% - 97%)    Parameters below as of 29 Oct 2024 08:23  Patient On (Oxygen Delivery Method): room air    PE  NAD  Awake  Anicteric, MMM  No c/c/e  No rash grossly                        10.8   16.46 )-----------( 369      ( 29 Oct 2024 05:55 )             33.2       10-29    135  |  101  |  19  ----------------------------<  97  4.7   |  19[L]  |  0.98    Ca    9.5      29 Oct 2024 05:55    TPro  7.4  /  Alb  3.6  /  TBili  1.4[H]  /  DBili  x   /  AST  54[H]  /  ALT  178[H]  /  AlkPhos  169[H]  10-29

## 2024-10-29 NOTE — PROGRESS NOTE ADULT - ASSESSMENT
Patient is a 44 year old male with a PMHx of uncontrolled HTN, HLD, who presented with a chief complaint of headache, emesis, dizziness and was found to have left IPH with IVH. CTH with left BG IPH and IVH. Internal Medicine has been consulted on Mr. Ferro's care for medical management.       CVA  - Per Neuro, likely 2/2 hypertensive hemorrhage with etiology likely ESUS  - CTs as noted   - MR w/ parenchymal hematoma in L basal ganglia, IVH, with mild distention of temporal horns, 5 mm acute/subacute infarct in R corona radiata   - TTE w/ EF 60%, normal LA; CELENA neg for Thrombus   - EEG negative per Neuro   - Recommend ILR to screen for occult arrhythmia --> EP eval appreciated   - W/ concern for inflammatory process, F/u labs sent  - Planned for diagnostic cerebral angiogram   - On ASA and Statin therapy   - Neuro checks per protocol   - Monitor on tele   - PT / OT / S+S   - Fall, Seizure, Aspiration precautions   - Per neurology   - EP and Cardio eval appreciated; F/u recs    Up-trending Leukocytosis   - CT C/A/P w/ no acute findings   - Elevated Procal, check CXR in AM and trend Procal   - Started on Empiric Zosyn   - UA +; F/u UCx and BCx2   - 10/16 and 10/22 blood cultures negative   - Trend CBC, Temp curve, VS and monitor patient     Fever, Leukocytosis   - improved leukocytosis  - Patient febrile 10/16 w/ Tmax of 100.9F  - BCx2 w/ Neg   - 10/14 and 10/23 LE Duplex negative for DVT   - Trend CBC, temp curve, VS and monitor patient   - cont zosyn, 7 days course     Transaminitis  - US ABD w/ hepatic steatosis, no evidence of acute cholecystitis  - Statin DC'd and ordered for Zetia  - F/u hepatitis panel    - Continue to monitor and trend  - Serial abdominal examinations  - If up-trends, GI eval may be of benefit     Hyponatremia   - Weaned off of 2% HTS, On NaCl tabs 2g  - Na parameters as per Stroke Neurology   - Avoid overcorrection > 6-8 mEq in 24 hours     HTN   - On amlodipine, labetalol, hydralazine and losartan.   - Renal artery doppler: no evidence of KAITLYN.   - TSH WNL      PPX

## 2024-10-29 NOTE — PROGRESS NOTE ADULT - ASSESSMENT
44M no AC/AP hx uncontrolled HTN transferred to [facility] s/p flu-like symptoms and headache; found to have a acute/subacute R corona radiata ischemic infarct.    Stroke, history of HTN  - CT w/ Intraparenchymal hematoma in the medial left basal ganglia with adjacent vasogenic edema, mass effect, 7 mm of left to right midline shift, and intraventricular extension of hemorrhage predominantly in the left lateral ventricle. Etiology Likely hypertensive hemorrhage.  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   - S/P cerebral angiogram on 10/23 which showed no source of the lesion.   - CT CAP negative for evidence of malignancy  - LA and beta 2 glycoprotein negative, protein C 164, ATIII normal. The elevation of protein C is reflective or recent infarction and can be an acute phase reactant.  - Protein S 50, not significant  - F/u Factor V Leiden and prothrombin  - LE duplex neg  - Ongoing care per neurology    Will continue to follow.    Himanshu Jeffries PA-C  Hematology/Oncology  [facility]  [phone] (office)

## 2024-10-29 NOTE — PROGRESS NOTE ADULT - SUBJECTIVE AND OBJECTIVE BOX
Name of Patient : PACO SILVA  MRN: [id number]  Date of visit: 10-29-24       Subjective: Patient seen and examined. No new events except as noted.   doing okay       REVIEW OF SYSTEMS:    CONSTITUTIONAL: No weakness, fevers or chills  EYES/ENT: No visual changes;  No vertigo or throat pain   NECK: No pain or stiffness  RESPIRATORY: No cough, wheezing, hemoptysis; No shortness of breath  CARDIOVASCULAR: No chest pain or palpitations  GASTROINTESTINAL: No abdominal or epigastric pain. No nausea, vomiting, or hematemesis; No diarrhea or constipation. No melena or hematochezia.  GENITOURINARY: No dysuria, frequency or hematuria  NEUROLOGICAL: No numbness or weakness  SKIN: No itching, burning, rashes, or lesions   All other review of systems is negative unless indicated above.    MEDICATIONS:  MEDICATIONS  (STANDING):  amLODIPine   Tablet 10 milliGRAM(s) Oral daily  aspirin enteric coated 81 milliGRAM(s) Oral daily  enoxaparin Injectable 40 milliGRAM(s) SubCutaneous <User Schedule>  ezetimibe 10 milliGRAM(s) Oral daily  FLUoxetine 20 milliGRAM(s) Oral daily  hydrALAZINE 25 milliGRAM(s) Oral every 8 hours  labetalol 200 milliGRAM(s) Oral every 8 hours  losartan 100 milliGRAM(s) Oral daily  modafinil 100 milliGRAM(s) Oral <User Schedule>  mupirocin 2% Nasal 1 Application(s) Both Nostrils every 12 hours  pantoprazole    Tablet 40 milliGRAM(s) Oral before breakfast  piperacillin/tazobactam IVPB.. 3.375 Gram(s) IV Intermittent every 8 hours  polyethylene glycol 3350 17 Gram(s) Oral every 12 hours  senna 2 Tablet(s) Oral at bedtime  sodium chloride 2 Gram(s) Oral every 6 hours      PHYSICAL EXAM:  T(C): 36.6 (10-29-24 @ 21:46), Max: 38.7 (10-29-24 @ 16:10)  HR: 92 (10-29-24 @ 21:46) (70 - 98)  BP: 147/88 (10-29-24 @ 21:46) (109/69 - 147/88)  RR: 18 (10-29-24 @ 21:46) (18 - 18)  SpO2: 96% (10-29-24 @ 21:46) (95% - 97%)  Wt(kg): --  I&O's Summary    29 Oct 2024 07:01  -  29 Oct 2024 22:33  --------------------------------------------------------  IN: 200 mL / OUT: 0 mL / NET: 200 mL          Appearance: Normal	  HEENT:  PERRLA   Lymphatic: No lymphadenopathy   Cardiovascular: Normal S1 S2, no JVD  Respiratory: normal effort , clear  Gastrointestinal:  Soft, Non-tender  Skin: No rashes,  warm to touch  Psychiatry:  Mood & affect appropriate  Musculuskeletal: No edema    recent labs, Imaging and EKGs personally reviewed     10-29-24 @ 07:01  -  10-29-24 @ 22:33  --------------------------------------------------------  IN: 200 mL / OUT: 0 mL / NET: 200 mL                          10.8   16.46 )-----------( 369      ( 29 Oct 2024 05:55 )             33.2               10-29    135  |  101  |  19  ----------------------------<  97  4.7   |  19[L]  |  0.98    Ca    9.5      29 Oct 2024 05:55    TPro  7.4  /  Alb  3.6  /  TBili  1.4[H]  /  DBili  x   /  AST  54[H]  /  ALT  178[H]  /  AlkPhos  169[H]  10-29                       Urinalysis Basic - ( 29 Oct 2024 05:55 )    Color: x / Appearance: x / SG: x / pH: x  Gluc: 97 mg/dL / Ketone: x  / Bili: x / Urobili: x   Blood: x / Protein: x / Nitrite: x   Leuk Esterase: x / RBC: x / WBC x   Sq Epi: x / Non Sq Epi: x / Bacteria: x

## 2024-10-29 NOTE — PROGRESS NOTE ADULT - SUBJECTIVE AND OBJECTIVE BOX
C A R D I O [facility] O G Y  **********************************     DATE OF SERVICE: 10-29-24    Patient on abx for UTI. denies chest pain or shortness of breath.   Review of symptoms otherwise negative.    MEDICATIONS:  acetaminophen     Tablet .. 650 milliGRAM(s) Oral every 6 hours PRN  amLODIPine   Tablet 10 milliGRAM(s) Oral daily  aspirin enteric coated 81 milliGRAM(s) Oral daily  enoxaparin Injectable 40 milliGRAM(s) SubCutaneous <User Schedule>  ezetimibe 10 milliGRAM(s) Oral daily  FLUoxetine 20 milliGRAM(s) Oral daily  hydrALAZINE 25 milliGRAM(s) Oral every 8 hours  labetalol 200 milliGRAM(s) Oral every 8 hours  losartan 100 milliGRAM(s) Oral daily  modafinil 100 milliGRAM(s) Oral <User Schedule>  mupirocin 2% Nasal 1 Application(s) Both Nostrils every 12 hours  pantoprazole    Tablet 40 milliGRAM(s) Oral before breakfast  piperacillin/tazobactam IVPB.. 3.375 Gram(s) IV Intermittent every 8 hours  polyethylene glycol 3350 17 Gram(s) Oral every 12 hours  senna 2 Tablet(s) Oral at bedtime  sodium chloride 2 Gram(s) Oral every 6 hours      LABS:                        10.8   16.46 )-----------( 369      ( 29 Oct 2024 05:55 )             33.2       Hemoglobin: 10.8 g/dL (10-29 @ 05:55)  Hemoglobin: 11.2 g/dL (10-28 @ 05:18)  Hemoglobin: 12.3 g/dL (10-27 @ 06:36)  Hemoglobin: 11.6 g/dL (10-26 @ 07:17)  Hemoglobin: 12.1 g/dL (10-25 @ 06:59)      10-29    135  |  101  |  19  ----------------------------<  97  4.7   |  19[L]  |  0.98    Ca    9.5      29 Oct 2024 05:55    TPro  7.4  /  Alb  3.6  /  TBili  1.4[H]  /  DBili  x   /  AST  54[H]  /  ALT  178[H]  /  AlkPhos  169[H]  10-29    Creatinine Trend: 0.98<--, 0.89<--, 0.85<--, 0.89<--, 0.96<--, 0.89<--    COAGS:           PHYSICAL EXAM:  T(C): 36.1 (10-29-24 @ 08:23), Max: 36.6 (10-28-24 @ 16:28)  HR: 93 (10-29-24 @ 08:23) (70 - 95)  BP: 110/77 (10-29-24 @ 08:23) (100/65 - 138/80)  RR: 18 (10-29-24 @ 08:23) (18 - 18)  SpO2: 95% (10-29-24 @ 08:23) (95% - 97%)  Wt(kg): --    I&O's Summary      Gen: NAD  HEENT:  (-)icterus (-)pallor  CV: N S1 S2 1/6 SOHEILA (+)2 Pulses B/l  Resp:  Clear to auscultation B/L, normal effort  GI: (+) BS Soft, NT, ND  Lymph:  (-)Edema, (-)obvious lymphadenopathy  Skin: Warm to touch, Normal turgor  Psych: Appropriate mood and affect      TELEMETRY: NSR	      RADIOLOGY:  < from: Xray Chest 1 View- PORTABLE-Urgent (Xray Chest 1 View- PORTABLE-Urgent .) (10.16.24 @ 05:38) >  IMPRESSION:  Clear lungs.  < end of copied text >    < from: TTE W or WO Ultrasound Enhancing Agent (10.14.24 @ 08:46) >  CONCLUSIONS:   1. Fair study quality.   2. Left ventricular systolic function is normal with an ejection fraction of 60 % by Chua's method of disks.   3. Normal right ventricular systolic function.   4. No prior echocardiogram is available for comparison.  < end of copied text >      < from: CELENA W or WO Ultrasound Enhancing Agent (10.23.24 @ 11:47) >  CONCLUSIONS:     1. Agitated saline injection was negative for intracardiac shunt.   2. No thrombus seen in the left atrial, left atrial appendage, right atrial or right atrial appendage.   3. No pericardial effusion seen.   4. Normal right ventricular cavity size and normal right ventricular systolic function.   5. No evidence of left atrial or left atrial appendage thrombus.   6. Left ventricular systolic function is normal with an ejection fraction visually estimated at 55 to 60 %.  < end of copied text >    CT head: c< from: CT Head No Cont (10.25.24 @ 19:30) >  IMPRESSION:  Resolving subacute hemorrhage in the left caudate nucleus and left   lateral ventricle are decreased in size and attenuation from 10/19/2024.    Vasogenicedema and mass effect, with 9 mm rightward bulging the midline,   appears stable.  There is no subfalcine herniation of the frontal lobes   underneath the cerebral falx.    Slight prominence of the temporal horns of both lateral ventricles,   suspicious for mild hydrocephalus, is stable.    No new intracranial findings.        --- End of Report ---    < end of copied text >    CT : t< from: CT Chest w/ IV Cont (10.25.24 @ 19:30) >  IMPRESSION:  No acute findings or evidence of malignancy in the chest, abdomen or   pelvis.    Please refer to detailed findings otherwise described above.    --- End of Report ---    < end of copied text >    ASSESSMENT/PLAN: Patient is a 45 y/o Male with PMH of HTN who presented with headaches admitted for ICH. Cardiology consulted for HTN.    #ICH  - Likely hypertensive hemorrhage per neuro  - Management per neuro/neurosx  - MRI Brain noted with acute/subacute infarct in R corona radiata  - D/w neuro, concern for embolic stroke - CELENA with no thrombus, neg for PFO  - EP consult appreciated - Outpatient surveillance for AFib with MCOT or Loop recorder  - Hypercoagulable workup per heme/onc  - s/p negative cerebral angio  - CT C/A/P neg for malignancy    #HTN  - SBP goal <160 per neuro  - Continue Amlodipine 10mg daily, Labetalol 200mg q8hrs, Hydralazine 25mg q8hrs, and Losartan 100mg daily. Not on thiazide diuretics due to strict NA control per neuro.  - Suspect will improve further once weaned of sodium chloride tabs  - TTE with normal LV function  - Secondary HTN workup - Renal artery dopplers neg for KAITLYN, TSH WNL, can eventually send aldosterone/renin/metaneprhine/AM cortisol levels once off sodium chloride tabs    #Leukocytosis  - Found to have UTI (UCx with GNR) - abx per primary team  - F/u BCx     - No further inpatient cardiac w/u planned  - Will setup patient for office follow up prior to discharge    Davi Carrion PA-C  Pager: [phone]

## 2024-10-29 NOTE — PROGRESS NOTE ADULT - ASSESSMENT
Agree with above assessment and plan as outlined above.    - cont abx per primary team    Bernardo Chilel MD, [facility]  BEEPER [phone]

## 2024-10-29 NOTE — PROGRESS NOTE ADULT - ASSESSMENT
44 Right handed man with HTN HLD p/w headache, emesis, dizziness found to have left IPH w/ IVH.     Impression: AMS and dizziness due to nontraumatic L BG intracerebral hemorrhage w/ IVH, perihematomal cerebral edema and brain compression. Etiology Likely hypertensive hemorrhage. MRI also reveals acute/subacute R corona radiata ischemic infarct, etiology likely cryptogenic stroke Young patient with vascular risk factors including hypertension and hyperlipidemia with MRI of the brain showing a small RIGHT corona radiata infarct and a LEFT basal ganglia hemorrhage. Among the etiologies that can cause ischemia and bleed in different vascular territories are either cardiac source of embolism vs something systemic inflammatory or infectious, workup thus far neg    NEURO: Neurologic examination with fluctuations in mental status, now overall stable. On 10/24: added fluoxitine 20mg qd for 3 months per FLAME, can be titrated off as outpatient and added modafinil 100mg qd to help with lethargy. S/P cerebral angiogram on 10/23 which showed no source of the lesion- can consider repeat angiogram outpatient. Repeat CTH 10/19 stable. EEG report negative, now discontinued. Concern for inflammatory process. Inflammatory labs sent (ESR/CRP, HIV, syphilis and hepatitis panel - HIV, hepatitis negative). Repeat CTH and CT CAP neg for malignancy. Cerebral angiogram no findings of vasculitis. Continue monitoring for neurologic deterioration in the setting of cerebral edema and compression, stable to move to neurology floor. Keep strict normotension. , continue high intensity statin, titrate to LDL goal < 70. A1C 5.  MRI brain w/wo results as above. PT/OT recommend AR.  Q4 neurochecks at night for protected sleep    ANTITHROMBOTIC THERAPY: Aspirin 81mg daily for secondary stroke prevention started 10/21/24    PULMONARY: CXR (10/16) clear, protecting airway, saturating well     CARDIOVASCULAR: TTE: EF 60%, normal LA. Continue cardiac monitoring. Appreciate cardiology recommendations for HTN management. C Continue Amlodipine 10mg daily, Labetalol 200mg q8hrs, Hydralazine 25mg q8hrs, and Losartan 100mg daily. Not on thiazide diuretics due to strict NA control.  Secondary HTN workup - Renal artery dopplers neg for KAITLYN, TSH WNL, can eventually send aldosterone/renin/metaneprhine/AM cortisol levels once off sodium chloride tabs. TSH wnl. CELENA 10/23 shows normal LV Function,  no thrombus.  - Appreciate EP recommendations, recommended outpatient MCOT or ILR to screen for arrhythmia like Afib.      SBP goal: <160     GASTROINTESTINAL:  dysphagia screen  - passed, tolerating diet. Elevated LFTs improving, continue to trend, internal medicine recs appreciated. US abdomen with  hepatic steatosis, no evidence of acute cholecystitis. Discontinued statin, will switch to zetia. Hepatits panel negative.     Diet: regular    RENAL: BUN/Cr stable, good urine output. Weaned off of 2% HTS, continue NaCl tabs 2g q6h for hyponatremia: slowly titrate off at rehab    HEMATOLOGY: H/H stable, Platelets 392. BLE dopplers (10/14): no evidence of dvt. Repeat LE dopplers on 10/23 negative for DVT. Hypercoagulable labs sent. CT CAP: negative for malignancy     DVT ppx: Lovenox     ID: afebrile, elevated WBC to 22 on 10/28: repeat UA positive, pending UC, started on antibiotic zosyn 10/28  initial UA negative, blood cultures x2 (10/16) NGTD.  Repeat UA neg, repeat blood cultures negative, ammonia level WNL and LE dopplers negative. Staph PCR nasal swab positive- started on mupirocin     DISPO: Acute rehab per PT/OT eval once bed available, medically cleared    CORE MEASURES:        Admission NIHSS: 1     ICH 1     TPA: [] YES [X] NO      LDL/HDL: 146/43     Depression Screen:      Statin Therapy: YES     Dysphagia Screen: [X] PASS [] FAIL     Smoking [] YES [X] NO      Afib [] YES [X] NO     Stroke Education [] YES [] N 44 Right handed man with HTN HLD p/w headache, emesis, dizziness found to have left IPH w/ IVH.     Impression: AMS and dizziness due to nontraumatic L BG intracerebral hemorrhage w/ IVH, perihematomal cerebral edema and brain compression. Etiology Likely hypertensive hemorrhage. MRI also reveals acute/subacute R corona radiata ischemic infarct, etiology likely cryptogenic stroke Young patient with vascular risk factors including hypertension and hyperlipidemia with MRI of the brain showing a small RIGHT corona radiata infarct and a LEFT basal ganglia hemorrhage. Among the etiologies that can cause ischemia and bleed in different vascular territories are either cardiac source of embolism vs something systemic inflammatory or infectious, workup thus far neg    NEURO: Neurologic examination with fluctuations in mental status, now overall stable. On 10/24: added fluoxitine 20mg qd for 3 months per FLAME, can be titrated off as outpatient and added modafinil 100mg qd to help with lethargy. S/P cerebral angiogram on 10/23 which showed no source of the lesion- can consider repeat angiogram outpatient. Repeat CTH 10/19 stable. EEG report negative, now discontinued. Concern for inflammatory process. Inflammatory labs sent (ESR/CRP, HIV, syphilis and hepatitis panel - HIV, hepatitis negative). Repeat CTH and CT CAP neg for malignancy. Cerebral angiogram no findings of vasculitis. Continue monitoring for neurologic deterioration in the setting of cerebral edema and compression, stable to move to neurology floor. Keep strict normotension. , continue high intensity statin, titrate to LDL goal < 70. A1C 5.  MRI brain w/wo results as above. PT/OT recommend AR.  Q4 neurochecks at night for protected sleep    ANTITHROMBOTIC THERAPY: Aspirin 81mg daily for secondary stroke prevention started 10/21/24    PULMONARY: CXR (10/16) clear, protecting airway, saturating well     CARDIOVASCULAR: TTE: EF 60%, normal LA. Continue cardiac monitoring. Appreciate cardiology recommendations for HTN management. C Continue Amlodipine 10mg daily, Labetalol 200mg q8hrs, Hydralazine 25mg q8hrs, and Losartan 100mg daily. Not on thiazide diuretics due to strict NA control.  Secondary HTN workup - Renal artery dopplers neg for KAITLYN, TSH WNL, can eventually send aldosterone/renin/metaneprhine/AM cortisol levels once off sodium chloride tabs. TSH wnl. CELENA 10/23 shows normal LV Function,  no thrombus.  - Appreciate EP recommendations, recommended outpatient MCOT or ILR to screen for arrhythmia like Afib.      SBP goal: <160     GASTROINTESTINAL:  dysphagia screen  - passed, tolerating diet. Elevated LFTs, continue to trend, internal medicine recs appreciated. US abdomen with  hepatic steatosis, no evidence of acute cholecystitis. Discontinued statin, will switch to zetia. Hepatits panel negative.     Diet: regular    RENAL: BUN/Cr stable, Serum sodium level improved today (135). good urine output. Weaned off of 2% HTS, continue NaCl tabs 2g q6h for hyponatremia: slowly titrate off at rehab    HEMATOLOGY: H/H stable, Platelets 363. BLE dopplers (10/14): no evidence of dvt. Repeat LE dopplers on 10/23 negative for DVT. Hypercoagulable labs sent. CT CAP: negative for malignancy     DVT ppx: Lovenox     ID: afebrile, improved leukocytosis, WBC decreased to 16. repeat UA positive, pending UC, started on antibiotic zosyn 10/28  initial UA negative, blood cultures x2 (10/16) NGTD.  Repeat UA neg, repeat blood cultures negative, ammonia level WNL and LE dopplers negative. Staph PCR nasal swab positive- started on mupirocin     DISPO: Acute rehab per PT/OT eval once bed available, medically cleared    CORE MEASURES:        Admission NIHSS: 1     ICH 1     TPA: [] YES [X] NO      LDL/HDL: 146/43     Depression Screen:      Statin Therapy: YES     Dysphagia Screen: [X] PASS [] FAIL     Smoking [] YES [X] NO      Afib [] YES [X] NO     Stroke Education [] YES [] N 44 Right handed man with HTN HLD p/w headache, emesis, dizziness found to have left IPH w/ IVH.     Impression: AMS and dizziness due to nontraumatic L BG intracerebral hemorrhage w/ IVH, perihematomal cerebral edema and brain compression. Etiology Likely hypertensive hemorrhage. MRI also reveals acute/subacute R corona radiata ischemic infarct, etiology likely cryptogenic stroke Young patient with vascular risk factors including hypertension and hyperlipidemia with MRI of the brain showing a small RIGHT corona radiata infarct and a LEFT basal ganglia hemorrhage. Among the etiologies that can cause ischemia and bleed in different vascular territories are either cardiac source of embolism vs something systemic inflammatory or infectious, workup thus far neg    NEURO: Neurologic examination with fluctuations in mental status, now overall stable. On 10/24: added fluoxitine 20mg qd for 3 months per FLAME, can be titrated off as outpatient and added modafinil 100mg qd to help with lethargy. S/P cerebral angiogram on 10/23 which showed no source of the lesion- can consider repeat angiogram outpatient. Repeat CTH 10/19 stable. EEG report negative, now discontinued. Concern for inflammatory process. Inflammatory labs sent (ESR/CRP, HIV, syphilis and hepatitis panel - HIV, hepatitis negative). Repeat CTH and CT CAP neg for malignancy. Cerebral angiogram no findings of vasculitis. Continue monitoring for neurologic deterioration in the setting of cerebral edema and compression, stable to move to neurology floor. Keep strict normotension. , continue high intensity statin, titrate to LDL goal < 70. A1C 5.  MRI brain w/wo results as above. PT/OT recommend AR.  Q4 neurochecks at night for protected sleep    ANTITHROMBOTIC THERAPY: Aspirin 81mg daily for secondary stroke prevention started 10/21/24    PULMONARY: CXR (10/16) clear, protecting airway, saturating well     CARDIOVASCULAR: TTE: EF 60%, normal LA. Continue cardiac monitoring. Appreciate cardiology recommendations for HTN management. C Continue Amlodipine 10mg daily, Labetalol 200mg q8hrs, Hydralazine 25mg q8hrs, and Losartan 100mg daily. Not on thiazide diuretics due to strict NA control.  Secondary HTN workup - Renal artery dopplers neg for KAITLYN, TSH WNL, can eventually send aldosterone/renin/metaneprhine/AM cortisol levels once off sodium chloride tabs. TSH wnl. CELENA 10/23 shows normal LV Function,  no thrombus.  - Appreciate EP recommendations, recommended outpatient MCOT or ILR to screen for arrhythmia like Afib.      SBP goal: <160     GASTROINTESTINAL:  dysphagia screen  - passed, tolerating diet. Elevated LFTs, continue to trend, internal medicine recs appreciated. US abdomen with  hepatic steatosis, no evidence of acute cholecystitis. Discontinued statin, will switch to zetia. Hepatits panel negative.     Diet: regular    RENAL: BUN/Cr stable, Serum sodium level improved today (135). good urine output. Weaned off of 2% HTS, continue NaCl tabs 2g q6h for hyponatremia: slowly titrate off at rehab    HEMATOLOGY: H/H stable, Platelets 363. BLE dopplers (10/14): no evidence of dvt. Repeat LE dopplers on 10/23 negative for DVT. Hypercoagulable labs sent. CT CAP: negative for malignancy. Appreciate hem/onc team recommendations:  LA and beta 2 glycoprotein negative, protein C 164, ATIII normal. The elevation of protein C is reflective or recent infarction and can be an acute phase reactant.     DVT ppx: Lovenox     ID: afebrile, improved leukocytosis, WBC decreased to 16. repeat UA positive, pending UC, started on antibiotic zosyn 10/28  initial UA negative, blood cultures x2 (10/16) NGTD.  Repeat UA neg, repeat blood cultures negative, ammonia level WNL and LE dopplers negative. Staph PCR nasal swab positive- started on mupirocin     DISPO: Acute rehab per PT/OT eval once bed available, medically cleared    CORE MEASURES:        Admission NIHSS: 1     ICH 1     TPA: [] YES [X] NO      LDL/HDL: 146/43     Depression Screen:      Statin Therapy: YES     Dysphagia Screen: [X] PASS [] FAIL     Smoking [] YES [X] NO      Afib [] YES [X] NO     Stroke Education [] YES [] N

## 2024-10-29 NOTE — PROGRESS NOTE ADULT - NS ATTEND AMEND GEN_ALL_CORE FT
I saw and examined the patient, reviewed diagnostic studies, and reviewed images personally. I agree with NP/PA’s history, exam, orders placed, and plan of care. Total care time spent, 50 minutes. Medical issues needing to be addressed include: Nontraumatic intracerebral hemorrhage subcortical w/ IVH, perihematomal cerebral edema and brain compression, HTN, medication noncompliance per brother, HA, AMS, n/v, dizziness.  Likely hypertensive hemorrhage. small area of diffusion restriction on MRI in R subcortical region, stroke w up. UTI +, cultures not back yet - follow up. DC vanc, cont zosyn, leukocytosis improving. Once cultures back, transition to appropriate PO abx.

## 2024-10-29 NOTE — PROGRESS NOTE ADULT - SUBJECTIVE AND OBJECTIVE BOX
EP Attending  HISTORY OF PRESENT ILLNESS: HPI:  44M no AC/AP hx uncontrolled HTN tx LVS s/p flu-like symptoms and headache; CTH w/ left BG IPH w/ IVH extension. Exam: Divehi speaking, Ox3, PERRL, no drift, CHAPA 5/5, SILT (14 Oct 2024 04:30)    Had ischemic stroke w/ hemorrhagic conversion.  Too sleepy to participate in HPI/ROS.  Family in room.  We discussed long term AFib surveillance after large stroke.  10/29- resting in bed, awaiting discharge to rehab.    PAST MEDICAL & SURGICAL HISTORY:  HTN    acetaminophen     Tablet .. 650 milliGRAM(s) Oral every 6 hours PRN  amLODIPine   Tablet 10 milliGRAM(s) Oral daily  aspirin enteric coated 81 milliGRAM(s) Oral daily  enoxaparin Injectable 40 milliGRAM(s) SubCutaneous <User Schedule>  ezetimibe 10 milliGRAM(s) Oral daily  FLUoxetine 20 milliGRAM(s) Oral daily  hydrALAZINE 25 milliGRAM(s) Oral every 8 hours  labetalol 200 milliGRAM(s) Oral every 8 hours  losartan 100 milliGRAM(s) Oral daily  modafinil 100 milliGRAM(s) Oral <User Schedule>  mupirocin 2% Nasal 1 Application(s) Both Nostrils every 12 hours  pantoprazole    Tablet 40 milliGRAM(s) Oral before breakfast  piperacillin/tazobactam IVPB.. 3.375 Gram(s) IV Intermittent every 8 hours  polyethylene glycol 3350 17 Gram(s) Oral every 12 hours  senna 2 Tablet(s) Oral at bedtime  sodium chloride 2 Gram(s) Oral every 6 hours                            10.8   16.46 )-----------( 369      ( 29 Oct 2024 05:55 )             33.2       10-29    135  |  101  |  19  ----------------------------<  97  4.7   |  19[L]  |  0.98    Ca    9.5      29 Oct 2024 05:55    TPro  7.4  /  Alb  3.6  /  TBili  1.4[H]  /  DBili  x   /  AST  54[H]  /  ALT  178[H]  /  AlkPhos  169[H]  10-29            T(C): 36.1 (10-29-24 @ 08:23), Max: 36.6 (10-28-24 @ 16:28)  HR: 93 (10-29-24 @ 08:23) (70 - 95)  BP: 110/77 (10-29-24 @ 08:23) (100/65 - 138/80)  RR: 18 (10-29-24 @ 08:23) (18 - 18)  SpO2: 95% (10-29-24 @ 08:23) (95% - 97%)  Wt(kg): --    I&O's Summary      General: Well nourished, no acute distress, alert and oriented x 3  Head: normocephalic, no trauma  Neck: no JVD, no bruit, supple, not enlarged  CV: S1S2, no S3, regular rate, rhythm is SINUS, no murmurs.    Lungs: clear BL, no rales or wheezes  Abdomen: bowel sounds +, soft, nontender, nondistended  Extremities: no clubbing, cyanosis or edema  Neuro: Moves all 4 extremities, sensation intact x 4 extremities  Skin: warm and moist, normal turgor  Psych: Mood and affect are appropriate for circumstances  MSK: normal range of motion and strength x4 extremities.    TELEMETRY: NSR	    ECG: NSR  Echo:  < from: TTE W or WO Ultrasound Enhancing Agent (10.14.24 @ 08:46) >  CONCLUSIONS:      1. Fair study quality.   2. Left ventricular systolic function is normal with an ejection fraction of 60 % by Chua's method of disks.   3. Normal right ventricular systolic function.   4. No prior echocardiogram is available for comparison.    < end of copied text >    < from: MR Head w/wo IV Cont (10.18.24 @ 16:32) >  FINDINGS:    Multiple images are degraded by motion, but are nonetheless diagnostic.    A parenchymal hematoma noted in the left basal ganglia measuring   approximately 2.7 x 2.2 x 2.2 cm. There is vasogenic edema surrounding   the clot. There is no enhancement. There is no evidence of an underlying   hemorrhagic tumor at this time, although follow-up to resolution is   recommended to exclude a lesion obscured by the blood products in the   acute phase. The location of the lesion is suggestive of hypertensive   hemorrhage, correlate clinically.    There is associated ventricular breakthrough, with a large cast of clot   in the left lateral ventricle, which is expanded. There is layering blood   at both occipital horns. There is midline shift at the level of the   septum pellucidum measuring 8 mm left to right. All of these findings   appear unchanged, without interval rebleeding.    The third ventricle is effaced and the temporal horns are slightly   distended, possibly low grade hydrocephalus. This has not progressed, and   the sulci are noteffaced. There is mild periventricular T2   hyperintensity which could represent small vessel disease or   transependymal CSF flow resorption. There are also mild nonspecific T2   hyperintensities in the subcortical white matter.    No acute ischemia. Intracranial flow voids are patent. The cerebellar   tonsils are normally positioned.    Limited views of the sinuses and mastoids show mild to moderate mucosal   thickening without air-fluid levels, likely chronic.    Limited views of the orbits and visualized soft tissues of the neck,   face, scalp, skull base, and calvarium are otherwise unremarkable.    IMPRESSION:    1.  Stable parenchymal hematoma in the left basal ganglia, without   evidence of an underlying hemorrhagic mass.  2.  Stable IVH, with mild distention of the temporal horns.  < end of copied text >    < from: CELENA W or WO Ultrasound Enhancing Agent (10.23.24 @ 11:47) >  CONCLUSIONS:      1. Agitated saline injection was negative for intracardiac shunt.   2. No thrombus seen in the left atrial, left atrial appendage, right atrial or right atrial appendage.   3. No pericardial effusion seen.   4. Normal right ventricular cavity size and normal right ventricular systolic function.   5. No evidence of left atrial or left atrial appendage thrombus.   6. Left ventricular systolic function is normal with an ejection fraction visually estimated at 55 to 60 %.  < end of copied text >      ASSESSMENT/PLAN:  Mr Ferro is a 44y Male here w/ stroke.  Has history of uncontrolled hypertension. Continue multi-drug regimen above to keep SBP ~140-160, unless lower target granted by neurology.  CELENA was reassuringly normal.  Outpatient surveillance for AFib with MCOT or Loop recorder, given significant stroke in young aged patient.  Awaiting DC plan.    Hawk Lizarraga M.D.  Cardiac Electrophysiology    office [phone]  pager [phone]

## 2024-10-29 NOTE — PROGRESS NOTE ADULT - SUBJECTIVE AND OBJECTIVE BOX
THE PATIENT WAS SEEN AND EXAMINED BY ME WITH THE HOUSESTAFF AND STROKE TEAM DURING MORNING ROUNDS.   HPI:  44M no AC/AP hx uncontrolled HTN tx LVS s/p flu-like symptoms and headache; CTH w/ left BG IPH w/ IVH extension. Exam: Urdu speaking, Ox3, PERRL, no drift, CHAPA 5/5, SILT      SUBJECTIVE: No events overnight.  No new neurologic complaints.      MEDICATIONS  (STANDING):  amLODIPine   Tablet 10 milliGRAM(s) Oral daily  aspirin enteric coated 81 milliGRAM(s) Oral daily  enoxaparin Injectable 40 milliGRAM(s) SubCutaneous <User Schedule>  ezetimibe 10 milliGRAM(s) Oral daily  FLUoxetine 20 milliGRAM(s) Oral daily  hydrALAZINE 25 milliGRAM(s) Oral every 8 hours  labetalol 200 milliGRAM(s) Oral every 8 hours  losartan 100 milliGRAM(s) Oral daily  modafinil 100 milliGRAM(s) Oral <User Schedule>  mupirocin 2% Nasal 1 Application(s) Both Nostrils every 12 hours  pantoprazole    Tablet 40 milliGRAM(s) Oral before breakfast  piperacillin/tazobactam IVPB.. 3.375 Gram(s) IV Intermittent every 8 hours  polyethylene glycol 3350 17 Gram(s) Oral every 12 hours  senna 2 Tablet(s) Oral at bedtime  sodium chloride 2 Gram(s) Oral every 6 hours      PHYSICAL EXAM:   Vital Signs Last 24 Hrs  T(C): 36.4 (10-29-24 @ 05:12), Max: 37.1 (10-28-24 @ 08:20)  T(F): 97.5 (10-29-24 @ 05:12), Max: 98.7 (10-28-24 @ 08:20)  HR: 95 (10-29-24 @ 05:12) (70 - 95)  BP: 138/80 (10-29-24 @ 05:12) (100/65 - 138/80)  RR: 18 (10-29-24 @ 05:12) (18 - 18)  SpO2: 96% (10-29-24 @ 05:12) (96% - 97%)  Wt(kg): --  Patient On (Oxygen Delivery Method): room air    General: No acute distress  HEENT: EOM intact, visual fields full  Abdomen: Soft, nontender, nondistended   Extremities: No edema    NEUROLOGICAL EXAM:  Mental status: Eyes closed, open to verbal stimuli.  Oriented to self. States year with choices. States "[facility]" for location. Hypophonic speech. Identifies objects. Follows some simple commands, unable to follow cross commands.  Cranial Nerves: No facial asymmetry, mild left gaze preference, no nystagmus, no dysarthria,  tongue midline  Motor exam: Normal tone, no drift, 5/5 RUE, 5/5 RLE, 5/5 LUE, 5/5 LLE, normal fine finger movements.  Sensation: Intact to light touch   Coordination/ Gait: No dysmetria, gait deferred.       LABS:  cret                        10.8   16.46 )-----------( 369      ( 29 Oct 2024 05:55 )             33.2     10-29    135  |  101  |  19  ----------------------------<  97  4.7   |  19[L]  |  0.98    Ca    9.5      29 Oct 2024 05:55    TPro  7.4  /  Alb  3.6  /  TBili  1.4[H]  /  DBili  x   /  AST  54[H]  /  ALT  178[H]  /  AlkPhos  169[H]  10-29          IMAGING: Reviewed by me.     CTH 10/19/24: No change since 10/15/2024. Left corona radiata hemorrhage with intraventricular extension. Mild midline shift to the right.    MRI Brain w/wo 10/18/24:  1. Stable parenchymal hematoma in the left basal ganglia, without evidence of an underlying hemorrhagic mass.  2. Stable IVH, with mild distention of the temporal horns.  *** ADDENDUM # 1 ***  On further review, there is a 5 mm acute/subacute infarct in the right corona radiata. The lesion shows low ADC values and T2/FLAIR   hyperintensity, consistent with an event between 24 hours and 7 days in age.      CTH 10/15/24: Interval stability compared with the prior 10/14/2024 in left basal ganglia acute hematoma with intraventricular extension. No evidence of rehemorrhage. No change in the ventricle size.      CTH 10/14/24 10am: Left basal ganglia lucency suspicious for infarct. Left corona radiata hemorrhage with intraventricular extension and mild ventricular dilatation, no change since 4:46 AM.    10/14/24 4am  CT HEAD: Stable left medial basal ganglia parenchymal hemorrhage with associated intraventricular extension of hemorrhage. Unchanged edema within the adjacent left basal ganglia as well as rightward bulging of the septum pellucidum.  CTA NECK: No evidence of significant stenosis or occlusion.  CTA HEAD: No large vessel occlusion, significant stenosis or vascular abnormality identified.    CTH 10/14/24 1am: Intraparenchymal hematoma in the medial left basal ganglia with adjacent vasogenic edema, mass effect, 7 mm of left to right midline shift, and intraventricular extension of hemorrhage predominantly in the left lateral ventricle.    CELENA:     1. Agitated saline injection was negative for intracardiac shunt.   2. No thrombus seen in the left atrial, left atrial appendage, right atrial or right atrial appendage.   3. No pericardial effusion seen.   4. Normal right ventricular cavity size and normal right ventricular systolic function.   5. No evidence of left atrial or left atrial appendage thrombus.   6. Left ventricular systolic function is normal with an ejection fraction visually estimated at 55 to 60 %.

## 2024-10-29 NOTE — CHART NOTE - NSCHARTNOTEFT_GEN_A_CORE
44yoM admitted with [facility] with IVH.Pt seen at bedside for continuation of speech-language evaluation. Pt seen with assistance of intepreter #[id number] for Santa, as needed. Pt awake and alert, oriented to self, grossly to year and month, to hospital with choices. Pt verbally responsive, mostly following directions for the purposes of the exam.     Pt continues to present with significant hypophonia in setting of suspected component of hypokinetic dysarthria vs. reduced effort. Pt also presents with evidence of cognitive-linguistic impairment notable for significantly reduced initiation with flat affect. Pt is generally cooperative and willing to participate, but requires frequent encouragement to elaborate his responses. Pt also with reduced orientation. Pt with grossly appropriate but very concrete responses to problem solving and reasoning questions, requiring cues to explain answers. Pt with reduced insight into impairments and reason for admission to hospital. However, Pt able to state that he is not ready to go back to work as a . Pt with verbal expression with relatively short but grammatical utterances with mostly appropriate vocabulary and content. However, Pt with frequent use of generalized / nonspecific language, suspected component of reduced verbal fluency vs. mild component of aphasia. Verbal comprehension is grossly intact for simple to moderate complexity information. Pt with inconsistent accuracy for more complex comprehension tasks; question language vs. attention impairment in origin.       RECOMMENDATIONS:   Restorative speech-language therapy. Will continue to follow while patient is in-house.  Acute rehab on d/c.    Liana Roth MA, CCC-SLP  Speech-Language Pathologist  office [phone]  TEAMS preferred

## 2024-10-30 LAB
-  AMOXICILLIN/CLAVULANIC ACID: SIGNIFICANT CHANGE UP
-  AMPICILLIN/SULBACTAM: SIGNIFICANT CHANGE UP
-  AMPICILLIN: SIGNIFICANT CHANGE UP
-  AZTREONAM: SIGNIFICANT CHANGE UP
-  CEFAZOLIN: SIGNIFICANT CHANGE UP
-  CEFEPIME: SIGNIFICANT CHANGE UP
-  CEFOXITIN: SIGNIFICANT CHANGE UP
-  CEFTRIAXONE: SIGNIFICANT CHANGE UP
-  CIPROFLOXACIN: SIGNIFICANT CHANGE UP
-  ERTAPENEM: SIGNIFICANT CHANGE UP
-  GENTAMICIN: SIGNIFICANT CHANGE UP
-  IMIPENEM: SIGNIFICANT CHANGE UP
-  LEVOFLOXACIN: SIGNIFICANT CHANGE UP
-  MEROPENEM: SIGNIFICANT CHANGE UP
-  NITROFURANTOIN: SIGNIFICANT CHANGE UP
-  PIPERACILLIN/TAZOBACTAM: SIGNIFICANT CHANGE UP
-  TOBRAMYCIN: SIGNIFICANT CHANGE UP
-  TRIMETHOPRIM/SULFAMETHOXAZOLE: SIGNIFICANT CHANGE UP
ALBUMIN SERPL ELPH-MCNC: 3.4 G/DL — SIGNIFICANT CHANGE UP (ref 3.3–5)
ALP SERPL-CCNC: 220 U/L — HIGH (ref 40–120)
ALT FLD-CCNC: 237 U/L — HIGH (ref 10–45)
ANION GAP SERPL CALC-SCNC: 12 MMOL/L — SIGNIFICANT CHANGE UP (ref 5–17)
AST SERPL-CCNC: 90 U/L — HIGH (ref 10–40)
BILIRUB SERPL-MCNC: 0.9 MG/DL — SIGNIFICANT CHANGE UP (ref 0.2–1.2)
BUN SERPL-MCNC: 16 MG/DL — SIGNIFICANT CHANGE UP (ref 7–23)
CALCIUM SERPL-MCNC: 9.2 MG/DL — SIGNIFICANT CHANGE UP (ref 8.4–10.5)
CHLORIDE SERPL-SCNC: 99 MMOL/L — SIGNIFICANT CHANGE UP (ref 96–108)
CO2 SERPL-SCNC: 20 MMOL/L — LOW (ref 22–31)
CREAT SERPL-MCNC: 1.07 MG/DL — SIGNIFICANT CHANGE UP (ref 0.5–1.3)
CULTURE RESULTS: ABNORMAL
EGFR: 88 ML/MIN/1.73M2 — SIGNIFICANT CHANGE UP
FLUAV AG NPH QL: SIGNIFICANT CHANGE UP
FLUBV AG NPH QL: SIGNIFICANT CHANGE UP
GLUCOSE SERPL-MCNC: 112 MG/DL — HIGH (ref 70–99)
HCT VFR BLD CALC: 34.9 % — LOW (ref 39–50)
HGB BLD-MCNC: 11.4 G/DL — LOW (ref 13–17)
MAGNESIUM SERPL-MCNC: 2.1 MG/DL — SIGNIFICANT CHANGE UP (ref 1.6–2.6)
MCHC RBC-ENTMCNC: 28.5 PG — SIGNIFICANT CHANGE UP (ref 27–34)
MCHC RBC-ENTMCNC: 32.7 G/DL — SIGNIFICANT CHANGE UP (ref 32–36)
MCV RBC AUTO: 87.3 FL — SIGNIFICANT CHANGE UP (ref 80–100)
METHOD TYPE: SIGNIFICANT CHANGE UP
NRBC # BLD: 0 /100 WBCS — SIGNIFICANT CHANGE UP (ref 0–0)
ORGANISM # SPEC MICROSCOPIC CNT: ABNORMAL
ORGANISM # SPEC MICROSCOPIC CNT: ABNORMAL
PHOSPHATE SERPL-MCNC: 4.3 MG/DL — SIGNIFICANT CHANGE UP (ref 2.5–4.5)
PLATELET # BLD AUTO: 352 K/UL — SIGNIFICANT CHANGE UP (ref 150–400)
POTASSIUM SERPL-MCNC: 3.8 MMOL/L — SIGNIFICANT CHANGE UP (ref 3.5–5.3)
POTASSIUM SERPL-SCNC: 3.8 MMOL/L — SIGNIFICANT CHANGE UP (ref 3.5–5.3)
PROT SERPL-MCNC: 7.3 G/DL — SIGNIFICANT CHANGE UP (ref 6–8.3)
RBC # BLD: 4 M/UL — LOW (ref 4.2–5.8)
RBC # FLD: 12.8 % — SIGNIFICANT CHANGE UP (ref 10.3–14.5)
RSV RNA NPH QL NAA+NON-PROBE: SIGNIFICANT CHANGE UP
SARS-COV-2 RNA SPEC QL NAA+PROBE: SIGNIFICANT CHANGE UP
SODIUM SERPL-SCNC: 131 MMOL/L — LOW (ref 135–145)
SPECIMEN SOURCE: SIGNIFICANT CHANGE UP
WBC # BLD: 7.42 K/UL — SIGNIFICANT CHANGE UP (ref 3.8–10.5)
WBC # FLD AUTO: 7.42 K/UL — SIGNIFICANT CHANGE UP (ref 3.8–10.5)

## 2024-10-30 PROCEDURE — 99222 1ST HOSP IP/OBS MODERATE 55: CPT

## 2024-10-30 PROCEDURE — 93970 EXTREMITY STUDY: CPT | Mod: 26

## 2024-10-30 PROCEDURE — 99233 SBSQ HOSP IP/OBS HIGH 50: CPT

## 2024-10-30 RX ORDER — ACETAMINOPHEN 500 MG
1000 TABLET ORAL ONCE
Refills: 0 | Status: COMPLETED | OUTPATIENT
Start: 2024-10-30 | End: 2024-10-30

## 2024-10-30 RX ORDER — ERTAPENEM SODIUM 1 G/1
1000 INJECTION, POWDER, LYOPHILIZED, FOR SOLUTION INTRAMUSCULAR; INTRAVENOUS EVERY 24 HOURS
Refills: 0 | Status: COMPLETED | OUTPATIENT
Start: 2024-10-30 | End: 2024-11-02

## 2024-10-30 RX ADMIN — Medication 650 MILLIGRAM(S): at 12:10

## 2024-10-30 RX ADMIN — MUPIROCIN 1 APPLICATION(S): 20 OINTMENT TOPICAL at 06:14

## 2024-10-30 RX ADMIN — POLYETHYLENE GLYCOL 3350 17 GRAM(S): 17 POWDER, FOR SOLUTION ORAL at 06:56

## 2024-10-30 RX ADMIN — EZETIMIBE 10 MILLIGRAM(S): 10 TABLET ORAL at 11:31

## 2024-10-30 RX ADMIN — Medication 400 MILLIGRAM(S): at 01:00

## 2024-10-30 RX ADMIN — Medication 20 MILLIGRAM(S): at 11:30

## 2024-10-30 RX ADMIN — POLYETHYLENE GLYCOL 3350 17 GRAM(S): 17 POWDER, FOR SOLUTION ORAL at 17:44

## 2024-10-30 RX ADMIN — SODIUM CHLORIDE 2 GRAM(S): 9 INJECTION, SOLUTION INTRAMUSCULAR; INTRAVENOUS; SUBCUTANEOUS at 23:46

## 2024-10-30 RX ADMIN — SODIUM CHLORIDE 2 GRAM(S): 9 INJECTION, SOLUTION INTRAMUSCULAR; INTRAVENOUS; SUBCUTANEOUS at 06:00

## 2024-10-30 RX ADMIN — Medication 200 MILLIGRAM(S): at 06:12

## 2024-10-30 RX ADMIN — SODIUM CHLORIDE 2 GRAM(S): 9 INJECTION, SOLUTION INTRAMUSCULAR; INTRAVENOUS; SUBCUTANEOUS at 17:26

## 2024-10-30 RX ADMIN — Medication 81 MILLIGRAM(S): at 11:30

## 2024-10-30 RX ADMIN — Medication 40 MILLIGRAM(S): at 17:26

## 2024-10-30 RX ADMIN — PANTOPRAZOLE SODIUM 40 MILLIGRAM(S): 40 TABLET, DELAYED RELEASE ORAL at 06:12

## 2024-10-30 RX ADMIN — Medication 650 MILLIGRAM(S): at 13:00

## 2024-10-30 RX ADMIN — Medication 1000 MILLIGRAM(S): at 01:30

## 2024-10-30 RX ADMIN — MODAFINIL 100 MILLIGRAM(S): 200 TABLET ORAL at 11:31

## 2024-10-30 RX ADMIN — SODIUM CHLORIDE 2 GRAM(S): 9 INJECTION, SOLUTION INTRAMUSCULAR; INTRAVENOUS; SUBCUTANEOUS at 00:43

## 2024-10-30 RX ADMIN — HYDRALAZINE HYDROCHLORIDE 25 MILLIGRAM(S): 50 TABLET, FILM COATED ORAL at 06:13

## 2024-10-30 RX ADMIN — ERTAPENEM SODIUM 120 MILLIGRAM(S): 1 INJECTION, POWDER, LYOPHILIZED, FOR SOLUTION INTRAMUSCULAR; INTRAVENOUS at 23:09

## 2024-10-30 RX ADMIN — SODIUM CHLORIDE 2 GRAM(S): 9 INJECTION, SOLUTION INTRAMUSCULAR; INTRAVENOUS; SUBCUTANEOUS at 11:31

## 2024-10-30 RX ADMIN — PIPERACILLIN AND TAZOBACTAM 25 GRAM(S): .5; 4 INJECTION, POWDER, LYOPHILIZED, FOR SOLUTION INTRAVENOUS at 06:13

## 2024-10-30 RX ADMIN — Medication 200 MILLIGRAM(S): at 21:12

## 2024-10-30 NOTE — PROGRESS NOTE ADULT - ASSESSMENT
Patient is a 44 year old male with a PMHx of uncontrolled HTN, HLD, who presented with a chief complaint of headache, emesis, dizziness and was found to have left IPH with IVH. CTH with left BG IPH and IVH. Internal Medicine has been consulted on Mr. Ferro's care for medical management.       CVA  - Per Neuro, likely 2/2 hypertensive hemorrhage with etiology likely ESUS  - CTs as noted   - MR w/ parenchymal hematoma in L basal ganglia, IVH, with mild distention of temporal horns, 5 mm acute/subacute infarct in R corona radiata   - TTE w/ EF 60%, normal LA; CELENA neg for Thrombus   - EEG negative per Neuro   - Recommend ILR to screen for occult arrhythmia --> EP eval appreciated   - W/ concern for inflammatory process, F/u labs sent  - Planned for diagnostic cerebral angiogram   - On ASA and Statin therapy   - Neuro checks per protocol   - Monitor on tele   - PT / OT / S+S   - Fall, Seizure, Aspiration precautions   - Per neurology   - EP and Cardio eval appreciated; F/u recs    Up-trending Leukocytosis --> FEBRILE   - CT C/A/P w/ no acute findings   - Elevated Procal, CXR in AM and trend Procal   - On  Empiric Zosyn   - UA +; F/u UCx + for klebsiella   - BCx2 NGTD; F/u final   - 10/16 and 10/22 blood cultures negative   - febrile 10/30 to 102 --> F/u COVID. Flu, RSV. F/u Duplex  - Trend CBC, Temp curve, VS and monitor patient   - ID eval appreciated; F/u recs    Fever, Leukocytosis   - improved leukocytosis  - Patient febrile 10/16 w/ Tmax of 100.9F  - BCx2 w/ Neg   - 10/14 and 10/23 LE Duplex negative for DVT   - Trend CBC, temp curve, VS and monitor patient   - cont zosyn, 7 days course     Transaminitis  - US ABD w/ hepatic steatosis, no evidence of acute cholecystitis  - Statin DC'd and ordered for Zetia  - F/u hepatitis panel    - Continue to monitor and trend  - Serial abdominal examinations  - Up-trending. GI eval appreciated; F/u recs -->    Hyponatremia   - Weaned off of 2% HTS, On NaCl tabs 2g  - Na parameters as per Stroke Neurology   - Avoid overcorrection > 6-8 mEq in 24 hours     HTN   - On amlodipine, labetalol, hydralazine and losartan.   - Renal artery doppler: no evidence of KAITLYN.   - TSH WNL      PPX    Discussed with Attending and neuro

## 2024-10-30 NOTE — PROGRESS NOTE ADULT - SUBJECTIVE AND OBJECTIVE BOX
THE PATIENT WAS SEEN AND EXAMINED BY ME WITH THE HOUSESTAFF AND STROKE TEAM DURING MORNING ROUNDS.   HPI:  44M no AC/AP hx uncontrolled HTN tx LVS s/p flu-like symptoms and headache; CTH w/ left BG IPH w/ IVH extension. Exam: Georgian speaking, Ox3, PERRL, no drift, CHAPA 5/5, SILT     SUBJECTIVE: No events overnight.  No new neurologic complaints, ROS reported negative unless otherwise noted.      acetaminophen     Tablet .. 650 milliGRAM(s) Oral every 6 hours PRN  amLODIPine   Tablet 10 milliGRAM(s) Oral daily  aspirin enteric coated 81 milliGRAM(s) Oral daily  enoxaparin Injectable 40 milliGRAM(s) SubCutaneous <User Schedule>  ezetimibe 10 milliGRAM(s) Oral daily  FLUoxetine 20 milliGRAM(s) Oral daily  hydrALAZINE 25 milliGRAM(s) Oral every 8 hours  labetalol 200 milliGRAM(s) Oral every 8 hours  losartan 100 milliGRAM(s) Oral daily  modafinil 100 milliGRAM(s) Oral <User Schedule>  mupirocin 2% Nasal 1 Application(s) Both Nostrils every 12 hours  pantoprazole    Tablet 40 milliGRAM(s) Oral before breakfast  piperacillin/tazobactam IVPB.. 3.375 Gram(s) IV Intermittent every 8 hours  polyethylene glycol 3350 17 Gram(s) Oral every 12 hours  senna 2 Tablet(s) Oral at bedtime  sodium chloride 2 Gram(s) Oral every 6 hours      PHYSICAL EXAM:   Vital Signs Last 24 Hrs  T(C): 36.6 (30 Oct 2024 12:57), Max: 38.9 (30 Oct 2024 00:35)  T(F): 97.9 (30 Oct 2024 12:57), Max: 102 (30 Oct 2024 00:35)  HR: 82 (30 Oct 2024 12:57) (73 - 98)  BP: 134/95 (30 Oct 2024 12:57) (102/67 - 147/88)  BP(mean): --  RR: 18 (30 Oct 2024 12:57) (18 - 18)  SpO2: 98% (30 Oct 2024 12:57) (94% - 98%)    Parameters below as of 30 Oct 2024 12:57  Patient On (Oxygen Delivery Method): room air        General: No acute distress  HEENT: EOM intact, visual fields full  Abdomen: Soft, nontender, nondistended   Extremities: No edema    NEUROLOGICAL EXAM:  Mental status: Eyes closed, open to verbal stimuli.  Oriented to self, year and place, able to recognize family member at bedside. Hypophonic speech. Identifies objects. Follows some simple commands, unable to follow cross commands.  Cranial Nerves: No facial asymmetry, mild left gaze preference, no nystagmus, no dysarthria,  tongue midline  Motor exam: Normal tone, no drift, 5/5 RUE, 5/5 RLE, 5/5 LUE, 5/5 LLE  Sensation: Intact to light touch   Coordination/ Gait: No dysmetria, gait deferred.     LABS:                        11.4   7.42  )-----------( 352      ( 30 Oct 2024 05:44 )             34.9    10-30    131[L]  |  99  |  16  ----------------------------<  112[H]  3.8   |  20[L]  |  1.07    Ca    9.2      30 Oct 2024 05:47  Phos  4.3     10-30  Mg     2.1     10-30    TPro  7.3  /  Alb  3.4  /  TBili  0.9  /  DBili  x   /  AST  90[H]  /  ALT  237[H]  /  AlkPhos  220[H]  10-30        IMAGING: Reviewed by me.     CTH 10/19/24: No change since 10/15/2024. Left corona radiata hemorrhage with intraventricular extension. Mild midline shift to the right.    MRI Brain w/wo 10/18/24:  1. Stable parenchymal hematoma in the left basal ganglia, without evidence of an underlying hemorrhagic mass.  2. Stable IVH, with mild distention of the temporal horns.  *** ADDENDUM # 1 ***  On further review, there is a 5 mm acute/subacute infarct in the right corona radiata. The lesion shows low ADC values and T2/FLAIR   hyperintensity, consistent with an event between 24 hours and 7 days in age.      CTH 10/15/24: Interval stability compared with the prior 10/14/2024 in left basal ganglia acute hematoma with intraventricular extension. No evidence of rehemorrhage. No change in the ventricle size.      CTH 10/14/24 10am: Left basal ganglia lucency suspicious for infarct. Left corona radiata hemorrhage with intraventricular extension and mild ventricular dilatation, no change since 4:46 AM.    10/14/24 4am  CT HEAD: Stable left medial basal ganglia parenchymal hemorrhage with associated intraventricular extension of hemorrhage. Unchanged edema within the adjacent left basal ganglia as well as rightward bulging of the septum pellucidum.  CTA NECK: No evidence of significant stenosis or occlusion.  CTA HEAD: No large vessel occlusion, significant stenosis or vascular abnormality identified.    CTH 10/14/24 1am: Intraparenchymal hematoma in the medial left basal ganglia with adjacent vasogenic edema, mass effect, 7 mm of left to right midline shift, and intraventricular extension of hemorrhage predominantly in the left lateral ventricle.    CELENA:     1. Agitated saline injection was negative for intracardiac shunt.   2. No thrombus seen in the left atrial, left atrial appendage, right atrial or right atrial appendage.   3. No pericardial effusion seen.   4. Normal right ventricular cavity size and normal right ventricular systolic function.   5. No evidence of left atrial or left atrial appendage thrombus.   6. Left ventricular systolic function is normal with an ejection fraction visually estimated at 55 to 60 %.

## 2024-10-30 NOTE — PROGRESS NOTE ADULT - SUBJECTIVE AND OBJECTIVE BOX
EP Attending  HISTORY OF PRESENT ILLNESS: HPI:  44M no AC/AP hx uncontrolled HTN tx LVS s/p flu-like symptoms and headache; CTH w/ left BG IPH w/ IVH extension. Exam: Setswana speaking, Ox3, PERRL, no drift, CHAPA 5/5, SILT (14 Oct 2024 04:30)    Had ischemic stroke w/ hemorrhagic conversion.  Too sleepy to participate in HPI/ROS.  Family in room.  We discussed long term AFib surveillance after large stroke.  10/29- resting in bed, awaiting discharge to rehab.  Date of service 10/30- uneventful overnight, no new issues.    PAST MEDICAL & SURGICAL HISTORY:  HTN    acetaminophen     Tablet .. 650 milliGRAM(s) Oral every 6 hours PRN  amLODIPine   Tablet 10 milliGRAM(s) Oral daily  aspirin enteric coated 81 milliGRAM(s) Oral daily  enoxaparin Injectable 40 milliGRAM(s) SubCutaneous <User Schedule>  ezetimibe 10 milliGRAM(s) Oral daily  FLUoxetine 20 milliGRAM(s) Oral daily  hydrALAZINE 25 milliGRAM(s) Oral every 8 hours  labetalol 200 milliGRAM(s) Oral every 8 hours  losartan 100 milliGRAM(s) Oral daily  modafinil 100 milliGRAM(s) Oral <User Schedule>  mupirocin 2% Nasal 1 Application(s) Both Nostrils every 12 hours  pantoprazole    Tablet 40 milliGRAM(s) Oral before breakfast  piperacillin/tazobactam IVPB.. 3.375 Gram(s) IV Intermittent every 8 hours  polyethylene glycol 3350 17 Gram(s) Oral every 12 hours  senna 2 Tablet(s) Oral at bedtime  sodium chloride 2 Gram(s) Oral every 6 hours                            11.4   7.42  )-----------( 352      ( 30 Oct 2024 05:44 )             34.9       10-30    131[L]  |  99  |  16  ----------------------------<  112[H]  3.8   |  20[L]  |  1.07    Ca    9.2      30 Oct 2024 05:47  Phos  4.3     10-30  Mg     2.1     10-30    TPro  7.3  /  Alb  3.4  /  TBili  0.9  /  DBili  x   /  AST  90[H]  /  ALT  237[H]  /  AlkPhos  220[H]  10-30    T(C): 36.7 (10-30-24 @ 08:40), Max: 38.9 (10-30-24 @ 00:35)  HR: 75 (10-30-24 @ 08:40) (73 - 98)  BP: 102/67 (10-30-24 @ 08:40) (102/67 - 147/88)  RR: 18 (10-30-24 @ 08:40) (18 - 18)  SpO2: 98% (10-30-24 @ 08:40) (94% - 98%)  Wt(kg): --    I&O's Summary    29 Oct 2024 07:01  -  30 Oct 2024 07:00  --------------------------------------------------------  IN: 200 mL / OUT: 0 mL / NET: 200 mL    30 Oct 2024 07:01  -  30 Oct 2024 12:07  --------------------------------------------------------  IN: 250 mL / OUT: 0 mL / NET: 250 mL    General: Well nourished, no acute distress, alert and oriented x 3  Head: normocephalic, no trauma  Neck: no JVD, no bruit, supple, not enlarged  CV: S1S2, no S3, regular rate, rhythm is SINUS, no murmurs.    Lungs: clear BL, no rales or wheezes  Abdomen: bowel sounds +, soft, nontender, nondistended  Extremities: no clubbing, cyanosis or edema  Neuro: Moves all 4 extremities, sensation intact x 4 extremities  Skin: warm and moist, normal turgor  Psych: Mood and affect are appropriate for circumstances  MSK: normal range of motion and strength x4 extremities.    TELEMETRY: NSR	    ECG: NSR  Echo:  < from: TTE W or WO Ultrasound Enhancing Agent (10.14.24 @ 08:46) >  CONCLUSIONS:      1. Fair study quality.   2. Left ventricular systolic function is normal with an ejection fraction of 60 % by Chua's method of disks.   3. Normal right ventricular systolic function.   4. No prior echocardiogram is available for comparison.    < end of copied text >    < from: MR Head w/wo IV Cont (10.18.24 @ 16:32) >  FINDINGS:    Multiple images are degraded by motion, but are nonetheless diagnostic.    A parenchymal hematoma noted in the left basal ganglia measuring   approximately 2.7 x 2.2 x 2.2 cm. There is vasogenic edema surrounding   the clot. There is no enhancement. There is no evidence of an underlying   hemorrhagic tumor at this time, although follow-up to resolution is   recommended to exclude a lesion obscured by the blood products in the   acute phase. The location of the lesion is suggestive of hypertensive   hemorrhage, correlate clinically.    There is associated ventricular breakthrough, with a large cast of clot   in the left lateral ventricle, which is expanded. There is layering blood   at both occipital horns. There is midline shift at the level of the   septum pellucidum measuring 8 mm left to right. All of these findings   appear unchanged, without interval rebleeding.    The third ventricle is effaced and the temporal horns are slightly   distended, possibly low grade hydrocephalus. This has not progressed, and   the sulci are noteffaced. There is mild periventricular T2   hyperintensity which could represent small vessel disease or   transependymal CSF flow resorption. There are also mild nonspecific T2   hyperintensities in the subcortical white matter.    No acute ischemia. Intracranial flow voids are patent. The cerebellar   tonsils are normally positioned.    Limited views of the sinuses and mastoids show mild to moderate mucosal   thickening without air-fluid levels, likely chronic.    Limited views of the orbits and visualized soft tissues of the neck,   face, scalp, skull base, and calvarium are otherwise unremarkable.    IMPRESSION:    1.  Stable parenchymal hematoma in the left basal ganglia, without   evidence of an underlying hemorrhagic mass.  2.  Stable IVH, with mild distention of the temporal horns.  < end of copied text >    < from: CELENA W or WO Ultrasound Enhancing Agent (10.23.24 @ 11:47) >  CONCLUSIONS:      1. Agitated saline injection was negative for intracardiac shunt.   2. No thrombus seen in the left atrial, left atrial appendage, right atrial or right atrial appendage.   3. No pericardial effusion seen.   4. Normal right ventricular cavity size and normal right ventricular systolic function.   5. No evidence of left atrial or left atrial appendage thrombus.   6. Left ventricular systolic function is normal with an ejection fraction visually estimated at 55 to 60 %.  < end of copied text >      ASSESSMENT/PLAN:  Mr Ferro is a 44y Male here w/ stroke.  Has history of uncontrolled hypertension. Continue multi-drug regimen above to keep SBP ~140-160, unless lower target granted by neurology.  CELENA was reassuringly normal.  Outpatient surveillance for AFib with MCOT or Loop recorder, given significant stroke in young aged patient.  Awaiting DC plan.    Hawk Lizarraga M.D.  Cardiac Electrophysiology    office [phone]  pager [phone]

## 2024-10-30 NOTE — PROVIDER CONTACT NOTE (OTHER) - ASSESSMENT
A&OX1, other VSS
Pt remain neurologically unchanged. Denies pain. Blood pressure 196/93. Other vital signs WDL.

## 2024-10-30 NOTE — CHART NOTE - NSCHARTNOTEFT_GEN_A_CORE
The patient was signed to Angeline Butler.   Will be taken over by Dr. Ben Carter in the medicine service.     Currently being worked up for central fever. ID consulted and following up on RVP and Flu   Continue on zosyn for Klebsiella. Pending sensitivities.

## 2024-10-30 NOTE — PROGRESS NOTE ADULT - ASSESSMENT
44 Right handed man with HTN HLD p/w headache, emesis, dizziness found to have left IPH w/ IVH.     Impression: AMS and dizziness due to nontraumatic L BG intracerebral hemorrhage w/ IVH, perihematomal cerebral edema and brain compression. Etiology Likely hypertensive hemorrhage. MRI also reveals acute/subacute R corona radiata ischemic infarct, etiology likely cryptogenic stroke Young patient with vascular risk factors including hypertension and hyperlipidemia with MRI of the brain showing a small RIGHT corona radiata infarct and a LEFT basal ganglia hemorrhage. Among the etiologies that can cause ischemia and bleed in different vascular territories are either cardiac source of embolism vs something systemic inflammatory or infectious, workup thus far neg    NEURO: Neurologic examination with fluctuations in mental status, now overall stable. On 10/24: added fluoxitine 20mg qd for 3 months per FLAME, can be titrated off as outpatient and added modafinil 100mg qd to help with lethargy. S/P cerebral angiogram on 10/23 which showed no source of the lesion- can consider repeat angiogram outpatient. Repeat CTH 10/19 stable. EEG report negative, now discontinued. Concern for inflammatory process. Inflammatory labs sent (ESR/CRP, HIV, syphilis and hepatitis panel - HIV, hepatitis negative). Repeat CTH and CT CAP neg for malignancy. Cerebral angiogram no findings of vasculitis. Continue monitoring for neurologic deterioration in the setting of cerebral edema and compression, stable to move to neurology floor. Keep strict normotension. , continue high intensity statin, titrate to LDL goal < 70. A1C 5.  MRI brain w/wo results as above. PT/OT recommend AR.  Q4 neurochecks at night for protected sleep. Pt transferred to medicine service for further care    ANTITHROMBOTIC THERAPY: Aspirin 81mg daily for secondary stroke prevention started 10/21/24    PULMONARY: CXR (10/16) clear, protecting airway, saturating well     CARDIOVASCULAR: TTE: EF 60%, normal LA. Continue cardiac monitoring. Appreciate cardiology recommendations for HTN management. C Continue Amlodipine 10mg daily, Labetalol 200mg q8hrs, Hydralazine 25mg q8hrs, and Losartan 100mg daily. Not on thiazide diuretics due to strict NA control.  Secondary HTN workup - Renal artery dopplers neg for KAITLYN, TSH WNL, can eventually send aldosterone/renin/metaneprhine/AM cortisol levels once off sodium chloride tabs. TSH wnl. CELENA 10/23 shows normal LV Function,  no thrombus.  - Appreciate EP recommendations, recommended outpatient MCOT or ILR to screen for arrhythmia like Afib.      SBP goal: <160     GASTROINTESTINAL:  dysphagia screen  - passed, tolerating diet. Elevated LFTs, continue to trend, internal medicine recs appreciated. US abdomen with  hepatic steatosis, no evidence of acute cholecystitis. Discontinued statin, will switch to zetia. Hepatits panel negative.     Diet: regular    RENAL: BUN/Cr stable, Serum sodium level improved today (135). good urine output. Weaned off of 2% HTS, continue NaCl tabs 2g q6h for hyponatremia: slowly titrate off at rehab    HEMATOLOGY: H/H stable, Platelets 363. BLE dopplers (10/14): no evidence of dvt. Repeat LE dopplers on 10/23 negative for DVT. Hypercoagulable labs sent. CT CAP: negative for malignancy. Appreciate hem/onc team recommendations:  LA and beta 2 glycoprotein negative, protein C 164, ATIII normal. The elevation of protein C is reflective or recent infarction and can be an acute phase reactant.     DVT ppx: Lovenox     ID: febrile overnight Tmax 102. ID contactde for further recommendations, RVP/Flu test ordered, will obtain repeat LE doppler, no leukocytosis, UA positive, pending UC, started on antibiotic zosyn 10/28. blood cultures x2 (10/16) NGTD.  Repeat blood cultures negative, ammonia level WNL and LE dopplers 10/13/24: negative. Staph PCR nasal swab positive- started on mupirocin     DISPO: Acute rehab per PT/OT eval once bed available, medically cleared    CORE MEASURES:        Admission NIHSS: 1     ICH 1     TPA: [] YES [X] NO      LDL/HDL: 146/43     Depression Screen:      Statin Therapy: YES     Dysphagia Screen: [X] PASS [] FAIL     Smoking [] YES [X] NO      Afib [] YES [X] NO     Stroke Education [] YES [] N

## 2024-10-30 NOTE — CONSULT NOTE ADULT - SUBJECTIVE AND OBJECTIVE BOX
Patient is a 44y old  Male who presents with a chief complaint of CVA (30 Oct 2024 11:07)    HPI:  44M no AC/AP hx uncontrolled HTN presented to [facility] ER 10/13/2024 with flu-like symptoms and headache.  CT noted a left basal ganglia hemorrhage with IVH extension.  He was transferred to [facility] for further management.  Sonogram for elevated LFT c/w hepatic steatosis.  Additionally, he underwent cerebral angiogram (-). 10/28 WBC increased to 22K.  Tm 102 on 10/29.  UA with pyuria and bacteriuria.  Empiric zosyn was started.  Fever resolved.  WBC now 7K and patient remains afebrile.  BC (-).  UC with klebsiella aerogenes.    ID asked to help management     prior hospital charts reviewed [  ]  primary team notes reviewed [  ]  other consultant notes reviewed [  ]    PAST MEDICAL & SURGICAL HISTORY:  HTN    Allergies  No Known Allergies    ANTIMICROBIALS:  piperacillin/tazobactam IVPB.. 3.375 every 8 hours (10/28-)    MEDICATIONS  (STANDING):  amLODIPine   Tablet 10 daily  aspirin enteric coated 81 daily  enoxaparin Injectable 40 <User Schedule>  ezetimibe 10 daily  FLUoxetine 20 daily  hydrALAZINE 25 every 8 hours  labetalol 200 every 8 hours  losartan 100 daily  modafinil 100 <User Schedule>  pantoprazole    Tablet 40 before breakfast  polyethylene glycol 3350 17 every 12 hours  senna 2 at bedtime    SOCIAL HISTORY:   not a smoker    FAMILY HISTORY:    REVIEW OF SYSTEMS  [  ] ROS unobtainable because:    [  ] All other systems negative except as noted below:	    Constitutional:  [ ] fever [ ] chills  [ ] weight loss  [ ] weakness  Skin:  [ ] rash [ ] phlebitis	  Eyes: [ ] icterus [ ] pain  [ ] discharge	  ENMT: [ ] sore throat  [ ] thrush [ ] ulcers [ ] exudates  Respiratory: [ ] dyspnea [ ] hemoptysis [ ] cough [ ] sputum	  Cardiovascular:  [ ] chest pain [ ] palpitations [ ] edema	  Gastrointestinal:  [ ] nausea [ ] vomiting [ ] diarrhea [ ] constipation [ ] pain	  Genitourinary:  [ ] dysuria [ ] frequency [ ] hematuria [ ] discharge [ ] flank pain  [ ] incontinence  Musculoskeletal:  [ ] myalgias [ ] arthralgias [ ] arthritis  [ ] back pain  Neurological:  [ ] headache [ ] seizures  [ ] confusion/altered mental status  Psychiatric:  [ ] anxiety [ ] depression	  Hematology/Lymphatics:  [ ] lymphadenopathy  Endocrine:  [ ] adrenal [ ] thyroid  Allergic/Immunologic:	 [ ] transplant [ ] seasonal    Vital Signs Last 24 Hrs  T(F): 98 (10-30-24 @ 08:40), Max: 102 (10-30-24 @ 00:35)  Vital Signs Last 24 Hrs  HR: 75 (10-30-24 @ 08:40) (73 - 98)  BP: 102/67 (10-30-24 @ 08:40) (102/67 - 147/88)  RR: 18 (10-30-24 @ 08:40)  SpO2: 98% (10-30-24 @ 08:40) (94% - 98%)  Wt(kg): --    PHYSICAL EXAM:                          11.4   7.42  )-----------( 352      ( 30 Oct 2024 05:44 )             34.9     131[L]  |  99  |  16  ----------------------------<  112[H]  3.8   |  20[L]  |  1.07    Ca    9.2      30 Oct 2024 05:47  Phos  4.3     10-30  Mg     2.1     10-30    TPro  7.3  /  Alb  3.4  /  TBili  0.9  /  DBili  x   /  AST  90[H]  /  ALT  237[H]  /  AlkPhos  220[H]  10-30    WBC Count: 7.42 (10-30-24 @ 05:44)  WBC Count: 16.46 (10-29-24 @ 05:55)  WBC Count: 22.64 (10-28-24 @ 05:18)  WBC Count: 17.20 (10-27-24 @ 06:36)  WBC Count: 12.67 (10-26-24 @ 07:17)  WBC Count: 13.44 (10-25-24 @ 06:59)  WBC Count: 13.65 (10-24-24 @ 04:57)  WBC Count: 13.39 (10-23-24 @ 06:06)  WBC Count: 14.03 (10-22-24 @ 06:49)  WBC Count: 12.65 (10-21-24 @ 07:26)    Procalcitonin: 0.68 (10-29 @ 05:55)  Procalcitonin: 0.86 (10-28 @ 05:17)    Urinalysis (10.28.24 @ 11:02)   Glucose Qualitative, Urine: Negative mg/dL  Blood, Urine: Negative  pH Urine: 5.5  Color: Dark Yellow  Urine Appearance: Cloudy  Bilirubin: Negative  Ketone - Urine: Negative mg/dL  Specific Gravity: 1.025  Protein, Urine: Trace mg/dL  Urobilinogen: 1.0 mg/dL  Nitrite: Positive  Leukocyte Esterase Concentration: Small  Urine Microscopic-Add On (NC) (10.28.24 @ 11:02)   Epithelial Cells: 2 /HPF  Review: Reviewed  Cast: 1 /LPF  Bacteria: Many /HPF  Red Blood Cell - Urine: 2 /HPF  White Blood Cell - Urine: 17 /HPF    MICROBIOLOGY:  Culture - Urine (collected 28 Oct 2024 11:02)  Source: Clean Catch Clean Catch (Midstream)  Preliminary Report (29 Oct 2024 18:02):    >100,000 CFU/ml Klebsiella aerogenes (Previously Enterobacter)    Culture - Blood (collected 28 Oct 2024 09:20)  Source: .Blood BLOOD  Preliminary Report (29 Oct 2024 14:02):    No growth at 24 hours    Culture - Blood (collected 28 Oct 2024 09:15)  Source: .Blood BLOOD  Preliminary Report (29 Oct 2024 14:02):    No growth at 24 hours    RADIOLOGY:  imaging below personally reviewed and agree with findings    Xray Chest 1 View- PORTABLE-Routine (Xray Chest 1 View- PORTABLE-Routine .) (10.28.24 @ 16:47) >  IMPRESSION:  Mild atelectasis.    CT Abdomen and Pelvis w/ Oral Cont and w/ IV Cont (10.25.24 @ 19:30) >  GALLBLADDER: Mildly distended. No gallstones. No pericholecystic inflammatory changes..  REPRODUCTIVE ORGANS: Prostate mildly enlarged measuring 4.4 x 3.5 cm.    IMPRESSION:  No acute findings or evidence of malignancy in the chest, abdomen or pelvis.    CT Head No Cont (10.25.24 @ 19:30) >  IMPRESSION:  Resolving subacute hemorrhage in the left caudate nucleus and left lateral ventricle are decreased in size and attenuation from 10/19/2024.  Vasogenic edema and mass effect, with 9 mm rightward bulging the midline, appears stable.  There is no subfalcine herniation of the frontal lobes   underneath the cerebral falx.  Slight prominence of the temporal horns of both lateral ventricles, suspicious for mild hydrocephalus, is stable.    IR Neuro (10.23.24 @ 21:03) >  Impression: Normal cerebral angiogram    VA Duplex Lower Ext Vein Scan, Bilat (10.23.24 @ 09:07) >  IMPRESSION:  No evidence of deep venous thrombosis in either lower extremity.    US Abdomen Upper Quadrant Right (10.21.24 @ 19:27) >  IMPRESSION:  No sonographic evidence of acute cholecystitis.  Hepatic steatosis.    CT Head No Cont (10.19.24 @ 22:36) >  IMPRESSION: No change since 10/15/2024. Left corona radiata hemorrhage with intraventricular extension. Mild midline shift to the right.    MR Head w/wo IV Cont (10.18.24 @ 16:32) >  IMPRESSION:  1.  Stable parenchymal hematoma in the left basal ganglia, without evidence of an underlying hemorrhagic mass.  2.  Stable IVH, with mild distention of the temporal horns.    CT Head No Cont (10.15.24 @ 09:14) >  IMPRESSION:  Interval stability compared with the prior 10/14/2024 in left basal ganglia acute hematoma with intraventricular extension. No evidence of rehemorrhage. No change in the ventricle size.    CT Angio Neck w/ IV Cont (10.14.24 @ 04:47) >  IMPRESSION:  CT HEAD:  Stable left medial basal ganglia parenchymal hemorrhage with associated intraventricular extension of hemorrhage. Unchanged edema within the adjacent left basal ganglia as well as rightward bulging of the septum pellucidum.  CTA NECK:  No evidence of significant stenosis or occlusion.  CTA HEAD:  No large vessel occlusion, significant stenosis or vascular abnormality identified.    CT Head No Cont (10.14.24 @ 01:09) >  IMPRESSION:  Intraparenchymal hematoma in the medial left basal ganglia with adjacent vasogenic edema, mass effect, 7 mm of left to right midline shift, and intraventricular extension of hemorrhage predominantly in the left lateral ventricle.         Patient is a 44y old  Male who presents with a chief complaint of CVA (30 Oct 2024 11:07)    HPI:  44M hx uncontrolled HTN presented to [facility] ER 10/13/2024 with flu-like symptoms and headache.  CT noted a left basal ganglia hemorrhage with IVH extension.  He was transferred to [facility] for further management.  Sonogram for elevated LFT c/w hepatic steatosis.  Additionally, he underwent cerebral angiogram (-). 10/28 WBC increased to 22K.  Tm 102 on 10/29.  UA with pyuria and bacteriuria.  Empiric zosyn was started.  Fever resolved.  WBC now 7K and patient remains afebrile.  BC (-).  UC with klebsiella aerogenes.  No complaints.     ID asked to help management     prior hospital charts reviewed [  ]  primary team notes reviewed [ x ]  other consultant notes reviewed [  x]    PAST MEDICAL & SURGICAL HISTORY:  HTN    Allergies  No Known Allergies    ANTIMICROBIALS:  piperacillin/tazobactam IVPB.. 3.375 every 8 hours (10/28-)    MEDICATIONS  (STANDING):  amLODIPine   Tablet 10 daily  aspirin enteric coated 81 daily  enoxaparin Injectable 40 <User Schedule>  ezetimibe 10 daily  FLUoxetine 20 daily  hydrALAZINE 25 every 8 hours  labetalol 200 every 8 hours  losartan 100 daily  modafinil 100 <User Schedule>  pantoprazole    Tablet 40 before breakfast  polyethylene glycol 3350 17 every 12 hours  senna 2 at bedtime    SOCIAL HISTORY:   not a smoker    FAMILY HISTORY:  n/c    REVIEW OF SYSTEMS  [  ] ROS unobtainable because:    [ x ] All other systems negative except as noted below:	    Constitutional:  [x ] fever [ ] chills  [ ] weight loss  [ ] weakness  Skin:  [ ] rash [ ] phlebitis	  Eyes: [ ] icterus [ ] pain  [ ] discharge	  ENMT: [ ] sore throat  [ ] thrush [ ] ulcers [ ] exudates  Respiratory: [ ] dyspnea [ ] hemoptysis [ ] cough [ ] sputum	  Cardiovascular:  [ ] chest pain [ ] palpitations [ ] edema	  Gastrointestinal:  [ ] nausea [ ] vomiting [ ] diarrhea [ ] constipation [ ] pain	  Genitourinary:  [ ] dysuria [ ] frequency [ ] hematuria [ ] discharge [ ] flank pain  [ ] incontinence  Musculoskeletal:  [ ] myalgias [ ] arthralgias [ ] arthritis  [ ] back pain  Neurological:  [ ] headache [ ] seizures  [x ] sometimes diplopia  Psychiatric:  [ ] anxiety [ ] depression	  Hematology/Lymphatics:  [ ] lymphadenopathy  Endocrine:  [ ] adrenal [ ] thyroid  Allergic/Immunologic:	 [ ] transplant [ ] seasonal    Vital Signs Last 24 Hrs  T(F): 98 (10-30-24 @ 08:40), Max: 102 (10-30-24 @ 00:35)  Vital Signs Last 24 Hrs  HR: 75 (10-30-24 @ 08:40) (73 - 98)  BP: 102/67 (10-30-24 @ 08:40) (102/67 - 147/88)  RR: 18 (10-30-24 @ 08:40)  SpO2: 98% (10-30-24 @ 08:40) (94% - 98%)  Wt(kg): --    PHYSICAL EXAM:  Constitutional: non-toxic  HEAD/EYES: anicteric  ENT:  supple, no thrush  Cardiovascular:   normal S1, S2  Respiratory:  clear BS bilaterally  GI:  soft, non-tender, normal bowel sounds  :  no mclaughlin  Musculoskeletal:  no synovitis  Neurologic: awake and alert, grossly non-focal  Skin:  no rash  Psychiatric:  awake, appropriate mood                      11.4   7.42  )-----------( 352      ( 30 Oct 2024 05:44 )             34.9     131[L]  |  99  |  16  ----------------------------<  112[H]  3.8   |  20[L]  |  1.07    Ca    9.2      30 Oct 2024 05:47  Phos  4.3     10-30  Mg     2.1     10-30    TPro  7.3  /  Alb  3.4  /  TBili  0.9  /  DBili  x   /  AST  90[H]  /  ALT  237[H]  /  AlkPhos  220[H]  10-30    WBC Count: 7.42 (10-30-24 @ 05:44)  WBC Count: 16.46 (10-29-24 @ 05:55)  WBC Count: 22.64 (10-28-24 @ 05:18)  WBC Count: 17.20 (10-27-24 @ 06:36)  WBC Count: 12.67 (10-26-24 @ 07:17)  WBC Count: 13.44 (10-25-24 @ 06:59)  WBC Count: 13.65 (10-24-24 @ 04:57)  WBC Count: 13.39 (10-23-24 @ 06:06)  WBC Count: 14.03 (10-22-24 @ 06:49)  WBC Count: 12.65 (10-21-24 @ 07:26)    Procalcitonin: 0.68 (10-29 @ 05:55)  Procalcitonin: 0.86 (10-28 @ 05:17)    Urinalysis (10.28.24 @ 11:02)   Glucose Qualitative, Urine: Negative mg/dL  Blood, Urine: Negative  pH Urine: 5.5  Color: Dark Yellow  Urine Appearance: Cloudy  Bilirubin: Negative  Ketone - Urine: Negative mg/dL  Specific Gravity: 1.025  Protein, Urine: Trace mg/dL  Urobilinogen: 1.0 mg/dL  Nitrite: Positive  Leukocyte Esterase Concentration: Small  Urine Microscopic-Add On (NC) (10.28.24 @ 11:02)   Epithelial Cells: 2 /HPF  Review: Reviewed  Cast: 1 /LPF  Bacteria: Many /HPF  Red Blood Cell - Urine: 2 /HPF  White Blood Cell - Urine: 17 /HPF    MICROBIOLOGY:  Culture - Urine (collected 28 Oct 2024 11:02)  Source: Clean Catch Clean Catch (Midstream)  Preliminary Report (29 Oct 2024 18:02):    >100,000 CFU/ml Klebsiella aerogenes (Previously Enterobacter)    Culture - Blood (collected 28 Oct 2024 09:20)  Source: .Blood BLOOD  Preliminary Report (29 Oct 2024 14:02):    No growth at 24 hours    Culture - Blood (collected 28 Oct 2024 09:15)  Source: .Blood BLOOD  Preliminary Report (29 Oct 2024 14:02):    No growth at 24 hours    RADIOLOGY:  imaging below personally reviewed and agree with findings    Xray Chest 1 View- PORTABLE-Routine (Xray Chest 1 View- PORTABLE-Routine .) (10.28.24 @ 16:47) >  IMPRESSION:  Mild atelectasis.    CT Abdomen and Pelvis w/ Oral Cont and w/ IV Cont (10.25.24 @ 19:30) >  GALLBLADDER: Mildly distended. No gallstones. No pericholecystic inflammatory changes..  REPRODUCTIVE ORGANS: Prostate mildly enlarged measuring 4.4 x 3.5 cm.    IMPRESSION:  No acute findings or evidence of malignancy in the chest, abdomen or pelvis.    CT Head No Cont (10.25.24 @ 19:30) >  IMPRESSION:  Resolving subacute hemorrhage in the left caudate nucleus and left lateral ventricle are decreased in size and attenuation from 10/19/2024.  Vasogenic edema and mass effect, with 9 mm rightward bulging the midline, appears stable.  There is no subfalcine herniation of the frontal lobes   underneath the cerebral falx.  Slight prominence of the temporal horns of both lateral ventricles, suspicious for mild hydrocephalus, is stable.    IR Neuro (10.23.24 @ 21:03) >  Impression: Normal cerebral angiogram    VA Duplex Lower Ext Vein Scan, Bilat (10.23.24 @ 09:07) >  IMPRESSION:  No evidence of deep venous thrombosis in either lower extremity.    US Abdomen Upper Quadrant Right (10.21.24 @ 19:27) >  IMPRESSION:  No sonographic evidence of acute cholecystitis.  Hepatic steatosis.    CT Head No Cont (10.19.24 @ 22:36) >  IMPRESSION: No change since 10/15/2024. Left corona radiata hemorrhage with intraventricular extension. Mild midline shift to the right.    MR Head w/wo IV Cont (10.18.24 @ 16:32) >  IMPRESSION:  1.  Stable parenchymal hematoma in the left basal ganglia, without evidence of an underlying hemorrhagic mass.  2.  Stable IVH, with mild distention of the temporal horns.    CT Head No Cont (10.15.24 @ 09:14) >  IMPRESSION:  Interval stability compared with the prior 10/14/2024 in left basal ganglia acute hematoma with intraventricular extension. No evidence of rehemorrhage. No change in the ventricle size.    CT Angio Neck w/ IV Cont (10.14.24 @ 04:47) >  IMPRESSION:  CT HEAD:  Stable left medial basal ganglia parenchymal hemorrhage with associated intraventricular extension of hemorrhage. Unchanged edema within the adjacent left basal ganglia as well as rightward bulging of the septum pellucidum.  CTA NECK:  No evidence of significant stenosis or occlusion.  CTA HEAD:  No large vessel occlusion, significant stenosis or vascular abnormality identified.    CT Head No Cont (10.14.24 @ 01:09) >  IMPRESSION:  Intraparenchymal hematoma in the medial left basal ganglia with adjacent vasogenic edema, mass effect, 7 mm of left to right midline shift, and intraventricular extension of hemorrhage predominantly in the left lateral ventricle.

## 2024-10-30 NOTE — PROVIDER CONTACT NOTE (OTHER) - ACTION/TREATMENT ORDERED:
BAM Keith notified, IV tylenol ordered and administered. Cold packs applied
Provider aware. 10mg IV push of labetalol ordered and given at this time.

## 2024-10-30 NOTE — CONSULT NOTE ADULT - SUBJECTIVE AND OBJECTIVE BOX
full consult to follow   Chief Complaint:  Patient is a 44y old  Male who presents with a chief complaint of CVA (30 Oct 2024 11:07)       HPI:  43yo male history of uncontrolled HTN tranfsferred from [facility] to here s/p flu-like symptoms and headache found to have CTH w/ left BG IPH w/ IVH extension. GI consulted for increased LFT's. US Abd for elevated LFT c/w hepatic steatosis. No etoh abuse, no recreational drug use, no herbal supplements, no abdominal pain or clinically related gi symptoms. US Abdomen for elevated LFT c/w hepatic steatosis.     No Known Allergies      acetaminophen     Tablet .. 650 milliGRAM(s) Oral every 6 hours PRN  amLODIPine   Tablet 10 milliGRAM(s) Oral daily  aspirin enteric coated 81 milliGRAM(s) Oral daily  enoxaparin Injectable 40 milliGRAM(s) SubCutaneous <User Schedule>  ezetimibe 10 milliGRAM(s) Oral daily  FLUoxetine 20 milliGRAM(s) Oral daily  hydrALAZINE 25 milliGRAM(s) Oral every 8 hours  labetalol 200 milliGRAM(s) Oral every 8 hours  losartan 100 milliGRAM(s) Oral daily  modafinil 100 milliGRAM(s) Oral <User Schedule>  mupirocin 2% Nasal 1 Application(s) Both Nostrils every 12 hours  pantoprazole    Tablet 40 milliGRAM(s) Oral before breakfast  piperacillin/tazobactam IVPB.. 3.375 Gram(s) IV Intermittent every 8 hours  polyethylene glycol 3350 17 Gram(s) Oral every 12 hours  senna 2 Tablet(s) Oral at bedtime  sodium chloride 2 Gram(s) Oral every 6 hours        FAMILY HISTORY:        Review of Systems:    General:  No wt loss, fevers, chills, night sweats, fatigue  Eyes:  Good vision, no reported pain  ENT:  No sore throat, pain, runny nose, dysphagia  CV:  No pain, palpitations, no lightheadedness  Resp:  No dyspnea, cough, tachypnea, wheezing  GI: denies n/v/d/c, abdominal pain, melena or brbpr   :  No pain, bleeding, incontinence, nocturia  Muscle:  No pain, weakness  Neuro:  No weakness, tingling, memory problems  Psych:  No fatigue, insomnia, mood problems, depression  Endocrine:  No polyuria, polydypsia, cold/heat intolerance  Heme:  No petechiae, ecchymosis, easy bruisability  Skin:  No rash, tattoos, scars, edema    Relevant Family History:   n/c    Relevant Social History: n/c      Physical Exam:    Vital Signs:  Vital Signs Last 24 Hrs  T(C): 36.6 (30 Oct 2024 12:57), Max: 38.9 (30 Oct 2024 00:35)  T(F): 97.9 (30 Oct 2024 12:57), Max: 102 (30 Oct 2024 00:35)  HR: 82 (30 Oct 2024 12:57) (73 - 98)  BP: 134/95 (30 Oct 2024 12:57) (102/67 - 147/88)  BP(mean): --  RR: 18 (30 Oct 2024 12:57) (18 - 18)  SpO2: 98% (30 Oct 2024 12:57) (94% - 98%)    Parameters below as of 30 Oct 2024 12:57  Patient On (Oxygen Delivery Method): room air      Daily     Daily     General:  Appears stated age, well-groomed, nad  HEENT:  NC/AT,  conjunctivae clear and pink, no thyromegaly, nodules, adenopathy, no JVD  Chest:  Full & symmetric excursion, no increased effort, breath sounds clear  Cardiovascular:  Regular rhythm, S1, S2, no murmur/rub/S3/S4, no abdominal bruit, no edema  Abdomen:  Soft, non-tender, non-distended, normoactive bowel sounds,  no masses ,no hepatosplenomeagaly, no signs of chronic liver disease  Extremities:  no cyanosis,clubbing or edema  Skin:  No rash/erythema/ecchymoses/petechiae/wounds/abscess/warm/dry  Neuro/Psych:  A&Ox3  , no asterixis, no tremor, no encephalopathy    Laboratory:                            11.4   7.42  )-----------( 352      ( 30 Oct 2024 05:44 )             34.9     10-30    131[L]  |  99  |  16  ----------------------------<  112[H]  3.8   |  20[L]  |  1.07    Ca    9.2      30 Oct 2024 05:47  Phos  4.3     10-30  Mg     2.1     10-30    TPro  7.3  /  Alb  3.4  /  TBili  0.9  /  DBili  x   /  AST  90[H]  /  ALT  237[H]  /  AlkPhos  220[H]  10-30    LIVER FUNCTIONS - ( 30 Oct 2024 05:47 )  Alb: 3.4 g/dL / Pro: 7.3 g/dL / ALK PHOS: 220 U/L / ALT: 237 U/L / AST: 90 U/L / GGT: x             Urinalysis Basic - ( 30 Oct 2024 05:47 )    Color: x / Appearance: x / SG: x / pH: x  Gluc: 112 mg/dL / Ketone: x  / Bili: x / Urobili: x   Blood: x / Protein: x / Nitrite: x   Leuk Esterase: x / RBC: x / WBC x   Sq Epi: x / Non Sq Epi: x / Bacteria: x        Imaging:      < from: CT Abdomen and Pelvis w/ Oral Cont and w/ IV Cont (10.25.24 @ 19:30) >    ACC: [id number] EXAM:  CT ABDOMEN AND PELVIS OC IC   ORDERED BY: KATY GIRARD     ACC: [id number] EXAM:  CT CHEST IC   ORDERED BY: KATY GIRARD     PROCEDURE DATE:  10/25/2024          INTERPRETATION:  CLINICAL INFORMATION: Stroke. Rule out malignancy.    COMPARISON: None.    CONTRAST/COMPLICATIONS:  IV Contrast: Omnipaque 350  90 cc administered   10 cc discarded  Oral Contrast: NONE  Complications: None reported at time of study completion    PROCEDURE:  CT of the Chest, Abdomen and Pelvis was performed.  Sagittal and coronal reformats were performed.    FINDINGS:  CHEST:  LUNGS AND LARGE AIRWAYS: Patent central airways. No pulmonary nodules.  Minimal bibasilar subsegmental atelectasis.  PLEURA: No pleural effusion.  VESSELS: Within normal limits. Exam was not performed or optimized for   evaluation of pulmonary embolism. However, no central PE noted.  HEART: Heart size is normal. No pericardial effusion.  MEDIASTINUM AND JESUS: No lymphadenopathy.  CHEST WALL AND LOWER NECK: Within normal limits.    ABDOMEN AND PELVIS:  LIVER: Within normal limits.  BILE DUCTS: Normal caliber.  GALLBLADDER: Mildly distended. No gallstones. No pericholecystic   inflammatory changes..  SPLEEN: Within normal limits.  PANCREAS: Within normal limits.  ADRENALS: Within normal limits.  KIDNEYS/URETERS: Within normal limits.    BLADDER: Within normal limits.  REPRODUCTIVE ORGANS: Prostate mildly enlarged measuring 4.4 x 3.5 cm.    Several opaque densities within the stomach likely reflecting ingested   pills.    BOWEL: No bowel obstruction. Appendix is not visualized. No evidence of   inflammation in the pericecal region.  PERITONEUM/RETROPERITONEUM: Within normal limits.  No free air or abscess.  VESSELS: Within normal limits.  LYMPH NODES: No lymphadenopathy.  ABDOMINAL WALL: Within normal limits.  BONES: Within normal limits. No lytic or blastic process.    IMPRESSION:  No acute findings or evidence of malignancy in the chest, abdomen or   pelvis.    Please refer to detailed findings otherwise described above.    --- End of Report ---          JAEN WINKLER MD; Resident Radiologist  This document has been electronically signed.   CHANDU BRANDT MD; Attending Radiologist  This document has been electronically signed. Oct 25 2024  8:55PM    < end of copied text >      < from: US Abdomen Upper Quadrant Right (10.21.24 @ 19:27) >    ACC: [id number] EXAM:  US ABDOMEN RT UPR QUADRANT   ORDERED BY:  TISH MATA     PROCEDURE DATE:  10/21/2024          INTERPRETATION:  CLINICAL INFORMATION: Abnormal liver function tests    COMPARISON: None available.    TECHNIQUE: Sonography of the right upper quadrant.    FINDINGS:  Liver: Increased echogenicity.  Bile ducts: Normal caliber. Common bile duct measures 4 mm.  Gallbladder: Biliary sludge  Pancreas: Poorly visualized.  Right kidney: 12.6 cm. No hydronephrosis.  Ascites: None.  IVC: Visualized portions are within normal limits.    IMPRESSION:  No sonographic evidence of acute cholecystitis.  Hepatic steatosis.        --- End of Report ---          FRANCOIS LOVELACE DO; Resident Radiologist  This document has been electronically signed.  JACQUELYN MOHAN MD; Attending Radiologist  This document has been electronically signed. Oct 21 2024  8:41PM    < end of copied text >

## 2024-10-30 NOTE — CONSULT NOTE ADULT - PROVIDER SPECIALTY LIST ADULT
Cardiology
Internal Medicine
Neurology
Electrophysiology
Rehab Medicine
Gastroenterology
Heme/Onc
Infectious Disease

## 2024-10-30 NOTE — CONSULT NOTE ADULT - ASSESSMENT
43yo M h/o uncontrolled HTN and HLD p/w headache, emesis, dizziness found to have left IPH with IVH. CTH with left BG IPH and IVH.     1. Increasing LFTs   US/CT without new concerning gi pathology  baseline appear to be mildly elevated in setting of hepatic steatosis   favor increase in lft's r/t Zosyn, less likely, Zetia   if pt is close to completion of zosyn, would continue, however, if prolonged course would rec changing   cont to trend LFTs daily and avoid unnecessary hepatotoxins    2. CVA   per neurology     3. Fevers           The plan of care was discussed with the physician assistant and modifications were made to the notation where appropriate.   Differential diagnosis and plan of care discussed with patient after the evaluation  35 minutes spent on total encounter of which more than fifty percent of the encounter was spent counseling and/or coordinating care by the attending physician.    [facility]  Hawk Weston M.D.   [address], [place]  Office: [phone]  43yo M h/o uncontrolled HTN and HLD p/w headache, emesis, dizziness found to have left IPH with IVH. CTH with left BG IPH and IVH.     1. Increasing LFTs   baseline appear to be mildly elevated in setting of hepatic steatosis   US/CT without new concerning/contributory gi pathology  favor increase in lft's r/t Zosyn, less likely, Zetia   if pt is close to completion of zosyn, would continue, however, if prolonged course would rec changing   cont to trend LFTs daily and avoid unnecessary hepatotoxins    2. CVA   per neurology     3. Fevers           The plan of care was discussed with the physician assistant and modifications were made to the notation where appropriate.   Differential diagnosis and plan of care discussed with patient after the evaluation  35 minutes spent on total encounter of which more than fifty percent of the encounter was spent counseling and/or coordinating care by the attending physician.    [facility]  Hawk Weston M.D.   [address], [place]  Office: [phone]

## 2024-10-30 NOTE — CONSULT NOTE ADULT - CONSULT REASON
Rehabilitation consult
Stroke
HTN
Hypercoagulable work up
ICH
Medical management
fever
increasing LFTs

## 2024-10-30 NOTE — PROVIDER CONTACT NOTE (OTHER) - REASON
Pt here for chemo- labs from PAC sent upon arrival. ANC 1.0, Hgb 7.0.  notified- ordered to hold chemo today and give 1 unit RBC. Patient aware, pt is to RTC in 2 weeks with appts already scheduled. T&S sent to lab.   
Pt tolerated 1 unit RBC without reaction. RTC in 2 weeks. Pt d.c in stable condition.   
Patient's rectal temp is 102
SBP of 196

## 2024-10-30 NOTE — PROGRESS NOTE ADULT - ASSESSMENT
Agree with above assessment and plan as outlined above.      - abx per primary team  - hypercoagulable w/u per Heme    Bernardo Chilel MD, [facility]  BEEPER [phone]

## 2024-10-30 NOTE — PROGRESS NOTE ADULT - SUBJECTIVE AND OBJECTIVE BOX
C A R D I O L O G Y  **********************************     DATE OF SERVICE: 10-30-24    Patient remains on IV abx for UTI. denies chest pain or shortness of breath.   Review of symptoms otherwise negative.    MEDICATIONS:  acetaminophen     Tablet .. 650 milliGRAM(s) Oral every 6 hours PRN  amLODIPine   Tablet 10 milliGRAM(s) Oral daily  aspirin enteric coated 81 milliGRAM(s) Oral daily  enoxaparin Injectable 40 milliGRAM(s) SubCutaneous <User Schedule>  ezetimibe 10 milliGRAM(s) Oral daily  FLUoxetine 20 milliGRAM(s) Oral daily  hydrALAZINE 25 milliGRAM(s) Oral every 8 hours  labetalol 200 milliGRAM(s) Oral every 8 hours  losartan 100 milliGRAM(s) Oral daily  modafinil 100 milliGRAM(s) Oral <User Schedule>  mupirocin 2% Nasal 1 Application(s) Both Nostrils every 12 hours  pantoprazole    Tablet 40 milliGRAM(s) Oral before breakfast  piperacillin/tazobactam IVPB.. 3.375 Gram(s) IV Intermittent every 8 hours  polyethylene glycol 3350 17 Gram(s) Oral every 12 hours  senna 2 Tablet(s) Oral at bedtime  sodium chloride 2 Gram(s) Oral every 6 hours      LABS:                        11.4   7.42  )-----------( 352      ( 30 Oct 2024 05:44 )             34.9       Hemoglobin: 11.4 g/dL (10-30 @ 05:44)  Hemoglobin: 10.8 g/dL (10-29 @ 05:55)  Hemoglobin: 11.2 g/dL (10-28 @ 05:18)  Hemoglobin: 12.3 g/dL (10-27 @ 06:36)  Hemoglobin: 11.6 g/dL (10-26 @ 07:17)      10-30    131[L]  |  99  |  16  ----------------------------<  112[H]  3.8   |  20[L]  |  1.07    Ca    9.2      30 Oct 2024 05:47  Phos  4.3     10-30  Mg     2.1     10-30    TPro  7.3  /  Alb  3.4  /  TBili  0.9  /  DBili  x   /  AST  90[H]  /  ALT  237[H]  /  AlkPhos  220[H]  10-30    Creatinine Trend: 1.07<--, 0.98<--, 0.89<--, 0.85<--, 0.89<--, 0.96<--    COAGS:           PHYSICAL EXAM:  T(C): 36.6 (10-30-24 @ 12:57), Max: 38.9 (10-30-24 @ 00:35)  HR: 82 (10-30-24 @ 12:57) (73 - 98)  BP: 134/95 (10-30-24 @ 12:57) (102/67 - 147/88)  RR: 18 (10-30-24 @ 12:57) (18 - 18)  SpO2: 98% (10-30-24 @ 12:57) (94% - 98%)  Wt(kg): --    I&O's Summary    29 Oct 2024 07:01  -  30 Oct 2024 07:00  --------------------------------------------------------  IN: 200 mL / OUT: 0 mL / NET: 200 mL    30 Oct 2024 07:01  -  30 Oct 2024 14:15  --------------------------------------------------------  IN: 250 mL / OUT: 0 mL / NET: 250 mL        Gen: NAD  HEENT:  (-)icterus (-)pallor  CV: N S1 S2 1/6 SOHEILA (+)2 Pulses B/l  Resp:  Clear to auscultation B/L, normal effort  GI: (+) BS Soft, NT, ND  Lymph:  (-)Edema, (-)obvious lymphadenopathy  Skin: Warm to touch, Normal turgor  Psych: Appropriate mood and affect      TELEMETRY: NSR	      RADIOLOGY:  < from: Xray Chest 1 View- PORTABLE-Urgent (Xray Chest 1 View- PORTABLE-Urgent .) (10.16.24 @ 05:38) >  IMPRESSION:  Clear lungs.  < end of copied text >    < from: TTE W or WO Ultrasound Enhancing Agent (10.14.24 @ 08:46) >  CONCLUSIONS:   1. Fair study quality.   2. Left ventricular systolic function is normal with an ejection fraction of 60 % by Chua's method of disks.   3. Normal right ventricular systolic function.   4. No prior echocardiogram is available for comparison.  < end of copied text >      < from: CELENA W or WO Ultrasound Enhancing Agent (10.23.24 @ 11:47) >  CONCLUSIONS:     1. Agitated saline injection was negative for intracardiac shunt.   2. No thrombus seen in the left atrial, left atrial appendage, right atrial or right atrial appendage.   3. No pericardial effusion seen.   4. Normal right ventricular cavity size and normal right ventricular systolic function.   5. No evidence of left atrial or left atrial appendage thrombus.   6. Left ventricular systolic function is normal with an ejection fraction visually estimated at 55 to 60 %.  < end of copied text >    CT head: c< from: CT Head No Cont (10.25.24 @ 19:30) >  IMPRESSION:  Resolving subacute hemorrhage in the left caudate nucleus and left   lateral ventricle are decreased in size and attenuation from 10/19/2024.    Vasogenicedema and mass effect, with 9 mm rightward bulging the midline,   appears stable.  There is no subfalcine herniation of the frontal lobes   underneath the cerebral falx.    Slight prominence of the temporal horns of both lateral ventricles,   suspicious for mild hydrocephalus, is stable.    No new intracranial findings.        --- End of Report ---    < end of copied text >    CT : t< from: CT Chest w/ IV Cont (10.25.24 @ 19:30) >  IMPRESSION:  No acute findings or evidence of malignancy in the chest, abdomen or   pelvis.    Please refer to detailed findings otherwise described above.    --- End of Report ---    < end of copied text >    ASSESSMENT/PLAN: Patient is a 45 y/o Male with PMH of HTN who presented with headaches admitted for ICH. Cardiology consulted for HTN.    #ICH  - Likely hypertensive hemorrhage per neuro  - Management per neuro/neurosx  - MRI Brain noted with acute/subacute infarct in R corona radiata  - D/w neuro, concern for embolic stroke - CELENA with no thrombus, neg for PFO  - EP consult appreciated - Outpatient surveillance for AFib with MCOT or Loop recorder  - Hypercoagulable workup per heme/onc  - s/p negative cerebral angio  - CT C/A/P neg for malignancy    #HTN  - SBP goal <160 per neuro  - Continue Amlodipine 10mg daily, Labetalol 200mg q8hrs, Hydralazine 25mg q8hrs, and Losartan 100mg daily. Not on thiazide diuretics due to strict NA control per neuro.  - Suspect will improve further once weaned of sodium chloride tabs  - TTE with normal LV function  - Secondary HTN workup - Renal artery dopplers neg for KAITLYN, TSH WNL, can eventually send aldosterone/renin/metaneprhine/AM cortisol levels once off sodium chloride tabs    #Leukocytosis  - Found to have UTI (UCx with GNR) - abx per primary team/ID  - Prelim BCx negative     - No further inpatient cardiac w/u planned  - Will setup patient for office follow up prior to discharge    Davi Carrion PA-C  Pager: [phone]

## 2024-10-30 NOTE — PROGRESS NOTE ADULT - NS ATTEND AMEND GEN_ALL_CORE FT
I saw and examined the patient, reviewed diagnostic studies, and reviewed images personally. I agree with NP/PA’s history, exam, orders placed, and plan of care. Total care time spent, 50 minutes. Medical issues needing to be addressed include: Nontraumatic intracerebral hemorrhage subcortical w/ IVH, perihematomal cerebral edema and brain compression, HTN, medication noncompliance per brother, HA, AMS, n/v, dizziness.  Likely hypertensive hemorrhage. small area of diffusion restriction on MRI in R subcortical region, stroke w up. UTI +, cultures pending. Appreciate Imed assuming primary, and ID was consulted, recommends considering ertapenem, will defer to Imed/ID. GI c/s for increasing LFTs, ?zosyn, trend LFTs. No statins now.

## 2024-10-30 NOTE — CONSULT NOTE ADULT - ASSESSMENT
44M no AC/AP hx uncontrolled HTN presented to [facility] ER 10/13/2024 with flu-like symptoms and headache.  CT noted a left basal ganglia hemorrhage with IVH extension.  He was transferred to [facility] for further management.  Sonogram for elevated LFT c/w hepatic steatosis.  Additionally, he underwent cerebral angiogram (-). 10/28 WBC increased to 22K.  Tm 102 on 10/29.  UA with pyuria and bacteriuria.  Empiric zosyn was started.  Fever resolved.  WBC now 7K and patient remains afebrile.  BC (-).  UC with klebsiella aerogenes.   44M with nonadherence to medications and uncontrolled HTN initially presented 10/13/2024 with flu-like symptoms and headache.  Found to have an acute left basal ganglia hemorrhage with IVH extension.  Course complicated by elevated LFT, sono showed findings c/w hepatic steatosis.  Cerebral angiogram was done and negative.  Course also complicated by fever for 1-2 days.  Leukocytosis to 22K that normalized on zosyn.  BC (-). UA with pyuria and UC with klebsiella aerogenes.      Sepsis with leukocytosis and fever due to UTI with Klebsiella aerogenes  - can change to ertapenem 1g daily  - if remains stable, can limit antibiotics to 7 days, today is D#3  - f/u sensitivities, hope to transition to oral antibiotics    I have discussed plan of care as detailed above with neurology housestaff

## 2024-10-30 NOTE — CONSULT NOTE ADULT - CONSULT REQUESTED DATE/TIME
14-Oct-2024 14:52
23-Oct-2024 13:29
22-Oct-2024 09:26
30-Oct-2024 12:58
18-Oct-2024 17:52
30-Oct-2024 11:07
22-Oct-2024 11:34
24-Oct-2024 13:54

## 2024-10-30 NOTE — PROVIDER CONTACT NOTE (OTHER) - BACKGROUND
Pt was admitted with a basal ganglia bleed and has a history of uncontrolled hypertension.
L BG IPH w IVH

## 2024-10-30 NOTE — PROGRESS NOTE ADULT - SUBJECTIVE AND OBJECTIVE BOX
Name of Patient : PACO SILVA  MRN: [id number]  Date of visit: 10-30-24 @ 15:18      Subjective: Patient seen and examined. No new events except as noted.   Patient seen earlier this AM. Lying down in bed. Family at the bedside   Febrile overnight, ID eval called     REVIEW OF SYSTEMS:    CONSTITUTIONAL: + weakness, febrile overnight to 102   EYES/ENT: No visual changes;  No vertigo or throat pain   NECK: No pain or stiffness  RESPIRATORY: No cough, wheezing, hemoptysis; No shortness of breath  CARDIOVASCULAR: No chest pain or palpitations  GASTROINTESTINAL: No abdominal or epigastric pain. No nausea, vomiting, or hematemesis; No diarrhea or constipation. No melena or hematochezia.  GENITOURINARY: No dysuria, frequency or hematuria  NEUROLOGICAL: + Weakness  SKIN: No itching, burning, rashes, or lesions   All other review of systems is negative unless indicated above.    MEDICATIONS:  MEDICATIONS  (STANDING):  amLODIPine   Tablet 10 milliGRAM(s) Oral daily  aspirin enteric coated 81 milliGRAM(s) Oral daily  enoxaparin Injectable 40 milliGRAM(s) SubCutaneous <User Schedule>  ezetimibe 10 milliGRAM(s) Oral daily  FLUoxetine 20 milliGRAM(s) Oral daily  hydrALAZINE 25 milliGRAM(s) Oral every 8 hours  labetalol 200 milliGRAM(s) Oral every 8 hours  losartan 100 milliGRAM(s) Oral daily  modafinil 100 milliGRAM(s) Oral <User Schedule>  mupirocin 2% Nasal 1 Application(s) Both Nostrils every 12 hours  pantoprazole    Tablet 40 milliGRAM(s) Oral before breakfast  piperacillin/tazobactam IVPB.. 3.375 Gram(s) IV Intermittent every 8 hours  polyethylene glycol 3350 17 Gram(s) Oral every 12 hours  senna 2 Tablet(s) Oral at bedtime  sodium chloride 2 Gram(s) Oral every 6 hours      PHYSICAL EXAM:  T(C): 36.6 (10-30-24 @ 12:57), Max: 38.9 (10-30-24 @ 00:35)  HR: 82 (10-30-24 @ 12:57) (73 - 98)  BP: 134/95 (10-30-24 @ 12:57) (102/67 - 147/88)  RR: 18 (10-30-24 @ 12:57) (18 - 18)  SpO2: 98% (10-30-24 @ 12:57) (94% - 98%)  Wt(kg): --  I&O's Summary    29 Oct 2024 07:01  -  30 Oct 2024 07:00  --------------------------------------------------------  IN: 200 mL / OUT: 0 mL / NET: 200 mL    30 Oct 2024 07:01  -  30 Oct 2024 15:18  --------------------------------------------------------  IN: 570 mL / OUT: 0 mL / NET: 570 mL          Appearance: Awake, generalized weakness, lying down in bed 	  HEENT: Eyes are open   Lymphatic: No lymphadenopathy grossly   Cardiovascular: Normal S1 S2, no JVD  Respiratory: normal effort , clear  Gastrointestinal:  Soft, Non-tender  Skin: No rashes,  warm to touch  Psychiatry:  Mood & affect appropriate  Musculoskeletal: No edema      10-29-24 @ 07:01  -  10-30-24 @ 07:00  --------------------------------------------------------  IN: 200 mL / OUT: 0 mL / NET: 200 mL    10-30-24 @ 07:01  -  10-30-24 @ 15:18  --------------------------------------------------------  IN: 570 mL / OUT: 0 mL / NET: 570 mL                              11.4   7.42  )-----------( 352      ( 30 Oct 2024 05:44 )             34.9               10-30    131[L]  |  99  |  16  ----------------------------<  112[H]  3.8   |  20[L]  |  1.07    Ca    9.2      30 Oct 2024 05:47  Phos  4.3     10-30  Mg     2.1     10-30    TPro  7.3  /  Alb  3.4  /  TBili  0.9  /  DBili  x   /  AST  90[H]  /  ALT  237[H]  /  AlkPhos  220[H]  10-30                       Urinalysis Basic - ( 30 Oct 2024 05:47 )    Color: x / Appearance: x / SG: x / pH: x  Gluc: 112 mg/dL / Ketone: x  / Bili: x / Urobili: x   Blood: x / Protein: x / Nitrite: x   Leuk Esterase: x / RBC: x / WBC x   Sq Epi: x / Non Sq Epi: x / Bacteria: x        Culture - Urine (10.28.24 @ 11:02)   Specimen Source: Clean Catch Clean Catch (Midstream)  Culture Results:   >100,000 CFU/ml Klebsiella aerogenes (Previously Enterobacter)    Culture - Blood (10.28.24 @ 09:20)   Specimen Source: .Blood BLOOD  Culture Results:   No growth at 48 Hours    Culture - Blood (10.28.24 @ 09:15)   Specimen Source: .Blood BLOOD  Culture Results:   No growth at 48 Hours      < from: Xray Chest 1 View- PORTABLE-Routine (Xray Chest 1 View- PORTABLE-Routine .) (10.28.24 @ 16:47) >  IMPRESSION:  Mild atelectasis.    < end of copied text >

## 2024-10-31 LAB
ALBUMIN SERPL ELPH-MCNC: 3.5 G/DL — SIGNIFICANT CHANGE UP (ref 3.3–5)
ALP SERPL-CCNC: 215 U/L — HIGH (ref 40–120)
ALT FLD-CCNC: 243 U/L — HIGH (ref 10–45)
ANION GAP SERPL CALC-SCNC: 14 MMOL/L — SIGNIFICANT CHANGE UP (ref 5–17)
AST SERPL-CCNC: 89 U/L — HIGH (ref 10–40)
BILIRUB SERPL-MCNC: 0.4 MG/DL — SIGNIFICANT CHANGE UP (ref 0.2–1.2)
BUN SERPL-MCNC: 16 MG/DL — SIGNIFICANT CHANGE UP (ref 7–23)
CALCIUM SERPL-MCNC: 9 MG/DL — SIGNIFICANT CHANGE UP (ref 8.4–10.5)
CHLORIDE SERPL-SCNC: 99 MMOL/L — SIGNIFICANT CHANGE UP (ref 96–108)
CO2 SERPL-SCNC: 20 MMOL/L — LOW (ref 22–31)
CREAT SERPL-MCNC: 0.94 MG/DL — SIGNIFICANT CHANGE UP (ref 0.5–1.3)
EGFR: 103 ML/MIN/1.73M2 — SIGNIFICANT CHANGE UP
GLUCOSE SERPL-MCNC: 100 MG/DL — HIGH (ref 70–99)
HCT VFR BLD CALC: 32.2 % — LOW (ref 39–50)
HGB BLD-MCNC: 10.5 G/DL — LOW (ref 13–17)
MCHC RBC-ENTMCNC: 28.1 PG — SIGNIFICANT CHANGE UP (ref 27–34)
MCHC RBC-ENTMCNC: 32.6 G/DL — SIGNIFICANT CHANGE UP (ref 32–36)
MCV RBC AUTO: 86.1 FL — SIGNIFICANT CHANGE UP (ref 80–100)
NRBC # BLD: 0 /100 WBCS — SIGNIFICANT CHANGE UP (ref 0–0)
PLATELET # BLD AUTO: 344 K/UL — SIGNIFICANT CHANGE UP (ref 150–400)
POTASSIUM SERPL-MCNC: 4 MMOL/L — SIGNIFICANT CHANGE UP (ref 3.5–5.3)
POTASSIUM SERPL-SCNC: 4 MMOL/L — SIGNIFICANT CHANGE UP (ref 3.5–5.3)
PROT SERPL-MCNC: 7.3 G/DL — SIGNIFICANT CHANGE UP (ref 6–8.3)
RAPID RVP RESULT: SIGNIFICANT CHANGE UP
RBC # BLD: 3.74 M/UL — LOW (ref 4.2–5.8)
RBC # FLD: 12.9 % — SIGNIFICANT CHANGE UP (ref 10.3–14.5)
SARS-COV-2 RNA SPEC QL NAA+PROBE: SIGNIFICANT CHANGE UP
SODIUM SERPL-SCNC: 133 MMOL/L — LOW (ref 135–145)
WBC # BLD: 5.26 K/UL — SIGNIFICANT CHANGE UP (ref 3.8–10.5)
WBC # FLD AUTO: 5.26 K/UL — SIGNIFICANT CHANGE UP (ref 3.8–10.5)

## 2024-10-31 PROCEDURE — 99233 SBSQ HOSP IP/OBS HIGH 50: CPT

## 2024-10-31 PROCEDURE — 99232 SBSQ HOSP IP/OBS MODERATE 35: CPT

## 2024-10-31 RX ORDER — PANTOPRAZOLE SODIUM 40 MG/1
1 TABLET, DELAYED RELEASE ORAL
Refills: 0 | DISCHARGE

## 2024-10-31 RX ORDER — LABETALOL HCL 200 MG
1 TABLET ORAL
Qty: 0 | Refills: 0 | DISCHARGE
Start: 2024-10-31

## 2024-10-31 RX ORDER — AMLODIPINE BESYLATE 10 MG
1 TABLET ORAL
Refills: 0 | DISCHARGE

## 2024-10-31 RX ORDER — EZETIMIBE 10 MG/1
1 TABLET ORAL
Qty: 0 | Refills: 0 | DISCHARGE
Start: 2024-10-31

## 2024-10-31 RX ORDER — LEVOFLOXACIN 750 MG/1
1 TABLET, FILM COATED ORAL
Qty: 6 | Refills: 0
Start: 2024-10-31 | End: 2024-11-05

## 2024-10-31 RX ORDER — MODAFINIL 200 MG/1
1 TABLET ORAL
Qty: 0 | Refills: 0 | DISCHARGE
Start: 2024-10-31

## 2024-10-31 RX ORDER — ASPIRIN/MAG CARB/ALUMINUM AMIN 325 MG
1 TABLET ORAL
Qty: 0 | Refills: 0 | DISCHARGE
Start: 2024-10-31

## 2024-10-31 RX ORDER — SODIUM CHLORIDE 9 MG/ML
2 INJECTION, SOLUTION INTRAMUSCULAR; INTRAVENOUS; SUBCUTANEOUS
Qty: 0 | Refills: 0 | DISCHARGE
Start: 2024-10-31

## 2024-10-31 RX ORDER — MUPIROCIN 20 MG/G
1 OINTMENT TOPICAL
Qty: 1 | Refills: 0
Start: 2024-10-31 | End: 2024-11-01

## 2024-10-31 RX ORDER — LOSARTAN POTASSIUM 25 MG/1
1 TABLET ORAL
Qty: 0 | Refills: 0 | DISCHARGE
Start: 2024-10-31

## 2024-10-31 RX ORDER — LOSARTAN POTASSIUM 25 MG/1
1 TABLET ORAL
Refills: 0 | DISCHARGE

## 2024-10-31 RX ORDER — FLUOXETINE HCL 10 MG
1 CAPSULE ORAL
Qty: 0 | Refills: 0 | DISCHARGE
Start: 2024-10-31

## 2024-10-31 RX ORDER — PANTOPRAZOLE SODIUM 40 MG/1
1 TABLET, DELAYED RELEASE ORAL
Qty: 0 | Refills: 0 | DISCHARGE
Start: 2024-10-31

## 2024-10-31 RX ORDER — AMLODIPINE BESYLATE 10 MG
1 TABLET ORAL
Qty: 0 | Refills: 0 | DISCHARGE
Start: 2024-10-31

## 2024-10-31 RX ADMIN — Medication 20 MILLIGRAM(S): at 13:00

## 2024-10-31 RX ADMIN — Medication 200 MILLIGRAM(S): at 22:13

## 2024-10-31 RX ADMIN — MUPIROCIN 1 APPLICATION(S): 20 OINTMENT TOPICAL at 05:23

## 2024-10-31 RX ADMIN — SODIUM CHLORIDE 2 GRAM(S): 9 INJECTION, SOLUTION INTRAMUSCULAR; INTRAVENOUS; SUBCUTANEOUS at 05:30

## 2024-10-31 RX ADMIN — MODAFINIL 100 MILLIGRAM(S): 200 TABLET ORAL at 07:54

## 2024-10-31 RX ADMIN — Medication 10 MILLIGRAM(S): at 05:23

## 2024-10-31 RX ADMIN — LOSARTAN POTASSIUM 100 MILLIGRAM(S): 25 TABLET ORAL at 05:23

## 2024-10-31 RX ADMIN — SODIUM CHLORIDE 2 GRAM(S): 9 INJECTION, SOLUTION INTRAMUSCULAR; INTRAVENOUS; SUBCUTANEOUS at 17:17

## 2024-10-31 RX ADMIN — POLYETHYLENE GLYCOL 3350 17 GRAM(S): 17 POWDER, FOR SOLUTION ORAL at 17:17

## 2024-10-31 RX ADMIN — SODIUM CHLORIDE 2 GRAM(S): 9 INJECTION, SOLUTION INTRAMUSCULAR; INTRAVENOUS; SUBCUTANEOUS at 12:59

## 2024-10-31 RX ADMIN — Medication 200 MILLIGRAM(S): at 05:23

## 2024-10-31 RX ADMIN — Medication 81 MILLIGRAM(S): at 12:59

## 2024-10-31 RX ADMIN — ERTAPENEM SODIUM 120 MILLIGRAM(S): 1 INJECTION, POWDER, LYOPHILIZED, FOR SOLUTION INTRAMUSCULAR; INTRAVENOUS at 22:13

## 2024-10-31 RX ADMIN — MUPIROCIN 1 APPLICATION(S): 20 OINTMENT TOPICAL at 17:18

## 2024-10-31 RX ADMIN — Medication 40 MILLIGRAM(S): at 17:17

## 2024-10-31 RX ADMIN — PANTOPRAZOLE SODIUM 40 MILLIGRAM(S): 40 TABLET, DELAYED RELEASE ORAL at 05:23

## 2024-10-31 RX ADMIN — Medication 200 MILLIGRAM(S): at 13:00

## 2024-10-31 RX ADMIN — EZETIMIBE 10 MILLIGRAM(S): 10 TABLET ORAL at 13:00

## 2024-10-31 NOTE — PROGRESS NOTE ADULT - SUBJECTIVE AND OBJECTIVE BOX
Name of Patient : PACO SILVA  MRN: [id number]  Date of visit: 10-31-24 @ 12:15      Subjective: Patient seen and examined. No new events except as noted.   Patient seen earlier this AM. Lying down in bed. Family at the bedside.   Afebrile.    REVIEW OF SYSTEMS:  Limited ROS  CONSTITUTIONAL: + weakness, Afebrile   EYES/ENT: No visual changes   NECK: No pain   RESPIRATORY: No cough, wheezing, hemoptysis; No shortness of breath  CARDIOVASCULAR: No chest pain or palpitations  GASTROINTESTINAL: No abdominal or epigastric pain. No nausea, vomiting   GENITOURINARY: No dysuria, frequency or hematuria  NEUROLOGICAL: + Weakness  SKIN: No itching, burning, rashes, or lesions   All other review of systems is negative unless indicated above.    MEDICATIONS:  MEDICATIONS  (STANDING):  amLODIPine   Tablet 10 milliGRAM(s) Oral daily  aspirin enteric coated 81 milliGRAM(s) Oral daily  enoxaparin Injectable 40 milliGRAM(s) SubCutaneous <User Schedule>  ertapenem  IVPB 1000 milliGRAM(s) IV Intermittent every 24 hours  ezetimibe 10 milliGRAM(s) Oral daily  FLUoxetine 20 milliGRAM(s) Oral daily  labetalol 200 milliGRAM(s) Oral every 8 hours  losartan 100 milliGRAM(s) Oral daily  modafinil 100 milliGRAM(s) Oral <User Schedule>  mupirocin 2% Nasal 1 Application(s) Both Nostrils every 12 hours  pantoprazole    Tablet 40 milliGRAM(s) Oral before breakfast  polyethylene glycol 3350 17 Gram(s) Oral every 12 hours  senna 2 Tablet(s) Oral at bedtime  sodium chloride 2 Gram(s) Oral every 6 hours      PHYSICAL EXAM:  T(C): 37.1 (10-31-24 @ 08:49), Max: 37.5 (10-31-24 @ 00:00)  HR: 70 (10-31-24 @ 08:49) (70 - 90)  BP: 112/71 (10-31-24 @ 08:49) (112/71 - 147/89)  RR: 19 (10-31-24 @ 08:49) (10 - 19)  SpO2: 97% (10-31-24 @ 08:49) (97% - 98%)  Wt(kg): --  I&O's Summary    30 Oct 2024 07:01  -  31 Oct 2024 07:00  --------------------------------------------------------  IN: 630 mL / OUT: 0 mL / NET: 630 mL    31 Oct 2024 07:01  -  31 Oct 2024 12:15  --------------------------------------------------------  IN: 240 mL / OUT: 0 mL / NET: 240 mL      Appearance: Awake, generalized weakness, lying down in bed 	  HEENT: Eyes are open   Lymphatic: No lymphadenopathy grossly   Cardiovascular: Normal S1 S2, no JVD  Respiratory: normal effort , clear  Gastrointestinal:  Soft, Non-tender  Skin: No rashes,  warm to touch  Psychiatry:  Mood & affect appropriate  Musculoskeletal: No edema       10-30-24 @ 07:01  -  10-31-24 @ 07:00  --------------------------------------------------------  IN: 630 mL / OUT: 0 mL / NET: 630 mL    10-31-24 @ 07:01  -  10-31-24 @ 12:15  --------------------------------------------------------  IN: 240 mL / OUT: 0 mL / NET: 240 mL                              10.5   5.26  )-----------( 344      ( 31 Oct 2024 07:12 )             32.2               10-31    133[L]  |  99  |  16  ----------------------------<  100[H]  4.0   |  20[L]  |  0.94    Ca    9.0      31 Oct 2024 07:02  Phos  4.3     10-30  Mg     2.1     10-30    TPro  7.3  /  Alb  3.5  /  TBili  0.4  /  DBili  x   /  AST  89[H]  /  ALT  243[H]  /  AlkPhos  215[H]  10-31                       Urinalysis Basic - ( 31 Oct 2024 07:02 )    Color: x / Appearance: x / SG: x / pH: x  Gluc: 100 mg/dL / Ketone: x  / Bili: x / Urobili: x   Blood: x / Protein: x / Nitrite: x   Leuk Esterase: x / RBC: x / WBC x   Sq Epi: x / Non Sq Epi: x / Bacteria: x        Culture - Urine (10.28.24 @ 11:02)  Specimen Source: Clean Catch Clean Catch (Midstream)  Culture Results: >100,000 CFU/ml Klebsiella aerogenes (Previously Enterobacter)  Organism Identification: Klebsiella aerogenes (Previously Enterobacter)  Organism: Klebsiella aerogenes (Previously Enterobacter)  Method Type: CECILIA    Culture - Blood (10.28.24 @ 09:20)   Specimen Source: .Blood BLOOD  Culture Results:   No growth at 48 Hours    Culture - Blood (10.28.24 @ 09:15)   Specimen Source: .Blood BLOOD  Culture Results:   No growth at 48 Hours    < from: VA Duplex Lower Ext Vein Scan, Bilat (10.30.24 @ 13:45) >    IMPRESSION:  No evidence of deep venous thrombosis in either lower extremity.      < end of copied text >

## 2024-10-31 NOTE — PROGRESS NOTE ADULT - NS ATTEND AMEND GEN_ALL_CORE FT
I saw and examined the patient, reviewed diagnostic studies, and reviewed images personally. I agree with NP/PA’s history, exam, orders placed, and plan of care. Total care time spent, 50 minutes. Medical issues needing to be addressed include: Nontraumatic intracerebral hemorrhage subcortical w/ IVH, perihematomal cerebral edema and brain compression, HTN, medication noncompliance per brother, HA, AMS, n/v, dizziness.  Likely hypertensive hemorrhage. small area of diffusion restriction on MRI in R subcortical region, stroke w up complete. Neurowise stable. UTI +. Defer abx and infxn treatment to Imed/ID. CT CAP was (-). Transaminitis per GI/Imed.

## 2024-10-31 NOTE — PROGRESS NOTE ADULT - ASSESSMENT
Patient is a 44 year old male with a PMHx of uncontrolled HTN, HLD, who presented with a chief complaint of headache, emesis, dizziness and was found to have left IPH with IVH. CTH with left BG IPH and IVH. Internal Medicine has been consulted on Mr. Ferro's care for medical management.       CVA  - Per Neuro, likely 2/2 hypertensive hemorrhage with etiology likely ESUS  - CTs as noted   - MR w/ parenchymal hematoma in L basal ganglia, IVH, with mild distention of temporal horns, 5 mm acute/subacute infarct in R corona radiata   - TTE w/ EF 60%, normal LA; CELENA neg for Thrombus   - EEG negative per Neuro   - Recommend ILR to screen for occult arrhythmia --> EP eval appreciated   - W/ concern for inflammatory process, labs per neuro   - On ASA and Statin therapy   - Neuro checks per protocol   - Monitor on tele   - PT / OT / S+S   - Fall, Seizure, Aspiration precautions   - Per neurology   - EP and Cardio eval appreciated; F/u recs --> Planned for monitoring with MCOT or ILR as an outpatient per EP     Up-trending Leukocytosis --> FEBRILE   - CT C/A/P w/ no acute findings   - UA +; F/u UCx + for klebsiella   - BCx2 NGTD; F/u final   - 10/16 and 10/22 blood cultures negative   - Febrile 10/30 to 102 --> F/u COVID/ RVP/ Flu/ RSV negative   - 10/14,10/23, 10/31 LE Duplex negative for DVT   - Trend CBC, Temp curve, VS and monitor patient --> WBC down-trending   - ID eval appreciated; F/u recs --> Now on Ertapenem. Monitor     Transaminitis  - US ABD w/ hepatic steatosis, no evidence of acute cholecystitis  - Statin DC'd and ordered for Zetia  - F/u hepatitis panel  - neg   - Continue to monitor and trend  - Serial abdominal examinations  - Up-trending. GI eval appreciated; F/u recs --> Zosyn switched to Ertapenem. Monitor     Hyponatremia   - Weaned off of 2% HTS, On NaCl tabs 2g  - Na parameters as per Stroke Neurology   - Avoid overcorrection > 6-8 mEq in 24 hours     HTN   - On amlodipine, labetalol and losartan.   - Renal artery doppler: no evidence of KAITLYN.   - TSH WNL      PPX    Discussed with Attending and ACP  Discussed with Family at the bedside

## 2024-10-31 NOTE — PROGRESS NOTE ADULT - ASSESSMENT
45 yo man with IPH    1. CVA  per neurology    2. elevated lfts. hep panel and imaging of liver noncontributory, ?DILI from abx  outpt GI follow up, no objection to d/c

## 2024-10-31 NOTE — PROGRESS NOTE ADULT - ASSESSMENT
Patient seen and examined, agree with above assessment and plan as transcribed above.    - complete abx per med  - No need for further inpatient cardiac work up.    Bernardo Chilel MD, [facility]  BEEPER [phone]

## 2024-10-31 NOTE — CHART NOTE - NSCHARTNOTEFT_GEN_A_CORE
Request from Dr. Carter to facilitate patient discharge. Medication reconciliation reviewed, revised, and resolved with Dr. Carter, who has medically cleared patient for discharge with follow up as advised. Please refer to discharge note for detailed hospital course.

## 2024-10-31 NOTE — CHART NOTE - NSCHARTNOTESELECT_GEN_ALL_CORE
Discharge
Event Note
Event Note
Neuro IR procedure note
Transfer Note/Event Note
safeguard removal/Event Note
Neurology
Speech & Swallow
VTE risk/Event Note

## 2024-10-31 NOTE — PROGRESS NOTE ADULT - SUBJECTIVE AND OBJECTIVE BOX
Chief Complaint:  Patient is a 44y old  Male who presents with a chief complaint of CVA (31 Oct 2024 10:15)      Date of service 10-31-24 @ 19:48      Interval Events:     Hospital Medications:  acetaminophen     Tablet .. 650 milliGRAM(s) Oral every 6 hours PRN  amLODIPine   Tablet 10 milliGRAM(s) Oral daily  aspirin enteric coated 81 milliGRAM(s) Oral daily  enoxaparin Injectable 40 milliGRAM(s) SubCutaneous <User Schedule>  ertapenem  IVPB 1000 milliGRAM(s) IV Intermittent every 24 hours  ezetimibe 10 milliGRAM(s) Oral daily  FLUoxetine 20 milliGRAM(s) Oral daily  labetalol 200 milliGRAM(s) Oral every 8 hours  losartan 100 milliGRAM(s) Oral daily  modafinil 100 milliGRAM(s) Oral <User Schedule>  mupirocin 2% Nasal 1 Application(s) Both Nostrils every 12 hours  pantoprazole    Tablet 40 milliGRAM(s) Oral before breakfast  polyethylene glycol 3350 17 Gram(s) Oral every 12 hours  senna 2 Tablet(s) Oral at bedtime  sodium chloride 2 Gram(s) Oral every 6 hours        Review of Systems:  General:  No wt loss, fevers, chills, night sweats, fatigue,   Eyes:  Good vision, no reported pain  ENT:  No sore throat, pain, runny nose, dysphagia  CV:  No pain, palpitations, hypo/hypertension  Resp:  No dyspnea, cough, tachypnea, wheezing  GI:  See HPI  :  No pain, bleeding, incontinence, nocturia  Muscle:  No pain, weakness  Neuro:  No weakness, tingling, memory problems  Psych:  No fatigue, insomnia, mood problems, depression  Endocrine:  No polyuria, polydipsia, cold/heat intolerance  Heme:  No petechiae, ecchymosis, easy bruisability  Integumentary:  No rash, edema    PHYSICAL EXAM:   Vital Signs:  Vital Signs Last 24 Hrs  T(C): 36.8 (31 Oct 2024 17:07), Max: 37.5 (31 Oct 2024 00:00)  T(F): 98.3 (31 Oct 2024 17:07), Max: 99.5 (31 Oct 2024 00:00)  HR: 73 (31 Oct 2024 17:07) (70 - 90)  BP: 121/78 (31 Oct 2024 17:07) (112/71 - 147/89)  BP(mean): --  RR: 18 (31 Oct 2024 17:07) (18 - 19)  SpO2: 98% (31 Oct 2024 17:07) (97% - 98%)    Parameters below as of 31 Oct 2024 17:07  Patient On (Oxygen Delivery Method): room air      Daily     Daily       PHYSICAL EXAM:     GENERAL:  Appears stated age, well-groomed, well-nourished, no distress  HEENT:  NC/AT,  conjunctivae anicteric, clear and pink,   NECK: supple, trachea midline  CHEST:  Full & symmetric excursion, no increased effort, breath sounds clear  HEART:  Regular rhythm, no JVD  ABDOMEN:  Soft, non-tender, non-distended, normoactive bowel sounds,  no masses , no hepatosplenomegaly  EXTREMITIES:  no cyanosis,clubbing or edema  SKIN:  No rash, erythema, or, ecchymoses, no jaundice  NEURO:  Alert, non-focal, no asterixis  PSYCH: Appropriate affect, oriented to place and time  RECTAL: Deferred      LABS Personally reviewed by me:                        10.5   5.26  )-----------( 344      ( 31 Oct 2024 07:12 )             32.2     Mean Cell Volume: 86.1 fl (10-31-24 @ 07:12)    10-31    133[L]  |  99  |  16  ----------------------------<  100[H]  4.0   |  20[L]  |  0.94    Ca    9.0      31 Oct 2024 07:02  Phos  4.3     10-30  Mg     2.1     10-30    TPro  7.3  /  Alb  3.5  /  TBili  0.4  /  DBili  x   /  AST  89[H]  /  ALT  243[H]  /  AlkPhos  215[H]  10-31    LIVER FUNCTIONS - ( 31 Oct 2024 07:02 )  Alb: 3.5 g/dL / Pro: 7.3 g/dL / ALK PHOS: 215 U/L / ALT: 243 U/L / AST: 89 U/L / GGT: x             Urinalysis Basic - ( 31 Oct 2024 07:02 )    Color: x / Appearance: x / SG: x / pH: x  Gluc: 100 mg/dL / Ketone: x  / Bili: x / Urobili: x   Blood: x / Protein: x / Nitrite: x   Leuk Esterase: x / RBC: x / WBC x   Sq Epi: x / Non Sq Epi: x / Bacteria: x                              10.5   5.26  )-----------( 344      ( 31 Oct 2024 07:12 )             32.2                         11.4   7.42  )-----------( 352      ( 30 Oct 2024 05:44 )             34.9                         10.8   16.46 )-----------( 369      ( 29 Oct 2024 05:55 )             33.2       Imaging personally reviewed by me:

## 2024-10-31 NOTE — PROGRESS NOTE ADULT - ASSESSMENT
44 Right handed man with HTN HLD p/w headache, emesis, dizziness found to have left IPH w/ IVH.     Impression: AMS and dizziness due to nontraumatic L BG intracerebral hemorrhage w/ IVH, perihematomal cerebral edema and brain compression. Etiology Likely hypertensive hemorrhage. MRI also reveals acute/subacute R corona radiata ischemic infarct, etiology likely cryptogenic stroke. Young patient with vascular risk factors including hypertension and hyperlipidemia with MRI of the brain showing a small RIGHT corona radiata infarct and a LEFT basal ganglia hemorrhage. Among the etiologies that can cause ischemia and bleed in different vascular territories are either cardiac source of embolism vs something systemic inflammatory or infectious, workup thus far neg    NEURO: Neurologic examination with fluctuations in mental status, now overall stable. On 10/24: added fluoxitine 20mg qd for 3 months per FLAME, can be titrated off as outpatient and added modafinil 100mg qd to help with lethargy. S/P cerebral angiogram on 10/23 which showed no source of the lesion- can consider repeat angiogram outpatient. Repeat CTH 10/19 stable. EEG report negative, now discontinued. Concern for inflammatory process. Inflammatory labs sent (ESR/CRP, HIV, syphilis and hepatitis panel - HIV, hepatitis negative). Repeat CTH and CT CAP neg for malignancy. Cerebral angiogram no findings of vasculitis. Continue monitoring for neurologic deterioration in the setting of cerebral edema and compression, stable to move to neurology floor. Keep strict normotension. , continue high intensity statin, titrate to LDL goal < 70. A1C 5.  MRI brain w/wo results as above. PT/OT recommend AR.  Q4 neurochecks at night for protected sleep. Pt transferred to medicine service for further care    ANTITHROMBOTIC THERAPY: Aspirin 81mg daily for secondary stroke prevention started 10/21/24    PULMONARY: CXR (10/16) clear, protecting airway, saturating well     CARDIOVASCULAR: TTE: EF 60%, normal LA. Continue cardiac monitoring. Appreciate cardiology recommendations for HTN management. C Continue Amlodipine 10mg daily, Labetalol 200mg q8hrs, Hydralazine 25mg q8hrs, and Losartan 100mg daily. Not on thiazide diuretics due to strict NA control.  Secondary HTN workup - Renal artery dopplers neg for KAITLYN, TSH WNL, can eventually send aldosterone/renin/metaneprhine/AM cortisol levels once off sodium chloride tabs. TSH wnl. CELENA 10/23 shows normal LV Function,  no thrombus.  - Appreciate EP recommendations, recommended outpatient MCOT or ILR to screen for arrhythmia like Afib.      SBP goal: <160     GASTROINTESTINAL:  dysphagia screen  - passed, tolerating diet. Elevated LFTs, continue to trend, internal medicine recs appreciated. US abdomen with  hepatic steatosis, no evidence of acute cholecystitis. Discontinued statin, will switch to zetia. Hepatits panel negative. GI consult, believed LFTs elevated due to combination of Zosyn, and baseline hepatic steatosis      Diet: regular    RENAL: BUN/Cr stable, Serum sodium level improved today (135). good urine output. Weaned off of 2% HTS, continue NaCl tabs 2g q6h for hyponatremia: slowly titrate off at rehab    HEMATOLOGY: H/H stable, Platelets 363. BLE dopplers (10/14): no evidence of dvt. Repeat LE dopplers on 10/23 negative for DVT. Hypercoagulable labs sent. CT CAP: negative for malignancy. Appreciate hem/onc team recommendations:  LA and beta 2 glycoprotein negative, protein C 164, ATIII normal. The elevation of protein C is reflective or recent infarction and can be an acute phase reactant.     DVT ppx: Lovenox     ID: febrile overnight Tmax 102. ID contactde for further recommendations, RVP/Flu test ordered, will obtain repeat LE doppler, no leukocytosis, UA positive, pending UC, started on antibiotic zosyn 10/28. blood cultures x2 (10/16) NGTD.  Repeat blood cultures negative, ammonia level WNL and LE dopplers 10/13/24: negative. Staph PCR nasal swab positive- started on mupirocin. ID consult -> change to ertapenem 1g, f/u sensitivites    DISPO: Acute rehab per PT/OT eval once bed available, medically cleared    CORE MEASURES:        Admission NIHSS: 1     ICH 1     TPA: [] YES [X] NO      LDL/HDL: 146/43     Depression Screen:      Statin Therapy: YES     Dysphagia Screen: [X] PASS [] FAIL     Smoking [] YES [X] NO      Afib [] YES [X] NO     Stroke Education [] YES [] N 44 Right handed man with HTN HLD p/w headache, emesis, dizziness found to have left IPH w/ IVH.     Impression: AMS and dizziness due to nontraumatic L BG intracerebral hemorrhage w/ IVH, perihematomal cerebral edema and brain compression. Etiology Likely hypertensive hemorrhage. MRI also reveals acute/subacute R corona radiata ischemic infarct, etiology likely cryptogenic stroke. Young patient with vascular risk factors including hypertension and hyperlipidemia with MRI of the brain showing a small RIGHT corona radiata infarct and a LEFT basal ganglia hemorrhage. Among the etiologies that can cause ischemia and bleed in different vascular territories are either cardiac source of embolism vs something systemic inflammatory or infectious, workup thus far neg    NEURO: Neurologic examination with fluctuations in mental status, now overall stable. On 10/24: added fluoxitine 20mg qd for 3 months per FLAME, can be titrated off as outpatient and added modafinil 100mg qd to help with lethargy. S/P cerebral angiogram on 10/23 which showed no source of the lesion- can consider repeat angiogram outpatient. Repeat CTH 10/19 stable. EEG report negative, now discontinued. Concern for inflammatory process. Inflammatory labs sent (ESR/CRP, HIV, syphilis and hepatitis panel - HIV, hepatitis negative). Repeat CTH and CT CAP neg for malignancy. Cerebral angiogram no findings of vasculitis. Continue monitoring for neurologic deterioration in the setting of cerebral edema and compression, stable to move to neurology floor. Keep strict normotension. , continue high intensity statin, titrate to LDL goal < 70. A1C 5.  MRI brain w/wo results as above. PT/OT recommend AR.  Q4 neurochecks at night for protected sleep. Pt transferred to medicine service for further care    ANTITHROMBOTIC THERAPY: Aspirin 81mg daily for secondary stroke prevention started 10/21/24    CARDIOVASCULAR: TTE: EF 60%, normal LA. Continue cardiac monitoring. Appreciate cardiology recommendations for HTN management. C Continue Amlodipine 10mg daily, Labetalol 200mg q8hrs, Hydralazine 25mg q8hrs, and Losartan 100mg daily. Not on thiazide diuretics due to strict NA control.  Secondary HTN workup - Renal artery dopplers neg for KAITLYN, TSH WNL, can eventually send aldosterone/renin/metaneprhine/AM cortisol levels once off sodium chloride tabs. TSH wnl. CELENA 10/23 shows normal LV Function,  no thrombus.  - Appreciate EP recommendations, recommended outpatient MCOT or ILR to screen for arrhythmia like Afib.      SBP goal: <160     CORE MEASURES:        Admission NIHSS: 1     ICH 1     TPA: [] YES [X] NO      LDL/HDL: 146/43     Depression Screen:      Statin Therapy: YES     Dysphagia Screen: [X] PASS [] FAIL     Smoking [] YES [X] NO      Afib [] YES [X] NO     Stroke Education [] YES [] N

## 2024-10-31 NOTE — PROGRESS NOTE ADULT - SUBJECTIVE AND OBJECTIVE BOX
Patient seen for rehab follow up  CC: CVA    no new complaints   sister at beside     REVIEW OF SYSTEMS  Constitutional - No fever,  No fatigue  HEENT - No vertigo, No neck pain  Neurological - No headaches, No memory loss    FUNCTIONAL PROGRESS  SLP 10/29  hypophonia   cognitive linguistic impairment     PT 10/30  bed mobility supervision   transfers Cg with RW  gait CG with RW x 45 feet   standing rest     OT 10/29  bed mobility CG   transfers CG with RW   dressing  CG   follows 75% commands     VITALS  T(C): 37.1 (10-31-24 @ 08:49), Max: 37.5 (10-31-24 @ 00:00)  HR: 70 (10-31-24 @ 08:49) (70 - 90)  BP: 112/71 (10-31-24 @ 08:49) (112/71 - 147/89)  RR: 19 (10-31-24 @ 08:49) (10 - 19)  SpO2: 97% (10-31-24 @ 08:49) (97% - 98%)  Wt(kg): --    MEDICATIONS   acetaminophen     Tablet .. 650 milliGRAM(s) every 6 hours PRN  amLODIPine   Tablet 10 milliGRAM(s) daily  aspirin enteric coated 81 milliGRAM(s) daily  enoxaparin Injectable 40 milliGRAM(s) <User Schedule>  ertapenem  IVPB 1000 milliGRAM(s) every 24 hours  ezetimibe 10 milliGRAM(s) daily  FLUoxetine 20 milliGRAM(s) daily  labetalol 200 milliGRAM(s) every 8 hours  losartan 100 milliGRAM(s) daily  modafinil 100 milliGRAM(s) <User Schedule>  mupirocin 2% Nasal 1 Application(s) every 12 hours  pantoprazole    Tablet 40 milliGRAM(s) before breakfast  polyethylene glycol 3350 17 Gram(s) every 12 hours  senna 2 Tablet(s) at bedtime  sodium chloride 2 Gram(s) every 6 hours      RECENT LABS - Reviewed                        10.5   5.26  )-----------( 344      ( 31 Oct 2024 07:12 )             32.2     10-31    133[L]  |  99  |  16  ----------------------------<  100[H]  4.0   |  20[L]  |  0.94    Ca    9.0      31 Oct 2024 07:02  Phos  4.3     10-30  Mg     2.1     10-30    TPro  7.3  /  Alb  3.5  /  TBili  0.4  /  DBili  x   /  AST  89[H]  /  ALT  243[H]  /  AlkPhos  215[H]  10-31      Urinalysis Basic - ( 31 Oct 2024 07:02 )    Color: x / Appearance: x / SG: x / pH: x  Gluc: 100 mg/dL / Ketone: x  / Bili: x / Urobili: x   Blood: x / Protein: x / Nitrite: x   Leuk Esterase: x / RBC: x / WBC x   Sq Epi: x / Non Sq Epi: x / Bacteria: x          < from: CT Head No Cont (10.19.24 @ 22:36) >    IMPRESSION: No change since 10/15/2024. Left corona radiata hemorrhage   with intraventricular extension. Mild midline shift to the right.      < end of copied text >    < from: MR Head w/wo IV Cont (10.18.24 @ 16:32) >    IMPRESSION:    1.  Stable parenchymal hematoma in the left basal ganglia, without   evidence of an underlying hemorrhagic mass.  2.  Stable IVH, with mild distention of the temporal horns.      < end of copied text >      ----------------------------------------------------------------------------------------  PHYSICAL EXAM  Constitutional - NAD, Comfortable, in bed   Chest - Breathing comfortably on room air   Cardiovascular - S1S2   Extremities - No C/C/E, No calf tenderness   Neurologic Exam -    follows commands                 Cognitive - Awake, Alert, AAO to self, place, year     Communication - hypophonic, aphasia         Motor - moves all ext      Sensory - Intact to LT     Psychiatric - Mood stable, Affect flat   ----------------------------------------------------------------------------------------  ASSESSMENT/PLAN  44yMale h/o HTN with functional deficits after CVA  MRI with acute/subacute right corona radiata infarct, stable left corona radiata bleed with mid line shift  EEG negative, on modafinil and fluoxetine   HTN, on multiple medications, continue to monitor   seen by EP for ILR, follow up as outpatient   +UTI, +klebsiella, ertapenem, ID following   DVT PPX - SCDs lovenox   Rehab - Will continue to follow for ongoing rehab needs and recommendations.    patient requires CG with mobility, +cognitive linguistic deficits   continue bedside therapy while admitted to prevent secondary complications of immobility, bed mobility, transfer training, progressive ambulation, ADLs   OOB throughout the day with staff, OOB to chair with meals          Recommend ACUTE inpatient rehabilitation for the functional deficits consisting of 3 hours of multidisciplinary intense therapy/day x 5 days/week, 1-2 weeks depending on progress at rehabilitation facility, 24 hour RN/daily PMR physician for comorbid medical management. Patient will be able to participate in and benefit from intense rehabilitation therapies for 3 hours a day to maximize independence.                  35 minutes spent on total encounter  with chart review of PT/OT/SLP notes, exam, imaging, counseling and education on inpatient rehabilitation, coordination of care with rehab team and

## 2024-10-31 NOTE — PROGRESS NOTE ADULT - SUBJECTIVE AND OBJECTIVE BOX
EP Attending  HISTORY OF PRESENT ILLNESS: HPI:  44M no AC/AP hx uncontrolled HTN tx LVS s/p flu-like symptoms and headache; CTH w/ left BG IPH w/ IVH extension. Exam: Sami speaking, Ox3, PERRL, no drift, CHAPA 5/5, SILJAY (14 Oct 2024 04:30)    Had ischemic stroke w/ hemorrhagic conversion.  Too sleepy to participate in HPI/ROS.  Family in room.  We discussed long term AFib surveillance after large stroke.  10/29- resting in bed, awaiting discharge to rehab.  10/30- uneventful overnight, no new issues.  Date of service 10/31- resting in bed, no overnight issues.    PAST MEDICAL & SURGICAL HISTORY:  HTN    acetaminophen     Tablet .. 650 milliGRAM(s) Oral every 6 hours PRN  amLODIPine   Tablet 10 milliGRAM(s) Oral daily  aspirin enteric coated 81 milliGRAM(s) Oral daily  enoxaparin Injectable 40 milliGRAM(s) SubCutaneous <User Schedule>  ertapenem  IVPB 1000 milliGRAM(s) IV Intermittent every 24 hours  ezetimibe 10 milliGRAM(s) Oral daily  FLUoxetine 20 milliGRAM(s) Oral daily  labetalol 200 milliGRAM(s) Oral every 8 hours  losartan 100 milliGRAM(s) Oral daily  modafinil 100 milliGRAM(s) Oral <User Schedule>  mupirocin 2% Nasal 1 Application(s) Both Nostrils every 12 hours  pantoprazole    Tablet 40 milliGRAM(s) Oral before breakfast  polyethylene glycol 3350 17 Gram(s) Oral every 12 hours  senna 2 Tablet(s) Oral at bedtime  sodium chloride 2 Gram(s) Oral every 6 hours                            10.5   5.26  )-----------( 344      ( 31 Oct 2024 07:12 )             32.2       10-31    133[L]  |  99  |  16  ----------------------------<  100[H]  4.0   |  20[L]  |  0.94    Ca    9.0      31 Oct 2024 07:02  Phos  4.3     10-30  Mg     2.1     10-30    TPro  7.3  /  Alb  3.5  /  TBili  0.4  /  DBili  x   /  AST  89[H]  /  ALT  243[H]  /  AlkPhos  215[H]  10-31    T(C): 37.1 (10-31-24 @ 08:49), Max: 37.5 (10-31-24 @ 00:00)  HR: 70 (10-31-24 @ 08:49) (70 - 90)  BP: 112/71 (10-31-24 @ 08:49) (112/71 - 147/89)  RR: 19 (10-31-24 @ 08:49) (10 - 19)  SpO2: 97% (10-31-24 @ 08:49) (97% - 98%)  Wt(kg): --    I&O's Summary    30 Oct 2024 07:01  -  31 Oct 2024 07:00  --------------------------------------------------------  IN: 630 mL / OUT: 0 mL / NET: 630 mL    31 Oct 2024 07:01  -  31 Oct 2024 11:50  --------------------------------------------------------  IN: 240 mL / OUT: 0 mL / NET: 240 mL    General: Well nourished, no acute distress, alert and oriented x 3  Head: normocephalic, no trauma  Neck: no JVD, no bruit, supple, not enlarged  CV: S1S2, no S3, regular rate, rhythm is SINUS, no murmurs.    Lungs: clear BL, no rales or wheezes  Abdomen: bowel sounds +, soft, nontender, nondistended  Extremities: no clubbing, cyanosis or edema  Neuro: Moves all 4 extremities, sensation intact x 4 extremities  Skin: warm and moist, normal turgor  Psych: Mood and affect are appropriate for circumstances  MSK: normal range of motion and strength x4 extremities.    TELEMETRY: NSR	    ECG: NSR  Echo:  < from: TTE W or WO Ultrasound Enhancing Agent (10.14.24 @ 08:46) >  CONCLUSIONS:      1. Fair study quality.   2. Left ventricular systolic function is normal with an ejection fraction of 60 % by Chua's method of disks.   3. Normal right ventricular systolic function.   4. No prior echocardiogram is available for comparison.    < end of copied text >    < from: MR Head w/wo IV Cont (10.18.24 @ 16:32) >  FINDINGS:    Multiple images are degraded by motion, but are nonetheless diagnostic.    A parenchymal hematoma noted in the left basal ganglia measuring   approximately 2.7 x 2.2 x 2.2 cm. There is vasogenic edema surrounding   the clot. There is no enhancement. There is no evidence of an underlying   hemorrhagic tumor at this time, although follow-up to resolution is   recommended to exclude a lesion obscured by the blood products in the   acute phase. The location of the lesion is suggestive of hypertensive   hemorrhage, correlate clinically.    There is associated ventricular breakthrough, with a large cast of clot   in the left lateral ventricle, which is expanded. There is layering blood   at both occipital horns. There is midline shift at the level of the   septum pellucidum measuring 8 mm left to right. All of these findings   appear unchanged, without interval rebleeding.    The third ventricle is effaced and the temporal horns are slightly   distended, possibly low grade hydrocephalus. This has not progressed, and   the sulci are noteffaced. There is mild periventricular T2   hyperintensity which could represent small vessel disease or   transependymal CSF flow resorption. There are also mild nonspecific T2   hyperintensities in the subcortical white matter.    No acute ischemia. Intracranial flow voids are patent. The cerebellar   tonsils are normally positioned.    Limited views of the sinuses and mastoids show mild to moderate mucosal   thickening without air-fluid levels, likely chronic.    Limited views of the orbits and visualized soft tissues of the neck,   face, scalp, skull base, and calvarium are otherwise unremarkable.    IMPRESSION:    1.  Stable parenchymal hematoma in the left basal ganglia, without   evidence of an underlying hemorrhagic mass.  2.  Stable IVH, with mild distention of the temporal horns.  < end of copied text >    < from: CELENA W or WO Ultrasound Enhancing Agent (10.23.24 @ 11:47) >  CONCLUSIONS:      1. Agitated saline injection was negative for intracardiac shunt.   2. No thrombus seen in the left atrial, left atrial appendage, right atrial or right atrial appendage.   3. No pericardial effusion seen.   4. Normal right ventricular cavity size and normal right ventricular systolic function.   5. No evidence of left atrial or left atrial appendage thrombus.   6. Left ventricular systolic function is normal with an ejection fraction visually estimated at 55 to 60 %.  < end of copied text >      ASSESSMENT/PLAN:  Mr Ferro is a 44y Male here w/ stroke.  Has history of uncontrolled hypertension. Continue multi-drug regimen above to keep SBP ~140-160, unless lower target granted by neurology.  CELENA was reassuringly normal.  Outpatient surveillance for AFib with MCOT or Loop recorder, given significant stroke in young aged patient.  Awaiting DC plan.    Hawk Lizarraga M.D.  Cardiac Electrophysiology    office [phone]  pager [phone]

## 2024-11-01 LAB
ALBUMIN SERPL ELPH-MCNC: 3.5 G/DL — SIGNIFICANT CHANGE UP (ref 3.3–5)
ALP SERPL-CCNC: 197 U/L — HIGH (ref 40–120)
ALT FLD-CCNC: 223 U/L — HIGH (ref 10–45)
ANION GAP SERPL CALC-SCNC: 12 MMOL/L — SIGNIFICANT CHANGE UP (ref 5–17)
AST SERPL-CCNC: 70 U/L — HIGH (ref 10–40)
BILIRUB SERPL-MCNC: 0.4 MG/DL — SIGNIFICANT CHANGE UP (ref 0.2–1.2)
BUN SERPL-MCNC: 15 MG/DL — SIGNIFICANT CHANGE UP (ref 7–23)
CALCIUM SERPL-MCNC: 9.4 MG/DL — SIGNIFICANT CHANGE UP (ref 8.4–10.5)
CHLORIDE SERPL-SCNC: 101 MMOL/L — SIGNIFICANT CHANGE UP (ref 96–108)
CO2 SERPL-SCNC: 22 MMOL/L — SIGNIFICANT CHANGE UP (ref 22–31)
CREAT SERPL-MCNC: 0.85 MG/DL — SIGNIFICANT CHANGE UP (ref 0.5–1.3)
EGFR: 111 ML/MIN/1.73M2 — SIGNIFICANT CHANGE UP
GLUCOSE SERPL-MCNC: 89 MG/DL — SIGNIFICANT CHANGE UP (ref 70–99)
POTASSIUM SERPL-MCNC: 4.1 MMOL/L — SIGNIFICANT CHANGE UP (ref 3.5–5.3)
POTASSIUM SERPL-SCNC: 4.1 MMOL/L — SIGNIFICANT CHANGE UP (ref 3.5–5.3)
PROT SERPL-MCNC: 7.5 G/DL — SIGNIFICANT CHANGE UP (ref 6–8.3)
SODIUM SERPL-SCNC: 135 MMOL/L — SIGNIFICANT CHANGE UP (ref 135–145)

## 2024-11-01 PROCEDURE — 99231 SBSQ HOSP IP/OBS SF/LOW 25: CPT

## 2024-11-01 RX ORDER — MODAFINIL 200 MG/1
100 TABLET ORAL
Refills: 0 | Status: DISCONTINUED | OUTPATIENT
Start: 2024-11-01 | End: 2024-11-04

## 2024-11-01 RX ADMIN — Medication 650 MILLIGRAM(S): at 00:27

## 2024-11-01 RX ADMIN — Medication 650 MILLIGRAM(S): at 01:30

## 2024-11-01 RX ADMIN — SODIUM CHLORIDE 2 GRAM(S): 9 INJECTION, SOLUTION INTRAMUSCULAR; INTRAVENOUS; SUBCUTANEOUS at 17:28

## 2024-11-01 RX ADMIN — SODIUM CHLORIDE 2 GRAM(S): 9 INJECTION, SOLUTION INTRAMUSCULAR; INTRAVENOUS; SUBCUTANEOUS at 06:25

## 2024-11-01 RX ADMIN — Medication 40 MILLIGRAM(S): at 17:28

## 2024-11-01 RX ADMIN — Medication 200 MILLIGRAM(S): at 22:17

## 2024-11-01 RX ADMIN — SODIUM CHLORIDE 2 GRAM(S): 9 INJECTION, SOLUTION INTRAMUSCULAR; INTRAVENOUS; SUBCUTANEOUS at 00:30

## 2024-11-01 RX ADMIN — MODAFINIL 100 MILLIGRAM(S): 200 TABLET ORAL at 07:29

## 2024-11-01 RX ADMIN — PANTOPRAZOLE SODIUM 40 MILLIGRAM(S): 40 TABLET, DELAYED RELEASE ORAL at 06:25

## 2024-11-01 RX ADMIN — ERTAPENEM SODIUM 120 MILLIGRAM(S): 1 INJECTION, POWDER, LYOPHILIZED, FOR SOLUTION INTRAMUSCULAR; INTRAVENOUS at 22:53

## 2024-11-01 RX ADMIN — Medication 20 MILLIGRAM(S): at 13:07

## 2024-11-01 RX ADMIN — SODIUM CHLORIDE 2 GRAM(S): 9 INJECTION, SOLUTION INTRAMUSCULAR; INTRAVENOUS; SUBCUTANEOUS at 13:06

## 2024-11-01 RX ADMIN — Medication 81 MILLIGRAM(S): at 13:06

## 2024-11-01 RX ADMIN — EZETIMIBE 10 MILLIGRAM(S): 10 TABLET ORAL at 13:07

## 2024-11-01 RX ADMIN — LOSARTAN POTASSIUM 100 MILLIGRAM(S): 25 TABLET ORAL at 06:25

## 2024-11-01 RX ADMIN — MUPIROCIN 1 APPLICATION(S): 20 OINTMENT TOPICAL at 06:25

## 2024-11-01 RX ADMIN — Medication 200 MILLIGRAM(S): at 06:25

## 2024-11-01 RX ADMIN — MUPIROCIN 1 APPLICATION(S): 20 OINTMENT TOPICAL at 17:28

## 2024-11-01 RX ADMIN — Medication 10 MILLIGRAM(S): at 06:25

## 2024-11-01 RX ADMIN — Medication 200 MILLIGRAM(S): at 13:06

## 2024-11-01 RX ADMIN — MODAFINIL 100 MILLIGRAM(S): 200 TABLET ORAL at 07:56

## 2024-11-01 NOTE — PROGRESS NOTE ADULT - SUBJECTIVE AND OBJECTIVE BOX
Patient is a 44y old  Male who presents with a chief complaint of CVA (30 Oct 2024 11:07)    f/u fever    Interval History/ROS:  no fever    PAST MEDICAL & SURGICAL HISTORY:  HTN    Allergies  No Known Allergies    ANTIMICROBIALS:  piperacillin/tazobactam IVPB.. 3.375 every 8 hours (10/28-10/30)    active:  ertapenem  IVPB 1000 every 24 hours (10/30-)    MEDICATIONS  (STANDING):  amLODIPine   Tablet 10 daily  aspirin enteric coated 81 daily  enoxaparin Injectable 40 <User Schedule>  ezetimibe 10 daily  FLUoxetine 20 daily  labetalol 200 every 8 hours  losartan 100 daily  pantoprazole    Tablet 40 before breakfast  polyethylene glycol 3350 17 every 12 hours  senna 2 at bedtime    Vital Signs Last 24 Hrs  T(F): 97.9 (11-01-24 @ 04:54), Max: 98.7 (10-31-24 @ 08:49)  HR: 67 (11-01-24 @ 04:54)  BP: 129/62 (11-01-24 @ 04:54)  RR: 17 (11-01-24 @ 04:54)  SpO2: 98% (11-01-24 @ 04:54) (97% - 98%)  Wt(kg): --    PHYSICAL EXAM:                              10.5   5.26  )-----------( 344      ( 31 Oct 2024 07:12 )             32.2 11-01    135  |  101  |  15  ----------------------------<  89  4.1   |  22  |  0.85  Ca    9.4      01 Nov 2024 06:11  TPro  7.5  /  Alb  3.5  /  TBili  0.4  /  DBili  x   /  AST  70  /  ALT  223  /  AlkPhos  197  11-01    WBC Count: 7.42 (10-30-24 @ 05:44)  WBC Count: 16.46 (10-29-24 @ 05:55)  WBC Count: 22.64 (10-28-24 @ 05:18)  WBC Count: 17.20 (10-27-24 @ 06:36)  WBC Count: 12.67 (10-26-24 @ 07:17)  WBC Count: 13.44 (10-25-24 @ 06:59)  WBC Count: 13.65 (10-24-24 @ 04:57)  WBC Count: 13.39 (10-23-24 @ 06:06)  WBC Count: 14.03 (10-22-24 @ 06:49)  WBC Count: 12.65 (10-21-24 @ 07:26)    Procalcitonin: 0.68 (10-29 @ 05:55)  Procalcitonin: 0.86 (10-28 @ 05:17)    Urinalysis (10.28.24 @ 11:02)   Glucose Qualitative, Urine: Negative mg/dL  Blood, Urine: Negative  pH Urine: 5.5  Color: Dark Yellow  Urine Appearance: Cloudy  Bilirubin: Negative  Ketone - Urine: Negative mg/dL  Specific Gravity: 1.025  Protein, Urine: Trace mg/dL  Urobilinogen: 1.0 mg/dL  Nitrite: Positive  Leukocyte Esterase Concentration: Small  Urine Microscopic-Add On (NC) (10.28.24 @ 11:02)   Epithelial Cells: 2 /HPF  Review: Reviewed  Cast: 1 /LPF  Bacteria: Many /HPF  Red Blood Cell - Urine: 2 /HPF  White Blood Cell - Urine: 17 /HPF    MICROBIOLOGY:  Culture - Urine (collected 10-28-24 @ 11:02)  Source: Clean Catch Clean Catch (Midstream)  Final Report (10-30-24 @ 15:38):    >100,000 CFU/ml Klebsiella aerogenes (Previously Enterobacter)  Organism: Klebsiella aerogenes (Previously Enterobacter) (10-30-24 @ 15:38)  Organism: Klebsiella aerogenes (Previously Enterobacter) (10-30-24 @ 15:38)      Method Type: CECILIA      -  Amoxicillin/Clavulanic Acid: R >16/8      -  Ampicillin: R >16 These ampicillin results predict results for amoxicillin      -  Ampicillin/Sulbactam: R >16/8      -  Aztreonam: S <=4      -  Cefazolin: R >16      -  Cefepime: S <=2      -  Cefoxitin: R >16      -  Ceftriaxone: S <=1 Enterobacter cloacae, Klebsiella aerogenes, and Citrobacter freundii may develop resistance during prolonged therapy.      -  Ciprofloxacin: S <=0.25      -  Ertapenem: S <=0.5      -  Gentamicin: S <=2      -  Imipenem: S <=1      -  Levofloxacin: S <=0.5      -  Meropenem: S <=1      -  Nitrofurantoin: I 64 Should not be used to treat pyelonephritis      -  Piperacillin/Tazobactam: S <=8 Treatment with Pipercillin/Tazobactam is not recommended in severe infections casued by Klebsiella aerogenes, Enterobacter cloacae complex, and Citrobacter freundii complex.      -  Tobramycin: S <=2      -  Trimethoprim/Sulfamethoxazole: S <=0.5/9.5    Culture - Blood (collected 10-28-24 @ 09:20)  Source: .Blood BLOOD  Preliminary Report (10-31-24 @ 14:01):    No growth at 72 Hours    Culture - Blood (collected 10-28-24 @ 09:15)  Source: .Blood BLOOD  Preliminary Report (10-31-24 @ 14:01):    No growth at 72 Hours        RADIOLOGY:  imaging below personally reviewed and agree with findings    Xray Chest 1 View- PORTABLE-Routine (Xray Chest 1 View- PORTABLE-Routine .) (10.28.24 @ 16:47) >  IMPRESSION:  Mild atelectasis.    CT Abdomen and Pelvis w/ Oral Cont and w/ IV Cont (10.25.24 @ 19:30) >  GALLBLADDER: Mildly distended. No gallstones. No pericholecystic inflammatory changes..  REPRODUCTIVE ORGANS: Prostate mildly enlarged measuring 4.4 x 3.5 cm.    IMPRESSION:  No acute findings or evidence of malignancy in the chest, abdomen or pelvis.    CT Head No Cont (10.25.24 @ 19:30) >  IMPRESSION:  Resolving subacute hemorrhage in the left caudate nucleus and left lateral ventricle are decreased in size and attenuation from 10/19/2024.  Vasogenic edema and mass effect, with 9 mm rightward bulging the midline, appears stable.  There is no subfalcine herniation of the frontal lobes   underneath the cerebral falx.  Slight prominence of the temporal horns of both lateral ventricles, suspicious for mild hydrocephalus, is stable.    IR Neuro (10.23.24 @ 21:03) >  Impression: Normal cerebral angiogram    VA Duplex Lower Ext Vein Scan, Bilat (10.23.24 @ 09:07) >  IMPRESSION:  No evidence of deep venous thrombosis in either lower extremity.    US Abdomen Upper Quadrant Right (10.21.24 @ 19:27) >  IMPRESSION:  No sonographic evidence of acute cholecystitis.  Hepatic steatosis.    CT Head No Cont (10.19.24 @ 22:36) >  IMPRESSION: No change since 10/15/2024. Left corona radiata hemorrhage with intraventricular extension. Mild midline shift to the right.    MR Head w/wo IV Cont (10.18.24 @ 16:32) >  IMPRESSION:  1.  Stable parenchymal hematoma in the left basal ganglia, without evidence of an underlying hemorrhagic mass.  2.  Stable IVH, with mild distention of the temporal horns.    CT Head No Cont (10.15.24 @ 09:14) >  IMPRESSION:  Interval stability compared with the prior 10/14/2024 in left basal ganglia acute hematoma with intraventricular extension. No evidence of rehemorrhage. No change in the ventricle size.    CT Angio Neck w/ IV Cont (10.14.24 @ 04:47) >  IMPRESSION:  CT HEAD:  Stable left medial basal ganglia parenchymal hemorrhage with associated intraventricular extension of hemorrhage. Unchanged edema within the adjacent left basal ganglia as well as rightward bulging of the septum pellucidum.  CTA NECK:  No evidence of significant stenosis or occlusion.  CTA HEAD:  No large vessel occlusion, significant stenosis or vascular abnormality identified.    CT Head No Cont (10.14.24 @ 01:09) >  IMPRESSION:  Intraparenchymal hematoma in the medial left basal ganglia with adjacent vasogenic edema, mass effect, 7 mm of left to right midline shift, and intraventricular extension of hemorrhage predominantly in the left lateral ventricle.         Patient is a 44y old  Male who presents with a chief complaint of CVA (30 Oct 2024 11:07)    f/u fever    Interval History/ROS:  no fever. family at bedside.  no dysuria.      PAST MEDICAL & SURGICAL HISTORY:  HTN    Allergies  No Known Allergies    ANTIMICROBIALS:  piperacillin/tazobactam IVPB.. 3.375 every 8 hours (10/28-10/30)    active:  ertapenem  IVPB 1000 every 24 hours (10/30-)    MEDICATIONS  (STANDING):  amLODIPine   Tablet 10 daily  aspirin enteric coated 81 daily  enoxaparin Injectable 40 <User Schedule>  ezetimibe 10 daily  FLUoxetine 20 daily  labetalol 200 every 8 hours  losartan 100 daily  pantoprazole    Tablet 40 before breakfast  polyethylene glycol 3350 17 every 12 hours  senna 2 at bedtime    Vital Signs Last 24 Hrs  T(F): 97.9 (11-01-24 @ 04:54), Max: 98.7 (10-31-24 @ 08:49)  HR: 67 (11-01-24 @ 04:54)  BP: 129/62 (11-01-24 @ 04:54)  RR: 17 (11-01-24 @ 04:54)  SpO2: 98% (11-01-24 @ 04:54) (97% - 98%)  Wt(kg): --    PHYSICAL EXAM:  Constitutional: non-toxic  HEAD/EYES: anicteric  ENT:  supple, no thrush  Cardiovascular:   normal S1, S2  Respiratory:  clear BS bilaterally  GI:  soft, non-tender, normal bowel sounds  :  no mclaughlin  Musculoskeletal:  no synovitis  Neurologic: awake and alert, grossly non-focal  Skin:  no rash  Psychiatric:  awake, appropriate mood                          10.5   5.26  )-----------( 344      ( 31 Oct 2024 07:12 )             32.2 11-01    135  |  101  |  15  ----------------------------<  89  4.1   |  22  |  0.85  Ca    9.4      01 Nov 2024 06:11  TPro  7.5  /  Alb  3.5  /  TBili  0.4  /  DBili  x   /  AST  70  /  ALT  223  /  AlkPhos  197  11-01    WBC Count: 7.42 (10-30-24 @ 05:44)  WBC Count: 16.46 (10-29-24 @ 05:55)  WBC Count: 22.64 (10-28-24 @ 05:18)  WBC Count: 17.20 (10-27-24 @ 06:36)  WBC Count: 12.67 (10-26-24 @ 07:17)  WBC Count: 13.44 (10-25-24 @ 06:59)  WBC Count: 13.65 (10-24-24 @ 04:57)  WBC Count: 13.39 (10-23-24 @ 06:06)  WBC Count: 14.03 (10-22-24 @ 06:49)  WBC Count: 12.65 (10-21-24 @ 07:26)    Procalcitonin: 0.68 (10-29 @ 05:55)  Procalcitonin: 0.86 (10-28 @ 05:17)    Urinalysis (10.28.24 @ 11:02)   Glucose Qualitative, Urine: Negative mg/dL  Blood, Urine: Negative  pH Urine: 5.5  Color: Dark Yellow  Urine Appearance: Cloudy  Bilirubin: Negative  Ketone - Urine: Negative mg/dL  Specific Gravity: 1.025  Protein, Urine: Trace mg/dL  Urobilinogen: 1.0 mg/dL  Nitrite: Positive  Leukocyte Esterase Concentration: Small  Urine Microscopic-Add On (NC) (10.28.24 @ 11:02)   Epithelial Cells: 2 /HPF  Review: Reviewed  Cast: 1 /LPF  Bacteria: Many /HPF  Red Blood Cell - Urine: 2 /HPF  White Blood Cell - Urine: 17 /HPF    MICROBIOLOGY:  Culture - Urine (collected 10-28-24 @ 11:02)  Source: Clean Catch Clean Catch (Midstream)  Final Report (10-30-24 @ 15:38):    >100,000 CFU/ml Klebsiella aerogenes (Previously Enterobacter)  Organism: Klebsiella aerogenes (Previously Enterobacter) (10-30-24 @ 15:38)  Organism: Klebsiella aerogenes (Previously Enterobacter) (10-30-24 @ 15:38)      Method Type: CECILIA      -  Amoxicillin/Clavulanic Acid: R >16/8      -  Ampicillin: R >16 These ampicillin results predict results for amoxicillin      -  Ampicillin/Sulbactam: R >16/8      -  Aztreonam: S <=4      -  Cefazolin: R >16      -  Cefepime: S <=2      -  Cefoxitin: R >16      -  Ceftriaxone: S <=1 Enterobacter cloacae, Klebsiella aerogenes, and Citrobacter freundii may develop resistance during prolonged therapy.      -  Ciprofloxacin: S <=0.25      -  Ertapenem: S <=0.5      -  Gentamicin: S <=2      -  Imipenem: S <=1      -  Levofloxacin: S <=0.5      -  Meropenem: S <=1      -  Nitrofurantoin: I 64 Should not be used to treat pyelonephritis      -  Piperacillin/Tazobactam: S <=8 Treatment with Pipercillin/Tazobactam is not recommended in severe infections casued by Klebsiella aerogenes, Enterobacter cloacae complex, and Citrobacter freundii complex.      -  Tobramycin: S <=2      -  Trimethoprim/Sulfamethoxazole: S <=0.5/9.5    Culture - Blood (collected 10-28-24 @ 09:20)  Source: .Blood BLOOD  Preliminary Report (10-31-24 @ 14:01):    No growth at 72 Hours    Culture - Blood (collected 10-28-24 @ 09:15)  Source: .Blood BLOOD  Preliminary Report (10-31-24 @ 14:01):    No growth at 72 Hours        RADIOLOGY:  imaging below personally reviewed and agree with findings    Xray Chest 1 View- PORTABLE-Routine (Xray Chest 1 View- PORTABLE-Routine .) (10.28.24 @ 16:47) >  IMPRESSION:  Mild atelectasis.    CT Abdomen and Pelvis w/ Oral Cont and w/ IV Cont (10.25.24 @ 19:30) >  GALLBLADDER: Mildly distended. No gallstones. No pericholecystic inflammatory changes..  REPRODUCTIVE ORGANS: Prostate mildly enlarged measuring 4.4 x 3.5 cm.    IMPRESSION:  No acute findings or evidence of malignancy in the chest, abdomen or pelvis.    CT Head No Cont (10.25.24 @ 19:30) >  IMPRESSION:  Resolving subacute hemorrhage in the left caudate nucleus and left lateral ventricle are decreased in size and attenuation from 10/19/2024.  Vasogenic edema and mass effect, with 9 mm rightward bulging the midline, appears stable.  There is no subfalcine herniation of the frontal lobes   underneath the cerebral falx.  Slight prominence of the temporal horns of both lateral ventricles, suspicious for mild hydrocephalus, is stable.    IR Neuro (10.23.24 @ 21:03) >  Impression: Normal cerebral angiogram    VA Duplex Lower Ext Vein Scan, Bilat (10.23.24 @ 09:07) >  IMPRESSION:  No evidence of deep venous thrombosis in either lower extremity.    US Abdomen Upper Quadrant Right (10.21.24 @ 19:27) >  IMPRESSION:  No sonographic evidence of acute cholecystitis.  Hepatic steatosis.    CT Head No Cont (10.19.24 @ 22:36) >  IMPRESSION: No change since 10/15/2024. Left corona radiata hemorrhage with intraventricular extension. Mild midline shift to the right.    MR Head w/wo IV Cont (10.18.24 @ 16:32) >  IMPRESSION:  1.  Stable parenchymal hematoma in the left basal ganglia, without evidence of an underlying hemorrhagic mass.  2.  Stable IVH, with mild distention of the temporal horns.    CT Head No Cont (10.15.24 @ 09:14) >  IMPRESSION:  Interval stability compared with the prior 10/14/2024 in left basal ganglia acute hematoma with intraventricular extension. No evidence of rehemorrhage. No change in the ventricle size.    CT Angio Neck w/ IV Cont (10.14.24 @ 04:47) >  IMPRESSION:  CT HEAD:  Stable left medial basal ganglia parenchymal hemorrhage with associated intraventricular extension of hemorrhage. Unchanged edema within the adjacent left basal ganglia as well as rightward bulging of the septum pellucidum.  CTA NECK:  No evidence of significant stenosis or occlusion.  CTA HEAD:  No large vessel occlusion, significant stenosis or vascular abnormality identified.    CT Head No Cont (10.14.24 @ 01:09) >  IMPRESSION:  Intraparenchymal hematoma in the medial left basal ganglia with adjacent vasogenic edema, mass effect, 7 mm of left to right midline shift, and intraventricular extension of hemorrhage predominantly in the left lateral ventricle.

## 2024-11-01 NOTE — PROGRESS NOTE ADULT - ASSESSMENT
45 yo man with IPH    1. CVA  per neurology    2. elevated lfts. hep panel and imaging of liver noncontributory, ?DILI from abx  outpt GI follow up, no objection to d/c, currently trending down

## 2024-11-01 NOTE — PROGRESS NOTE ADULT - SUBJECTIVE AND OBJECTIVE BOX
C A R D I O [facility] O G Y  **********************************     DATE OF SERVICE: 11-01-24    Patient denies chest pain or shortness of breath.   Review of symptoms otherwise negative.    MEDICATIONS:  acetaminophen     Tablet .. 650 milliGRAM(s) Oral every 6 hours PRN  amLODIPine   Tablet 10 milliGRAM(s) Oral daily  aspirin enteric coated 81 milliGRAM(s) Oral daily  enoxaparin Injectable 40 milliGRAM(s) SubCutaneous <User Schedule>  ertapenem  IVPB 1000 milliGRAM(s) IV Intermittent every 24 hours  ezetimibe 10 milliGRAM(s) Oral daily  FLUoxetine 20 milliGRAM(s) Oral daily  labetalol 200 milliGRAM(s) Oral every 8 hours  losartan 100 milliGRAM(s) Oral daily  modafinil 100 milliGRAM(s) Oral <User Schedule>  mupirocin 2% Nasal 1 Application(s) Both Nostrils every 12 hours  pantoprazole    Tablet 40 milliGRAM(s) Oral before breakfast  polyethylene glycol 3350 17 Gram(s) Oral every 12 hours  senna 2 Tablet(s) Oral at bedtime  sodium chloride 2 Gram(s) Oral every 6 hours      LABS:                        10.5   5.26  )-----------( 344      ( 31 Oct 2024 07:12 )             32.2       Hemoglobin: 10.5 g/dL (10-31 @ 07:12)  Hemoglobin: 11.4 g/dL (10-30 @ 05:44)  Hemoglobin: 10.8 g/dL (10-29 @ 05:55)  Hemoglobin: 11.2 g/dL (10-28 @ 05:18)      11-01    135  |  101  |  15  ----------------------------<  89  4.1   |  22  |  0.85    Ca    9.4      01 Nov 2024 06:11    TPro  7.5  /  Alb  3.5  /  TBili  0.4  /  DBili  x   /  AST  70[H]  /  ALT  223[H]  /  AlkPhos  197[H]  11-01    Creatinine Trend: 0.85<--, 0.94<--, 1.07<--, 0.98<--, 0.89<--, 0.85<--    COAGS:           PHYSICAL EXAM:  T(C): 36.5 (11-01-24 @ 12:29), Max: 36.8 (10-31-24 @ 17:07)  HR: 67 (11-01-24 @ 12:29) (67 - 79)  BP: 116/77 (11-01-24 @ 12:29) (114/76 - 129/83)  RR: 17 (11-01-24 @ 12:29) (17 - 18)  SpO2: 99% (11-01-24 @ 12:29) (97% - 99%)  Wt(kg): --    I&O's Summary    31 Oct 2024 07:01  -  01 Nov 2024 07:00  --------------------------------------------------------  IN: 600 mL / OUT: 0 mL / NET: 600 mL    01 Nov 2024 07:01  -  01 Nov 2024 15:30  --------------------------------------------------------  IN: 600 mL / OUT: 0 mL / NET: 600 mL        Gen: NAD  HEENT:  (-)icterus (-)pallor  CV: N S1 S2 1/6 SOHEILA (+)2 Pulses B/l  Resp:  Clear to auscultation B/L, normal effort  GI: (+) BS Soft, NT, ND  Lymph:  (-)Edema, (-)obvious lymphadenopathy  Skin: Warm to touch, Normal turgor  Psych: Appropriate mood and affect      TELEMETRY: NSR	      RADIOLOGY:  < from: Xray Chest 1 View- PORTABLE-Urgent (Xray Chest 1 View- PORTABLE-Urgent .) (10.16.24 @ 05:38) >  IMPRESSION:  Clear lungs.  < end of copied text >    < from: TTE W or WO Ultrasound Enhancing Agent (10.14.24 @ 08:46) >  CONCLUSIONS:   1. Fair study quality.   2. Left ventricular systolic function is normal with an ejection fraction of 60 % by Chua's method of disks.   3. Normal right ventricular systolic function.   4. No prior echocardiogram is available for comparison.  < end of copied text >      < from: CELENA W or WO Ultrasound Enhancing Agent (10.23.24 @ 11:47) >  CONCLUSIONS:     1. Agitated saline injection was negative for intracardiac shunt.   2. No thrombus seen in the left atrial, left atrial appendage, right atrial or right atrial appendage.   3. No pericardial effusion seen.   4. Normal right ventricular cavity size and normal right ventricular systolic function.   5. No evidence of left atrial or left atrial appendage thrombus.   6. Left ventricular systolic function is normal with an ejection fraction visually estimated at 55 to 60 %.  < end of copied text >    CT head: c< from: CT Head No Cont (10.25.24 @ 19:30) >  IMPRESSION:  Resolving subacute hemorrhage in the left caudate nucleus and left   lateral ventricle are decreased in size and attenuation from 10/19/2024.    Vasogenicedema and mass effect, with 9 mm rightward bulging the midline,   appears stable.  There is no subfalcine herniation of the frontal lobes   underneath the cerebral falx.    Slight prominence of the temporal horns of both lateral ventricles,   suspicious for mild hydrocephalus, is stable.    No new intracranial findings.        --- End of Report ---    < end of copied text >    CT : t< from: CT Chest w/ IV Cont (10.25.24 @ 19:30) >  IMPRESSION:  No acute findings or evidence of malignancy in the chest, abdomen or   pelvis.    Please refer to detailed findings otherwise described above.    --- End of Report ---    < end of copied text >    ASSESSMENT/PLAN: Patient is a 43 y/o Male with PMH of HTN who presented with headaches admitted for ICH. Cardiology consulted for HTN.    #ICH  - Likely hypertensive hemorrhage per neuro  - Management per neuro/neurosx  - MRI Brain noted with acute/subacute infarct in R corona radiata  - D/w neuro, concern for embolic stroke - CELENA with no thrombus, neg for PFO  - EP consult appreciated - Outpatient surveillance for AFib with MCOT or Loop recorder  - Hypercoagulable workup per heme/onc  - s/p negative cerebral angio  - CT C/A/P neg for malignancy    #HTN  - SBP goal <160 per neuro  - Continue Amlodipine 10mg daily, Labetalol 200mg q8hrs, Hydralazine 25mg q8hrs, and Losartan 100mg daily. Not on thiazide diuretics due to strict NA control per neuro.  - Suspect will improve further once weaned of sodium chloride tabs  - TTE with normal LV function  - Secondary HTN workup - Renal artery dopplers neg for KAITLYN, TSH WNL, can eventually send aldosterone/renin/metaneprhine/AM cortisol levels once off sodium chloride tabs    #Leukocytosis  - Found to have UTI (UCx with GNR) - continue abx per primary team/ID  - Prelim BCx negative     - No further inpatient cardiac w/u planned  - D/C planning per primary team  - Patient to follow up in our [place] office with Dr. Frye after rehab on 12/4 at 12:15 PM ([id number] [place] [address]. Office Phone #[phone])    Davi Carrion PA-C  Pager: [phone]

## 2024-11-01 NOTE — PROGRESS NOTE ADULT - SUBJECTIVE AND OBJECTIVE BOX
Name of Patient : PACO SILVA  MRN: [id number]  Date of visit: 11-01-24       Subjective: Patient seen and examined. No new events except as noted.   Doing okay   afberile       REVIEW OF SYSTEMS:    CONSTITUTIONAL: No weakness, fevers or chills  EYES/ENT: No visual changes;  No vertigo or throat pain   NECK: No pain or stiffness  RESPIRATORY: No cough, wheezing, hemoptysis; No shortness of breath  CARDIOVASCULAR: No chest pain or palpitations  GASTROINTESTINAL: No abdominal or epigastric pain. No nausea, vomiting, or hematemesis; No diarrhea or constipation. No melena or hematochezia.  GENITOURINARY: No dysuria, frequency or hematuria  NEUROLOGICAL: No numbness or weakness  SKIN: No itching, burning, rashes, or lesions   All other review of systems is negative unless indicated above.    MEDICATIONS:  MEDICATIONS  (STANDING):  amLODIPine   Tablet 10 milliGRAM(s) Oral daily  aspirin enteric coated 81 milliGRAM(s) Oral daily  enoxaparin Injectable 40 milliGRAM(s) SubCutaneous <User Schedule>  ertapenem  IVPB 1000 milliGRAM(s) IV Intermittent every 24 hours  ezetimibe 10 milliGRAM(s) Oral daily  FLUoxetine 20 milliGRAM(s) Oral daily  labetalol 200 milliGRAM(s) Oral every 8 hours  losartan 100 milliGRAM(s) Oral daily  modafinil 100 milliGRAM(s) Oral <User Schedule>  mupirocin 2% Nasal 1 Application(s) Both Nostrils every 12 hours  pantoprazole    Tablet 40 milliGRAM(s) Oral before breakfast  polyethylene glycol 3350 17 Gram(s) Oral every 12 hours  senna 2 Tablet(s) Oral at bedtime  sodium chloride 2 Gram(s) Oral every 6 hours      PHYSICAL EXAM:  T(C): 36.4 (11-01-24 @ 09:06), Max: 36.8 (10-31-24 @ 12:25)  HR: 79 (11-01-24 @ 09:06) (67 - 79)  BP: 114/76 (11-01-24 @ 09:06) (114/76 - 129/83)  RR: 18 (11-01-24 @ 09:06) (17 - 18)  SpO2: 98% (11-01-24 @ 09:06) (97% - 98%)  Wt(kg): --  I&O's Summary    31 Oct 2024 07:01  -  01 Nov 2024 07:00  --------------------------------------------------------  IN: 600 mL / OUT: 0 mL / NET: 600 mL    01 Nov 2024 07:01  -  01 Nov 2024 11:34  --------------------------------------------------------  IN: 240 mL / OUT: 0 mL / NET: 240 mL          Appearance: Normal	  HEENT:  PERRLA   Lymphatic: No lymphadenopathy   Cardiovascular: Normal S1 S2, no JVD  Respiratory: normal effort , clear  Gastrointestinal:  Soft, Non-tender  Skin: No rashes,  warm to touch  Psychiatry:  Mood & affect appropriate  Musculuskeletal: No edema    recent labs, Imaging and EKGs personally reviewed     10-31-24 @ 07:01  -  11-01-24 @ 07:00  --------------------------------------------------------  IN: 600 mL / OUT: 0 mL / NET: 600 mL    11-01-24 @ 07:01  -  11-01-24 @ 11:34  --------------------------------------------------------  IN: 240 mL / OUT: 0 mL / NET: 240 mL                          10.5   5.26  )-----------( 344      ( 31 Oct 2024 07:12 )             32.2               11-01    135  |  101  |  15  ----------------------------<  89  4.1   |  22  |  0.85    Ca    9.4      01 Nov 2024 06:11    TPro  7.5  /  Alb  3.5  /  TBili  0.4  /  DBili  x   /  AST  70[H]  /  ALT  223[H]  /  AlkPhos  197[H]  11-01                       Urinalysis Basic - ( 01 Nov 2024 06:11 )    Color: x / Appearance: x / SG: x / pH: x  Gluc: 89 mg/dL / Ketone: x  / Bili: x / Urobili: x   Blood: x / Protein: x / Nitrite: x   Leuk Esterase: x / RBC: x / WBC x   Sq Epi: x / Non Sq Epi: x / Bacteria: x

## 2024-11-01 NOTE — PROGRESS NOTE ADULT - ASSESSMENT
44M with nonadherence to medications and uncontrolled HTN initially presented 10/13/2024 with flu-like symptoms and headache.  Found to have an acute left basal ganglia hemorrhage with IVH extension.  Course complicated by elevated LFT, sono showed findings c/w hepatic steatosis.  Cerebral angiogram was done and negative.  Course also complicated by fever for 1-2 days.  Leukocytosis to 22K that normalized on zosyn.  BC (-). UA with pyuria and UC with klebsiella aerogenes.      Sepsis with leukocytosis and fever due to UTI with Klebsiella aerogenes  - ertapenem 1g daily, today is D#5 antibiotics  - can transition to oral levaquin to complete 7 days total antibiotics   44M with nonadherence to medications and uncontrolled HTN initially presented 10/13/2024 with flu-like symptoms and headache.  Found to have an acute left basal ganglia hemorrhage with IVH extension.  Course complicated by elevated LFT, sono showed findings c/w hepatic steatosis.  Cerebral angiogram was done and negative.  Course also complicated by fever for 1-2 days.  Leukocytosis to 22K that normalized on zosyn.  BC (-). UA with pyuria and UC with klebsiella aerogenes.      Sepsis with leukocytosis and fever due to UTI with Klebsiella aerogenes  - ertapenem 1g daily, today is D#5 antibiotics  - can transition to oral levaquin to complete 7 days total antibiotics    Please call Infectious Diseases if there is a change in status.  Thank you.  [phone].

## 2024-11-01 NOTE — PROGRESS NOTE ADULT - ASSESSMENT
Patient is a 44 year old male with a PMHx of uncontrolled HTN, HLD, who presented with a chief complaint of headache, emesis, dizziness and was found to have left IPH with IVH. CTH with left BG IPH and IVH. Internal Medicine has been consulted on Mr. Ferro's care for medical management.       CVA  - Per Neuro, likely 2/2 hypertensive hemorrhage with etiology likely ESUS  - CTs as noted   - MR w/ parenchymal hematoma in L basal ganglia, IVH, with mild distention of temporal horns, 5 mm acute/subacute infarct in R corona radiata   - TTE w/ EF 60%, normal LA; CELENA neg for Thrombus   - EEG negative per Neuro   - Recommend ILR to screen for occult arrhythmia --> EP eval appreciated   - W/ concern for inflammatory process, labs per neuro   - On ASA and Statin therapy   - Neuro checks per protocol   - Monitor on tele   - PT / OT / S+S   - Fall, Seizure, Aspiration precautions   - Per neurology   - EP and Cardio eval appreciated; F/u recs --> Planned for monitoring with MCOT or ILR as an outpatient per EP     Up-trending Leukocytosis --> FEBRILE   - CT C/A/P w/ no acute findings   - UA +; F/u UCx + for klebsiella   - BCx2 NGTD; F/u final   - 10/16 and 10/22 blood cultures negative   - Febrile 10/30 to 102 --> F/u COVID/ RVP/ Flu/ RSV negative   - 10/14,10/23, 10/31 LE Duplex negative for DVT   - Trend CBC, Temp curve, VS and monitor patient --> WBC down-trending   - ID eval appreciated; F/u recs --> Now on Ertapenem. Monitor , switch to oral levaquin , total of 7 days     Transaminitis  - US ABD w/ hepatic steatosis, no evidence of acute cholecystitis  - Statin DC'd and ordered for Zetia  - F/u hepatitis panel  - neg   - Continue to monitor and trend  - Serial abdominal examinations  - Up-trending. GI eval appreciated; F/u recs --> Zosyn switched to Ertapenem. Monitor     Hyponatremia   - Weaned off of 2% HTS, On NaCl tabs 2g  - Na parameters as per Stroke Neurology   - Avoid overcorrection > 6-8 mEq in 24 hours     HTN   - On amlodipine, labetalol and losartan.   - Renal artery doppler: no evidence of KAITLYN.   - TSH WNL      PPX    D/C planing   awaiting bed at rehab

## 2024-11-01 NOTE — PROGRESS NOTE ADULT - REASON FOR ADMISSION
CVA
CVA
headache / ICH
CVA
Stroke
left basal ganglia IPH with IVH
IPH
Stroke
Stroke
left basal ganglia IPH with IVH

## 2024-11-01 NOTE — PROGRESS NOTE ADULT - ASSESSMENT
Patient seen and examined, agree with above assessment and plan as transcribed above.    - complete ABx per ID  - D/c planning     Bernardo Chilel MD, [facility]  BEEPER [phone]

## 2024-11-02 LAB
ALBUMIN SERPL ELPH-MCNC: 3.6 G/DL — SIGNIFICANT CHANGE UP (ref 3.3–5)
ALP SERPL-CCNC: 192 U/L — HIGH (ref 40–120)
ALT FLD-CCNC: 220 U/L — HIGH (ref 10–45)
ANION GAP SERPL CALC-SCNC: 15 MMOL/L — SIGNIFICANT CHANGE UP (ref 5–17)
AST SERPL-CCNC: 64 U/L — HIGH (ref 10–40)
BILIRUB SERPL-MCNC: 0.3 MG/DL — SIGNIFICANT CHANGE UP (ref 0.2–1.2)
BUN SERPL-MCNC: 16 MG/DL — SIGNIFICANT CHANGE UP (ref 7–23)
CALCIUM SERPL-MCNC: 9.5 MG/DL — SIGNIFICANT CHANGE UP (ref 8.4–10.5)
CHLORIDE SERPL-SCNC: 102 MMOL/L — SIGNIFICANT CHANGE UP (ref 96–108)
CO2 SERPL-SCNC: 21 MMOL/L — LOW (ref 22–31)
CREAT SERPL-MCNC: 0.89 MG/DL — SIGNIFICANT CHANGE UP (ref 0.5–1.3)
CULTURE RESULTS: SIGNIFICANT CHANGE UP
CULTURE RESULTS: SIGNIFICANT CHANGE UP
EGFR: 109 ML/MIN/1.73M2 — SIGNIFICANT CHANGE UP
GLUCOSE SERPL-MCNC: 90 MG/DL — SIGNIFICANT CHANGE UP (ref 70–99)
HCT VFR BLD CALC: 34.9 % — LOW (ref 39–50)
HGB BLD-MCNC: 11.3 G/DL — LOW (ref 13–17)
MCHC RBC-ENTMCNC: 28 PG — SIGNIFICANT CHANGE UP (ref 27–34)
MCHC RBC-ENTMCNC: 32.4 G/DL — SIGNIFICANT CHANGE UP (ref 32–36)
MCV RBC AUTO: 86.4 FL — SIGNIFICANT CHANGE UP (ref 80–100)
NRBC # BLD: 0 /100 WBCS — SIGNIFICANT CHANGE UP (ref 0–0)
PLATELET # BLD AUTO: 404 K/UL — HIGH (ref 150–400)
POTASSIUM SERPL-MCNC: 4.3 MMOL/L — SIGNIFICANT CHANGE UP (ref 3.5–5.3)
POTASSIUM SERPL-SCNC: 4.3 MMOL/L — SIGNIFICANT CHANGE UP (ref 3.5–5.3)
PROT SERPL-MCNC: 7.9 G/DL — SIGNIFICANT CHANGE UP (ref 6–8.3)
RBC # BLD: 4.04 M/UL — LOW (ref 4.2–5.8)
RBC # FLD: 13 % — SIGNIFICANT CHANGE UP (ref 10.3–14.5)
SODIUM SERPL-SCNC: 138 MMOL/L — SIGNIFICANT CHANGE UP (ref 135–145)
SPECIMEN SOURCE: SIGNIFICANT CHANGE UP
SPECIMEN SOURCE: SIGNIFICANT CHANGE UP
WBC # BLD: 8.43 K/UL — SIGNIFICANT CHANGE UP (ref 3.8–10.5)
WBC # FLD AUTO: 8.43 K/UL — SIGNIFICANT CHANGE UP (ref 3.8–10.5)

## 2024-11-02 RX ADMIN — Medication 10 MILLIGRAM(S): at 05:47

## 2024-11-02 RX ADMIN — Medication 200 MILLIGRAM(S): at 22:22

## 2024-11-02 RX ADMIN — SODIUM CHLORIDE 2 GRAM(S): 9 INJECTION, SOLUTION INTRAMUSCULAR; INTRAVENOUS; SUBCUTANEOUS at 05:46

## 2024-11-02 RX ADMIN — Medication 20 MILLIGRAM(S): at 11:38

## 2024-11-02 RX ADMIN — LOSARTAN POTASSIUM 100 MILLIGRAM(S): 25 TABLET ORAL at 05:46

## 2024-11-02 RX ADMIN — EZETIMIBE 10 MILLIGRAM(S): 10 TABLET ORAL at 11:38

## 2024-11-02 RX ADMIN — SODIUM CHLORIDE 2 GRAM(S): 9 INJECTION, SOLUTION INTRAMUSCULAR; INTRAVENOUS; SUBCUTANEOUS at 18:52

## 2024-11-02 RX ADMIN — Medication 40 MILLIGRAM(S): at 18:52

## 2024-11-02 RX ADMIN — SODIUM CHLORIDE 2 GRAM(S): 9 INJECTION, SOLUTION INTRAMUSCULAR; INTRAVENOUS; SUBCUTANEOUS at 11:36

## 2024-11-02 RX ADMIN — SODIUM CHLORIDE 2 GRAM(S): 9 INJECTION, SOLUTION INTRAMUSCULAR; INTRAVENOUS; SUBCUTANEOUS at 23:41

## 2024-11-02 RX ADMIN — MUPIROCIN 1 APPLICATION(S): 20 OINTMENT TOPICAL at 05:47

## 2024-11-02 RX ADMIN — PANTOPRAZOLE SODIUM 40 MILLIGRAM(S): 40 TABLET, DELAYED RELEASE ORAL at 05:47

## 2024-11-02 RX ADMIN — MODAFINIL 100 MILLIGRAM(S): 200 TABLET ORAL at 08:11

## 2024-11-02 RX ADMIN — Medication 200 MILLIGRAM(S): at 05:46

## 2024-11-02 RX ADMIN — Medication 200 MILLIGRAM(S): at 14:21

## 2024-11-02 RX ADMIN — Medication 81 MILLIGRAM(S): at 11:36

## 2024-11-02 RX ADMIN — SODIUM CHLORIDE 2 GRAM(S): 9 INJECTION, SOLUTION INTRAMUSCULAR; INTRAVENOUS; SUBCUTANEOUS at 00:16

## 2024-11-02 RX ADMIN — ERTAPENEM SODIUM 120 MILLIGRAM(S): 1 INJECTION, POWDER, LYOPHILIZED, FOR SOLUTION INTRAMUSCULAR; INTRAVENOUS at 22:23

## 2024-11-02 NOTE — PROGRESS NOTE ADULT - SUBJECTIVE AND OBJECTIVE BOX
Name of Patient : PACO SILVA  MRN: [id number]  Date of visit: 11-02-24       Subjective: Patient seen and examined. No new events except as noted.     REVIEW OF SYSTEMS:    CONSTITUTIONAL: No weakness, fevers or chills  EYES/ENT: No visual changes;  No vertigo or throat pain   NECK: No pain or stiffness  RESPIRATORY: No cough, wheezing, hemoptysis; No shortness of breath  CARDIOVASCULAR: No chest pain or palpitations  GASTROINTESTINAL: No abdominal or epigastric pain. No nausea, vomiting, or hematemesis; No diarrhea or constipation. No melena or hematochezia.  GENITOURINARY: No dysuria, frequency or hematuria  NEUROLOGICAL: No numbness or weakness  SKIN: No itching, burning, rashes, or lesions   All other review of systems is negative unless indicated above.    MEDICATIONS:  MEDICATIONS  (STANDING):  amLODIPine   Tablet 10 milliGRAM(s) Oral daily  aspirin enteric coated 81 milliGRAM(s) Oral daily  enoxaparin Injectable 40 milliGRAM(s) SubCutaneous <User Schedule>  ezetimibe 10 milliGRAM(s) Oral daily  FLUoxetine 20 milliGRAM(s) Oral daily  labetalol 200 milliGRAM(s) Oral every 8 hours  losartan 100 milliGRAM(s) Oral daily  modafinil 100 milliGRAM(s) Oral <User Schedule>  pantoprazole    Tablet 40 milliGRAM(s) Oral before breakfast  polyethylene glycol 3350 17 Gram(s) Oral every 12 hours  senna 2 Tablet(s) Oral at bedtime  sodium chloride 2 Gram(s) Oral every 6 hours      PHYSICAL EXAM:  T(C): 36.9 (11-02-24 @ 20:00), Max: 36.9 (11-02-24 @ 20:00)  HR: 69 (11-02-24 @ 20:00) (68 - 73)  BP: 122/75 (11-02-24 @ 20:00) (119/80 - 149/96)  RR: 18 (11-02-24 @ 20:00) (18 - 18)  SpO2: 99% (11-02-24 @ 20:00) (97% - 100%)  Wt(kg): --  I&O's Summary    01 Nov 2024 07:01  -  02 Nov 2024 07:00  --------------------------------------------------------  IN: 600 mL / OUT: 0 mL / NET: 600 mL    02 Nov 2024 08:01  -  02 Nov 2024 23:22  --------------------------------------------------------  IN: 580 mL / OUT: 0 mL / NET: 580 mL          Appearance: Normal	  HEENT:  PERRLA   Lymphatic: No lymphadenopathy   Cardiovascular: Normal S1 S2, no JVD  Respiratory: normal effort , clear  Gastrointestinal:  Soft, Non-tender  Skin: No rashes,  warm to touch  Psychiatry:  Mood & affect appropriate  Musculuskeletal: No edema    recent labs, Imaging and EKGs personally reviewed     11-01-24 @ 07:01  -  11-02-24 @ 07:00  --------------------------------------------------------  IN: 600 mL / OUT: 0 mL / NET: 600 mL    11-02-24 @ 08:01  -  11-02-24 @ 23:22  --------------------------------------------------------  IN: 580 mL / OUT: 0 mL / NET: 580 mL                          11.3   8.43  )-----------( 404      ( 02 Nov 2024 06:40 )             34.9               11-02    138  |  102  |  16  ----------------------------<  90  4.3   |  21[L]  |  0.89    Ca    9.5      02 Nov 2024 06:40    TPro  7.9  /  Alb  3.6  /  TBili  0.3  /  DBili  x   /  AST  64[H]  /  ALT  220[H]  /  AlkPhos  192[H]  11-02                       Urinalysis Basic - ( 02 Nov 2024 06:40 )    Color: x / Appearance: x / SG: x / pH: x  Gluc: 90 mg/dL / Ketone: x  / Bili: x / Urobili: x   Blood: x / Protein: x / Nitrite: x   Leuk Esterase: x / RBC: x / WBC x   Sq Epi: x / Non Sq Epi: x / Bacteria: x

## 2024-11-02 NOTE — PROGRESS NOTE ADULT - SUBJECTIVE AND OBJECTIVE BOX
C A R D I O L O G Y  **********************************     DATE OF SERVICE: 11-02-24    Patient denies chest pain or shortness of breath.   Review of symptoms otherwise negative.    MEDICATIONS:  acetaminophen     Tablet .. 650 milliGRAM(s) Oral every 6 hours PRN  amLODIPine   Tablet 10 milliGRAM(s) Oral daily  aspirin enteric coated 81 milliGRAM(s) Oral daily  enoxaparin Injectable 40 milliGRAM(s) SubCutaneous <User Schedule>  ertapenem  IVPB 1000 milliGRAM(s) IV Intermittent every 24 hours  ezetimibe 10 milliGRAM(s) Oral daily  FLUoxetine 20 milliGRAM(s) Oral daily  labetalol 200 milliGRAM(s) Oral every 8 hours  losartan 100 milliGRAM(s) Oral daily  modafinil 100 milliGRAM(s) Oral <User Schedule>  pantoprazole    Tablet 40 milliGRAM(s) Oral before breakfast  polyethylene glycol 3350 17 Gram(s) Oral every 12 hours  senna 2 Tablet(s) Oral at bedtime  sodium chloride 2 Gram(s) Oral every 6 hours      LABS:                        11.3   8.43  )-----------( 404      ( 02 Nov 2024 06:40 )             34.9       Hemoglobin: 11.3 g/dL (11-02 @ 06:40)  Hemoglobin: 10.5 g/dL (10-31 @ 07:12)  Hemoglobin: 11.4 g/dL (10-30 @ 05:44)  Hemoglobin: 10.8 g/dL (10-29 @ 05:55)      11-02    138  |  102  |  16  ----------------------------<  90  4.3   |  21[L]  |  0.89    Ca    9.5      02 Nov 2024 06:40    TPro  7.9  /  Alb  3.6  /  TBili  0.3  /  DBili  x   /  AST  64[H]  /  ALT  220[H]  /  AlkPhos  192[H]  11-02    Creatinine Trend: 0.89<--, 0.85<--, 0.94<--, 1.07<--, 0.98<--, 0.89<--    COAGS:           PHYSICAL EXAM:  T(C): 36.4 (11-02-24 @ 13:28), Max: 36.7 (11-02-24 @ 05:00)  HR: 72 (11-02-24 @ 13:28) (65 - 73)  BP: 123/77 (11-02-24 @ 13:28) (119/80 - 149/96)  RR: 18 (11-02-24 @ 13:28) (18 - 18)  SpO2: 100% (11-02-24 @ 13:28) (96% - 100%)  Wt(kg): --    I&O's Summary    01 Nov 2024 07:01  -  02 Nov 2024 07:00  --------------------------------------------------------  IN: 600 mL / OUT: 0 mL / NET: 600 mL    02 Nov 2024 08:01  -  02 Nov 2024 14:12  --------------------------------------------------------  IN: 580 mL / OUT: 0 mL / NET: 580 mL        Gen: NAD  HEENT:  (-)icterus (-)pallor  CV: N S1 S2 1/6 SOHEILA (+)2 Pulses B/l  Resp:  Clear to auscultation B/L, normal effort  GI: (+) BS Soft, NT, ND  Lymph:  (-)Edema, (-)obvious lymphadenopathy  Skin: Warm to touch, Normal turgor  Psych: Appropriate mood and affect      TELEMETRY: NSR	      RADIOLOGY:  < from: Xray Chest 1 View- PORTABLE-Urgent (Xray Chest 1 View- PORTABLE-Urgent .) (10.16.24 @ 05:38) >  IMPRESSION:  Clear lungs.  < end of copied text >    < from: TTE W or WO Ultrasound Enhancing Agent (10.14.24 @ 08:46) >  CONCLUSIONS:   1. Fair study quality.   2. Left ventricular systolic function is normal with an ejection fraction of 60 % by Chua's method of disks.   3. Normal right ventricular systolic function.   4. No prior echocardiogram is available for comparison.  < end of copied text >      < from: CELENA W or WO Ultrasound Enhancing Agent (10.23.24 @ 11:47) >  CONCLUSIONS:     1. Agitated saline injection was negative for intracardiac shunt.   2. No thrombus seen in the left atrial, left atrial appendage, right atrial or right atrial appendage.   3. No pericardial effusion seen.   4. Normal right ventricular cavity size and normal right ventricular systolic function.   5. No evidence of left atrial or left atrial appendage thrombus.   6. Left ventricular systolic function is normal with an ejection fraction visually estimated at 55 to 60 %.  < end of copied text >    CT head: c< from: CT Head No Cont (10.25.24 @ 19:30) >  IMPRESSION:  Resolving subacute hemorrhage in the left caudate nucleus and left   lateral ventricle are decreased in size and attenuation from 10/19/2024.    Vasogenicedema and mass effect, with 9 mm rightward bulging the midline,   appears stable.  There is no subfalcine herniation of the frontal lobes   underneath the cerebral falx.    Slight prominence of the temporal horns of both lateral ventricles,   suspicious for mild hydrocephalus, is stable.    No new intracranial findings.        --- End of Report ---    < end of copied text >    CT : t< from: CT Chest w/ IV Cont (10.25.24 @ 19:30) >  IMPRESSION:  No acute findings or evidence of malignancy in the chest, abdomen or   pelvis.    Please refer to detailed findings otherwise described above.    --- End of Report ---    < end of copied text >    ASSESSMENT/PLAN: Patient is a 43 y/o Male with PMH of HTN who presented with headaches admitted for ICH. Cardiology consulted for HTN.    #ICH  - Likely hypertensive hemorrhage per neuro  - Management per neuro/neurosx  - MRI Brain noted with acute/subacute infarct in R corona radiata  - D/w neuro, concern for embolic stroke - CELENA with no thrombus, neg for PFO  - EP consult appreciated - Outpatient surveillance for AFib with MCOT or Loop recorder  - Hypercoagulable workup per heme/onc  - s/p negative cerebral angio  - CT C/A/P neg for malignancy    #HTN  - SBP goal <160 per neuro  - Continue Amlodipine 10mg daily, Labetalol 200mg q8hrs, Hydralazine 25mg q8hrs, and Losartan 100mg daily. Not on thiazide diuretics due to strict NA control per neuro.  - Suspect will improve further once weaned of sodium chloride tabs  - TTE with normal LV function  - Secondary HTN workup - Renal artery dopplers neg for KAITLYN, TSH WNL, can eventually send aldosterone/renin/metaneprhine/AM cortisol levels once off sodium chloride tabs    #Leukocytosis  - Found to have UTI (UCx with GNR) - continue abx per primary team/ID  - Prelim BCx negative     - No further inpatient cardiac w/u planned  - D/C planning per primary team  - Patient to follow up in our [place] office with Dr. Frye after rehab on 12/4 at 12:15 PM ([phone] [place] [address]. Office Phone #[phone])    Selma Marr MD  Pager: [phone]

## 2024-11-02 NOTE — PROGRESS NOTE ADULT - SUBJECTIVE AND OBJECTIVE BOX
EP     HISTORY OF PRESENT ILLNESS: HPI:  44M no AC/AP hx uncontrolled HTN tx LVS s/p flu-like symptoms and headache; CTH w/ left BG IPH w/ IVH extension. Exam: Lao speaking, Ox3, PERRL, no drift, CHAPA 5/5, SILT (14 Oct 2024 04:30)    Had ischemic stroke w/ hemorrhagic conversion.  Too sleepy to participate in HPI/ROS.  Family in room.  We discussed long term AFib surveillance after large stroke.  10/29- resting in bed, awaiting discharge to rehab.  10/30- uneventful overnight, no new issues.  10/31- resting in bed, no overnight issues.  Date of service  11/2  no events overnight     PAST MEDICAL & SURGICAL HISTORY:  HTN          acetaminophen     Tablet .. 650 milliGRAM(s) Oral every 6 hours PRN  amLODIPine   Tablet 10 milliGRAM(s) Oral daily  aspirin enteric coated 81 milliGRAM(s) Oral daily  enoxaparin Injectable 40 milliGRAM(s) SubCutaneous <User Schedule>  ertapenem  IVPB 1000 milliGRAM(s) IV Intermittent every 24 hours  ezetimibe 10 milliGRAM(s) Oral daily  FLUoxetine 20 milliGRAM(s) Oral daily  labetalol 200 milliGRAM(s) Oral every 8 hours  losartan 100 milliGRAM(s) Oral daily  modafinil 100 milliGRAM(s) Oral <User Schedule>  pantoprazole    Tablet 40 milliGRAM(s) Oral before breakfast  polyethylene glycol 3350 17 Gram(s) Oral every 12 hours  senna 2 Tablet(s) Oral at bedtime  sodium chloride 2 Gram(s) Oral every 6 hours                            11.3   8.43  )-----------( 404      ( 02 Nov 2024 06:40 )             34.9       Hemoglobin: 11.3 g/dL (11-02 @ 06:40)  Hemoglobin: 10.5 g/dL (10-31 @ 07:12)  Hemoglobin: 11.4 g/dL (10-30 @ 05:44)  Hemoglobin: 10.8 g/dL (10-29 @ 05:55)      11-02    138  |  102  |  16  ----------------------------<  90  4.3   |  21[L]  |  0.89    Ca    9.5      02 Nov 2024 06:40    TPro  7.9  /  Alb  3.6  /  TBili  0.3  /  DBili  x   /  AST  64[H]  /  ALT  220[H]  /  AlkPhos  192[H]  11-02    Creatinine Trend: 0.89<--, 0.85<--, 0.94<--, 1.07<--, 0.98<--, 0.89<--    COAGS:           T(C): 36.6 (11-02-24 @ 08:47), Max: 36.7 (11-02-24 @ 05:00)  HR: 69 (11-02-24 @ 08:47) (65 - 73)  BP: 119/80 (11-02-24 @ 08:47) (116/77 - 149/96)  RR: 18 (11-02-24 @ 08:47) (17 - 18)  SpO2: 97% (11-02-24 @ 08:47) (96% - 99%)  Wt(kg): --    I&O's Summary    01 Nov 2024 07:01  -  02 Nov 2024 07:00  --------------------------------------------------------  IN: 600 mL / OUT: 0 mL / NET: 600 mL      General: Well nourished, no acute distress, alert and oriented x 3  Head: normocephalic, no trauma  Neck: no JVD, no bruit, supple, not enlarged  CV: S1S2, no S3, regular rate, rhythm is SINUS, no murmurs.    Lungs: clear BL, no rales or wheezes  Abdomen: bowel sounds +, soft, nontender, nondistended  Extremities: no clubbing, cyanosis or edema  Neuro: Moves all 4 extremities, sensation intact x 4 extremities  Skin: warm and moist, normal turgor  Psych: Mood and affect are appropriate for circumstances  MSK: normal range of motion and strength x4 extremities.    TELEMETRY: NSR	    ECG: NSR  Echo:  < from: TTE W or WO Ultrasound Enhancing Agent (10.14.24 @ 08:46) >  CONCLUSIONS:      1. Fair study quality.   2. Left ventricular systolic function is normal with an ejection fraction of 60 % by Chua's method of disks.   3. Normal right ventricular systolic function.   4. No prior echocardiogram is available for comparison.    < end of copied text >    < from: MR Head w/wo IV Cont (10.18.24 @ 16:32) >  FINDINGS:    Multiple images are degraded by motion, but are nonetheless diagnostic.    A parenchymal hematoma noted in the left basal ganglia measuring   approximately 2.7 x 2.2 x 2.2 cm. There is vasogenic edema surrounding   the clot. There is no enhancement. There is no evidence of an underlying   hemorrhagic tumor at this time, although follow-up to resolution is   recommended to exclude a lesion obscured by the blood products in the   acute phase. The location of the lesion is suggestive of hypertensive   hemorrhage, correlate clinically.    There is associated ventricular breakthrough, with a large cast of clot   in the left lateral ventricle, which is expanded. There is layering blood   at both occipital horns. There is midline shift at the level of the   septum pellucidum measuring 8 mm left to right. All of these findings   appear unchanged, without interval rebleeding.    The third ventricle is effaced and the temporal horns are slightly   distended, possibly low grade hydrocephalus. This has not progressed, and   the sulci are noteffaced. There is mild periventricular T2   hyperintensity which could represent small vessel disease or   transependymal CSF flow resorption. There are also mild nonspecific T2   hyperintensities in the subcortical white matter.    No acute ischemia. Intracranial flow voids are patent. The cerebellar   tonsils are normally positioned.    Limited views of the sinuses and mastoids show mild to moderate mucosal   thickening without air-fluid levels, likely chronic.    Limited views of the orbits and visualized soft tissues of the neck,   face, scalp, skull base, and calvarium are otherwise unremarkable.    IMPRESSION:    1.  Stable parenchymal hematoma in the left basal ganglia, without   evidence of an underlying hemorrhagic mass.  2.  Stable IVH, with mild distention of the temporal horns.  < end of copied text >    < from: CELENA W or WO Ultrasound Enhancing Agent (10.23.24 @ 11:47) >  CONCLUSIONS:      1. Agitated saline injection was negative for intracardiac shunt.   2. No thrombus seen in the left atrial, left atrial appendage, right atrial or right atrial appendage.   3. No pericardial effusion seen.   4. Normal right ventricular cavity size and normal right ventricular systolic function.   5. No evidence of left atrial or left atrial appendage thrombus.   6. Left ventricular systolic function is normal with an ejection fraction visually estimated at 55 to 60 %.  < end of copied text >      ASSESSMENT/PLAN:  Mr Ferro is a 44y Male here w/ stroke.  Has history of uncontrolled hypertension. Continue multi-drug regimen above to keep SBP ~140-160, unless lower target granted by neurology.  CELENA was reassuringly normal.  Outpatient surveillance for AFib with MCOT or Loop recorder, given significant stroke in young aged patient.  Awaiting DC plan.

## 2024-11-03 LAB
ALBUMIN SERPL ELPH-MCNC: 3.7 G/DL — SIGNIFICANT CHANGE UP (ref 3.3–5)
ALP SERPL-CCNC: 177 U/L — HIGH (ref 40–120)
ALT FLD-CCNC: 203 U/L — HIGH (ref 10–45)
ANION GAP SERPL CALC-SCNC: 15 MMOL/L — SIGNIFICANT CHANGE UP (ref 5–17)
AST SERPL-CCNC: 67 U/L — HIGH (ref 10–40)
BILIRUB SERPL-MCNC: 0.3 MG/DL — SIGNIFICANT CHANGE UP (ref 0.2–1.2)
BUN SERPL-MCNC: 17 MG/DL — SIGNIFICANT CHANGE UP (ref 7–23)
CALCIUM SERPL-MCNC: 9.8 MG/DL — SIGNIFICANT CHANGE UP (ref 8.4–10.5)
CHLORIDE SERPL-SCNC: 99 MMOL/L — SIGNIFICANT CHANGE UP (ref 96–108)
CO2 SERPL-SCNC: 21 MMOL/L — LOW (ref 22–31)
CREAT SERPL-MCNC: 0.76 MG/DL — SIGNIFICANT CHANGE UP (ref 0.5–1.3)
EGFR: 114 ML/MIN/1.73M2 — SIGNIFICANT CHANGE UP
GLUCOSE SERPL-MCNC: 104 MG/DL — HIGH (ref 70–99)
HCT VFR BLD CALC: 35.9 % — LOW (ref 39–50)
HGB BLD-MCNC: 11.4 G/DL — LOW (ref 13–17)
MCHC RBC-ENTMCNC: 27.7 PG — SIGNIFICANT CHANGE UP (ref 27–34)
MCHC RBC-ENTMCNC: 31.8 G/DL — LOW (ref 32–36)
MCV RBC AUTO: 87.3 FL — SIGNIFICANT CHANGE UP (ref 80–100)
NRBC # BLD: 0 /100 WBCS — SIGNIFICANT CHANGE UP (ref 0–0)
PLATELET # BLD AUTO: 387 K/UL — SIGNIFICANT CHANGE UP (ref 150–400)
POTASSIUM SERPL-MCNC: 4.5 MMOL/L — SIGNIFICANT CHANGE UP (ref 3.5–5.3)
POTASSIUM SERPL-SCNC: 4.5 MMOL/L — SIGNIFICANT CHANGE UP (ref 3.5–5.3)
PROT SERPL-MCNC: 7.9 G/DL — SIGNIFICANT CHANGE UP (ref 6–8.3)
RBC # BLD: 4.11 M/UL — LOW (ref 4.2–5.8)
RBC # FLD: 13.2 % — SIGNIFICANT CHANGE UP (ref 10.3–14.5)
SODIUM SERPL-SCNC: 135 MMOL/L — SIGNIFICANT CHANGE UP (ref 135–145)
WBC # BLD: 8.72 K/UL — SIGNIFICANT CHANGE UP (ref 3.8–10.5)
WBC # FLD AUTO: 8.72 K/UL — SIGNIFICANT CHANGE UP (ref 3.8–10.5)

## 2024-11-03 RX ADMIN — Medication 81 MILLIGRAM(S): at 11:09

## 2024-11-03 RX ADMIN — Medication 650 MILLIGRAM(S): at 10:30

## 2024-11-03 RX ADMIN — LOSARTAN POTASSIUM 100 MILLIGRAM(S): 25 TABLET ORAL at 06:27

## 2024-11-03 RX ADMIN — PANTOPRAZOLE SODIUM 40 MILLIGRAM(S): 40 TABLET, DELAYED RELEASE ORAL at 06:27

## 2024-11-03 RX ADMIN — Medication 10 MILLIGRAM(S): at 06:27

## 2024-11-03 RX ADMIN — Medication 200 MILLIGRAM(S): at 06:27

## 2024-11-03 RX ADMIN — Medication 40 MILLIGRAM(S): at 17:24

## 2024-11-03 RX ADMIN — EZETIMIBE 10 MILLIGRAM(S): 10 TABLET ORAL at 11:09

## 2024-11-03 RX ADMIN — Medication 20 MILLIGRAM(S): at 11:10

## 2024-11-03 RX ADMIN — Medication 2 TABLET(S): at 21:36

## 2024-11-03 RX ADMIN — SODIUM CHLORIDE 2 GRAM(S): 9 INJECTION, SOLUTION INTRAMUSCULAR; INTRAVENOUS; SUBCUTANEOUS at 06:27

## 2024-11-03 RX ADMIN — SODIUM CHLORIDE 2 GRAM(S): 9 INJECTION, SOLUTION INTRAMUSCULAR; INTRAVENOUS; SUBCUTANEOUS at 17:24

## 2024-11-03 RX ADMIN — SODIUM CHLORIDE 2 GRAM(S): 9 INJECTION, SOLUTION INTRAMUSCULAR; INTRAVENOUS; SUBCUTANEOUS at 11:10

## 2024-11-03 RX ADMIN — SODIUM CHLORIDE 2 GRAM(S): 9 INJECTION, SOLUTION INTRAMUSCULAR; INTRAVENOUS; SUBCUTANEOUS at 23:31

## 2024-11-03 RX ADMIN — MODAFINIL 100 MILLIGRAM(S): 200 TABLET ORAL at 07:50

## 2024-11-03 RX ADMIN — Medication 200 MILLIGRAM(S): at 14:19

## 2024-11-03 RX ADMIN — Medication 650 MILLIGRAM(S): at 11:30

## 2024-11-03 NOTE — PROGRESS NOTE ADULT - SUBJECTIVE AND OBJECTIVE BOX
EP     HISTORY OF PRESENT ILLNESS: HPI:  44M no AC/AP hx uncontrolled HTN tx LVS s/p flu-like symptoms and headache; CTH w/ left BG IPH w/ IVH extension. Exam: Macedonian speaking, Ox3, PERRL, no drift, CHAPA 5/5, SILJAY (14 Oct 2024 04:30)    Had ischemic stroke w/ hemorrhagic conversion.  Too sleepy to participate in HPI/ROS.  Family in room.  We discussed long term AFib surveillance after large stroke.  10/29- resting in bed, awaiting discharge to rehab.  10/30- uneventful overnight, no new issues.  10/31- resting in bed, no overnight issues.    11/2  no events overnight   Date of service 11/3 pt resting in bed, no distress      PAST MEDICAL & SURGICAL HISTORY:  HTN           acetaminophen     Tablet .. 650 milliGRAM(s) Oral every 6 hours PRN  amLODIPine   Tablet 10 milliGRAM(s) Oral daily  aspirin enteric coated 81 milliGRAM(s) Oral daily  enoxaparin Injectable 40 milliGRAM(s) SubCutaneous <User Schedule>  ezetimibe 10 milliGRAM(s) Oral daily  FLUoxetine 20 milliGRAM(s) Oral daily  labetalol 200 milliGRAM(s) Oral every 8 hours  losartan 100 milliGRAM(s) Oral daily  modafinil 100 milliGRAM(s) Oral <User Schedule>  pantoprazole    Tablet 40 milliGRAM(s) Oral before breakfast  polyethylene glycol 3350 17 Gram(s) Oral every 12 hours  senna 2 Tablet(s) Oral at bedtime  sodium chloride 2 Gram(s) Oral every 6 hours                            11.4   8.72  )-----------( 387      ( 03 Nov 2024 06:29 )             35.9       Hemoglobin: 11.4 g/dL (11-03 @ 06:29)  Hemoglobin: 11.3 g/dL (11-02 @ 06:40)  Hemoglobin: 10.5 g/dL (10-31 @ 07:12)  Hemoglobin: 11.4 g/dL (10-30 @ 05:44)      11-03    135  |  99  |  17  ----------------------------<  104[H]  4.5   |  21[L]  |  0.76    Ca    9.8      03 Nov 2024 06:29    TPro  7.9  /  Alb  3.7  /  TBili  0.3  /  DBili  x   /  AST  67[H]  /  ALT  203[H]  /  AlkPhos  177[H]  11-03    Creatinine Trend: 0.76<--, 0.89<--, 0.85<--, 0.94<--, 1.07<--, 0.98<--    COAGS:           T(C): 36.7 (11-03-24 @ 08:00), Max: 37.1 (11-03-24 @ 04:43)  HR: 86 (11-03-24 @ 08:00) (64 - 86)  BP: 116/81 (11-03-24 @ 08:00) (116/81 - 138/87)  RR: 18 (11-03-24 @ 08:00) (18 - 18)  SpO2: 97% (11-03-24 @ 08:00) (97% - 100%)  Wt(kg): --    I&O's Summary    02 Nov 2024 08:01  -  03 Nov 2024 07:00  --------------------------------------------------------  IN: 580 mL / OUT: 0 mL / NET: 580 mL      General: Well nourished, no acute distress, alert and oriented x 3  Head: normocephalic, no trauma  Neck: no JVD, no bruit, supple, not enlarged  CV: S1S2, no S3, regular rate, rhythm is SINUS, no murmurs.    Lungs: clear BL, no rales or wheezes  Abdomen: bowel sounds +, soft, nontender, nondistended  Extremities: no clubbing, cyanosis or edema  Neuro: Moves all 4 extremities, sensation intact x 4 extremities  Skin: warm and moist, normal turgor  Psych: Mood and affect are appropriate for circumstances  MSK: normal range of motion and strength x4 extremities.    TELEMETRY: NSR	    ECG: NSR  Echo:  < from: TTE W or WO Ultrasound Enhancing Agent (10.14.24 @ 08:46) >  CONCLUSIONS:      1. Fair study quality.   2. Left ventricular systolic function is normal with an ejection fraction of 60 % by Chua's method of disks.   3. Normal right ventricular systolic function.   4. No prior echocardiogram is available for comparison.    < end of copied text >    < from: MR Head w/wo IV Cont (10.18.24 @ 16:32) >  FINDINGS:    Multiple images are degraded by motion, but are nonetheless diagnostic.    A parenchymal hematoma noted in the left basal ganglia measuring   approximately 2.7 x 2.2 x 2.2 cm. There is vasogenic edema surrounding   the clot. There is no enhancement. There is no evidence of an underlying   hemorrhagic tumor at this time, although follow-up to resolution is   recommended to exclude a lesion obscured by the blood products in the   acute phase. The location of the lesion is suggestive of hypertensive   hemorrhage, correlate clinically.    There is associated ventricular breakthrough, with a large cast of clot   in the left lateral ventricle, which is expanded. There is layering blood   at both occipital horns. There is midline shift at the level of the   septum pellucidum measuring 8 mm left to right. All of these findings   appear unchanged, without interval rebleeding.    The third ventricle is effaced and the temporal horns are slightly   distended, possibly low grade hydrocephalus. This has not progressed, and   the sulci are noteffaced. There is mild periventricular T2   hyperintensity which could represent small vessel disease or   transependymal CSF flow resorption. There are also mild nonspecific T2   hyperintensities in the subcortical white matter.    No acute ischemia. Intracranial flow voids are patent. The cerebellar   tonsils are normally positioned.    Limited views of the sinuses and mastoids show mild to moderate mucosal   thickening without air-fluid levels, likely chronic.    Limited views of the orbits and visualized soft tissues of the neck,   face, scalp, skull base, and calvarium are otherwise unremarkable.    IMPRESSION:    1.  Stable parenchymal hematoma in the left basal ganglia, without   evidence of an underlying hemorrhagic mass.  2.  Stable IVH, with mild distention of the temporal horns.  < end of copied text >    < from: CELENA W or WO Ultrasound Enhancing Agent (10.23.24 @ 11:47) >  CONCLUSIONS:      1. Agitated saline injection was negative for intracardiac shunt.   2. No thrombus seen in the left atrial, left atrial appendage, right atrial or right atrial appendage.   3. No pericardial effusion seen.   4. Normal right ventricular cavity size and normal right ventricular systolic function.   5. No evidence of left atrial or left atrial appendage thrombus.   6. Left ventricular systolic function is normal with an ejection fraction visually estimated at 55 to 60 %.  < end of copied text >      ASSESSMENT/PLAN:  Mr Ferro is a 44y Male here w/ stroke.  Has history of uncontrolled hypertension. Continue multi-drug regimen above to keep SBP ~140-160, unless lower target granted by neurology.  CELENA was reassuringly normal.  Outpatient surveillance for AFib with MCOT or Loop recorder, given significant stroke in young aged patient.  Awaiting DC plan.

## 2024-11-03 NOTE — PROGRESS NOTE ADULT - SUBJECTIVE AND OBJECTIVE BOX
C A R D I O L O G Y  **********************************    DATE OF SERVICE: 11-03-24    Patient denies chest pain or shortness of breath.   Review of symptoms otherwise negative.    MEDICATIONS:  acetaminophen     Tablet .. 650 milliGRAM(s) Oral every 6 hours PRN  amLODIPine   Tablet 10 milliGRAM(s) Oral daily  aspirin enteric coated 81 milliGRAM(s) Oral daily  enoxaparin Injectable 40 milliGRAM(s) SubCutaneous <User Schedule>  ezetimibe 10 milliGRAM(s) Oral daily  FLUoxetine 20 milliGRAM(s) Oral daily  labetalol 200 milliGRAM(s) Oral every 8 hours  losartan 100 milliGRAM(s) Oral daily  modafinil 100 milliGRAM(s) Oral <User Schedule>  pantoprazole    Tablet 40 milliGRAM(s) Oral before breakfast  polyethylene glycol 3350 17 Gram(s) Oral every 12 hours  senna 2 Tablet(s) Oral at bedtime  sodium chloride 2 Gram(s) Oral every 6 hours      LABS:                        11.4   8.72  )-----------( 387      ( 03 Nov 2024 06:29 )             35.9       Hemoglobin: 11.4 g/dL (11-03 @ 06:29)  Hemoglobin: 11.3 g/dL (11-02 @ 06:40)  Hemoglobin: 10.5 g/dL (10-31 @ 07:12)  Hemoglobin: 11.4 g/dL (10-30 @ 05:44)      11-03    135  |  99  |  17  ----------------------------<  104[H]  4.5   |  21[L]  |  0.76    Ca    9.8      03 Nov 2024 06:29    TPro  7.9  /  Alb  3.7  /  TBili  0.3  /  DBili  x   /  AST  67[H]  /  ALT  203[H]  /  AlkPhos  177[H]  11-03    Creatinine Trend: 0.76<--, 0.89<--, 0.85<--, 0.94<--, 1.07<--, 0.98<--    COAGS:           PHYSICAL EXAM:  T(C): 36.7 (11-03-24 @ 08:00), Max: 37.1 (11-03-24 @ 04:43)  HR: 86 (11-03-24 @ 08:00) (64 - 86)  BP: 116/81 (11-03-24 @ 08:00) (116/81 - 138/87)  RR: 18 (11-03-24 @ 08:00) (18 - 18)  SpO2: 97% (11-03-24 @ 08:00) (97% - 99%)  Wt(kg): --    I&O's Summary    02 Nov 2024 08:01  -  03 Nov 2024 07:00  --------------------------------------------------------  IN: 580 mL / OUT: 0 mL / NET: 580 mL          Gen: NAD  HEENT:  (-)icterus (-)pallor  CV: N S1 S2 1/6 SOHEILA (+)2 Pulses B/l  Resp:  Clear to auscultation B/L, normal effort  GI: (+) BS Soft, NT, ND  Lymph:  (-)Edema, (-)obvious lymphadenopathy  Skin: Warm to touch, Normal turgor  Psych: Appropriate mood and affect      TELEMETRY: NSR	      RADIOLOGY:  < from: Xray Chest 1 View- PORTABLE-Urgent (Xray Chest 1 View- PORTABLE-Urgent .) (10.16.24 @ 05:38) >  IMPRESSION:  Clear lungs.  < end of copied text >    < from: TTE W or WO Ultrasound Enhancing Agent (10.14.24 @ 08:46) >  CONCLUSIONS:   1. Fair study quality.   2. Left ventricular systolic function is normal with an ejection fraction of 60 % by Chua's method of disks.   3. Normal right ventricular systolic function.   4. No prior echocardiogram is available for comparison.  < end of copied text >      < from: CELENA W or WO Ultrasound Enhancing Agent (10.23.24 @ 11:47) >  CONCLUSIONS:     1. Agitated saline injection was negative for intracardiac shunt.   2. No thrombus seen in the left atrial, left atrial appendage, right atrial or right atrial appendage.   3. No pericardial effusion seen.   4. Normal right ventricular cavity size and normal right ventricular systolic function.   5. No evidence of left atrial or left atrial appendage thrombus.   6. Left ventricular systolic function is normal with an ejection fraction visually estimated at 55 to 60 %.  < end of copied text >    CT head: c< from: CT Head No Cont (10.25.24 @ 19:30) >  IMPRESSION:  Resolving subacute hemorrhage in the left caudate nucleus and left   lateral ventricle are decreased in size and attenuation from 10/19/2024.    Vasogenicedema and mass effect, with 9 mm rightward bulging the midline,   appears stable.  There is no subfalcine herniation of the frontal lobes   underneath the cerebral falx.    Slight prominence of the temporal horns of both lateral ventricles,   suspicious for mild hydrocephalus, is stable.    No new intracranial findings.        --- End of Report ---    < end of copied text >    CT : t< from: CT Chest w/ IV Cont (10.25.24 @ 19:30) >  IMPRESSION:  No acute findings or evidence of malignancy in the chest, abdomen or   pelvis.    Please refer to detailed findings otherwise described above.    --- End of Report ---    < end of copied text >    ASSESSMENT/PLAN: Patient is a 45 y/o Male with PMH of HTN who presented with headaches admitted for ICH. Cardiology consulted for HTN.    #ICH  - Likely hypertensive hemorrhage per neuro  - Management per neuro/neurosx  - MRI Brain noted with acute/subacute infarct in R corona radiata  - D/w neuro, concern for embolic stroke - CELENA with no thrombus, neg for PFO  - EP consult appreciated - Outpatient surveillance for AFib with MCOT or Loop recorder  - Hypercoagulable workup per heme/onc  - s/p negative cerebral angio  - CT C/A/P neg for malignancy    #HTN  - SBP goal <160 per neuro  - Continue Amlodipine 10mg daily, Labetalol 200mg q8hrs, Hydralazine 25mg q8hrs, and Losartan 100mg daily. Not on thiazide diuretics due to strict NA control per neuro.  - Suspect will improve further once weaned of sodium chloride tabs  - TTE with normal LV function  - Secondary HTN workup - Renal artery dopplers neg for KAITLYN, TSH WNL, can eventually send aldosterone/renin/metaneprhine/AM cortisol levels once off sodium chloride tabs    #Leukocytosis  - Found to have UTI (UCx with GNR) - continue abx per primary team/ID  - Prelim BCx negative     - No further inpatient cardiac w/u planned  - D/C planning per primary team  - Patient to follow up in our [place] office with Dr. Frye after rehab on 12/4 at 12:15 PM ([id number] [place] [address]. Office Phone #[phone])    Selma Marr MD  Pager: [phone]  head

## 2024-11-03 NOTE — PROGRESS NOTE ADULT - NS ATTEND OPT1 GEN_ALL_CORE
I independently performed the documented:
I attest my time as attending is greater than 50% of the total combined time spent on qualifying patient care activities by the PA/NP and attending.
I independently performed the documented:
I attest my time as attending is greater than 50% of the total combined time spent on qualifying patient care activities by the PA/NP and attending.
I independently performed the documented:
I attest my time as attending is greater than 50% of the total combined time spent on qualifying patient care activities by the PA/NP and attending.

## 2024-11-03 NOTE — PROGRESS NOTE ADULT - SUBJECTIVE AND OBJECTIVE BOX
Name of Patient : PACO SILVA  MRN: [id number]  Date of visit: 11-03-24       Subjective: Patient seen and examined. No new events except as noted.   Doing okay     REVIEW OF SYSTEMS:    CONSTITUTIONAL: No weakness, fevers or chills  EYES/ENT: No visual changes;  No vertigo or throat pain   NECK: No pain or stiffness  RESPIRATORY: No cough, wheezing, hemoptysis; No shortness of breath  CARDIOVASCULAR: No chest pain or palpitations  GASTROINTESTINAL: No abdominal or epigastric pain. No nausea, vomiting, or hematemesis; No diarrhea or constipation. No melena or hematochezia.  GENITOURINARY: No dysuria, frequency or hematuria  NEUROLOGICAL: No numbness or weakness  SKIN: No itching, burning, rashes, or lesions   All other review of systems is negative unless indicated above.    MEDICATIONS:  MEDICATIONS  (STANDING):  amLODIPine   Tablet 10 milliGRAM(s) Oral daily  aspirin enteric coated 81 milliGRAM(s) Oral daily  enoxaparin Injectable 40 milliGRAM(s) SubCutaneous <User Schedule>  ezetimibe 10 milliGRAM(s) Oral daily  FLUoxetine 20 milliGRAM(s) Oral daily  labetalol 200 milliGRAM(s) Oral every 8 hours  losartan 100 milliGRAM(s) Oral daily  modafinil 100 milliGRAM(s) Oral <User Schedule>  pantoprazole    Tablet 40 milliGRAM(s) Oral before breakfast  polyethylene glycol 3350 17 Gram(s) Oral every 12 hours  senna 2 Tablet(s) Oral at bedtime  sodium chloride 2 Gram(s) Oral every 6 hours      PHYSICAL EXAM:  T(C): 36.7 (11-03-24 @ 20:00), Max: 37.1 (11-03-24 @ 04:43)  HR: 72 (11-03-24 @ 20:00) (64 - 86)  BP: 110/73 (11-03-24 @ 20:00) (106/73 - 138/87)  RR: 18 (11-03-24 @ 20:00) (18 - 18)  SpO2: 98% (11-03-24 @ 20:00) (97% - 98%)  Wt(kg): --  I&O's Summary    02 Nov 2024 08:01  -  03 Nov 2024 07:00  --------------------------------------------------------  IN: 580 mL / OUT: 0 mL / NET: 580 mL          Appearance: Normal	  HEENT:  PERRLA   Lymphatic: No lymphadenopathy   Cardiovascular: Normal S1 S2, no JVD  Respiratory: normal effort , clear  Gastrointestinal:  Soft, Non-tender  Skin: No rashes,  warm to touch  Psychiatry:  Mood & affect appropriate  Musculuskeletal: No edema    recent labs, Imaging and EKGs personally reviewed     11-02-24 @ 08:01  -  11-03-24 @ 07:00  --------------------------------------------------------  IN: 580 mL / OUT: 0 mL / NET: 580 mL                          11.4   8.72  )-----------( 387      ( 03 Nov 2024 06:29 )             35.9               11-03    135  |  99  |  17  ----------------------------<  104[H]  4.5   |  21[L]  |  0.76    Ca    9.8      03 Nov 2024 06:29    TPro  7.9  /  Alb  3.7  /  TBili  0.3  /  DBili  x   /  AST  67[H]  /  ALT  203[H]  /  AlkPhos  177[H]  11-03                       Urinalysis Basic - ( 03 Nov 2024 06:29 )    Color: x / Appearance: x / SG: x / pH: x  Gluc: 104 mg/dL / Ketone: x  / Bili: x / Urobili: x   Blood: x / Protein: x / Nitrite: x   Leuk Esterase: x / RBC: x / WBC x   Sq Epi: x / Non Sq Epi: x / Bacteria: x

## 2024-11-04 ENCOUNTER — INPATIENT (INPATIENT)
Facility: HOSPITAL | Age: 44
LOS: 8 days | Discharge: HOME | DRG: 66 | End: 2024-11-13
Attending: STUDENT IN AN ORGANIZED HEALTH CARE EDUCATION/TRAINING PROGRAM | Admitting: STUDENT IN AN ORGANIZED HEALTH CARE EDUCATION/TRAINING PROGRAM
Payer: MEDICAID

## 2024-11-04 VITALS
TEMPERATURE: 98 F | DIASTOLIC BLOOD PRESSURE: 76 MMHG | WEIGHT: 142.42 LBS | HEART RATE: 67 BPM | OXYGEN SATURATION: 97 % | RESPIRATION RATE: 16 BRPM | SYSTOLIC BLOOD PRESSURE: 119 MMHG | HEIGHT: 64 IN

## 2024-11-04 VITALS
SYSTOLIC BLOOD PRESSURE: 115 MMHG | DIASTOLIC BLOOD PRESSURE: 75 MMHG | OXYGEN SATURATION: 98 % | RESPIRATION RATE: 18 BRPM | TEMPERATURE: 98 F | HEART RATE: 60 BPM

## 2024-11-04 DIAGNOSIS — I63.9 CEREBRAL INFARCTION, UNSPECIFIED: ICD-10-CM

## 2024-11-04 PROBLEM — Z00.00 ENCOUNTER FOR PREVENTIVE HEALTH EXAMINATION: Status: ACTIVE | Noted: 2024-11-04

## 2024-11-04 LAB
ALBUMIN SERPL ELPH-MCNC: 3.9 G/DL — SIGNIFICANT CHANGE UP (ref 3.3–5)
ALP SERPL-CCNC: 174 U/L — HIGH (ref 40–120)
ALT FLD-CCNC: 334 U/L — HIGH (ref 10–45)
ANION GAP SERPL CALC-SCNC: 14 MMOL/L — SIGNIFICANT CHANGE UP (ref 5–17)
AST SERPL-CCNC: 122 U/L — HIGH (ref 10–40)
BILIRUB SERPL-MCNC: 0.3 MG/DL — SIGNIFICANT CHANGE UP (ref 0.2–1.2)
BUN SERPL-MCNC: 20 MG/DL — SIGNIFICANT CHANGE UP (ref 7–23)
CALCIUM SERPL-MCNC: 9.9 MG/DL — SIGNIFICANT CHANGE UP (ref 8.4–10.5)
CHLORIDE SERPL-SCNC: 103 MMOL/L — SIGNIFICANT CHANGE UP (ref 96–108)
CO2 SERPL-SCNC: 22 MMOL/L — SIGNIFICANT CHANGE UP (ref 22–31)
CREAT SERPL-MCNC: 0.89 MG/DL — SIGNIFICANT CHANGE UP (ref 0.5–1.3)
EGFR: 109 ML/MIN/1.73M2 — SIGNIFICANT CHANGE UP
GLUCOSE SERPL-MCNC: 90 MG/DL — SIGNIFICANT CHANGE UP (ref 70–99)
HCT VFR BLD CALC: 35 % — LOW (ref 39–50)
HGB BLD-MCNC: 11.4 G/DL — LOW (ref 13–17)
MCHC RBC-ENTMCNC: 28.9 PG — SIGNIFICANT CHANGE UP (ref 27–34)
MCHC RBC-ENTMCNC: 32.6 G/DL — SIGNIFICANT CHANGE UP (ref 32–36)
MCV RBC AUTO: 88.6 FL — SIGNIFICANT CHANGE UP (ref 80–100)
NRBC # BLD: 0 /100 WBCS — SIGNIFICANT CHANGE UP (ref 0–0)
PLATELET # BLD AUTO: 444 K/UL — HIGH (ref 150–400)
POTASSIUM SERPL-MCNC: 4.3 MMOL/L — SIGNIFICANT CHANGE UP (ref 3.5–5.3)
POTASSIUM SERPL-SCNC: 4.3 MMOL/L — SIGNIFICANT CHANGE UP (ref 3.5–5.3)
PROT SERPL-MCNC: 8 G/DL — SIGNIFICANT CHANGE UP (ref 6–8.3)
RBC # BLD: 3.95 M/UL — LOW (ref 4.2–5.8)
RBC # FLD: 13.1 % — SIGNIFICANT CHANGE UP (ref 10.3–14.5)
SARS-COV-2 RNA SPEC QL NAA+PROBE: SIGNIFICANT CHANGE UP
SODIUM SERPL-SCNC: 139 MMOL/L — SIGNIFICANT CHANGE UP (ref 135–145)
WBC # BLD: 8.47 K/UL — SIGNIFICANT CHANGE UP (ref 3.8–10.5)
WBC # FLD AUTO: 8.47 K/UL — SIGNIFICANT CHANGE UP (ref 3.8–10.5)

## 2024-11-04 PROCEDURE — 84436 ASSAY OF TOTAL THYROXINE: CPT

## 2024-11-04 PROCEDURE — 84100 ASSAY OF PHOSPHORUS: CPT

## 2024-11-04 PROCEDURE — 92507 TX SP LANG VOICE COMM INDIV: CPT

## 2024-11-04 PROCEDURE — C1769: CPT

## 2024-11-04 PROCEDURE — 87186 SC STD MICRODIL/AGAR DIL: CPT

## 2024-11-04 PROCEDURE — 99223 1ST HOSP IP/OBS HIGH 75: CPT | Mod: GC

## 2024-11-04 PROCEDURE — 71045 X-RAY EXAM CHEST 1 VIEW: CPT

## 2024-11-04 PROCEDURE — 36227 PLACE CATH XTRNL CAROTID: CPT | Mod: 59

## 2024-11-04 PROCEDURE — 93325 DOPPLER ECHO COLOR FLOW MAPG: CPT

## 2024-11-04 PROCEDURE — 87086 URINE CULTURE/COLONY COUNT: CPT

## 2024-11-04 PROCEDURE — 93320 DOPPLER ECHO COMPLETE: CPT

## 2024-11-04 PROCEDURE — 93970 EXTREMITY STUDY: CPT

## 2024-11-04 PROCEDURE — 93306 TTE W/DOPPLER COMPLETE: CPT

## 2024-11-04 PROCEDURE — 84439 ASSAY OF FREE THYROXINE: CPT

## 2024-11-04 PROCEDURE — 36224 PLACE CATH CAROTD ART: CPT

## 2024-11-04 PROCEDURE — 76705 ECHO EXAM OF ABDOMEN: CPT

## 2024-11-04 PROCEDURE — 85613 RUSSELL VIPER VENOM DILUTED: CPT

## 2024-11-04 PROCEDURE — 80048 BASIC METABOLIC PNL TOTAL CA: CPT

## 2024-11-04 PROCEDURE — 0225U NFCT DS DNA&RNA 21 SARSCOV2: CPT

## 2024-11-04 PROCEDURE — 84443 ASSAY THYROID STIM HORMONE: CPT

## 2024-11-04 PROCEDURE — 84484 ASSAY OF TROPONIN QUANT: CPT

## 2024-11-04 PROCEDURE — 95700 EEG CONT REC W/VID EEG TECH: CPT

## 2024-11-04 PROCEDURE — 87637 SARSCOV2&INF A&B&RSV AMP PRB: CPT

## 2024-11-04 PROCEDURE — 86147 CARDIOLIPIN ANTIBODY EA IG: CPT

## 2024-11-04 PROCEDURE — 97116 GAIT TRAINING THERAPY: CPT

## 2024-11-04 PROCEDURE — 85307 ASSAY ACTIVATED PROTEIN C: CPT

## 2024-11-04 PROCEDURE — 87641 MR-STAPH DNA AMP PROBE: CPT

## 2024-11-04 PROCEDURE — 81240 F2 GENE: CPT

## 2024-11-04 PROCEDURE — C1760: CPT

## 2024-11-04 PROCEDURE — 87389 HIV-1 AG W/HIV-1&-2 AB AG IA: CPT

## 2024-11-04 PROCEDURE — 83090 ASSAY OF HOMOCYSTEINE: CPT

## 2024-11-04 PROCEDURE — 83935 ASSAY OF URINE OSMOLALITY: CPT

## 2024-11-04 PROCEDURE — 86140 C-REACTIVE PROTEIN: CPT

## 2024-11-04 PROCEDURE — 36226 PLACE CATH VERTEBRAL ART: CPT

## 2024-11-04 PROCEDURE — 97110 THERAPEUTIC EXERCISES: CPT

## 2024-11-04 PROCEDURE — 70496 CT ANGIOGRAPHY HEAD: CPT | Mod: MC

## 2024-11-04 PROCEDURE — 95714 VEEG EA 12-26 HR UNMNTR: CPT

## 2024-11-04 PROCEDURE — 85025 COMPLETE CBC W/AUTO DIFF WBC: CPT

## 2024-11-04 PROCEDURE — 97129 THER IVNTJ 1ST 15 MIN: CPT

## 2024-11-04 PROCEDURE — 97535 SELF CARE MNGMENT TRAINING: CPT

## 2024-11-04 PROCEDURE — 97166 OT EVAL MOD COMPLEX 45 MIN: CPT

## 2024-11-04 PROCEDURE — 71260 CT THORAX DX C+: CPT | Mod: MC

## 2024-11-04 PROCEDURE — 81005 URINALYSIS: CPT

## 2024-11-04 PROCEDURE — 93005 ELECTROCARDIOGRAM TRACING: CPT

## 2024-11-04 PROCEDURE — 86900 BLOOD TYPING SEROLOGIC ABO: CPT

## 2024-11-04 PROCEDURE — 70450 CT HEAD/BRAIN W/O DYE: CPT | Mod: MC

## 2024-11-04 PROCEDURE — 81003 URINALYSIS AUTO W/O SCOPE: CPT

## 2024-11-04 PROCEDURE — 80074 ACUTE HEPATITIS PANEL: CPT

## 2024-11-04 PROCEDURE — A9585: CPT

## 2024-11-04 PROCEDURE — 85306 CLOT INHIBIT PROT S FREE: CPT

## 2024-11-04 PROCEDURE — 86901 BLOOD TYPING SEROLOGIC RH(D): CPT

## 2024-11-04 PROCEDURE — 85300 ANTITHROMBIN III ACTIVITY: CPT

## 2024-11-04 PROCEDURE — C1894: CPT

## 2024-11-04 PROCEDURE — 84480 ASSAY TRIIODOTHYRONINE (T3): CPT

## 2024-11-04 PROCEDURE — 85027 COMPLETE CBC AUTOMATED: CPT

## 2024-11-04 PROCEDURE — 82962 GLUCOSE BLOOD TEST: CPT

## 2024-11-04 PROCEDURE — C1887: CPT

## 2024-11-04 PROCEDURE — 81241 F5 GENE: CPT

## 2024-11-04 PROCEDURE — 97161 PT EVAL LOW COMPLEX 20 MIN: CPT

## 2024-11-04 PROCEDURE — 80061 LIPID PANEL: CPT

## 2024-11-04 PROCEDURE — 85610 PROTHROMBIN TIME: CPT

## 2024-11-04 PROCEDURE — 86850 RBC ANTIBODY SCREEN: CPT

## 2024-11-04 PROCEDURE — 80307 DRUG TEST PRSMV CHEM ANLYZR: CPT

## 2024-11-04 PROCEDURE — 87040 BLOOD CULTURE FOR BACTERIA: CPT

## 2024-11-04 PROCEDURE — 70498 CT ANGIOGRAPHY NECK: CPT | Mod: MC

## 2024-11-04 PROCEDURE — 97530 THERAPEUTIC ACTIVITIES: CPT

## 2024-11-04 PROCEDURE — 84300 ASSAY OF URINE SODIUM: CPT

## 2024-11-04 PROCEDURE — 92610 EVALUATE SWALLOWING FUNCTION: CPT

## 2024-11-04 PROCEDURE — 87077 CULTURE AEROBIC IDENTIFY: CPT

## 2024-11-04 PROCEDURE — 81001 URINALYSIS AUTO W/SCOPE: CPT

## 2024-11-04 PROCEDURE — 80053 COMPREHEN METABOLIC PANEL: CPT

## 2024-11-04 PROCEDURE — 70553 MRI BRAIN STEM W/O & W/DYE: CPT | Mod: MC

## 2024-11-04 PROCEDURE — 93975 VASCULAR STUDY: CPT

## 2024-11-04 PROCEDURE — 84145 PROCALCITONIN (PCT): CPT

## 2024-11-04 PROCEDURE — 87640 STAPH A DNA AMP PROBE: CPT

## 2024-11-04 PROCEDURE — 36415 COLL VENOUS BLD VENIPUNCTURE: CPT

## 2024-11-04 PROCEDURE — 85730 THROMBOPLASTIN TIME PARTIAL: CPT

## 2024-11-04 PROCEDURE — 86780 TREPONEMA PALLIDUM: CPT

## 2024-11-04 PROCEDURE — 93312 ECHO TRANSESOPHAGEAL: CPT

## 2024-11-04 PROCEDURE — 99291 CRITICAL CARE FIRST HOUR: CPT

## 2024-11-04 PROCEDURE — 85652 RBC SED RATE AUTOMATED: CPT

## 2024-11-04 PROCEDURE — 86146 BETA-2 GLYCOPROTEIN ANTIBODY: CPT

## 2024-11-04 PROCEDURE — 83036 HEMOGLOBIN GLYCOSYLATED A1C: CPT

## 2024-11-04 PROCEDURE — 85303 CLOT INHIBIT PROT C ACTIVITY: CPT

## 2024-11-04 PROCEDURE — 82140 ASSAY OF AMMONIA: CPT

## 2024-11-04 PROCEDURE — 92523 SPEECH SOUND LANG COMPREHEN: CPT

## 2024-11-04 PROCEDURE — 95711 VEEG 2-12 HR UNMONITORED: CPT

## 2024-11-04 PROCEDURE — 83735 ASSAY OF MAGNESIUM: CPT

## 2024-11-04 PROCEDURE — 74177 CT ABD & PELVIS W/CONTRAST: CPT | Mod: MC

## 2024-11-04 RX ORDER — SENNA 187 MG
2 TABLET ORAL AT BEDTIME
Refills: 0 | Status: DISCONTINUED | OUTPATIENT
Start: 2024-11-04 | End: 2024-11-13

## 2024-11-04 RX ORDER — PANTOPRAZOLE SODIUM 40 MG/1
40 TABLET, DELAYED RELEASE ORAL
Refills: 0 | Status: DISCONTINUED | OUTPATIENT
Start: 2024-11-04 | End: 2024-11-13

## 2024-11-04 RX ORDER — LOSARTAN POTASSIUM 25 MG/1
100 TABLET ORAL DAILY
Refills: 0 | Status: DISCONTINUED | OUTPATIENT
Start: 2024-11-04 | End: 2024-11-05

## 2024-11-04 RX ORDER — LABETALOL HCL 200 MG
200 TABLET ORAL EVERY 8 HOURS
Refills: 0 | Status: DISCONTINUED | OUTPATIENT
Start: 2024-11-04 | End: 2024-11-05

## 2024-11-04 RX ORDER — ACETAMINOPHEN 500 MG
650 TABLET ORAL EVERY 8 HOURS
Refills: 0 | Status: DISCONTINUED | OUTPATIENT
Start: 2024-11-04 | End: 2024-11-05

## 2024-11-04 RX ORDER — SENNA 187 MG
2 TABLET ORAL AT BEDTIME
Refills: 0 | Status: DISCONTINUED | OUTPATIENT
Start: 2024-11-04 | End: 2024-11-04

## 2024-11-04 RX ORDER — POLYETHYLENE GLYCOL 3350 17 G/17G
17 POWDER, FOR SOLUTION ORAL
Refills: 0 | Status: DISCONTINUED | OUTPATIENT
Start: 2024-11-04 | End: 2024-11-07

## 2024-11-04 RX ORDER — ENOXAPARIN SODIUM 40MG/0.4ML
40 SYRINGE (ML) SUBCUTANEOUS
Refills: 0 | Status: DISCONTINUED | OUTPATIENT
Start: 2024-11-04 | End: 2024-11-13

## 2024-11-04 RX ORDER — MODAFINIL 200 MG/1
100 TABLET ORAL
Refills: 0 | Status: DISCONTINUED | OUTPATIENT
Start: 2024-11-04 | End: 2024-11-08

## 2024-11-04 RX ORDER — SODIUM CHLORIDE 9 MG/ML
2 INJECTION, SOLUTION INTRAMUSCULAR; INTRAVENOUS; SUBCUTANEOUS EVERY 6 HOURS
Refills: 0 | Status: DISCONTINUED | OUTPATIENT
Start: 2024-11-04 | End: 2024-11-06

## 2024-11-04 RX ORDER — FLUOXETINE HCL 10 MG
20 CAPSULE ORAL DAILY
Refills: 0 | Status: DISCONTINUED | OUTPATIENT
Start: 2024-11-04 | End: 2024-11-13

## 2024-11-04 RX ORDER — AMLODIPINE BESYLATE 10 MG
10 TABLET ORAL DAILY
Refills: 0 | Status: DISCONTINUED | OUTPATIENT
Start: 2024-11-04 | End: 2024-11-05

## 2024-11-04 RX ORDER — EZETIMIBE 10 MG/1
10 TABLET ORAL DAILY
Refills: 0 | Status: DISCONTINUED | OUTPATIENT
Start: 2024-11-04 | End: 2024-11-13

## 2024-11-04 RX ORDER — CHLORHEXIDINE GLUCONATE 40 MG/ML
1 SOLUTION TOPICAL DAILY
Refills: 0 | Status: DISCONTINUED | OUTPATIENT
Start: 2024-11-04 | End: 2024-11-13

## 2024-11-04 RX ORDER — ASPIRIN/MAG CARB/ALUMINUM AMIN 325 MG
81 TABLET ORAL DAILY
Refills: 0 | Status: DISCONTINUED | OUTPATIENT
Start: 2024-11-04 | End: 2024-11-13

## 2024-11-04 RX ORDER — POLYETHYLENE GLYCOL 3350 17 G/17G
17 POWDER, FOR SOLUTION ORAL EVERY 12 HOURS
Refills: 0 | Status: DISCONTINUED | OUTPATIENT
Start: 2024-11-04 | End: 2024-11-04

## 2024-11-04 RX ORDER — ACETAMINOPHEN 500 MG
650 TABLET ORAL EVERY 6 HOURS
Refills: 0 | Status: DISCONTINUED | OUTPATIENT
Start: 2024-11-04 | End: 2024-11-05

## 2024-11-04 RX ADMIN — Medication 200 MILLIGRAM(S): at 05:00

## 2024-11-04 RX ADMIN — Medication 10 MILLIGRAM(S): at 05:00

## 2024-11-04 RX ADMIN — SODIUM CHLORIDE 2 GRAM(S): 9 INJECTION, SOLUTION INTRAMUSCULAR; INTRAVENOUS; SUBCUTANEOUS at 13:08

## 2024-11-04 RX ADMIN — POLYETHYLENE GLYCOL 3350 17 GRAM(S): 17 POWDER, FOR SOLUTION ORAL at 04:59

## 2024-11-04 RX ADMIN — MODAFINIL 100 MILLIGRAM(S): 200 TABLET ORAL at 09:58

## 2024-11-04 RX ADMIN — Medication 40 MILLIGRAM(S): at 19:15

## 2024-11-04 RX ADMIN — Medication 200 MILLIGRAM(S): at 13:08

## 2024-11-04 RX ADMIN — PANTOPRAZOLE SODIUM 40 MILLIGRAM(S): 40 TABLET, DELAYED RELEASE ORAL at 04:59

## 2024-11-04 RX ADMIN — SODIUM CHLORIDE 2 GRAM(S): 9 INJECTION, SOLUTION INTRAMUSCULAR; INTRAVENOUS; SUBCUTANEOUS at 04:59

## 2024-11-04 RX ADMIN — SODIUM CHLORIDE 2 GRAM(S): 9 INJECTION, SOLUTION INTRAMUSCULAR; INTRAVENOUS; SUBCUTANEOUS at 19:15

## 2024-11-04 RX ADMIN — Medication 2 TABLET(S): at 21:18

## 2024-11-04 RX ADMIN — LOSARTAN POTASSIUM 100 MILLIGRAM(S): 25 TABLET ORAL at 05:00

## 2024-11-04 RX ADMIN — Medication 200 MILLIGRAM(S): at 21:18

## 2024-11-04 RX ADMIN — Medication 81 MILLIGRAM(S): at 13:08

## 2024-11-04 RX ADMIN — EZETIMIBE 10 MILLIGRAM(S): 10 TABLET ORAL at 13:09

## 2024-11-04 RX ADMIN — Medication 20 MILLIGRAM(S): at 13:09

## 2024-11-04 NOTE — H&P ADULT - NSHPPHYSICALEXAM_GEN_ALL_CORE
Gen - NAD, Comfortable  HEENT - NCAT, EOMI  Neck - Supple, No limited ROM  Pulm - CTAB, No wheeze, No rhonchi, No crackles  Cardiovascular - RRR, S1S2, No murmurs  Abdomen - Soft, NT/ND  Extremities - No C/C/E, No calf tenderness  Neuro-     Cognitive - alert, oriented to name, month, year. [facility]      Communication - Fluent, No dysarthria, hypophonic, delayed processing     Attention: able to state days of the week forwards, difficulty stating backwards     Memory: Recall 3 objects immediate. Impaired delayed recall despite categorical and multiple choice cues     Cranial Nerves - no facial asymmetry, tongue midline, EOMI, facial sensation intact     Motor -                     LEFT    UE - ShAB 5/5, EF 5/5, EE 5/5, WE 5/5,  5/5                    RIGHT UE - ShAB 5/5, EF 5/5, EE 5/5, WE 5/5,  5/5                    LEFT    LE - HF 5/5, KE 5/5, DF 5/5, PF 5/5                    RIGHT LE - HF 5/5, KE 5/5, DF 5/5, PF 5/5        Sensory - Intact to LT     Reflexes - Negative clonus/Hoffmans bilaterally     Coordination - FTN intact     Tone - normal  Psychiatric - Mood stable, Affect WNL  Skin:  all skin intact      ([facility] : [id number]) Gen - NAD, Comfortable  HEENT - NCAT, EOMI  Neck - Supple, No limited ROM  Pulm - CTAB, No wheeze, No rhonchi, No crackles  Cardiovascular - RRR, S1S2, No murmurs  Abdomen - Soft, NT/ND  Extremities - No C/C/E, No calf tenderness  Neuro-     Cognitive - alert, oriented to name, month, year. [facility]      Communication -  No dysarthria, hypophonic, delayed processing     Attention: able to state days of the week forwards, difficulty stating backwards     Memory: Recall 3 objects immediate. Impaired delayed recall despite categorical and multiple choice cues     Cranial Nerves - no facial asymmetry, tongue midline, EOMI, facial sensation intact     Motor +motor apraxia                    LEFT    UE - ShAB 5/5, EF 5/5, EE 5/5, WE 5/5,  5/5                    RIGHT UE - ShAB 5/5, EF 5/5, EE 5/5, WE 5/5,  5/5                    LEFT    LE - HF 5/5, KE 5/5, DF 5/5, PF 5/5                    RIGHT LE - HF 5/5, KE 5/5, DF 5/5, PF 5/5        Sensory - Intact to LT     Reflexes - Negative clonus/Hoffmans bilaterally     Coordination - FTN intact     Tone - normal  Psychiatric - Mood stable, Affect WNL  Skin:  all skin intact      ([facility] : [id number])

## 2024-11-04 NOTE — H&P ADULT - NSHPLABSRESULTS_GEN_ALL_CORE
LABS:                        11.4   8.47  )-----------( 444      ( 04 Nov 2024 06:23 )             35.0     11-04    139  |  103  |  20  ----------------------------<  90  4.3   |  22  |  0.89    Ca    9.9      04 Nov 2024 06:24    TPro  8.0  /  Alb  3.9  /  TBili  0.3  /  DBili  x   /  AST  122[H]  /  ALT  334[H]  /  AlkPhos  174[H]  11-04      Urinalysis Basic - ( 04 Nov 2024 06:24 )    Color: x / Appearance: x / SG: x / pH: x  Gluc: 90 mg/dL / Ketone: x  / Bili: x / Urobili: x   Blood: x / Protein: x / Nitrite: x   Leuk Esterase: x / RBC: x / WBC x   Sq Epi: x / Non Sq Epi: x / Bacteria: x          IMAGING/RADIOLOGY:  CTH 10/19/24: No change since 10/15/2024. Left corona radiata hemorrhage with intraventricular extension. Mild midline shift to the right.    MRI Brain w/wo 10/18/24:  1. Stable parenchymal hematoma in the left basal ganglia, without evidence of an underlying hemorrhagic mass.  2. Stable IVH, with mild distention of the temporal horns.    CTH 10/15/24: Interval stability compared with the prior 10/14/2024 in left basal ganglia acute hematoma with intraventricular extension. No evidence of rehemorrhage. No change in the ventricle size.    CTH 10/14/24 10am: Left basal ganglia lucency suspicious for infarct. Left corona radiata hemorrhage with intraventricular extension and mild ventricular dilatation, no change since 4:46 AM.    10/14/24 4am  CT HEAD: Stable left medial basal ganglia parenchymal hemorrhage with associated intraventricular extension of hemorrhage. Unchanged edema within the adjacent left basal ganglia as well as rightward bulging of the septum pellucidum.  CTA NECK: No evidence of significant stenosis or occlusion.  CTA HEAD: No large vessel occlusion, significant stenosis or vascular abnormality identified.    CTH 10/14/24 1am: Intraparenchymal hematoma in the medial left basal ganglia with adjacent vasogenic edema, mass effect, 7 mm of left to right midline shift, and intraventricular extension of hemorrhage predominantly in the left lateral ventricle.      CARDIOLOGY:  TTE (10/23)   CONCLUSIONS:   1. Agitated saline injection was negative for intracardiac shunt.   2. No thrombus seen in the left atrial, left atrial appendage, right atrial or right atrial appendage.   3. No pericardial effusion seen.   4. Normal right ventricular cavity size and normal right ventricular systolic function.   5. No evidence of left atrial or left atrial appendage thrombus.   6. Left ventricular systolic function is normal with an ejection fraction visually estimated at 55 to 60 %.

## 2024-11-04 NOTE — H&P ADULT - ASSESSMENT
Assessment/Plan:  PACO SILVA is a 44y with a history of *** who presented to (name of hospital) on (date of acute admission) with (presenting sx) found to have ****.  Hospital Course complicated by ***. Patient now admitted for a multidisciplinary rehab program. 11-04-24 @ 13:26    -------------    Rehab Management/MEDICAL MANAGEMENT     #  - Gait Instability, ADL impairments and Functional impairments: start Comprehensive Rehab Program of PT/OT/SLP  - 3 hours a day, 5 days a week  - P&O as needed   - ASA 81mg daily      #HTN/HLD  - Amlodipine 10my daily   - Ezetimibe 10mg daily   - Labetalol 200mg TID   - Losartan 100mg daily     #UTI  - Levofloxacin 500mg daily     #Hyponatremia  - Sodium Chloride 1G QID     #Sleep/Mood  - Fluoxetine 20mg daily   - Modafinil 100mg daily     #Skin  - Skin on admission  - Pressure injury/Skin: OOB to Chair, PT/OT     #Pain Mgmt   - Tylenol PRN, Oxycodone PRN    #GI/Bowel Mgmt   - Pantoprazole     #/Bladder Mgmt   -  PVR q8h, CIC>400cc    #FEN   - Diet - Regular + Thins  [CCHO, DASH/TLC]    - Dysphagia  SLP - evaluation and treatment    #Precautions / PROPHYLAXIS:   - Falls, Cardiac, Sternal, Spinal, Seizure   - ortho: Weight bearing status: WBAT   - Lungs: Aspiration, Incentive Spirometer   - DVT: Lovenox 40mg daily   -----------------------------------------------  OUTPATIENT/FOLLOW UP:    Ashley Jiang  NP in [facility]  [address] [place] [address], [address]  [place] [address]  Phone: [phone]  Fax: [phone]  Follow Up Time: 2 weeks    Bernardo Chilel  Cardiovascular Disease  [address] [place] [address], [address]  [place] [address]  Phone: [phone]  Fax: [phone]  Follow Up Time: 1 month    Ana Resendez  Vascular Neurology  [address] [place] [address], [address]  [place] [address]  Phone: [phone]  Fax: [phone]  Follow Up Time: 1 week    Hawk Lizarraga  Cardiac Electrophysiology  [address] [place] [address], [address]  [place] [address]  Phone: [phone]  Fax: [phone]  Follow Up Time: 1 week    Hawk Weston  Gastroenterology  [address] [place] [address]  Phone: [phone]  Fax: [phone]  Follow Up Time: 1 week    Angela Donaldson  Hematology  [address] [place] [address]  Phone: [phone]  Fax: [phone]  Follow Up Time: 1 week    -----------------------------------------------  MEDICAL PROGNOSIS: GOOD                                   REHAB POTENTIAL: GOOD  ESTIMATED DISPOSITION: HOME                             ELOS: 10-14 Days   EXPECTED THERAPY:     P.T. 1hr/day       O.T. 1hr/day      S.L.P. 1hr/day      EXP FREQUENCY: 5 days per 7 day period     PRESCREEN COMPARISON: I have reviewed the prescreen information and I have found no relevant changes between the preadmission screening and my post admission evaluation     RATIONALE FOR INPATIENT ADMISSION - Patient demonstrates the following: (check all that apply)  [X] Medically appropriate for rehabilitation admission  [X] Has attainable rehab goals with an appropriate initial discharge plan  [X] Has rehabilitation potential (expected to make a significant improvement within a reasonable period of time)   [X] Requires close medical managment by a rehab physician, rehab nursing care, Hospitalist and comprehensive interdisciplinary team (including PT, OT, & or SLP, Prosthetics and Orthotics)     Assessment/Plan:  PACO SILVA is a 44 year old, Urdu speaking, male with PMH of uncontrolled HTN; who presented to Health system on 10/13 for headache, dizziness, vomiting and flu-like symptoms. Imaging was significant for L basal ganglia IPH with intraventricular hemorrhage extension. MR showed R corona radiata ischemic infarct.  Vascular neurology was consulted and had concerns for infectious etiology vs cardiac cause. EEG negative. His hospital course was significant for hypercoagulable workup (negative thus far), leukocytosis (2/2 UTI on ABX), transaminitis/hepatic steatosis (statin on hold), hyponatremia (on NaCL tabs), and +MRSA (completed mupirocin in nares). Patient now admitted for a multidisciplinary rehab program. 11-04-24 @ 13:26  -------------    Rehab Management/MEDICAL MANAGEMENT     #CVA- Basal Ganglia IPH & R corona radiata infarct  - Gait Instability, ADL impairments and Functional impairments: start Comprehensive Rehab Program of PT/OT/SLP  - 3 hours a day, 5 days a week  - P&O as needed   - Neurology Impression: AMS and dizziness due to Nontraumatic intracerebral hemorrhage subcortical w/ IVH, perihematomal cerebral edema and brain compression. Etiology likely hypertensive hemorrhage.   - ASA 81mg daily  - Plan for outpatient ILR placement     #HTN/HLD  - Amlodipine 10my daily   - Ezetimibe 10mg daily   - Labetalol 200mg TID   - Losartan 100mg daily     #Hyponatremia  - Sodium Chloride 1G QID     #Sleep/Mood  - Fluoxetine 20mg daily   - Modafinil 100mg daily     #Skin  #MRSA in nares  - Completed Mupirocin in BL nares on 11/2   - Skin on admission  - Pressure injury/Skin: OOB to Chair, PT/OT     #Pain Mgmt   - Tylenol PRN    #GI/Bowel Mgmt   #Transaminitis  - Statin on hold   - Pantoprazole   - Senna & Miralax     #/Bladder Mgmt   #UTI  - Levofloxacin 500mg daily   -  PVR q8h, CIC>400cc    #FEN   - Diet - Regular + Thins  [CCHO, DASH/TLC]    - Dysphagia  SLP - evaluation and treatment    #Precautions / PROPHYLAXIS:   - Falls  - ortho: Weight bearing status: WBAT   - Lungs: Aspiration, Incentive Spirometer   - DVT: Lovenox 40mg daily   -----------------------------------------------  OUTPATIENT/FOLLOW UP:    Ashley Jiang  NP in Family Health  [address] [place] [address], [address]  [place] [address]  Phone: [phone]  Fax: [phone]  Follow Up Time: 2 weeks    Bernardo Chilel  Cardiovascular Disease  [address] [place] [address], [address]  [place] [address]  Phone: [phone]  Fax: [phone]  Follow Up Time: 1 month    Ana Resendez  Vascular Neurology  [address] [place] [address], [facility] [address]  [place] [address]  Phone: [phone]  Fax: [phone]  Follow Up Time: 1 week    Hawk Lizarraga  Cardiac Electrophysiology  [address] [place] [address], [address]  [place] [address]  Phone: [phone]  Fax: [phone]  Follow Up Time: 1 week    Hawk Weston  Gastroenterology  [address] [place] [address]  Phone: [phone]  Fax: [phone]  Follow Up Time: 1 week    Angela Donaldson  Hematology  [address] [place] [address]  Phone: [phone]  Fax: [phone]  Follow Up Time: 1 week    -----------------------------------------------  MEDICAL PROGNOSIS: GOOD                                   REHAB POTENTIAL: GOOD  ESTIMATED DISPOSITION: HOME                             ELOS: 10-14 Days   EXPECTED THERAPY:     P.T. 1hr/day       O.T. 1hr/day      S.L.P. 1hr/day      EXP FREQUENCY: 5 days per 7 day period     PRESCREEN COMPARISON: I have reviewed the prescreen information and I have found no relevant changes between the preadmission screening and my post admission evaluation     RATIONALE FOR INPATIENT ADMISSION - Patient demonstrates the following: (check all that apply)  [X] Medically appropriate for rehabilitation admission  [X] Has attainable rehab goals with an appropriate initial discharge plan  [X] Has rehabilitation potential (expected to make a significant improvement within a reasonable period of time)   [X] Requires close medical managment by a rehab physician, rehab nursing care, Hospitalist and comprehensive interdisciplinary team (including PT, OT, & or SLP, Prosthetics and Orthotics)     Assessment/Plan:  PACO SILVA is a 44 year old, Greenlandic speaking, male with PMH of uncontrolled HTN; who presented to [facility] on 10/13 for headache, dizziness, vomiting and flu-like symptoms. Imaging was significant for L basal ganglia IPH with intraventricular hemorrhage extension. MR showed R corona radiata ischemic infarct.  Vascular neurology was consulted and had concerns for infectious etiology vs cardiac cause. EEG negative. His hospital course was significant for hypercoagulable workup (negative thus far), leukocytosis (2/2 UTI on ABX), transaminitis/hepatic steatosis (statin on hold), hyponatremia (on NaCL tabs), and +MRSA (completed mupirocin in nares). Patient now admitted for a multidisciplinary rehab program. 11-04-24 @ 13:26  -------------    Rehab Management/MEDICAL MANAGEMENT     #CVA- Basal Ganglia IPH & R corona radiata infarct  - Gait Instability, ADL impairments and Functional impairments: start Comprehensive Rehab Program of PT/OT/SLP  - 3 hours a day, 5 days a week  - P&O as needed   - Neurology Impression: AMS and dizziness due to Nontraumatic intracerebral hemorrhage subcortical w/ IVH, perihematomal cerebral edema and brain compression. Etiology likely hypertensive hemorrhage.   - ASA 81mg daily  - Plan for outpatient ILR placement     #HTN/HLD  - Amlodipine 10my daily   - Ezetimibe 10mg daily   - Labetalol 200mg TID   - Losartan 100mg daily     #Hyponatremia  - Sodium Chloride 1G QID     #Sleep/Mood  - Fluoxetine 20mg daily   - Modafinil 100mg daily     #Skin  #MRSA in nares  - Completed Mupirocin in BL nares on 11/2   - Skin on admission: intact  - Pressure injury/Skin: OOB to Chair, PT/OT     #Pain Mgmt   - Tylenol PRN    #GI/Bowel Mgmt   #Transaminitis  - Statin on hold   - Pantoprazole   - Senna & Miralax     #/Bladder Mgmt   #UTI  - Levofloxacin 500mg daily   -  PVR q8h, CIC>400cc    #FEN   - Diet - Regular + Thins  [CCHO, DASH/TLC]    - Dysphagia  SLP - evaluation and treatment    #Precautions / PROPHYLAXIS:   - Falls  - ortho: Weight bearing status: WBAT   - Lungs: Aspiration, Incentive Spirometer   - DVT: Lovenox 40mg daily   -----------------------------------------------  OUTPATIENT/FOLLOW UP:    Ashley Jiang  NP in Family Health  [address] [place] [address], [address]  [place] [address]  Phone: [phone]  Fax: [phone]  Follow Up Time: 2 weeks    Bernardo Chilel  Cardiovascular Disease  [address] [place] [address], [address]  [place] [address]  Phone: [phone]  Fax: [phone]  Follow Up Time: 1 month    Ana Resendez  Vascular Neurology  [address] [place] [address], [facility] [address]  [place] [address]  Phone: [phone]  Fax: [phone]  Follow Up Time: 1 week    Hawk Lizarraga  Cardiac Electrophysiology  [address] [place] [address], [address]  [place] [address]  Phone: [phone]  Fax: [phone]  Follow Up Time: 1 week    Hawk Weston  Gastroenterology  [address] [place] [address]  Phone: [phone]  Fax: [phone]  Follow Up Time: 1 week    Angela Donaldson  Hematology  [address] [place] [address]  Phone: [phone]  Fax: [phone]  Follow Up Time: 1 week    -----------------------------------------------  MEDICAL PROGNOSIS: GOOD                                   REHAB POTENTIAL: GOOD  ESTIMATED DISPOSITION: HOME                             ELOS: 10-14 Days   EXPECTED THERAPY:     P.T. 1hr/day       O.T. 1hr/day      S.L.P. 1hr/day      EXP FREQUENCY: 5 days per 7 day period     PRESCREEN COMPARISON: I have reviewed the prescreen information and I have found no relevant changes between the preadmission screening and my post admission evaluation     RATIONALE FOR INPATIENT ADMISSION - Patient demonstrates the following: (check all that apply)  [X] Medically appropriate for rehabilitation admission  [X] Has attainable rehab goals with an appropriate initial discharge plan  [X] Has rehabilitation potential (expected to make a significant improvement within a reasonable period of time)   [X] Requires close medical managment by a rehab physician, rehab nursing care, Hospitalist and comprehensive interdisciplinary team (including PT, OT, & or SLP, Prosthetics and Orthotics)     Assessment/Plan:  PACO SILVA is a 44 year old, Hebrew speaking, male with PMH of uncontrolled HTN; who presented to [facility] on 10/13 for headache, dizziness, vomiting and flu-like symptoms. Imaging was significant for L basal ganglia IPH with intraventricular hemorrhage extension. MR showed R corona radiata ischemic infarct.  Vascular neurology was consulted and had concerns for infectious etiology vs cardiac cause. EEG negative. His hospital course was significant for hypercoagulable workup (negative thus far), leukocytosis (2/2 UTI on ABX), transaminitis/hepatic steatosis (statin on hold), hyponatremia (on NaCL tabs), and +MRSA (completed mupirocin in nares). Patient now admitted for a multidisciplinary rehab program. 11-04-24 @ 13:26  -------------    Rehab Management/MEDICAL MANAGEMENT     #CVA- Basal Ganglia IPH & R corona radiata infarct  - Gait Instability, ADL impairments and Functional impairments: start Comprehensive Rehab Program of PT/OT/SLP  - 3 hours a day, 5 days a week  - P&O as needed   - Neurology Impression: AMS and dizziness due to Nontraumatic intracerebral hemorrhage subcortical w/ IVH, perihematomal cerebral edema and brain compression. Etiology likely hypertensive hemorrhage.   - ASA 81mg daily  - Plan for outpatient ILR placement     #HTN/HLD  - Amlodipine 10my daily --dec to 5 mg (11/5)  - Ezetimibe 10mg daily   - Labetalol 200mg TID -- change to BID (11/5)  - Losartan 100mg daily -- change to 50 (11/5)    #Hyponatremia  - Sodium Chloride 1G QID     #Sleep/Mood  - Fluoxetine 20mg daily   - Modafinil 100mg daily     #Skin  #MRSA in nares  - Completed Mupirocin in BL nares on 11/2   - Skin on admission: intact  - Pressure injury/Skin: OOB to Chair, PT/OT     #Pain Mgmt   - Tylenol PRN-- DC due to transaminitis    #GI/Bowel Mgmt   #Transaminitis  - Statin on hold   - Pantoprazole   - Senna & Miralax     #/Bladder Mgmt   #UTI  - Levofloxacin 500mg daily   -  PVR q8h, CIC>400cc    #FEN   - Diet - Regular + Thins  [CCHO, DASH/TLC]    - Dysphagia  SLP - evaluation and treatment    #Precautions / PROPHYLAXIS:   - Falls  - ortho: Weight bearing status: WBAT   - Lungs: Aspiration, Incentive Spirometer   - DVT: Lovenox 40mg daily   -----------------------------------------------  OUTPATIENT/FOLLOW UP:    Ashley Jiang  NP in Family Health  [address] [place] [address], [facility] [address]  [place] [address]  Phone: [phone]  Fax: [phone]  Follow Up Time: 2 weeks    Bernardo Chilel  Cardiovascular Disease  [address] [place] [address], [facility] [address]  [place] [address]  Phone: [phone]  Fax: [phone]  Follow Up Time: 1 month    Ana Resendez  Vascular Neurology  [address] [place] [address] [address]  [place] [address]  Phone: [phone]  Fax: [phone]  Follow Up Time: 1 week    Hawk Lizarraga  Cardiac Electrophysiology  [address] [place] [address], [address]  [place] [address]  Phone: [phone]  Fax: [phone]  Follow Up Time: 1 week    Hawk Weston  Gastroenterology  [address] [place] [address]  Phone: [phone]  Fax: [phone]  Follow Up Time: 1 week    Angela Donaldson  Hematology  [address] [place] [address]  Phone: [phone]  Fax: [phone]  Follow Up Time: 1 week    -----------------------------------------------  MEDICAL PROGNOSIS: GOOD                                   REHAB POTENTIAL: GOOD  ESTIMATED DISPOSITION: HOME                             ELOS: 10-14 Days   EXPECTED THERAPY:     P.T. 1hr/day       O.T. 1hr/day      S.L.P. 1hr/day      EXP FREQUENCY: 5 days per 7 day period     PRESCREEN COMPARISON: I have reviewed the prescreen information and I have found no relevant changes between the preadmission screening and my post admission evaluation     RATIONALE FOR INPATIENT ADMISSION - Patient demonstrates the following: (check all that apply)  [X] Medically appropriate for rehabilitation admission  [X] Has attainable rehab goals with an appropriate initial discharge plan  [X] Has rehabilitation potential (expected to make a significant improvement within a reasonable period of time)   [X] Requires close medical managment by a rehab physician, rehab nursing care, Hospitalist and comprehensive interdisciplinary team (including PT, OT, & or SLP, Prosthetics and Orthotics)

## 2024-11-04 NOTE — PROGRESS NOTE ADULT - ASSESSMENT
Patient is a 44 year old male with a PMHx of uncontrolled HTN, HLD, who presented with a chief complaint of headache, emesis, dizziness and was found to have left IPH with IVH. CTH with left BG IPH and IVH. Internal Medicine has been consulted on Mr. Ferro's care for medical management.       CVA  - Per Neuro, likely 2/2 hypertensive hemorrhage with etiology likely ESUS  - CTs as noted   - MR w/ parenchymal hematoma in L basal ganglia, IVH, with mild distention of temporal horns, 5 mm acute/subacute infarct in R corona radiata   - TTE w/ EF 60%, normal LA; CELENA neg for Thrombus   - EEG negative per Neuro   - Recommend ILR to screen for occult arrhythmia --> EP eval appreciated   - W/ concern for inflammatory process, labs per neuro   - On ASA and Statin therapy   - Neuro checks per protocol   - Monitor on tele   - PT / OT / S+S   - Fall, Seizure, Aspiration precautions   - Per neurology   - EP and Cardio eval appreciated; F/u recs --> Planned for monitoring with [facility] or ILR as an outpatient per EP     Up-trending Leukocytosis --> FEBRILE   - CT C/A/P w/ no acute findings   - UA +; F/u UCx + for klebsiella   - BCx2 Neg   - 10/16 and 10/22 blood cultures negative   - Febrile 10/30 to 102 --> F/u COVID/ RVP/ Flu/ RSV negative   - 10/14,10/23, 10/31 LE Duplex negative for DVT   - Trend CBC, Temp curve, VS and monitor patient --> WBC down-trending   - ID eval appreciated; F/u recs --> Now on Ertapenem. Monitor , switch to oral levaquin , total of 7 days     Transaminitis  - US ABD w/ hepatic steatosis, no evidence of acute cholecystitis  - Statin DC'd and ordered for Zetia  - F/u hepatitis panel  - neg   - Continue to monitor and trend --> Discussed with GI, patient cleared from GI standpoint for DC with close outpatient follow up   - Serial abdominal examinations  - Up-trending. GI eval appreciated; F/u recs --> Zosyn switched to Ertapenem. Monitor     Hyponatremia   - Weaned off of 2% HTS, On NaCl tabs 2g  - Na parameters as per Stroke Neurology   - Avoid overcorrection > 6-8 mEq in 24 hours     HTN   - On amlodipine, labetalol and losartan.   - Renal artery doppler: no evidence of KAITLYN.   - TSH WNL      PPX    D/C planing underway, awaiting bed at rehab   Discussed with Attending and Family at the bedside

## 2024-11-04 NOTE — H&P ADULT - HISTORY OF PRESENT ILLNESS
Von Frero         PM&R was consulted and deemed himan appropriate candidate for IRF. He was medically stabilized and cleared for discharge to [facility].  Von Ferro is a 44 year old, Frisian speaking, male with PMH of uncontrolled HTN; who presented to [facility] on 10/13 for headache, dizziness, vomiting and flu-like symptoms. Imaging was significant for L basal ganglia IPH with intraventricular hemorrhage extension. MR showed R corona radiata ischemic infarct.  Vascular neurology was consulted and had concerns for infectious etiology vs cardiac cause. EEG negative. His hospital course was significant for hypercoagulable workup (negative thus far), leukocytosis (2/2 UTI on ABX), transaminitis/hepatic steatosis (statin on hold), hyponatremia (on NaCL tabs), and +MRSA (completed mupirocin in nares). PM&R was consulted and deemed himan appropriate candidate for IRF. He was medically stabilized and cleared for discharge to [facility].

## 2024-11-04 NOTE — PROGRESS NOTE ADULT - PROVIDER SPECIALTY LIST ADULT
Cardiology
Electrophysiology
Heme/Onc
Internal Medicine
NSICU
NSICU
Neurology
Cardiology
Electrophysiology
Gastroenterology
Heme/Onc
Internal Medicine
NSICU
NSICU
Neurology
Rehab Medicine
Rehab Medicine
Gastroenterology
Gastroenterology
Infectious Disease
Internal Medicine
Internal Medicine
NSICU
NSICU
Neurology
Neurosurgery
Neurosurgery
Rehab Medicine
Heme/Onc
NSICU
Neurosurgery
NSICU

## 2024-11-04 NOTE — PROGRESS NOTE ADULT - SUBJECTIVE AND OBJECTIVE BOX
C A R D I O L O G Y  **********************************    DATE OF SERVICE: 11-04-24    Patient denies chest pain or shortness of breath.   Review of symptoms otherwise negative.    MEDICATIONS:  acetaminophen     Tablet .. 650 milliGRAM(s) Oral every 6 hours PRN  amLODIPine   Tablet 10 milliGRAM(s) Oral daily  aspirin enteric coated 81 milliGRAM(s) Oral daily  enoxaparin Injectable 40 milliGRAM(s) SubCutaneous <User Schedule>  ezetimibe 10 milliGRAM(s) Oral daily  FLUoxetine 20 milliGRAM(s) Oral daily  labetalol 200 milliGRAM(s) Oral every 8 hours  losartan 100 milliGRAM(s) Oral daily  modafinil 100 milliGRAM(s) Oral <User Schedule>  pantoprazole    Tablet 40 milliGRAM(s) Oral before breakfast  polyethylene glycol 3350 17 Gram(s) Oral every 12 hours  senna 2 Tablet(s) Oral at bedtime  sodium chloride 2 Gram(s) Oral every 6 hours      LABS:                        11.4   8.47  )-----------( 444      ( 04 Nov 2024 06:23 )             35.0       Hemoglobin: 11.4 g/dL (11-04 @ 06:23)  Hemoglobin: 11.4 g/dL (11-03 @ 06:29)  Hemoglobin: 11.3 g/dL (11-02 @ 06:40)  Hemoglobin: 10.5 g/dL (10-31 @ 07:12)      11-04    139  |  103  |  20  ----------------------------<  90  4.3   |  22  |  0.89    Ca    9.9      04 Nov 2024 06:24    TPro  8.0  /  Alb  3.9  /  TBili  0.3  /  DBili  x   /  AST  122[H]  /  ALT  334[H]  /  AlkPhos  174[H]  11-04    Creatinine Trend: 0.89<--, 0.76<--, 0.89<--, 0.85<--, 0.94<--, 1.07<--    COAGS:           PHYSICAL EXAM:  T(C): 36.8 (11-04-24 @ 12:00), Max: 36.8 (11-04-24 @ 12:00)  HR: 71 (11-04-24 @ 12:00) (68 - 75)  BP: 116/73 (11-04-24 @ 12:00) (106/70 - 124/80)  RR: 18 (11-04-24 @ 12:00) (18 - 18)  SpO2: 97% (11-04-24 @ 12:00) (97% - 98%)  Wt(kg): --    I&O's Summary        Gen: NAD  HEENT:  (-)icterus (-)pallor  CV: N S1 S2 1/6 SOHEILA (+)2 Pulses B/l  Resp:  Clear to auscultation B/L, normal effort  GI: (+) BS Soft, NT, ND  Lymph:  (-)Edema, (-)obvious lymphadenopathy  Skin: Warm to touch, Normal turgor  Psych: Appropriate mood and affect      TELEMETRY: NSR	      RADIOLOGY:  < from: Xray Chest 1 View- PORTABLE-Urgent (Xray Chest 1 View- PORTABLE-Urgent .) (10.16.24 @ 05:38) >  IMPRESSION:  Clear lungs.  < end of copied text >    < from: TTE W or WO Ultrasound Enhancing Agent (10.14.24 @ 08:46) >  CONCLUSIONS:   1. Fair study quality.   2. Left ventricular systolic function is normal with an ejection fraction of 60 % by Chua's method of disks.   3. Normal right ventricular systolic function.   4. No prior echocardiogram is available for comparison.  < end of copied text >      < from: CELENA W or WO Ultrasound Enhancing Agent (10.23.24 @ 11:47) >  CONCLUSIONS:     1. Agitated saline injection was negative for intracardiac shunt.   2. No thrombus seen in the left atrial, left atrial appendage, right atrial or right atrial appendage.   3. No pericardial effusion seen.   4. Normal right ventricular cavity size and normal right ventricular systolic function.   5. No evidence of left atrial or left atrial appendage thrombus.   6. Left ventricular systolic function is normal with an ejection fraction visually estimated at 55 to 60 %.  < end of copied text >    CT head: c< from: CT Head No Cont (10.25.24 @ 19:30) >  IMPRESSION:  Resolving subacute hemorrhage in the left caudate nucleus and left   lateral ventricle are decreased in size and attenuation from 10/19/2024.    Vasogenicedema and mass effect, with 9 mm rightward bulging the midline,   appears stable.  There is no subfalcine herniation of the frontal lobes   underneath the cerebral falx.    Slight prominence of the temporal horns of both lateral ventricles,   suspicious for mild hydrocephalus, is stable.    No new intracranial findings.        --- End of Report ---    < end of copied text >    CT : t< from: CT Chest w/ IV Cont (10.25.24 @ 19:30) >  IMPRESSION:  No acute findings or evidence of malignancy in the chest, abdomen or   pelvis.    Please refer to detailed findings otherwise described above.    --- End of Report ---    < end of copied text >    ASSESSMENT/PLAN: Patient is a 45 y/o Male with PMH of HTN who presented with headaches admitted for ICH. Cardiology consulted for HTN.    #ICH  - Likely hypertensive hemorrhage per neuro  - Management per neuro/neurosx  - MRI Brain noted with acute/subacute infarct in R corona radiata  - D/w neuro, concern for embolic stroke - CELENA with no thrombus, neg for PFO  - EP consult appreciated - Outpatient surveillance for AFib with MCOT or Loop recorder  - Hypercoagulable workup per heme/onc  - s/p negative cerebral angio  - CT C/A/P neg for malignancy    #HTN  - SBP goal <160 per neuro  - Continue Amlodipine 10mg daily, Labetalol 200mg q8hrs, Hydralazine 25mg q8hrs, and Losartan 100mg daily. Not on thiazide diuretics due to strict NA control per neuro.  - Suspect will improve further once weaned of sodium chloride tabs  - TTE with normal LV function  - Secondary HTN workup - Renal artery dopplers neg for KAITLYN, TSH WNL, can eventually send aldosterone/renin/metaneprhine/AM cortisol levels once off sodium chloride tabs    #Leukocytosis  - Found to have UTI (UCx with GNR) - s/p abx per primary team/ID  - BCx negative     - No further inpatient cardiac w/u planned  - D/C planning per primary team  - Patient to follow up in our [place] office with Dr. Frye after rehab on 12/4 at 12:15 PM ([phone] [place], [place] [id number]. Office Phone #[phone])    Davi Carrion PA-C  Pager: [phone]    Selma Marr MD  Pager: [phone]

## 2024-11-04 NOTE — H&P ADULT - NSHPREVIEWOFSYSTEMS_GEN_ALL_CORE
REVIEW OF SYSTEMS  Constitutional: No fever, No Chills, No fatigue  HEENT: No visual disturbances, No difficulty hearing  Pulm: No cough,  No shortness of breath  Cardio: No chest pain, No palpitations  GI:  No abdominal pain, No nausea, No vomiting, No diarrhea, No constipation  : No dysuria, No frequency, No hematuria  Neuro: No headaches, No loss of strength, No numbness  Skin: No itching, No rashes  MSK: No joint pain, No joint swelling, No muscle pain, No Neck pain, No back pain  Psych:  No depression, No anxiety

## 2024-11-04 NOTE — PROGRESS NOTE ADULT - SUBJECTIVE AND OBJECTIVE BOX
EP     HISTORY OF PRESENT ILLNESS: HPI:  44M no AC/AP hx uncontrolled HTN tx LVS s/p flu-like symptoms and headache; CTH w/ left BG IPH w/ IVH extension. Exam: Ukrainian speaking, Ox3, PERRL, no drift, CHAPA 5/5, SILT (14 Oct 2024 04:30)    Had ischemic stroke w/ hemorrhagic conversion.  Too sleepy to participate in HPI/ROS.  Family in room.  We discussed long term AFib surveillance after large stroke.  Date of service 11/4- no overnight issues.      PAST MEDICAL & SURGICAL HISTORY:  HTN       acetaminophen     Tablet .. 650 milliGRAM(s) Oral every 6 hours PRN  amLODIPine   Tablet 10 milliGRAM(s) Oral daily  aspirin enteric coated 81 milliGRAM(s) Oral daily  enoxaparin Injectable 40 milliGRAM(s) SubCutaneous <User Schedule>  ezetimibe 10 milliGRAM(s) Oral daily  FLUoxetine 20 milliGRAM(s) Oral daily  labetalol 200 milliGRAM(s) Oral every 8 hours  losartan 100 milliGRAM(s) Oral daily  modafinil 100 milliGRAM(s) Oral <User Schedule>  pantoprazole    Tablet 40 milliGRAM(s) Oral before breakfast  polyethylene glycol 3350 17 Gram(s) Oral every 12 hours  senna 2 Tablet(s) Oral at bedtime  sodium chloride 2 Gram(s) Oral every 6 hours                          11.4   8.47  )-----------( 444      ( 04 Nov 2024 06:23 )             35.0       11-04    139  |  103  |  20  ----------------------------<  90  4.3   |  22  |  0.89    Ca    9.9      04 Nov 2024 06:24    TPro  8.0  /  Alb  3.9  /  TBili  0.3  /  DBili  x   /  AST  122[H]  /  ALT  334[H]  /  AlkPhos  174[H]  11-04    T(C): 36.8 (11-04-24 @ 12:00), Max: 36.8 (11-04-24 @ 12:00)  HR: 71 (11-04-24 @ 12:00) (68 - 75)  BP: 116/73 (11-04-24 @ 12:00) (106/70 - 124/80)  RR: 18 (11-04-24 @ 12:00) (18 - 18)  SpO2: 97% (11-04-24 @ 12:00) (97% - 98%)  Wt(kg): --    I&O's Summary    General: Well nourished, no acute distress, alert and oriented x 3  Head: normocephalic, no trauma  Neck: no JVD, no bruit, supple, not enlarged  CV: S1S2, no S3, regular rate, rhythm is SINUS, no murmurs.    Lungs: clear BL, no rales or wheezes  Abdomen: bowel sounds +, soft, nontender, nondistended  Extremities: no clubbing, cyanosis or edema  Neuro: Moves all 4 extremities, sensation intact x 4 extremities  Skin: warm and moist, normal turgor  Psych: Mood and affect are appropriate for circumstances  MSK: normal range of motion and strength x4 extremities.    TELEMETRY: NSR	    ECG: NSR  Echo:  < from: TTE W or WO Ultrasound Enhancing Agent (10.14.24 @ 08:46) >  CONCLUSIONS:      1. Fair study quality.   2. Left ventricular systolic function is normal with an ejection fraction of 60 % by Chua's method of disks.   3. Normal right ventricular systolic function.   4. No prior echocardiogram is available for comparison.    < end of copied text >    < from: MR Head w/wo IV Cont (10.18.24 @ 16:32) >  FINDINGS:    Multiple images are degraded by motion, but are nonetheless diagnostic.    A parenchymal hematoma noted in the left basal ganglia measuring   approximately 2.7 x 2.2 x 2.2 cm. There is vasogenic edema surrounding   the clot. There is no enhancement. There is no evidence of an underlying   hemorrhagic tumor at this time, although follow-up to resolution is   recommended to exclude a lesion obscured by the blood products in the   acute phase. The location of the lesion is suggestive of hypertensive   hemorrhage, correlate clinically.    There is associated ventricular breakthrough, with a large cast of clot   in the left lateral ventricle, which is expanded. There is layering blood   at both occipital horns. There is midline shift at the level of the   septum pellucidum measuring 8 mm left to right. All of these findings   appear unchanged, without interval rebleeding.    The third ventricle is effaced and the temporal horns are slightly   distended, possibly low grade hydrocephalus. This has not progressed, and   the sulci are noteffaced. There is mild periventricular T2   hyperintensity which could represent small vessel disease or   transependymal CSF flow resorption. There are also mild nonspecific T2   hyperintensities in the subcortical white matter.    No acute ischemia. Intracranial flow voids are patent. The cerebellar   tonsils are normally positioned.    Limited views of the sinuses and mastoids show mild to moderate mucosal   thickening without air-fluid levels, likely chronic.    Limited views of the orbits and visualized soft tissues of the neck,   face, scalp, skull base, and calvarium are otherwise unremarkable.    IMPRESSION:    1.  Stable parenchymal hematoma in the left basal ganglia, without   evidence of an underlying hemorrhagic mass.  2.  Stable IVH, with mild distention of the temporal horns.  < end of copied text >    < from: CELENA W or WO Ultrasound Enhancing Agent (10.23.24 @ 11:47) >  CONCLUSIONS:      1. Agitated saline injection was negative for intracardiac shunt.   2. No thrombus seen in the left atrial, left atrial appendage, right atrial or right atrial appendage.   3. No pericardial effusion seen.   4. Normal right ventricular cavity size and normal right ventricular systolic function.   5. No evidence of left atrial or left atrial appendage thrombus.   6. Left ventricular systolic function is normal with an ejection fraction visually estimated at 55 to 60 %.  < end of copied text >      ASSESSMENT/PLAN:  Mr Ferro is a 44y Male here w/ stroke.  Has history of uncontrolled hypertension. Continue multi-drug regimen above to keep SBP ~140-160, unless lower target granted by neurology.  CELENA was reassuringly normal.  Outpatient surveillance for AFib with MCOT or Loop recorder, given significant stroke in young aged patient.  Awaiting DC plan.     Hawk Lizarraga M.D.  Cardiac Electrophysiology  [phone]

## 2024-11-04 NOTE — PROGRESS NOTE ADULT - SUBJECTIVE AND OBJECTIVE BOX
Name of Patient : PACO SILVA  MRN: [id number]  Date of visit: 11-04-24 @ 13:47      Subjective: Patient seen and examined. No new events except as noted.   Patient seen earlier this AM. Lying down in bed. Family at the bedside. Offers no new complaints    REVIEW OF SYSTEMS:  Limited ROS  CONSTITUTIONAL: + weakness, Afebrile   EYES/ENT: No visual changes   NECK: No pain   RESPIRATORY: No cough, wheezing, hemoptysis; No shortness of breath  CARDIOVASCULAR: No chest pain or palpitations  GASTROINTESTINAL: No abdominal or epigastric pain. No nausea, vomiting   GENITOURINARY: No dysuria, frequency or hematuria  NEUROLOGICAL: + Weakness  SKIN: No itching, burning, rashes, or lesions   All other review of systems is negative unless indicated above.    MEDICATIONS:  MEDICATIONS  (STANDING):  amLODIPine   Tablet 10 milliGRAM(s) Oral daily  aspirin enteric coated 81 milliGRAM(s) Oral daily  enoxaparin Injectable 40 milliGRAM(s) SubCutaneous <User Schedule>  ezetimibe 10 milliGRAM(s) Oral daily  FLUoxetine 20 milliGRAM(s) Oral daily  labetalol 200 milliGRAM(s) Oral every 8 hours  losartan 100 milliGRAM(s) Oral daily  modafinil 100 milliGRAM(s) Oral <User Schedule>  pantoprazole    Tablet 40 milliGRAM(s) Oral before breakfast  polyethylene glycol 3350 17 Gram(s) Oral every 12 hours  senna 2 Tablet(s) Oral at bedtime  sodium chloride 2 Gram(s) Oral every 6 hours      PHYSICAL EXAM:  T(C): 36.8 (11-04-24 @ 12:00), Max: 36.8 (11-04-24 @ 12:00)  HR: 71 (11-04-24 @ 12:00) (68 - 75)  BP: 116/73 (11-04-24 @ 12:00) (106/70 - 124/80)  RR: 18 (11-04-24 @ 12:00) (18 - 18)  SpO2: 97% (11-04-24 @ 12:00) (97% - 98%)  Wt(kg): --  I&O's Summary        Appearance: Awake, generalized weakness, lying down in bed 	  HEENT: Eyes are open   Lymphatic: No lymphadenopathy grossly   Cardiovascular: Normal S1 S2, no JVD  Respiratory: normal effort , clear  Gastrointestinal:  Soft, Non-tender  Skin: No rashes,  warm to touch  Psychiatry:  Mood & affect appropriate  Musculoskeletal: No edema                                  11.4   8.47  )-----------( 444      ( 04 Nov 2024 06:23 )             35.0               11-04    139  |  103  |  20  ----------------------------<  90  4.3   |  22  |  0.89    Ca    9.9      04 Nov 2024 06:24    TPro  8.0  /  Alb  3.9  /  TBili  0.3  /  DBili  x   /  AST  122[H]  /  ALT  334[H]  /  AlkPhos  174[H]  11-04                       Urinalysis Basic - ( 04 Nov 2024 06:24 )    Color: x / Appearance: x / SG: x / pH: x  Gluc: 90 mg/dL / Ketone: x  / Bili: x / Urobili: x   Blood: x / Protein: x / Nitrite: x   Leuk Esterase: x / RBC: x / WBC x   Sq Epi: x / Non Sq Epi: x / Bacteria: x

## 2024-11-04 NOTE — H&P ADULT - ATTENDING COMMENTS
PACO SILVA is a 44 year old, Mongolian speaking, male with PMH of uncontrolled HTN; who presented to [facility] on 10/13 for headache, dizziness, vomiting and flu-like symptoms. Imaging was significant for L basal ganglia IPH with intraventricular hemorrhage extension. MR showed R corona radiata ischemic infarct.  Vascular neurology was consulted and had concerns for infectious etiology vs cardiac cause. EEG negative. His hospital course was significant for hypercoagulable workup (negative thus far), leukocytosis (2/2 UTI on ABX), transaminitis/hepatic steatosis (statin on hold), hyponatremia (on NaCL tabs), and +MRSA (completed mupirocin in nares). Patient now admitted for a multidisciplinary rehab program. 11-04-24 @ 13:26    Patient seen at bedside, delayed processing and slowed answers to questions. States he slept, denies pain    Physical  Gen - NAD, Comfortable  HEENT - NCAT, EOMI  Neck - Supple, No limited ROM  Pulm - CTAB, No wheeze, No rhonchi, No crackles  Cardiovascular - RRR, S1S2, No murmurs  Abdomen - Soft, NT/ND  Extremities - No C/C/E, No calf tenderness  Neuro-     Cognitive - alert, oriented to name, month, year. States [facility]      Communication -  No dysarthria, hypophonic, delayed processing, No aphasia     Attention: able to state days of the week forwards, difficulty stating backwards     Memory: Recall 3 objects immediate. Impaired delayed recall despite categorical and multiple choice cues     Cranial Nerves - no facial asymmetry, tongue midline, EOMI, facial sensation intact     Motor -                     LEFT    UE - ShAB 5/5, EF 5/5, EE 5/5, WE 5/5,  5/5                    RIGHT UE - ShAB 5/5, EF 5/5, EE 5/5, WE 5/5,  5/5                    LEFT    LE - HF 5/5, KE 5/5, DF 5/5, PF 5/5                    RIGHT LE - HF 5/5, KE 5/5, DF 5/5, PF 5/5        Sensory - Intact to LT     Reflexes - Negative clonus/Hoffmans bilaterally     Coordination - FTN intact     Tone - normal  Psychiatric - Mood stable, Affect WNL  Skin:  all skin intact    admission labs reviewed  tylenol dc due to transaminitis  per RN, low BP this AM-- Discussed with hospitalist --regimen modified-- see above , RN to obtain orthostatics  discussed goals of AR, length of stay  patient states he was living with his sister in an apt , 0 steps. working as   Total time 80 mins-face to face time encounter and counseling the patient, meeting with hospitalist, nursing staff, therapists and social work to discuss medical updates and management, care coordination, reviewing chart and data-including but not limited to labs and imaging

## 2024-11-04 NOTE — PATIENT PROFILE ADULT - FALL HARM RISK - HARM RISK INTERVENTIONS
Assistance with ambulation/Assistance OOB with selected safe patient handling equipment/Communicate Risk of Fall with Harm to all staff/Discuss with provider need for PT consult/Monitor gait and stability/Reinforce activity limits and safety measures with patient and family/Tailored Fall Risk Interventions/Visual Cue: Yellow wristband and red socks/Bed in lowest position, wheels locked, appropriate side rails in place/Call bell, personal items and telephone in reach/Instruct patient to call for assistance before getting out of bed or chair/Non-slip footwear when patient is out of bed/[place]win to call system/Physically safe environment - no spills, clutter or unnecessary equipment/Purposeful Proactive Rounding/Room/bathroom lighting operational, light cord in reach

## 2024-11-04 NOTE — H&P ADULT - NSHPSOCIALHISTORY_GEN_ALL_CORE
SOCIAL HISTORY  Smoking - Former. Last use Jan 2020  EtOH - Denied   Drugs - Denied    FUNCTIONAL HISTORY  Lives with family, works as   Independent AMB and ADLs PTA     CURRENT FUNCTIONAL STATUS  SLP 10/29  hypophonia   cognitive linguistic impairment     PT 10/30  bed mobility supervision   transfers Cg with RW  gait CG with RW x 45 feet   standing rest     OT 10/29  bed mobility CG   transfers CG with RW   dressing  CG   follows 75% commands

## 2024-11-05 LAB
ALBUMIN SERPL ELPH-MCNC: 3.1 G/DL — LOW (ref 3.3–5)
ALP SERPL-CCNC: 167 U/L — HIGH (ref 40–120)
ALT FLD-CCNC: 421 U/L — HIGH (ref 10–45)
ANION GAP SERPL CALC-SCNC: 11 MMOL/L — SIGNIFICANT CHANGE UP (ref 5–17)
AST SERPL-CCNC: 113 U/L — HIGH (ref 10–40)
BASOPHILS # BLD AUTO: 0.09 K/UL — SIGNIFICANT CHANGE UP (ref 0–0.2)
BASOPHILS NFR BLD AUTO: 1 % — SIGNIFICANT CHANGE UP (ref 0–2)
BILIRUB SERPL-MCNC: 0.5 MG/DL — SIGNIFICANT CHANGE UP (ref 0.2–1.2)
BUN SERPL-MCNC: 21 MG/DL — SIGNIFICANT CHANGE UP (ref 7–23)
CALCIUM SERPL-MCNC: 9.7 MG/DL — SIGNIFICANT CHANGE UP (ref 8.4–10.5)
CHLORIDE SERPL-SCNC: 103 MMOL/L — SIGNIFICANT CHANGE UP (ref 96–108)
CO2 SERPL-SCNC: 25 MMOL/L — SIGNIFICANT CHANGE UP (ref 22–31)
CREAT SERPL-MCNC: 1.07 MG/DL — SIGNIFICANT CHANGE UP (ref 0.5–1.3)
EGFR: 89 ML/MIN/1.73M2 — SIGNIFICANT CHANGE UP
EOSINOPHIL # BLD AUTO: 0.37 K/UL — SIGNIFICANT CHANGE UP (ref 0–0.5)
EOSINOPHIL NFR BLD AUTO: 4.1 % — SIGNIFICANT CHANGE UP (ref 0–6)
GLUCOSE SERPL-MCNC: 93 MG/DL — SIGNIFICANT CHANGE UP (ref 70–99)
HCT VFR BLD CALC: 34.8 % — LOW (ref 39–50)
HGB BLD-MCNC: 11.4 G/DL — LOW (ref 13–17)
IMM GRANULOCYTES NFR BLD AUTO: 1.9 % — HIGH (ref 0–0.9)
LYMPHOCYTES # BLD AUTO: 3.25 K/UL — SIGNIFICANT CHANGE UP (ref 1–3.3)
LYMPHOCYTES # BLD AUTO: 35.6 % — SIGNIFICANT CHANGE UP (ref 13–44)
MCHC RBC-ENTMCNC: 28.3 PG — SIGNIFICANT CHANGE UP (ref 27–34)
MCHC RBC-ENTMCNC: 32.8 G/DL — SIGNIFICANT CHANGE UP (ref 32–36)
MCV RBC AUTO: 86.4 FL — SIGNIFICANT CHANGE UP (ref 80–100)
MONOCYTES # BLD AUTO: 0.54 K/UL — SIGNIFICANT CHANGE UP (ref 0–0.9)
MONOCYTES NFR BLD AUTO: 5.9 % — SIGNIFICANT CHANGE UP (ref 2–14)
NEUTROPHILS # BLD AUTO: 4.7 K/UL — SIGNIFICANT CHANGE UP (ref 1.8–7.4)
NEUTROPHILS NFR BLD AUTO: 51.5 % — SIGNIFICANT CHANGE UP (ref 43–77)
NRBC # BLD: 0 /100 WBCS — SIGNIFICANT CHANGE UP (ref 0–0)
PLATELET # BLD AUTO: 459 K/UL — HIGH (ref 150–400)
POTASSIUM SERPL-MCNC: 4.2 MMOL/L — SIGNIFICANT CHANGE UP (ref 3.5–5.3)
POTASSIUM SERPL-SCNC: 4.2 MMOL/L — SIGNIFICANT CHANGE UP (ref 3.5–5.3)
PROT SERPL-MCNC: 8.1 G/DL — SIGNIFICANT CHANGE UP (ref 6–8.3)
RBC # BLD: 4.03 M/UL — LOW (ref 4.2–5.8)
RBC # FLD: 13.2 % — SIGNIFICANT CHANGE UP (ref 10.3–14.5)
SODIUM SERPL-SCNC: 139 MMOL/L — SIGNIFICANT CHANGE UP (ref 135–145)
WBC # BLD: 9.12 K/UL — SIGNIFICANT CHANGE UP (ref 3.8–10.5)
WBC # FLD AUTO: 9.12 K/UL — SIGNIFICANT CHANGE UP (ref 3.8–10.5)

## 2024-11-05 PROCEDURE — 99223 1ST HOSP IP/OBS HIGH 75: CPT

## 2024-11-05 RX ORDER — LABETALOL HCL 200 MG
200 TABLET ORAL
Refills: 0 | Status: DISCONTINUED | OUTPATIENT
Start: 2024-11-05 | End: 2024-11-13

## 2024-11-05 RX ORDER — LOSARTAN POTASSIUM 25 MG/1
50 TABLET ORAL DAILY
Refills: 0 | Status: ACTIVE | OUTPATIENT
Start: 2024-11-06 | End: 2025-10-04

## 2024-11-05 RX ORDER — AMLODIPINE BESYLATE 10 MG
5 TABLET ORAL DAILY
Refills: 0 | Status: ACTIVE | OUTPATIENT
Start: 2024-11-06 | End: 2025-10-04

## 2024-11-05 RX ADMIN — Medication 200 MILLIGRAM(S): at 05:08

## 2024-11-05 RX ADMIN — PANTOPRAZOLE SODIUM 40 MILLIGRAM(S): 40 TABLET, DELAYED RELEASE ORAL at 05:09

## 2024-11-05 RX ADMIN — SODIUM CHLORIDE 2 GRAM(S): 9 INJECTION, SOLUTION INTRAMUSCULAR; INTRAVENOUS; SUBCUTANEOUS at 17:27

## 2024-11-05 RX ADMIN — Medication 20 MILLIGRAM(S): at 12:06

## 2024-11-05 RX ADMIN — Medication 81 MILLIGRAM(S): at 12:06

## 2024-11-05 RX ADMIN — LOSARTAN POTASSIUM 100 MILLIGRAM(S): 25 TABLET ORAL at 05:09

## 2024-11-05 RX ADMIN — SODIUM CHLORIDE 2 GRAM(S): 9 INJECTION, SOLUTION INTRAMUSCULAR; INTRAVENOUS; SUBCUTANEOUS at 23:52

## 2024-11-05 RX ADMIN — POLYETHYLENE GLYCOL 3350 17 GRAM(S): 17 POWDER, FOR SOLUTION ORAL at 05:08

## 2024-11-05 RX ADMIN — SODIUM CHLORIDE 2 GRAM(S): 9 INJECTION, SOLUTION INTRAMUSCULAR; INTRAVENOUS; SUBCUTANEOUS at 05:08

## 2024-11-05 RX ADMIN — EZETIMIBE 10 MILLIGRAM(S): 10 TABLET ORAL at 12:06

## 2024-11-05 RX ADMIN — POLYETHYLENE GLYCOL 3350 17 GRAM(S): 17 POWDER, FOR SOLUTION ORAL at 17:27

## 2024-11-05 RX ADMIN — MODAFINIL 100 MILLIGRAM(S): 200 TABLET ORAL at 08:07

## 2024-11-05 RX ADMIN — SODIUM CHLORIDE 2 GRAM(S): 9 INJECTION, SOLUTION INTRAMUSCULAR; INTRAVENOUS; SUBCUTANEOUS at 12:06

## 2024-11-05 RX ADMIN — Medication 2 TABLET(S): at 22:06

## 2024-11-05 RX ADMIN — Medication 10 MILLIGRAM(S): at 05:09

## 2024-11-05 RX ADMIN — CHLORHEXIDINE GLUCONATE 1 APPLICATION(S): 40 SOLUTION TOPICAL at 12:12

## 2024-11-05 RX ADMIN — Medication 40 MILLIGRAM(S): at 17:27

## 2024-11-05 RX ADMIN — Medication 200 MILLIGRAM(S): at 17:27

## 2024-11-05 NOTE — DIETITIAN INITIAL EVALUATION ADULT - ENERGY INTAKE
Fair (50-75%) Energy intake since admission has been fair. Pt consuming ~% of meals provided as per nursing flow sheets.

## 2024-11-05 NOTE — DIETITIAN INITIAL EVALUATION ADULT - OTHER INFO
Pt with hx of uncontrolled HTN.   Electrolytes stable.   Kidney indices stable.   Troponin elevated 524 ng/L 10/14.

## 2024-11-05 NOTE — CONSULT NOTE ADULT - CONSULT REASON
Goal Outcome Evaluation:  Plan of Care Reviewed With: patient        Progress: no change  Outcome Summary: PT eval completed. Pt alert and oriented x4 and up in chair upon arrival, with complaints of 10/10 incisional pain. Pt on 6L O2 via NC with sats in 90s. Pt edu on sternal precautions and verbalized understanding. Pt performed cardiac protocol pre and post activity. Pt did very well with mobility- able to stand with only SBA and VC, and performed gait training 200 feet. Pt needed cues for gait mechanics due to wide ANNIE and increased gait speed, as well as cues for deeper breathing, as patient tends to hold breath due to pain. Pt reports decreased pain levels at end of session. Pt will benefit from skilled PT to improve gait, balance, endurance and to reinforce precautions. Recommend d/c home with assist and cardiac rehab.   Rehab Admission - Medical Management

## 2024-11-05 NOTE — DIETITIAN INITIAL EVALUATION ADULT - PERTINENT LABORATORY DATA
11-05    139  |  103  |  21  ----------------------------<  93  4.2   |  25  |  1.07    Ca    9.7      05 Nov 2024 05:53    TPro  8.1  /  Alb  3.1[L]  /  TBili  0.5  /  DBili  x   /  AST  113[H]  /  ALT  421[H]  /  AlkPhos  167[H]  11-05  A1C with Estimated Average Glucose Result: 5.9 % (10-14-24 @ 04:13)

## 2024-11-05 NOTE — DIETITIAN INITIAL EVALUATION ADULT - EDUCATION DIETARY MODIFICATIONS
Ongoing DASH/TLC/Heart healthy education as needed./(1) partially meets; needs review/practice/verbalization

## 2024-11-05 NOTE — CONSULT NOTE ADULT - SUBJECTIVE AND OBJECTIVE BOX
Patient is a 44y old  Male who presents with a chief complaint of CVA- Basal Ganglia Hemorrhage & R corona radiata infarct (04 Nov 2024 13:21)    HPI, per admission H&P:  Von Ferro is a 44 year old, Turkmen speaking, male with PMH of uncontrolled HTN; who presented to [facility] on 10/13 for headache, dizziness, vomiting and flu-like symptoms. Imaging was significant for L basal ganglia IPH with intraventricular hemorrhage extension. MR showed R corona radiata ischemic infarct.  Vascular neurology was consulted and had concerns for infectious etiology vs cardiac cause. EEG negative. His hospital course was significant for hypercoagulable workup (negative thus far), leukocytosis (2/2 UTI on ABX), transaminitis/hepatic steatosis (statin on hold), hyponatremia (on NaCL tabs), and +MRSA (completed mupirocin in nares). PM&R was consulted and deemed himan appropriate candidate for IRF. He was medically stabilized and cleared for discharge to [facility].  (04 Nov 2024 13:21)    PAST MEDICAL & SURGICAL HISTORY:  HTN (hypertension)    SOCIAL HISTORY: former smoker, quit 4 years ago. denies alcohol or illicut drug use  FAMILY HISTORY: non-contributory     ALLERGIES:  No Known Allergies    MEDICATIONS  (STANDING):  amLODIPine   Tablet 10 milliGRAM(s) Oral daily  aspirin enteric coated 81 milliGRAM(s) Oral daily  chlorhexidine 2% Cloths 1 Application(s) Topical daily  enoxaparin Injectable 40 milliGRAM(s) SubCutaneous <User Schedule>  ezetimibe 10 milliGRAM(s) Oral daily  FLUoxetine 20 milliGRAM(s) Oral daily  labetalol 200 milliGRAM(s) Oral every 8 hours  levoFLOXacin  Tablet 500 milliGRAM(s) Oral every 24 hours  losartan 100 milliGRAM(s) Oral daily  modafinil 100 milliGRAM(s) Oral <User Schedule>  pantoprazole    Tablet 40 milliGRAM(s) Oral before breakfast  polyethylene glycol 3350 17 Gram(s) Oral two times a day  senna 2 Tablet(s) Oral at bedtime  sodium chloride 2 Gram(s) Oral every 6 hours    MEDICATIONS  (PRN):  acetaminophen     Tablet .. 650 milliGRAM(s) Oral every 8 hours PRN Mild Pain (1 - 3)  acetaminophen     Tablet .. 650 milliGRAM(s) Oral every 6 hours PRN Mild Pain (1 - 3), Moderate Pain (4 - 6)    Review of Systems: Refer to HPI for pertinent positives and negatives. All other ROS reviewed and negative except as otherwise stated above.    Vital Signs Last 24 Hrs  T(F): 97.8 (04 Nov 2024 21:16), Max: 98.2 (04 Nov 2024 12:00)  HR: 69 (05 Nov 2024 05:06) (60 - 75)  BP: 120/78 (05 Nov 2024 05:06) (106/70 - 120/78)  RR: 16 (04 Nov 2024 21:16) (16 - 18)  SpO2: 98% (04 Nov 2024 21:16) (97% - 98%)  I&O's Summary    PHYSICAL EXAM:  GENERAL: NAD, well-groomed  HEAD:  Atraumatic, Normocephalic  EYES: EOMI, PERRL, conjunctiva and sclera clear  ENMT: Moist mucous membranes, Good dentition  NECK: Supple, No JVD  CHEST/LUNG: Clear to auscultation bilaterally, non-labored breathing, good air entry  HEART: RRR; S1/S2, No murmur  ABDOMEN: Soft, Nontender, Nondistended; Bowel sounds present  VASCULAR: Normal pulses, Normal capillary refill  EXTREMITIES: No cyanosis, No edema  LYMPH: No lymphadenopathy noted  SKIN: Warm, Intact  PSYCH: Normal mood and affect  NERVOUS SYSTEM:  A/O x3, Good concentration; CN 2-12 intact, No focal deficits, moves all extremities    LABS:                        11.4   9.12  )-----------( 459      ( 05 Nov 2024 05:53 )             34.8     11-05    139  |  103  |  21  ----------------------------<  93  4.2   |  25  |  1.07    Ca    9.7      05 Nov 2024 05:53    TPro  8.1  /  Alb  3.1  /  TBili  0.5  /  DBili  x   /  AST  113  /  ALT  421  /  AlkPhos  167  11-05    Urinalysis Basic - ( 05 Nov 2024 05:53 )    Color: x / Appearance: x / SG: x / pH: x  Gluc: 93 mg/dL / Ketone: x  / Bili: x / Urobili: x   Blood: x / Protein: x / Nitrite: x   Leuk Esterase: x / RBC: x / WBC x   Sq Epi: x / Non Sq Epi: x / Bacteria: x    COVID-19 PCR: NotDetec (11-04-24 @ 17:50)    RADIOLOGY & ADDITIONAL TESTS:    10/14 EKG (personally reviewed by me): Sinus rhythm with sinus arrythmia at 75bpm    Care Discussed with Consultants/Other Providers: Patient is a 44y old  Male who presents with a chief complaint of CVA- Basal Ganglia Hemorrhage & R corona radiata infarct (04 Nov 2024 13:21)    HPI, per admission H&P:  Von Ferro is a 44 year old, Thai speaking, male with PMH of uncontrolled HTN; who presented to [facility] on 10/13 for headache, dizziness, vomiting and flu-like symptoms. Imaging was significant for L basal ganglia IPH with intraventricular hemorrhage extension. MR showed R corona radiata ischemic infarct.  Vascular neurology was consulted and had concerns for infectious etiology vs cardiac cause. EEG negative. His hospital course was significant for hypercoagulable workup (negative thus far), leukocytosis (2/2 UTI on ABX), transaminitis/hepatic steatosis (statin on hold), hyponatremia (on NaCL tabs), and +MRSA (completed mupirocin in nares). PM&R was consulted and deemed himan appropriate candidate for IRF. He was medically stabilized and cleared for discharge to [facility].  (04 Nov 2024 13:21)    Subjective Thai telephone  Sharron ID#[id number]  Patient seen and examined at bedside. Reviewed medical history and recent hospitalization with patient. Currently denies pain/discomfort. States he lives with his sister.     PAST MEDICAL & SURGICAL HISTORY:  HTN (hypertension)    SOCIAL HISTORY: former smoker, quit 4 years ago. denies alcohol or illicut drug use  FAMILY HISTORY: non-contributory     ALLERGIES:  No Known Allergies    MEDICATIONS  (STANDING):  amLODIPine   Tablet 10 milliGRAM(s) Oral daily  aspirin enteric coated 81 milliGRAM(s) Oral daily  chlorhexidine 2% Cloths 1 Application(s) Topical daily  enoxaparin Injectable 40 milliGRAM(s) SubCutaneous <User Schedule>  ezetimibe 10 milliGRAM(s) Oral daily  FLUoxetine 20 milliGRAM(s) Oral daily  labetalol 200 milliGRAM(s) Oral every 8 hours  levoFLOXacin  Tablet 500 milliGRAM(s) Oral every 24 hours  losartan 100 milliGRAM(s) Oral daily  modafinil 100 milliGRAM(s) Oral <User Schedule>  pantoprazole    Tablet 40 milliGRAM(s) Oral before breakfast  polyethylene glycol 3350 17 Gram(s) Oral two times a day  senna 2 Tablet(s) Oral at bedtime  sodium chloride 2 Gram(s) Oral every 6 hours    MEDICATIONS  (PRN):  acetaminophen     Tablet .. 650 milliGRAM(s) Oral every 8 hours PRN Mild Pain (1 - 3)  acetaminophen     Tablet .. 650 milliGRAM(s) Oral every 6 hours PRN Mild Pain (1 - 3), Moderate Pain (4 - 6)    Review of Systems: Refer to HPI for pertinent positives and negatives. All other ROS reviewed and negative except as otherwise stated above.    Vital Signs Last 24 Hrs  T(F): 97.8 (04 Nov 2024 21:16), Max: 98.2 (04 Nov 2024 12:00)  HR: 69 (05 Nov 2024 05:06) (60 - 75)  BP: 120/78 (05 Nov 2024 05:06) (106/70 - 120/78)  RR: 16 (04 Nov 2024 21:16) (16 - 18)  SpO2: 98% (04 Nov 2024 21:16) (97% - 98%)  I&O's Summary    PHYSICAL EXAM:  GENERAL: NAD, well-groomed  HEAD:  Atraumatic, Normocephalic  EYES: EOMI, PERRL, conjunctiva and sclera clear  ENMT: Moist mucous membranes, Good dentition  NECK: Supple, No JVD  CHEST/LUNG: Clear to auscultation bilaterally, non-labored breathing, good air entry  HEART: RRR; S1/S2, No murmur  ABDOMEN: Soft, Nontender, Nondistended; Bowel sounds present  VASCULAR: Normal pulses, Normal capillary refill  EXTREMITIES: No cyanosis, No edema  LYMPH: No lymphadenopathy noted  SKIN: Warm, Intact  PSYCH: Normal mood and affect  NERVOUS SYSTEM:  awake, alert. oriented to person, place and date. moving all extremities     LABS:                        11.4   9.12  )-----------( 459      ( 05 Nov 2024 05:53 )             34.8     11-05    139  |  103  |  21  ----------------------------<  93  4.2   |  25  |  1.07    Ca    9.7      05 Nov 2024 05:53    TPro  8.1  /  Alb  3.1  /  TBili  0.5  /  DBili  x   /  AST  113  /  ALT  421  /  AlkPhos  167  11-05    Urinalysis Basic - ( 05 Nov 2024 05:53 )    Color: x / Appearance: x / SG: x / pH: x  Gluc: 93 mg/dL / Ketone: x  / Bili: x / Urobili: x   Blood: x / Protein: x / Nitrite: x   Leuk Esterase: x / RBC: x / WBC x   Sq Epi: x / Non Sq Epi: x / Bacteria: x    COVID-19 PCR: NotDetec (11-04-24 @ 17:50)    RADIOLOGY & ADDITIONAL TESTS:    10/14 EKG (personally reviewed by me): Sinus rhythm with sinus arrythmia at 75bpm    Care Discussed with Consultants/Other Providers: d/w Dr. Neal

## 2024-11-05 NOTE — DIETITIAN INITIAL EVALUATION ADULT - NSFNSNUTRCHEWSWALLOWFT_GEN_A_CORE
No reported issues chewing/swallowing. Continue with Current Diet; Defer texture/consistency to Speech Language Pathologist prn.

## 2024-11-05 NOTE — CONSULT NOTE ADULT - ASSESSMENT
43 yo M with PMH of uncontrolled Hypertension who was admitted to [facility] for dizziness, headache. FOund to have L basal ganglia IPH with intraventricular hemorrhage extension. EEG negative. Hypercoag w/u thus far negative. Course complicated by transaminitis, hyponatremia, MRSA in nares. Now admitted to [facility] for rehab.    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 statin on hold d/t transaminitis     Hypertension   Orthostatic hypotension 11/5 am   - reducing norvasc to 5mg daily, losartan to 50mg daily   - can continue labetalol 100mg bid    Hyponatremia, SIADH  - continue NaCl tab 2g q6h. wean down/off as tolerated     Hyperlipidemia   - zetia  -   - not on statin d/t transaminitis     Transaminitis   - imaging from [facility] showing hepatic steatosis  - avoid hepatotoxins, trend LFTs    UTI   - continue levaquin. follow final culture sensitivities     DVT Prophylaxis:  - Lovenox     Thank you for involving us in the care of this patient. Medicine service will follow.

## 2024-11-05 NOTE — DIETITIAN INITIAL EVALUATION ADULT - ORAL INTAKE PTA/DIET HISTORY
Pt Setswana speaking  # [id number] via Language Line. Pt responding to yes/no questions only. Pt reports having adequate appetite at this time. No issues chewing/swallowing. No reported diarrhea/constipation. Unable to report UBW.  Pt Serbian speaking  # [id number] via Language Line. Pt responding to yes/no questions only. Pt reports having adequate appetite at this time. No issues chewing/swallowing. No reported diarrhea/constipation. Unable to report UBW. Pt with previously reported emesis however denies current N/V.

## 2024-11-05 NOTE — DIETITIAN INITIAL EVALUATION ADULT - PERTINENT MEDS FT
MEDICATIONS  (STANDING):  amLODIPine   Tablet 5 milliGRAM(s) Oral daily  aspirin enteric coated 81 milliGRAM(s) Oral daily  chlorhexidine 2% Cloths 1 Application(s) Topical daily  enoxaparin Injectable 40 milliGRAM(s) SubCutaneous <User Schedule>  ezetimibe 10 milliGRAM(s) Oral daily  FLUoxetine 20 milliGRAM(s) Oral daily  labetalol 200 milliGRAM(s) Oral two times a day  levoFLOXacin  Tablet 500 milliGRAM(s) Oral every 24 hours  losartan 50 milliGRAM(s) Oral daily  modafinil 100 milliGRAM(s) Oral <User Schedule>  pantoprazole    Tablet 40 milliGRAM(s) Oral before breakfast  polyethylene glycol 3350 17 Gram(s) Oral two times a day  senna 2 Tablet(s) Oral at bedtime  sodium chloride 2 Gram(s) Oral every 6 hours    MEDICATIONS  (PRN):  acetaminophen     Tablet .. 650 milliGRAM(s) Oral every 8 hours PRN Mild Pain (1 - 3)  acetaminophen     Tablet .. 650 milliGRAM(s) Oral every 6 hours PRN Mild Pain (1 - 3), Moderate Pain (4 - 6)

## 2024-11-05 NOTE — DIETITIAN INITIAL EVALUATION ADULT - PHYSCIAL ASSESSMENT
Pt unable to report UBW. Previous wt's unavailable. Pt appears well nourished, well developed./well nourished

## 2024-11-05 NOTE — DIETITIAN INITIAL EVALUATION ADULT - ADD RECOMMEND
1. Continue with Current Diet; Defer texture/consistency to Speech Language Pathologist prn.   2. Continue Current Nutrition Care Regimen at This Time.   3. Monitor BMP   4. RD to remain available.   5. Provide ongoing heart healthy nutrition therapy education as needed.

## 2024-11-06 PROCEDURE — 90832 PSYTX W PT 30 MINUTES: CPT

## 2024-11-06 PROCEDURE — 99232 SBSQ HOSP IP/OBS MODERATE 35: CPT

## 2024-11-06 PROCEDURE — 99233 SBSQ HOSP IP/OBS HIGH 50: CPT | Mod: GC

## 2024-11-06 RX ORDER — SODIUM CHLORIDE 9 MG/ML
1 INJECTION, SOLUTION INTRAMUSCULAR; INTRAVENOUS; SUBCUTANEOUS
Refills: 0 | Status: DISCONTINUED | OUTPATIENT
Start: 2024-11-06 | End: 2024-11-11

## 2024-11-06 RX ADMIN — LOSARTAN POTASSIUM 50 MILLIGRAM(S): 25 TABLET ORAL at 06:07

## 2024-11-06 RX ADMIN — SODIUM CHLORIDE 2 GRAM(S): 9 INJECTION, SOLUTION INTRAMUSCULAR; INTRAVENOUS; SUBCUTANEOUS at 11:32

## 2024-11-06 RX ADMIN — POLYETHYLENE GLYCOL 3350 17 GRAM(S): 17 POWDER, FOR SOLUTION ORAL at 17:46

## 2024-11-06 RX ADMIN — SODIUM CHLORIDE 1 GRAM(S): 9 INJECTION, SOLUTION INTRAMUSCULAR; INTRAVENOUS; SUBCUTANEOUS at 23:06

## 2024-11-06 RX ADMIN — EZETIMIBE 10 MILLIGRAM(S): 10 TABLET ORAL at 11:31

## 2024-11-06 RX ADMIN — MODAFINIL 100 MILLIGRAM(S): 200 TABLET ORAL at 08:00

## 2024-11-06 RX ADMIN — Medication 200 MILLIGRAM(S): at 05:23

## 2024-11-06 RX ADMIN — Medication 5 MILLIGRAM(S): at 06:07

## 2024-11-06 RX ADMIN — Medication 81 MILLIGRAM(S): at 11:31

## 2024-11-06 RX ADMIN — CHLORHEXIDINE GLUCONATE 1 APPLICATION(S): 40 SOLUTION TOPICAL at 11:34

## 2024-11-06 RX ADMIN — Medication 20 MILLIGRAM(S): at 11:32

## 2024-11-06 RX ADMIN — SODIUM CHLORIDE 2 GRAM(S): 9 INJECTION, SOLUTION INTRAMUSCULAR; INTRAVENOUS; SUBCUTANEOUS at 05:23

## 2024-11-06 RX ADMIN — SODIUM CHLORIDE 1 GRAM(S): 9 INJECTION, SOLUTION INTRAMUSCULAR; INTRAVENOUS; SUBCUTANEOUS at 17:46

## 2024-11-06 RX ADMIN — POLYETHYLENE GLYCOL 3350 17 GRAM(S): 17 POWDER, FOR SOLUTION ORAL at 05:21

## 2024-11-06 RX ADMIN — Medication 200 MILLIGRAM(S): at 17:46

## 2024-11-06 RX ADMIN — Medication 2 TABLET(S): at 21:38

## 2024-11-06 RX ADMIN — PANTOPRAZOLE SODIUM 40 MILLIGRAM(S): 40 TABLET, DELAYED RELEASE ORAL at 05:22

## 2024-11-06 RX ADMIN — Medication 40 MILLIGRAM(S): at 17:46

## 2024-11-06 NOTE — PROGRESS NOTE ADULT - ASSESSMENT
Pt alert, fair attention, fair expressive language, thought processes - goal-directed, thought contents - no morbid ideation noticed, depressed affect, euthymic mood, denied AH/VH, denied SI/HI/I/P, calm behavior. Plan: Continue the assessment process.

## 2024-11-06 NOTE — PROGRESS NOTE ADULT - SUBJECTIVE AND OBJECTIVE BOX
Pt was seen for 25  minutes for supportive tx; Pt's brother in law, Xu, was present. Pt c/o memory loss. Reviewed chart, approached Pt for an initial consult, introduced the role of neuropsychology in the rehab team, and explained the nature and purpose of the consult. Pt is a  43 y/o Croatian speaking, male with PMHx of uncontrolled HTN; who presented to [facility] on 10/13 for headache, dizziness, vomiting and flu-like symptoms. Imaging was significant for L basal ganglia IPH with intraventricular hemorrhage extension. MR showed R corona radiata ischemic infarct. Vascular neurology was consulted and had concerns for infectious etiology vs cardiac cause. EEG negative. His hospital course was significant for hypercoagulable workup (negative thus far), leukocytosis (2/2 UTI on ABX), transaminitis/hepatic steatosis (statin on hold), hyponatremia (on NaCL tabs), and +MRSA (completed mupirocin in nares). Pt now admitted for a multidisciplinary rehab program. Pt agreed to participate, he was well-related and candid in discussing his current medical condition. Pt speech was hypophonic, and with a slight delay in responding. Session focused on establishing rapport with Pt, gathering relevant biographical information, and assessing Pt's current emotional functioning. Pt provided sufficient information about his medical hx and social hx. In addition, Pt responded to all questions during the clinical interview in order to elicit emotional symptoms. Pt reported experiencing feelings of helplessness and fatigue. Pt denied any current concerns. Support and encouragement were provided.

## 2024-11-06 NOTE — PROGRESS NOTE ADULT - SUBJECTIVE AND OBJECTIVE BOX
Patient is a 44y old  Male who presents with a chief complaint of Cerebral infarction (05 Nov 2024 11:16)    Patient seen and examined at bedside. No acute overnight events. Room changed. Not very talkative. Denies pain.     ALLERGIES:  No Known Allergies    MEDICATIONS  (STANDING):  amLODIPine   Tablet 5 milliGRAM(s) Oral daily  aspirin enteric coated 81 milliGRAM(s) Oral daily  chlorhexidine 2% Cloths 1 Application(s) Topical daily  enoxaparin Injectable 40 milliGRAM(s) SubCutaneous <User Schedule>  ezetimibe 10 milliGRAM(s) Oral daily  FLUoxetine 20 milliGRAM(s) Oral daily  labetalol 200 milliGRAM(s) Oral two times a day  levoFLOXacin  Tablet 500 milliGRAM(s) Oral every 24 hours  losartan 50 milliGRAM(s) Oral daily  modafinil 100 milliGRAM(s) Oral <User Schedule>  pantoprazole    Tablet 40 milliGRAM(s) Oral before breakfast  polyethylene glycol 3350 17 Gram(s) Oral two times a day  senna 2 Tablet(s) Oral at bedtime  sodium chloride 2 Gram(s) Oral every 6 hours    MEDICATIONS  (PRN):    Vital Signs Last 24 Hrs  T(F): 97.7 (06 Nov 2024 08:23), Max: 98.1 (05 Nov 2024 19:45)  HR: 79 (06 Nov 2024 08:23) (70 - 79)  BP: 109/74 (06 Nov 2024 08:23) (109/74 - 125/86)  RR: 16 (06 Nov 2024 08:23) (15 - 16)  SpO2: 98% (06 Nov 2024 08:23) (95% - 98%)  I&O's Summary    BMI (kg/m2): 24.4 (11-04-24 @ 17:18)    PHYSICAL EXAM:  General: NAD, awake, alert. pleasant   ENT: MMM, no tonsilar exudate  Neck: Supple, No JVD  Lungs: Clear to auscultation bilaterally, no wheezes. Good air entry bilaterally   Cardio: RRR, S1/S2, No murmurs  Abdomen: Soft, Nontender, Nondistended; Bowel sounds present  Extremities: No calf tenderness, No pitting edema    LABS:                        11.4   9.12  )-----------( 459      ( 05 Nov 2024 05:53 )             34.8       11-05    139  |  103  |  21  ----------------------------<  93  4.2   |  25  |  1.07    Ca    9.7      05 Nov 2024 05:53    TPro  8.1  /  Alb  3.1  /  TBili  0.5  /  DBili  x   /  AST  113  /  ALT  421  /  AlkPhos  167  11-05     10-14 Chol 209 mg/dL LDL -- HDL 43 mg/dL Trig 109 mg/dL    Urinalysis Basic - ( 05 Nov 2024 05:53 )    Color: x / Appearance: x / SG: x / pH: x  Gluc: 93 mg/dL / Ketone: x  / Bili: x / Urobili: x   Blood: x / Protein: x / Nitrite: x   Leuk Esterase: x / RBC: x / WBC x   Sq Epi: x / Non Sq Epi: x / Bacteria: x    COVID-19 PCR: NotDetec (11-04-24 @ 17:50)    RADIOLOGY & ADDITIONAL TESTS:     Care Discussed with Consultants/Other Providers:

## 2024-11-06 NOTE — PROGRESS NOTE ADULT - ASSESSMENT
44 year old, Croatian speaking, male with PMH of uncontrolled HTN; who presented to [facility] on 10/13 for headache, dizziness, vomiting and flu-like symptoms. Imaging was significant for L basal ganglia IPH with intraventricular hemorrhage extension. MR showed R corona radiata ischemic infarct.  Vascular neurology was consulted and had concerns for infectious etiology vs cardiac cause. EEG negative. His hospital course was significant for hypercoagulable workup (negative thus far), leukocytosis (2/2 UTI on ABX), transaminitis/hepatic steatosis (statin on hold), hyponatremia (on NaCL tabs), and +MRSA (completed mupirocin in nares). Patient now admitted for a multidisciplinary rehab program. 11-04-24 @ 13:26  -------------    Rehab Management/MEDICAL MANAGEMENT     #CVA- Basal Ganglia IPH & R corona radiata infarct  - Gait Instability, ADL impairments and Functional impairments: start Comprehensive Rehab Program of PT/OT/SLP  - 3 hours a day, 5 days a week  - P&O as needed   - Neurology Impression: AMS and dizziness due to Nontraumatic intracerebral hemorrhage subcortical w/ IVH, perihematomal cerebral edema and brain compression. Etiology likely hypertensive hemorrhage.   - ASA 81mg daily  - Statin on hold d/t elevated LFT ( )  - Plan for outpatient ILR placement     #HTN/HLD  - Amlodipine 10my daily --dec to 5 mg (11/5)  - Ezetimibe 10mg daily   - Labetalol 200mg TID -- change to BID (11/5)  - Losartan 100mg daily -- change to 50 (11/5)    #Hyponatremia  - Sodium Chloride 2G QID >  reduce NaCl to 1g QID (11/6)  - Na 139 (11/5)     #Sleep/Mood/Alertness  - Fluoxetine 20mg daily   - Modafinil 100mg daily     #Skin  #MRSA in nares  - Completed Mupirocin in BL nares on 11/2   - Skin on admission: intact  - Pressure injury/Skin: OOB to Chair, PT/OT     #Pain Mgmt   - Tylenol PRN-- DC due to transaminitis    #Transaminitis  - Statin on hold   - AST//334 (11/4) > 113/421 (11/5)  - Consider US abd    #GI/Bowel Mgmt   - Pantoprazole   - Senna & Miralax     #/Bladder Mgmt   #UTI  - Levofloxacin 500mg daily (til 11/11)  - voiding     #FEN   - Diet - Regular + Thins   - Dysphagia  SLP - evaluation and treatment    #Precautions / PROPHYLAXIS:   - Falls  - ortho: Weight bearing status: WBAT   - Lungs: Aspiration, Incentive Spirometer   - DVT: Lovenox 40mg daily   -----------------------------------------------  OUTPATIENT/FOLLOW UP:    Ashley Jiang  NP in [facility]  [address] [place] [address], [facility] [address]  [place] [address]  Phone: [phone]  Fax: [phone]  Follow Up Time: 2 weeks    Bernardo Chilel  Cardiovascular Disease  [address] [place] [address], [facility] [address]  [place] [address]  Phone: [phone]  Fax: [phone]  Follow Up Time: 1 month    Ana Resendez  Vascular Neurology  [address] [place] [address] [address]  [place] [address]  Phone: [phone]  Fax: [phone]  Follow Up Time: 1 week    Hawk Lizarraga  Cardiac Electrophysiology  [address] [place] [address], [id number]  [place] [address]  Phone: [phone]  Fax: [phone]  Follow Up Time: 1 week    Hawk Weston  Gastroenterology  [address] [place] [address]  Phone: [phone]  Fax: [phone]  Follow Up Time: 1 week    Angela Donaldson  Hematology  [address] [place] [address]  Phone: [phone]  Fax: [phone]  Follow Up Time: 1 week    IDT 11/6  NSG: cont B/B  SW: lives with sister on [address] apt, 0 M[place].  has great family support in community  SLP: reg diet, recept language difficulty with multi step command, mild-mod cog, reduce attention, delay recall, reduced problem solving, safety awareness fine. not very motivated.    OT: SV grooming, Closer SV dressing, CGA bathing, CGA-close SV toileting/bathroom transfers  PT: SV bed mob, CGA transfer/am 100' no AD/ 12 steps 2 HR  Team goals: pt use external aide to recall therapy info min A; amb to bathroom + toilet SV  Barriers: cog, initiation  TDD: 11/13 home SV during waking hours

## 2024-11-06 NOTE — PROGRESS NOTE ADULT - NS ATTEND AMEND GEN_ALL_CORE FT
I have personally seen and examined the patient.  I fully participated in the care of this patient.  I have made amendments to the documentation where necessary, and agree with the history, physical exam, and plan as documented above  Patient seen while in the wheelchair, states he slept ok  Interdisciplinary team meeting today with speech therapist, occupational therapist, physical therapist, social work and nursing where medical updates provided, progress with therapies and goals discussed. Plan of care reviewed. Team conference note documented on luis antonio.  Total time 50 mins-face to face time encounter and counseling the patient, meeting with hospitalist, nursing staff, therapists and social work to discuss medical updates and management, care coordination, reviewing chart and data, team meeting. Plan for dc home 11/13.

## 2024-11-06 NOTE — PROGRESS NOTE ADULT - SUBJECTIVE AND OBJECTIVE BOX
Patient is a 44y old  Male who presents with a chief complaint of CVA- Basal Ganglia Hemorrhage & R corona radiata infarct     HPI:  Von Ferro is a 44 year old, Wolof speaking, male with PMH of uncontrolled HTN; who presented to [facility] on 10/13 for headache, dizziness, vomiting and flu-like symptoms. Imaging was significant for L basal ganglia IPH with intraventricular hemorrhage extension. MR showed R corona radiata ischemic infarct.  Vascular neurology was consulted and had concerns for infectious etiology vs cardiac cause. EEG negative. His hospital course was significant for hypercoagulable workup (negative thus far), leukocytosis (2/2 UTI on ABX), transaminitis/hepatic steatosis (statin on hold), hyponatremia (on NaCL tabs), and +MRSA (completed mupirocin in nares). PM&R was consulted and deemed himan appropriate candidate for IRF. He was medically stabilized and cleared for discharge to [facility].     PAST MEDICAL & SURGICAL HISTORY:  HTN (hypertension)    SUBJECTIVE/OBJECTIVE: Patient seen and evaluated while sitting up in WC.  Slept fair.     REVIEW OF SYSTEMS:  Denies CP, SOB, HA, pain  +cog impairment  +limb apraxia   +BP soft (medications adjusted yesterday)    PHYSICAL EXAM  44y  Vital Signs Last 24 Hrs  T(C): 36.5 (06 Nov 2024 08:23), Max: 36.7 (05 Nov 2024 19:45)  T(F): 97.7 (06 Nov 2024 08:23), Max: 98.1 (05 Nov 2024 19:45)  HR: 79 (06 Nov 2024 08:23) (70 - 79)  BP: 109/74 (06 Nov 2024 08:23) (109/74 - 125/86)  RR: 16 (06 Nov 2024 08:23) (15 - 16)  SpO2: 98% (06 Nov 2024 08:23) (95% - 98%)    Gen - NAD, Comfortable  HEENT - NCAT, EOMI  Neck - Supple, No limited ROM  Pulm - resp nonlabored  Cardiovascular - warm and well perfused, no cyanosis or pedal edema  Abdomen - Soft, NT/ND  Extremities - No peripheral edema, No calf tenderness  Neuro-     Cognitive - alert, oriented to name, month, year. able to follow simple commands and answer questions (delayed processing/response)     Communication -  No dysarthria, hypophonic     Attention: fair     Cranial Nerves - no facial asymmetry, tongue midline, EOMI, facial sensation intact     Motor +motor apraxia                    LEFT    UE - ShAB 5/5, EF 5/5, EE 5/5, WE 5/5,  5/5                    RIGHT UE - ShAB 5/5, EF 5/5, EE 5/5, WE 5/5,  5/5                    LEFT    LE - HF 5/5, KE 5/5, DF 5/5, PF 5/5                    RIGHT LE - HF 5/5, KE 5/5, DF 5/5, PF 5/5        Sensory - Intact to LT  Psychiatric - Mood stable, Affect WNL    RECENT LABS:                        11.4   9.12  )-----------( 459      ( 05 Nov 2024 05:53 )             34.8     11-05    139  |  103  |  21  ----------------------------<  93  4.2   |  25  |  1.07    Ca    9.7      05 Nov 2024 05:53    TPro  8.1  /  Alb  3.1[L]  /  TBili  0.5  /  DBili  x   /  AST  113[H]  /  ALT  421[H]  /  AlkPhos  167[H]  11-05    LIVER FUNCTIONS - ( 05 Nov 2024 05:53 )  Alb: 3.1 g/dL / Pro: 8.1 g/dL / ALK PHOS: 167 U/L / ALT: 421 U/L / AST: 113 U/L / GGT: x           Allergies  No Known Allergies  Intolerances    MEDICATIONS  (STANDING):  amLODIPine   Tablet 5 milliGRAM(s) Oral daily  aspirin enteric coated 81 milliGRAM(s) Oral daily  chlorhexidine 2% Cloths 1 Application(s) Topical daily  enoxaparin Injectable 40 milliGRAM(s) SubCutaneous <User Schedule>  ezetimibe 10 milliGRAM(s) Oral daily  FLUoxetine 20 milliGRAM(s) Oral daily  labetalol 200 milliGRAM(s) Oral two times a day  levoFLOXacin  Tablet 500 milliGRAM(s) Oral every 24 hours  losartan 50 milliGRAM(s) Oral daily  modafinil 100 milliGRAM(s) Oral <User Schedule>  pantoprazole    Tablet 40 milliGRAM(s) Oral before breakfast  polyethylene glycol 3350 17 Gram(s) Oral two times a day  senna 2 Tablet(s) Oral at bedtime  sodium chloride 2 Gram(s) Oral every 6 hours    MEDICATIONS  (PRN):   Patient is a 44y old  Male who presents with a chief complaint of CVA- Basal Ganglia Hemorrhage & R corona radiata infarct     HPI:  Von Ferro is a 44 year old, Telugu speaking, male with PMH of uncontrolled HTN; who presented to [facility] on 10/13 for headache, dizziness, vomiting and flu-like symptoms. Imaging was significant for L basal ganglia IPH with intraventricular hemorrhage extension. MR showed R corona radiata ischemic infarct.  Vascular neurology was consulted and had concerns for infectious etiology vs cardiac cause. EEG negative. His hospital course was significant for hypercoagulable workup (negative thus far), leukocytosis (2/2 UTI on ABX), transaminitis/hepatic steatosis (statin on hold), hyponatremia (on NaCL tabs), and +MRSA (completed mupirocin in nares). PM&R was consulted and deemed him an appropriate candidate for IRF. He was medically stabilized and cleared for discharge to [facility].     PAST MEDICAL & SURGICAL HISTORY:  HTN (hypertension)    SUBJECTIVE/OBJECTIVE: Patient seen and evaluated while sitting up in WC.  Slept fair.     REVIEW OF SYSTEMS:  Denies CP, SOB, HA, pain  +cog impairment  +limb apraxia   +BP soft (medications adjusted yesterday)    PHYSICAL EXAM  44y  Vital Signs Last 24 Hrs  T(C): 36.5 (06 Nov 2024 08:23), Max: 36.7 (05 Nov 2024 19:45)  T(F): 97.7 (06 Nov 2024 08:23), Max: 98.1 (05 Nov 2024 19:45)  HR: 79 (06 Nov 2024 08:23) (70 - 79)  BP: 109/74 (06 Nov 2024 08:23) (109/74 - 125/86)  RR: 16 (06 Nov 2024 08:23) (15 - 16)  SpO2: 98% (06 Nov 2024 08:23) (95% - 98%)    Gen - NAD, Comfortable  HEENT - NCAT, EOMI  Neck - Supple, No limited ROM  Pulm - resp nonlabored  Cardiovascular - warm and well perfused, no cyanosis or pedal edema  Abdomen - Soft, NT/ND  Extremities - No peripheral edema, No calf tenderness  Neuro-     Cognitive - alert, oriented to name, month, year. able to follow simple commands and answer questions (delayed processing/response)     Communication -  No dysarthria, hypophonic     Attention: fair     Cranial Nerves - no facial asymmetry, tongue midline, EOMI, facial sensation intact     Motor +motor apraxia                    LEFT    UE - ShAB 5/5, EF 5/5, EE 5/5, WE 5/5,  5/5                    RIGHT UE - ShAB 5/5, EF 5/5, EE 5/5, WE 5/5,  5/5                    LEFT    LE - HF 5/5, KE 5/5, DF 5/5, PF 5/5                    RIGHT LE - HF 5/5, KE 5/5, DF 5/5, PF 5/5        Sensory - Intact to LT  Psychiatric - Mood stable, Affect WNL    RECENT LABS:                        11.4   9.12  )-----------( 459      ( 05 Nov 2024 05:53 )             34.8     11-05    139  |  103  |  21  ----------------------------<  93  4.2   |  25  |  1.07    Ca    9.7      05 Nov 2024 05:53    TPro  8.1  /  Alb  3.1[L]  /  TBili  0.5  /  DBili  x   /  AST  113[H]  /  ALT  421[H]  /  AlkPhos  167[H]  11-05    LIVER FUNCTIONS - ( 05 Nov 2024 05:53 )  Alb: 3.1 g/dL / Pro: 8.1 g/dL / ALK PHOS: 167 U/L / ALT: 421 U/L / AST: 113 U/L / GGT: x           Allergies  No Known Allergies  Intolerances    MEDICATIONS  (STANDING):  amLODIPine   Tablet 5 milliGRAM(s) Oral daily  aspirin enteric coated 81 milliGRAM(s) Oral daily  chlorhexidine 2% Cloths 1 Application(s) Topical daily  enoxaparin Injectable 40 milliGRAM(s) SubCutaneous <User Schedule>  ezetimibe 10 milliGRAM(s) Oral daily  FLUoxetine 20 milliGRAM(s) Oral daily  labetalol 200 milliGRAM(s) Oral two times a day  levoFLOXacin  Tablet 500 milliGRAM(s) Oral every 24 hours  losartan 50 milliGRAM(s) Oral daily  modafinil 100 milliGRAM(s) Oral <User Schedule>  pantoprazole    Tablet 40 milliGRAM(s) Oral before breakfast  polyethylene glycol 3350 17 Gram(s) Oral two times a day  senna 2 Tablet(s) Oral at bedtime  sodium chloride 2 Gram(s) Oral every 6 hours    MEDICATIONS  (PRN):

## 2024-11-06 NOTE — PROGRESS NOTE ADULT - ASSESSMENT
45 yo M with PMH of uncontrolled Hypertension who was admitted to [facility] for dizziness, headache. FOund to have L basal ganglia IPH with intraventricular hemorrhage extension. EEG negative. Hypercoag w/u thus far negative. Course complicated by transaminitis, hyponatremia, MRSA in nares. Now admitted to [facility] for rehab.    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 statin on hold d/t transaminitis     Hypertension   Orthostatic hypotension 11/5 am   - reducing norvasc to 5mg daily, losartan to 50mg daily   - can continue labetalol 100mg bid    Hyponatremia, SIADH  - continue NaCl tab 2g q6h. wean down/off as tolerated     Hyperlipidemia   - zetia  -   - not on statin d/t transaminitis     Transaminitis   - imaging from [facility] showing hepatic steatosis  - avoid hepatotoxins, trend LFTs    UTI   - continue levaquin. culture reviewed    DVT Prophylaxis:  - Lovenox

## 2024-11-07 DIAGNOSIS — R74.01 ELEVATION OF LEVELS OF LIVER TRANSAMINASE LEVELS: ICD-10-CM

## 2024-11-07 DIAGNOSIS — K76.0 FATTY (CHANGE OF) LIVER, NOT ELSEWHERE CLASSIFIED: ICD-10-CM

## 2024-11-07 LAB
ALBUMIN SERPL ELPH-MCNC: 3.2 G/DL — LOW (ref 3.3–5)
ALP SERPL-CCNC: 166 U/L — HIGH (ref 40–120)
ALT FLD-CCNC: 537 U/L — HIGH (ref 10–45)
ANION GAP SERPL CALC-SCNC: 10 MMOL/L — SIGNIFICANT CHANGE UP (ref 5–17)
AST SERPL-CCNC: 151 U/L — HIGH (ref 10–40)
BASOPHILS # BLD AUTO: 0.09 K/UL — SIGNIFICANT CHANGE UP (ref 0–0.2)
BASOPHILS NFR BLD AUTO: 1 % — SIGNIFICANT CHANGE UP (ref 0–2)
BILIRUB SERPL-MCNC: 0.5 MG/DL — SIGNIFICANT CHANGE UP (ref 0.2–1.2)
BUN SERPL-MCNC: 24 MG/DL — HIGH (ref 7–23)
CALCIUM SERPL-MCNC: 9.7 MG/DL — SIGNIFICANT CHANGE UP (ref 8.4–10.5)
CHLORIDE SERPL-SCNC: 103 MMOL/L — SIGNIFICANT CHANGE UP (ref 96–108)
CO2 SERPL-SCNC: 27 MMOL/L — SIGNIFICANT CHANGE UP (ref 22–31)
CREAT SERPL-MCNC: 1.22 MG/DL — SIGNIFICANT CHANGE UP (ref 0.5–1.3)
EGFR: 76 ML/MIN/1.73M2 — SIGNIFICANT CHANGE UP
EOSINOPHIL # BLD AUTO: 0.28 K/UL — SIGNIFICANT CHANGE UP (ref 0–0.5)
EOSINOPHIL NFR BLD AUTO: 3.2 % — SIGNIFICANT CHANGE UP (ref 0–6)
GLUCOSE SERPL-MCNC: 94 MG/DL — SIGNIFICANT CHANGE UP (ref 70–99)
HCT VFR BLD CALC: 35.7 % — LOW (ref 39–50)
HGB BLD-MCNC: 11.9 G/DL — LOW (ref 13–17)
IMM GRANULOCYTES NFR BLD AUTO: 1.1 % — HIGH (ref 0–0.9)
LYMPHOCYTES # BLD AUTO: 3.37 K/UL — HIGH (ref 1–3.3)
LYMPHOCYTES # BLD AUTO: 38.7 % — SIGNIFICANT CHANGE UP (ref 13–44)
MCHC RBC-ENTMCNC: 29 PG — SIGNIFICANT CHANGE UP (ref 27–34)
MCHC RBC-ENTMCNC: 33.3 G/DL — SIGNIFICANT CHANGE UP (ref 32–36)
MCV RBC AUTO: 87.1 FL — SIGNIFICANT CHANGE UP (ref 80–100)
MONOCYTES # BLD AUTO: 0.48 K/UL — SIGNIFICANT CHANGE UP (ref 0–0.9)
MONOCYTES NFR BLD AUTO: 5.5 % — SIGNIFICANT CHANGE UP (ref 2–14)
NEUTROPHILS # BLD AUTO: 4.39 K/UL — SIGNIFICANT CHANGE UP (ref 1.8–7.4)
NEUTROPHILS NFR BLD AUTO: 50.5 % — SIGNIFICANT CHANGE UP (ref 43–77)
NRBC # BLD: 0 /100 WBCS — SIGNIFICANT CHANGE UP (ref 0–0)
PLATELET # BLD AUTO: 408 K/UL — HIGH (ref 150–400)
POTASSIUM SERPL-MCNC: 4.2 MMOL/L — SIGNIFICANT CHANGE UP (ref 3.5–5.3)
POTASSIUM SERPL-SCNC: 4.2 MMOL/L — SIGNIFICANT CHANGE UP (ref 3.5–5.3)
PROT SERPL-MCNC: 8 G/DL — SIGNIFICANT CHANGE UP (ref 6–8.3)
RBC # BLD: 4.1 M/UL — LOW (ref 4.2–5.8)
RBC # FLD: 13.2 % — SIGNIFICANT CHANGE UP (ref 10.3–14.5)
SODIUM SERPL-SCNC: 140 MMOL/L — SIGNIFICANT CHANGE UP (ref 135–145)
WBC # BLD: 8.71 K/UL — SIGNIFICANT CHANGE UP (ref 3.8–10.5)
WBC # FLD AUTO: 8.71 K/UL — SIGNIFICANT CHANGE UP (ref 3.8–10.5)

## 2024-11-07 PROCEDURE — 99232 SBSQ HOSP IP/OBS MODERATE 35: CPT

## 2024-11-07 PROCEDURE — 99223 1ST HOSP IP/OBS HIGH 75: CPT

## 2024-11-07 PROCEDURE — 99233 SBSQ HOSP IP/OBS HIGH 50: CPT | Mod: GC

## 2024-11-07 RX ORDER — POLYETHYLENE GLYCOL 3350 17 G/17G
17 POWDER, FOR SOLUTION ORAL DAILY
Refills: 0 | Status: DISCONTINUED | OUTPATIENT
Start: 2024-11-07 | End: 2024-11-13

## 2024-11-07 RX ORDER — LOSARTAN POTASSIUM 25 MG/1
25 TABLET ORAL DAILY
Refills: 0 | Status: DISCONTINUED | OUTPATIENT
Start: 2024-11-07 | End: 2024-11-13

## 2024-11-07 RX ADMIN — POLYETHYLENE GLYCOL 3350 17 GRAM(S): 17 POWDER, FOR SOLUTION ORAL at 05:08

## 2024-11-07 RX ADMIN — Medication 200 MILLIGRAM(S): at 05:07

## 2024-11-07 RX ADMIN — Medication 200 MILLIGRAM(S): at 17:18

## 2024-11-07 RX ADMIN — MODAFINIL 100 MILLIGRAM(S): 200 TABLET ORAL at 08:46

## 2024-11-07 RX ADMIN — Medication 81 MILLIGRAM(S): at 12:07

## 2024-11-07 RX ADMIN — SODIUM CHLORIDE 1 GRAM(S): 9 INJECTION, SOLUTION INTRAMUSCULAR; INTRAVENOUS; SUBCUTANEOUS at 05:08

## 2024-11-07 RX ADMIN — EZETIMIBE 10 MILLIGRAM(S): 10 TABLET ORAL at 12:07

## 2024-11-07 RX ADMIN — SODIUM CHLORIDE 1 GRAM(S): 9 INJECTION, SOLUTION INTRAMUSCULAR; INTRAVENOUS; SUBCUTANEOUS at 17:18

## 2024-11-07 RX ADMIN — PANTOPRAZOLE SODIUM 40 MILLIGRAM(S): 40 TABLET, DELAYED RELEASE ORAL at 05:08

## 2024-11-07 RX ADMIN — Medication 2 TABLET(S): at 21:13

## 2024-11-07 RX ADMIN — Medication 20 MILLIGRAM(S): at 12:07

## 2024-11-07 RX ADMIN — Medication 40 MILLIGRAM(S): at 17:18

## 2024-11-07 RX ADMIN — SODIUM CHLORIDE 1 GRAM(S): 9 INJECTION, SOLUTION INTRAMUSCULAR; INTRAVENOUS; SUBCUTANEOUS at 12:10

## 2024-11-07 RX ADMIN — CHLORHEXIDINE GLUCONATE 1 APPLICATION(S): 40 SOLUTION TOPICAL at 12:08

## 2024-11-07 NOTE — PROGRESS NOTE ADULT - ASSESSMENT
44 year old, Divehi speaking, male with PMH of uncontrolled HTN; who presented to [facility] on 10/13 for headache, dizziness, vomiting and flu-like symptoms. Imaging was significant for L basal ganglia IPH with intraventricular hemorrhage extension. MR showed R corona radiata ischemic infarct.  Vascular neurology was consulted and had concerns for infectious etiology vs cardiac cause. EEG negative. His hospital course was significant for hypercoagulable workup (negative thus far), leukocytosis (2/2 UTI on ABX), transaminitis/hepatic steatosis (statin on hold), hyponatremia (on NaCL tabs), and +MRSA (completed mupirocin in nares). Patient now admitted for a multidisciplinary rehab program. 11-04-24 @ 13:26  -------------    Rehab Management/MEDICAL MANAGEMENT     #CVA- Basal Ganglia IPH & R corona radiata infarct  - Gait Instability, ADL impairments and Functional impairments: start Comprehensive Rehab Program of PT/OT/SLP  - 3 hours a day, 5 days a week  - P&O as needed   - Neurology Impression: AMS and dizziness due to Nontraumatic intracerebral hemorrhage subcortical w/ IVH, perihematomal cerebral edema and brain compression. Etiology likely hypertensive hemorrhage.   - ASA 81mg daily  - Statin on hold d/t elevated LFT ( )  - Plan for outpatient ILR placement     #HTN/HLD - BP soft  - Amlodipine 10my daily --dec to 5 mg (11/5) - held today  - Ezetimibe 10mg daily   - Labetalol 200mg TID -- change to BID (11/5)  - Losartan 100mg daily -- change to 50 (11/5) - held today   - /70 - 111/76 (11/7)    #Hyponatremia  - Sodium Chloride 2G QID >  reduce NaCl to 1g QID (11/6)  - Na 139 (11/5) > 140 (11/7), will dc after 11/8 dose, and then CMP 11/11    #Sleep/Mood/Alertness  - Fluoxetine 20mg daily   - Modafinil 100mg daily     #Skin  #MRSA in nares  - Completed Mupirocin in BL nares on 11/2   - Skin on admission: intact  - Pressure injury/Skin: OOB to Chair, PT/OT     #Pain Mgmt   - Tylenol PRN-- DC due to transaminitis    #Transaminitis  - Statin on hold   - AST//334 (11/4) > 113/421 (11/5)>151/537 (11/7)  - Alk phos 174 (11/4) > 167 (11/5)> 166(11/7)  - US abd (10/21) No acute cholecystitis; Hepatic steatosis    #GI/Bowel Mgmt   - Pantoprazole   - Senna & Miralax QD    #/Bladder Mgmt   #UTI  - Levofloxacin 500mg daily (til 11/11)  - voiding     #FEN   - Diet - Regular + Thins   - Dysphagia  SLP - evaluation and treatment    #Precautions / PROPHYLAXIS:   - Falls  - ortho: Weight bearing status: WBAT   - Lungs: Aspiration, Incentive Spirometer   - DVT: Lovenox 40mg daily   -----------------------------------------------  OUTPATIENT/FOLLOW UP:    Ashley Jiang  NP in [facility]  [address] [place] [address], [facility] [address]  [place] [address]  Phone: [phone]  Fax: [phone]  Follow Up Time: 2 weeks    Bernardo Chilel  Cardiovascular Disease  [address] [place] [address], [facility] [address]  [place] [address]  Phone: [phone]  Fax: [phone]  Follow Up Time: 1 month    Ana Resendez  Vascular Neurology  [address] [place] [address] [address]  [place] [address]  Phone: [phone]  Fax: [phone]  Follow Up Time: 1 week    Hawk Lizarraga  Cardiac Electrophysiology  [address] [place] [address], [address]  [place] [address]  Phone: [phone]  Fax: [phone]  Follow Up Time: 1 week    Hawk Weston  Gastroenterology  [address] [place] [address]  Phone: [phone]  Fax: [phone]  Follow Up Time: 1 week    Naboush, Ali  Hematology  [address] [place] [address]  Phone: [phone]  Fax: [phone]  Follow Up Time: 1 week    IDT 11/6  NSG: cont B/B  SW: lives with sister on 1st fl apt, 0 MICH.  has great family support in community  SLP: reg diet, recept language difficulty with multi step command, mild-mod cog, reduce attention, delay recall, reduced problem solving, safety awareness fine. not very motivated.    OT: SV grooming, Closer SV dressing, CGA bathing, CGA-close SV toileting/bathroom transfers  PT: SV bed mob, CGA transfer/am 100' no AD/ 12 steps 2 HR  Team goals: pt use external aide to recall therapy info min A; amb to bathroom + toilet SV  Barriers: cog, initiation  TDD: 11/13 home SV during waking hours   44 year old, German speaking, male with PMH of uncontrolled HTN; who presented to [facility] on 10/13 for headache, dizziness, vomiting and flu-like symptoms. Imaging was significant for L basal ganglia IPH with intraventricular hemorrhage extension. MR showed R corona radiata ischemic infarct.  Vascular neurology was consulted and had concerns for infectious etiology vs cardiac cause. EEG negative. His hospital course was significant for hypercoagulable workup (negative thus far), leukocytosis (2/2 UTI on ABX), transaminitis/hepatic steatosis (statin on hold), hyponatremia (on NaCL tabs), and +MRSA (completed mupirocin in nares). Patient now admitted for a multidisciplinary rehab program. 11-04-24 @ 13:26  -------------    Rehab Management/MEDICAL MANAGEMENT     #CVA- Basal Ganglia IPH & R corona radiata infarct  - Gait Instability, ADL impairments and Functional impairments: start Comprehensive Rehab Program of PT/OT/SLP  - 3 hours a day, 5 days a week  - P&O as needed   - Neurology Impression: AMS and dizziness due to Nontraumatic intracerebral hemorrhage subcortical w/ IVH, perihematomal cerebral edema and brain compression. Etiology likely hypertensive hemorrhage.   - ASA 81mg daily  - Statin on hold d/t elevated LFT ( )  - Plan for outpatient ILR placement     #HTN/HLD - BP soft  - Amlodipine 10my daily --dec to 5 mg (11/5) - Dc  - Ezetimibe 10mg daily   - Labetalol 200mg TID -- change to BID (11/5)  - Losartan 100mg daily -- change to 50 (11/5) - held today > dec 25 QD(11/7)  - /70 - 111/76 (11/7)    #Hyponatremia  - Sodium Chloride 2G QID >  reduce NaCl to 1g QID (11/6)  - Na 139 (11/5) > 140 (11/7), will dc after 11/8 dose, and then CMP 11/11    #Sleep/Mood/Alertness  - Fluoxetine 20mg daily   - Modafinil 100mg daily     #Skin  #MRSA in nares  - Completed Mupirocin in BL nares on 11/2   - Skin on admission: intact  - Pressure injury/Skin: OOB to Chair, PT/OT     #Pain Mgmt   - Tylenol PRN-- DC due to transaminitis    #Transaminitis  - Statin on hold   - AST//334 (11/4) > 113/421 (11/5)>151/537 (11/7)  - Alk phos 174 (11/4) > 167 (11/5)> 166(11/7)  - US abd (10/21) No acute cholecystitis; Hepatic steatosis  - GI Consult for eval/management    #GI/Bowel Mgmt   - Pantoprazole   - Senna & Miralax QD    #/Bladder Mgmt   #UTI  - Levofloxacin 500mg daily (til 11/11)  - voiding     #FEN   - Diet - Regular + Thins   - Dysphagia  SLP - evaluation and treatment    #Precautions / PROPHYLAXIS:   - Falls  - ortho: Weight bearing status: WBAT   - Lungs: Aspiration, Incentive Spirometer   - DVT: Lovenox 40mg daily   -----------------------------------------------  OUTPATIENT/FOLLOW UP:    Ashley Jiang  NP in Family Health  [address] [place] [address] [address]  [place] [address]  Phone: [phone]  Fax: [phone]  Follow Up Time: 2 weeks    Bernardo Chilel  Cardiovascular Disease  [address] [place] [address], [facility] [address]  [place] [address]  Phone: [phone]  Fax: [phone]  Follow Up Time: 1 month    Ana Resendez  Vascular Neurology  [address] [place] [address] [address]  [place] [address]  Phone: [phone]  Fax: [phone]  Follow Up Time: 1 week    Hawk Lizarraga  Cardiac Electrophysiology  [address] [place] [address], [address]  [place] [address]  Phone: [phone]  Fax: [phone]  Follow Up Time: 1 week    Hawk Weston  Gastroenterology  [address] [place] [address]  Phone: [phone]  Fax: [phone]  Follow Up Time: 1 week    Saadcarroll Angela  Hematology  [address] [place] [address]  Phone: [phone]  Fax: [phone]  Follow Up Time: 1 week    IDT 11/6  NSG: cont B/B  SW: lives with sister on 1st fl apt, 0 MICH.  has great family support in community  SLP: reg diet, recept language difficulty with multi step command, mild-mod cog, reduce attention, delay recall, reduced problem solving, safety awareness fine. not very motivated.    OT: SV grooming, Closer SV dressing, CGA bathing, CGA-close SV toileting/bathroom transfers  PT: SV bed mob, CGA transfer/am 100' no AD/ 12 steps 2 HR  Team goals: pt use external aide to recall therapy info min A; amb to bathroom + toilet SV  Barriers: cog, initiation  TDD: 11/13 home SV during waking hours

## 2024-11-07 NOTE — PROGRESS NOTE ADULT - SUBJECTIVE AND OBJECTIVE BOX
PROGRESS NOTE:     Patient is a 44y old  Male who presents with a chief complaint of CVA- Basal Ganglia Hemorrhage & R corona radiata infarct (07 Nov 2024 11:31)      SUBJECTIVE / OVERNIGHT EVENTS: pt was seen and evaluated today  no complains offered  discussed bp medications adjustment and pt agreeble    ADDITIONAL REVIEW OF SYSTEMS: as per HPI    MEDICATIONS  (STANDING):  aspirin enteric coated 81 milliGRAM(s) Oral daily  chlorhexidine 2% Cloths 1 Application(s) Topical daily  enoxaparin Injectable 40 milliGRAM(s) SubCutaneous <User Schedule>  ezetimibe 10 milliGRAM(s) Oral daily  FLUoxetine 20 milliGRAM(s) Oral daily  labetalol 200 milliGRAM(s) Oral two times a day  levoFLOXacin  Tablet 500 milliGRAM(s) Oral every 24 hours  losartan 25 milliGRAM(s) Oral daily  modafinil 100 milliGRAM(s) Oral <User Schedule>  pantoprazole    Tablet 40 milliGRAM(s) Oral before breakfast  polyethylene glycol 3350 17 Gram(s) Oral daily  senna 2 Tablet(s) Oral at bedtime  sodium chloride 1 Gram(s) Oral four times a day    MEDICATIONS  (PRN):      CAPILLARY BLOOD GLUCOSE        I&O's Summary      PHYSICAL EXAM:  Vital Signs Last 24 Hrs  T(C): 37.1 (07 Nov 2024 08:34), Max: 37.1 (06 Nov 2024 19:48)  T(F): 98.7 (07 Nov 2024 08:34), Max: 98.8 (06 Nov 2024 19:48)  HR: 74 (07 Nov 2024 08:34) (70 - 90)  BP: 111/76 (07 Nov 2024 08:34) (106/70 - 120/83)  BP(mean): --  RR: 16 (07 Nov 2024 08:34) (14 - 16)  SpO2: 97% (07 Nov 2024 08:34) (97% - 98%)    Parameters below as of 07 Nov 2024 08:34  Patient On (Oxygen Delivery Method): room air      PHYSICAL EXAM:  General: NAD, awake, alert. pleasant   ENT: MMM, no tonsilar exudate  Neck: Supple, No JVD  Lungs: Clear to auscultation bilaterally, no wheezes. Good air entry bilaterally   Cardio: RRR, S1/S2, No murmurs  Abdomen: Soft, Nontender, Nondistended; Bowel sounds present  Extremities: No calf tenderness, No pitting edema      LABS:                        11.9   8.71  )-----------( 408      ( 07 Nov 2024 05:54 )             35.7     11-07    140  |  103  |  24[H]  ----------------------------<  94  4.2   |  27  |  1.22    Ca    9.7      07 Nov 2024 05:54    TPro  8.0  /  Alb  3.2[L]  /  TBili  0.5  /  DBili  x   /  AST  151[H]  /  ALT  537[H]  /  AlkPhos  166[H]  11-07          Urinalysis Basic - ( 07 Nov 2024 05:54 )    Color: x / Appearance: x / SG: x / pH: x  Gluc: 94 mg/dL / Ketone: x  / Bili: x / Urobili: x   Blood: x / Protein: x / Nitrite: x   Leuk Esterase: x / RBC: x / WBC x   Sq Epi: x / Non Sq Epi: x / Bacteria: x          discussed during IDR

## 2024-11-07 NOTE — CONSULT NOTE ADULT - SUBJECTIVE AND OBJECTIVE BOX
INTERVAL HPI/OVERNIGHT EVENTS:  HPI:  Von Ferro is a 44 year old, Portuguese speaking, male with PMH of uncontrolled HTN; who presented to [facility] on 10/13 for headache, dizziness, vomiting and flu-like symptoms. Imaging was significant for L basal ganglia IPH with intraventricular hemorrhage extension. MR showed R corona radiata ischemic infarct.  Vascular neurology was consulted and had concerns for infectious etiology vs cardiac cause. EEG negative. His hospital course was significant for hypercoagulable workup (negative thus far), leukocytosis (2/2 UTI on ABX), transaminitis/hepatic steatosis (statin on hold), hyponatremia (on NaCL tabs), and +MRSA (completed mupirocin in nares). PM&R was consulted and deemed himan appropriate candidate for IRF. He was medically stabilized and cleared for discharge to [facility].      GI consult was placed for elevated LFTs.     I examined the patient today at bedside. He denies abdominal pain, nausea, vomiting, diarrhea, change in stool color, cough, SOB.   He states that he did not know he has fatty liver. Denies drinking or drugs. Denies using pain medications such as tylenol at home. Smokes 2-3 cigarettes/day. No    MEDICATIONS  (STANDING):  aspirin enteric coated 81 milliGRAM(s) Oral daily  chlorhexidine 2% Cloths 1 Application(s) Topical daily  enoxaparin Injectable 40 milliGRAM(s) SubCutaneous <User Schedule>  ezetimibe 10 milliGRAM(s) Oral daily  FLUoxetine 20 milliGRAM(s) Oral daily  labetalol 200 milliGRAM(s) Oral two times a day  levoFLOXacin  Tablet 500 milliGRAM(s) Oral every 24 hours  losartan 25 milliGRAM(s) Oral daily  modafinil 100 milliGRAM(s) Oral <User Schedule>  pantoprazole    Tablet 40 milliGRAM(s) Oral before breakfast  polyethylene glycol 3350 17 Gram(s) Oral daily  senna 2 Tablet(s) Oral at bedtime  sodium chloride 1 Gram(s) Oral four times a day    MEDICATIONS  (PRN):      Allergies    No Known Allergies    Intolerances        PAST MEDICAL & SURGICAL HISTORY:  HTN (hypertension)          REVIEW OF SYSTEMS      General:	    Skin/Breast:  	  Ophthalmologic:  	  ENMT:	    Respiratory and Thorax:  	  Cardiovascular:	    Gastrointestinal:	    Genitourinary:	    Musculoskeletal:	    Neurological:	    Psychiatric:	    Hematology/Lymphatics:	    Endocrine:	    Allergic/Immunologic:	    PHYSICAL EXAM:   Vital Signs:  Vital Signs Last 24 Hrs  T(C): 37.1 (07 Nov 2024 08:34), Max: 37.1 (06 Nov 2024 19:48)  T(F): 98.7 (07 Nov 2024 08:34), Max: 98.8 (06 Nov 2024 19:48)  HR: 74 (07 Nov 2024 08:34) (70 - 90)  BP: 111/76 (07 Nov 2024 08:34) (106/70 - 120/83)  BP(mean): --  RR: 16 (07 Nov 2024 08:34) (14 - 16)  SpO2: 97% (07 Nov 2024 08:34) (97% - 98%)    Parameters below as of 07 Nov 2024 08:34  Patient On (Oxygen Delivery Method): room air      Daily     Daily I&O's Summary      GENERAL:  Appears stated age, no distress  HEENT:  NC/AT, no JVD, sclera anicteric  CHEST:  Full & symmetric excursion, no increased effort, breath sounds clear  HEART:  Regular rhythm, S1, S2  ABDOMEN:  Soft, non-tender, non-distended, normoactive bowel sounds,  no masses ,no HM  EXTREMITIES:  no edema  NEURO:  Alert, no asterixis, no tremor    LABS:                        11.9   8.71  )-----------( 408      ( 07 Nov 2024 05:54 )             35.7     11-07    140  |  103  |  24[H]  ----------------------------<  94  4.2   |  27  |  1.22    Ca    9.7      07 Nov 2024 05:54    TPro  8.0  /  Alb  3.2[L]  /  TBili  0.5  /  DBili  x   /  AST  151[H]  /  ALT  537[H]  /  AlkPhos  166[H]  11-07      Urinalysis Basic - ( 07 Nov 2024 05:54 )    Color: x / Appearance: x / SG: x / pH: x  Gluc: 94 mg/dL / Ketone: x  / Bili: x / Urobili: x   Blood: x / Protein: x / Nitrite: x   Leuk Esterase: x / RBC: x / WBC x   Sq Epi: x / Non Sq Epi: x / Bacteria: x      amylase   lipase  RADIOLOGY & ADDITIONAL TESTS:  < from: US Abdomen Upper Quadrant Right (10.21.24 @ 19:27) >  IMPRESSION:  No sonographic evidence of acute cholecystitis.  Hepatic steatosis.    < end of copied text >

## 2024-11-07 NOTE — PROGRESS NOTE ADULT - ASSESSMENT
45 yo M with PMH of uncontrolled Hypertension who was admitted to [facility] for dizziness, headache. FOund to have L basal ganglia IPH with intraventricular hemorrhage extension. EEG negative. Hypercoag w/u thus far negative. Course complicated by transaminitis, hyponatremia, MRSA in nares. Now admitted to [facility] for rehab.    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 statin on hold d/t transaminitis       Hypertension   Orthostatic hypotension 11/5 am   -  holding norvasc to 5mg daily, and reducing losartan to 25mg daily with hold paramenters   - can continue labetalol 100mg bid    Hyponatremia, SIADH  - continue NaCl tab 2g q6h. wean down/off as tolerated     Hyperlipidemia   - zetia  -   - not on statin d/t transaminitis     Transaminitis   - imaging from [facility] showing hepatic steatosis  - avoid hepatotoxins, trend LFTs  - GI to see today  - hepatitis panel ordered    UTI   - continue levaquin. culture reviewed    DVT Prophylaxis:  - Lovenox

## 2024-11-07 NOTE — PROGRESS NOTE ADULT - NS ATTEND AMEND GEN_ALL_CORE FT
I have personally seen and examined the patient.  I fully participated in the care of this patient.  I have made amendments to the documentation where necessary, and agree with the history, physical exam, and plan as documented above  Patient seen at bedside, cognitively impaired, confused at times, delayed processing.   GI Consult appreciated for uptrending LFTS-- per note-- - potentially transient secondary to levaquin/ertapenem, repeat hep panel pending.  Discussed medical updates and plan with nursing and hospitalist on team rounds

## 2024-11-07 NOTE — CONSULT NOTE ADULT - ASSESSMENT
45 yo M with PMH of uncontrolled Hypertension who was admitted to [facility] for dizziness, headache. FOund to have L basal ganglia IPH with intraventricular hemorrhage extension. EEG negative. Hypercoag w/u thus far negative. Course complicated by transaminitis, hyponatremia, MRSA in nares. Now admitted to [facility] for rehab.    GI team consulted for:    #Acute Transaminitis, unclear origin  #Steatohepatosis  - , , 537  - Normal bilirubin, no thrombocytopenia, normal INR  - denies alcohol use, no tylenol given during this admission ( few doses given at [facility]), no viral symptoms to indicate EBV, etc.   - CT with GALLBLADDER: Mildly distended. No gallstones. No pericholecystic   inflammatory changes. Normal caliber bile ducts.  - imaging from [facility] showing hepatic steatosis  - potentially transient secondary to levaquin/ertapenem  - acute viral hepatitis panel negative 10/23, repeat pending   - avoid hepatotoxins, trend LFTs  - will need outpatient GI follow up    Rest as per primary team.  Case discussed with Dr. Jaquez 43 yo M with PMH of uncontrolled Hypertension who was admitted to [facility] for dizziness, headache. FOund to have L basal ganglia IPH with intraventricular hemorrhage extension. EEG negative. Hypercoag w/u thus far negative. Course complicated by transaminitis, hyponatremia, MRSA in nares. Now admitted to [facility] for rehab.    GI team consulted for:    #Acute Transaminitis, unclear origin  #Steatohepatosis  - , , 537  - Normal bilirubin, no thrombocytopenia, normal INR  - denies alcohol use, no tylenol given during this admission ( few doses given at [facility]), no viral symptoms to indicate EBV, etc.   - CT with GALLBLADDER: Mildly distended. No gallstones. No pericholecystic   inflammatory changes. Normal caliber bile ducts.  - imaging from [facility] showing hepatic steatosis  - potentially transient secondary to levaquin/ertapenem  - acute viral hepatitis panel negative 10/23, repeat pending   - avoid hepatotoxins, trend LFTs  - repeat RUQ US  - will need outpatient GI follow up    Rest as per primary team.  Case discussed with Dr. Jaquez 43 yo M with PMH of uncontrolled Hypertension who was admitted to [facility] for dizziness, headache. FOund to have L basal ganglia IPH with intraventricular hemorrhage extension. EEG negative. Hypercoag w/u thus far negative. Course complicated by transaminitis, hyponatremia, MRSA in nares. Now admitted to [facility] for rehab.    GI team consulted for:    #Acute Transaminitis, unclear origin  #Steatohepatosis  - , , 537  - Normal bilirubin, no thrombocytopenia, normal INR  - denies alcohol use, no tylenol given during this admission ( few doses given at [facility]), no viral symptoms to indicate EBV, etc.   - CT with GALLBLADDER: Mildly distended. No gallstones. No pericholecystic   inflammatory changes. Normal caliber bile ducts.  - imaging from [facility] showing hepatic steatosis  - potentially transient secondary to levaquin/ertapenem  - acute viral hepatitis panel negative 10/23, repeat pending   - avoid hepatotoxins, trend LFTs  - will need outpatient GI follow up    Rest as per primary team.  Case discussed with Dr. Jaquez

## 2024-11-07 NOTE — CONSULT NOTE ADULT - ATTENDING COMMENTS
Agree with assessment and plan as above.    45 y/o M with outside hospital admission for CVA and intracranial hemorrhage. Admitted to  inpatient rehab. GI consulted for elevated LFTs. LFT elevation present even at outside hospital. Hepatitis serologies negative. Ab US showing heterogenous appearing liver. Unclear etiology of LFT elevation at this time. Possibly DILI on top of steatotic liver disease. Continue to trend liver enzymes.     >75 minutes was spent on direct patient care

## 2024-11-07 NOTE — CONSULT NOTE ADULT - REASON FOR ADMISSION
CVA- Basal Ganglia Hemorrhage & R corona radiata infarct
CVA- Basal Ganglia Hemorrhage & R corona radiata infarct

## 2024-11-07 NOTE — PROGRESS NOTE ADULT - SUBJECTIVE AND OBJECTIVE BOX
Patient is a 44y old  Male who presents with a chief complaint of CVA- Basal Ganglia Hemorrhage & R corona radiata infarct     HPI:  Von Ferro is a 44 year old, Malay speaking, male with PMH of uncontrolled HTN; who presented to [facility] on 10/13 for headache, dizziness, vomiting and flu-like symptoms. Imaging was significant for L basal ganglia IPH with intraventricular hemorrhage extension. MR showed R corona radiata ischemic infarct.  Vascular neurology was consulted and had concerns for infectious etiology vs cardiac cause. EEG negative. His hospital course was significant for hypercoagulable workup (negative thus far), leukocytosis (2/2 UTI on ABX), transaminitis/hepatic steatosis (statin on hold), hyponatremia (on NaCL tabs), and +MRSA (completed mupirocin in nares). PM&R was consulted and deemed him an appropriate candidate for IRF. He was medically stabilized and cleared for discharge to [facility].     PAST MEDICAL & SURGICAL HISTORY:  HTN (hypertension)    SUBJECTIVE/OBJECTIVE: Patient seen and evaluated while sitting up in WC at bedside.  Consumed 100% of breakfast tray independently. Overall feeling well.  Unable to recall name of community pharmacy, was repeating an address?     REVIEW OF SYSTEMS:  Denies CP, SOB, HA, pain  +cog impairment  +limb apraxia   +BP soft (medications adjusted yesterday)    PHYSICAL EXAM  44y  Vital Signs Last 24 Hrs  T(C): 37.1 (11-07-24 @ 08:34), Max: 37.1 (11-06-24 @ 19:48)  T(F): 98.7 (11-07-24 @ 08:34), Max: 98.8 (11-06-24 @ 19:48)  HR: 74 (11-07-24 @ 08:34) (70 - 90)  BP: 111/76 (11-07-24 @ 08:34) (106/70 - 120/83)  RR: 16 (11-07-24 @ 08:34) (14 - 16)  SpO2: 97% (11-07-24 @ 08:34) (97% - 98%)    Gen - NAD, Comfortable  HEENT - NCAT, EOMI  Pulm - resp nonlabored  Cardiovascular - warm and well perfused, no cyanosis or pedal edema  Abdomen - Soft, NT/ND  Extremities - No peripheral edema, No calf tenderness  Neuro-     Cognitive - alert, oriented to name, month, year. able to follow simple commands and answer questions (delayed processing/response)     Communication -  No dysarthria, hypophonic, +perseveration     Attention: fair     Cranial Nerves - no facial asymmetry, tongue midline, EOMI, facial sensation intact     Motor +motor apraxia                    LEFT    UE - ShAB 5/5, EF 5/5, EE 5/5, WE 5/5,  5/5                    RIGHT UE - ShAB 5/5, EF 5/5, EE 5/5, WE 5/5,  5/5                    LEFT    LE - HF 5/5, KE 5/5, DF 5/5, PF 5/5                    RIGHT LE - HF 5/5, KE 5/5, DF 5/5, PF 5/5        Sensory - Intact to LT  Psychiatric - Mood stable, Affect WNL    RECENT LABS:                        11.9   8.71  )-----------( 408      ( 07 Nov 2024 05:54 )             35.7     11-07    140  |  103  |  24[H]  ----------------------------<  94  4.2   |  27  |  1.22    Ca    9.7      07 Nov 2024 05:54    TPro  8.0  /  Alb  3.2[L]  /  TBili  0.5  /  DBili  x   /  AST  151[H]  /  ALT  537[H]  /  AlkPhos  166[H]  11-07    LIVER FUNCTIONS - ( 07 Nov 2024 05:54 )  Alb: 3.2 g/dL / Pro: 8.0 g/dL / ALK PHOS: 166 U/L / ALT: 537 U/L / AST: 151 U/L / GGT: x           Allergies  No Known Allergies  Intolerances    MEDICATIONS  (STANDING):  amLODIPine   Tablet 5 milliGRAM(s) Oral daily  aspirin enteric coated 81 milliGRAM(s) Oral daily  chlorhexidine 2% Cloths 1 Application(s) Topical daily  enoxaparin Injectable 40 milliGRAM(s) SubCutaneous <User Schedule>  ezetimibe 10 milliGRAM(s) Oral daily  FLUoxetine 20 milliGRAM(s) Oral daily  labetalol 200 milliGRAM(s) Oral two times a day  levoFLOXacin  Tablet 500 milliGRAM(s) Oral every 24 hours  losartan 50 milliGRAM(s) Oral daily  modafinil 100 milliGRAM(s) Oral <User Schedule>  pantoprazole    Tablet 40 milliGRAM(s) Oral before breakfast  polyethylene glycol 3350 17 Gram(s) Oral two times a day  senna 2 Tablet(s) Oral at bedtime  sodium chloride 1 Gram(s) Oral four times a day    MEDICATIONS  (PRN):

## 2024-11-08 LAB
ALBUMIN SERPL ELPH-MCNC: 3.1 G/DL — LOW (ref 3.3–5)
ALP SERPL-CCNC: 156 U/L — HIGH (ref 40–120)
ALT FLD-CCNC: 476 U/L — HIGH (ref 10–45)
ANION GAP SERPL CALC-SCNC: 8 MMOL/L — SIGNIFICANT CHANGE UP (ref 5–17)
AST SERPL-CCNC: 122 U/L — HIGH (ref 10–40)
BILIRUB SERPL-MCNC: 0.5 MG/DL — SIGNIFICANT CHANGE UP (ref 0.2–1.2)
BUN SERPL-MCNC: 23 MG/DL — SIGNIFICANT CHANGE UP (ref 7–23)
CALCIUM SERPL-MCNC: 9.2 MG/DL — SIGNIFICANT CHANGE UP (ref 8.4–10.5)
CHLORIDE SERPL-SCNC: 103 MMOL/L — SIGNIFICANT CHANGE UP (ref 96–108)
CO2 SERPL-SCNC: 26 MMOL/L — SIGNIFICANT CHANGE UP (ref 22–31)
CREAT SERPL-MCNC: 1.28 MG/DL — SIGNIFICANT CHANGE UP (ref 0.5–1.3)
EGFR: 71 ML/MIN/1.73M2 — SIGNIFICANT CHANGE UP
GLUCOSE SERPL-MCNC: 98 MG/DL — SIGNIFICANT CHANGE UP (ref 70–99)
HAV IGM SER-ACNC: SIGNIFICANT CHANGE UP
HBV CORE IGM SER-ACNC: SIGNIFICANT CHANGE UP
HBV SURFACE AG SER-ACNC: SIGNIFICANT CHANGE UP
HCV AB S/CO SERPL IA: 0.32 S/CO — SIGNIFICANT CHANGE UP (ref 0–0.99)
HCV AB SERPL-IMP: SIGNIFICANT CHANGE UP
INR BLD: 1.08 RATIO — SIGNIFICANT CHANGE UP (ref 0.85–1.16)
MELD SCORE WITH DIALYSIS: 21 POINTS — SIGNIFICANT CHANGE UP
MELD SCORE WITHOUT DIALYSIS: 10 POINTS — SIGNIFICANT CHANGE UP
POTASSIUM SERPL-MCNC: 4.3 MMOL/L — SIGNIFICANT CHANGE UP (ref 3.5–5.3)
POTASSIUM SERPL-SCNC: 4.3 MMOL/L — SIGNIFICANT CHANGE UP (ref 3.5–5.3)
PROT SERPL-MCNC: 7.7 G/DL — SIGNIFICANT CHANGE UP (ref 6–8.3)
PROTHROM AB SERPL-ACNC: 12.7 SEC — SIGNIFICANT CHANGE UP (ref 9.9–13.4)
SODIUM SERPL-SCNC: 137 MMOL/L — SIGNIFICANT CHANGE UP (ref 135–145)

## 2024-11-08 PROCEDURE — 99232 SBSQ HOSP IP/OBS MODERATE 35: CPT

## 2024-11-08 PROCEDURE — 99233 SBSQ HOSP IP/OBS HIGH 50: CPT | Mod: GC

## 2024-11-08 RX ORDER — MODAFINIL 200 MG/1
100 TABLET ORAL
Refills: 0 | Status: DISCONTINUED | OUTPATIENT
Start: 2024-11-09 | End: 2024-11-12

## 2024-11-08 RX ADMIN — SODIUM CHLORIDE 1 GRAM(S): 9 INJECTION, SOLUTION INTRAMUSCULAR; INTRAVENOUS; SUBCUTANEOUS at 12:36

## 2024-11-08 RX ADMIN — Medication 40 MILLIGRAM(S): at 17:39

## 2024-11-08 RX ADMIN — SODIUM CHLORIDE 1 GRAM(S): 9 INJECTION, SOLUTION INTRAMUSCULAR; INTRAVENOUS; SUBCUTANEOUS at 17:41

## 2024-11-08 RX ADMIN — EZETIMIBE 10 MILLIGRAM(S): 10 TABLET ORAL at 12:23

## 2024-11-08 RX ADMIN — SODIUM CHLORIDE 1 GRAM(S): 9 INJECTION, SOLUTION INTRAMUSCULAR; INTRAVENOUS; SUBCUTANEOUS at 23:06

## 2024-11-08 RX ADMIN — SODIUM CHLORIDE 1 GRAM(S): 9 INJECTION, SOLUTION INTRAMUSCULAR; INTRAVENOUS; SUBCUTANEOUS at 05:02

## 2024-11-08 RX ADMIN — Medication 200 MILLIGRAM(S): at 05:02

## 2024-11-08 RX ADMIN — MODAFINIL 100 MILLIGRAM(S): 200 TABLET ORAL at 07:27

## 2024-11-08 RX ADMIN — Medication 200 MILLIGRAM(S): at 17:39

## 2024-11-08 RX ADMIN — CHLORHEXIDINE GLUCONATE 1 APPLICATION(S): 40 SOLUTION TOPICAL at 12:25

## 2024-11-08 RX ADMIN — Medication 20 MILLIGRAM(S): at 12:24

## 2024-11-08 RX ADMIN — Medication 2 TABLET(S): at 23:06

## 2024-11-08 RX ADMIN — PANTOPRAZOLE SODIUM 40 MILLIGRAM(S): 40 TABLET, DELAYED RELEASE ORAL at 05:04

## 2024-11-08 RX ADMIN — LOSARTAN POTASSIUM 25 MILLIGRAM(S): 25 TABLET ORAL at 05:02

## 2024-11-08 RX ADMIN — Medication 81 MILLIGRAM(S): at 12:24

## 2024-11-08 RX ADMIN — SODIUM CHLORIDE 1 GRAM(S): 9 INJECTION, SOLUTION INTRAMUSCULAR; INTRAVENOUS; SUBCUTANEOUS at 00:30

## 2024-11-08 NOTE — PROGRESS NOTE ADULT - NS ATTEND AMEND GEN_ALL_CORE FT
I have personally seen and examined the patient.  I fully participated in the care of this patient.  I have made amendments to the documentation where necessary, and agree with the history, physical exam, and plan as documented above  Patient seen while sitting in the wheelchair at bedside. States he slept. Denies pain. ongoing cognitive impairments. Strength 5/5  discussed going home next week I have personally seen and examined the patient.  I fully participated in the care of this patient.  I have made amendments to the documentation where necessary, and agree with the history, physical exam, and plan as documented above  Patient seen while sitting in the wheelchair at bedside. States he slept. Denies pain. ongoing cognitive impairments. Strength 5/5  Appreciate GI consult-- Ab US showing heterogenous appearing liver. Unclear etiology of LFT elevation at this time. Possibly DILI on top of steatotic liver disease. Continue to trend liver enzymes.   discussed going home next week I have personally seen and examined the patient.  I fully participated in the care of this patient.  I have made amendments to the documentation where necessary, and agree with the history, physical exam, and plan as documented above  Patient seen while sitting in the wheelchair at bedside. States he slept. Denies pain. ongoing cognitive impairments. Strength 5/5 bilateral UE and LE  Appreciate GI consult-- Ab US showing heterogenous appearing liver. Unclear etiology of LFT elevation at this time. Possibly DILI on top of steatotic liver disease. Continue to trend liver enzymes.   discussed going home next week

## 2024-11-08 NOTE — PROGRESS NOTE ADULT - SUBJECTIVE AND OBJECTIVE BOX
Patient is a 44y old  Male who presents with a chief complaint of CVA- Basal Ganglia Hemorrhage & R corona radiata infarct     HPI:  Von Ferro is a 44 year old, Kazakh speaking, male with PMH of uncontrolled HTN; who presented to [facility] on 10/13 for headache, dizziness, vomiting and flu-like symptoms. Imaging was significant for L basal ganglia IPH with intraventricular hemorrhage extension. MR showed R corona radiata ischemic infarct.  Vascular neurology was consulted and had concerns for infectious etiology vs cardiac cause. EEG negative. His hospital course was significant for hypercoagulable workup (negative thus far), leukocytosis (2/2 UTI on ABX), transaminitis/hepatic steatosis (statin on hold), hyponatremia (on NaCL tabs), and +MRSA (completed mupirocin in nares). PM&R was consulted and deemed him an appropriate candidate for IRF. He was medically stabilized and cleared for discharge to [facility].     SUBJECTIVE/OBJECTIVE: Patient seen and evaluated while sitting up in WC at bedside. Feeling well, slept overnight. Unable to recall who brought him fruit. Tolerating therapy.    REVIEW OF SYSTEMS:  Denies CP, SOB, HA, pain, dysuria  +cog impairment  +limb apraxia   +BP normal tensive (denies dizzness)    PHYSICAL EXAM  44y  Vital Signs Last 24 Hrs  T(C): 36.7 (11-08-24 @ 07:31), Max: 36.8 (11-07-24 @ 19:56)  T(F): 98.1 (11-08-24 @ 07:31), Max: 98.3 (11-07-24 @ 19:56)  HR: 69 (11-08-24 @ 07:31) (69 - 95)  BP: 122/78 (11-08-24 @ 07:31) (110/70 - 123/89)  RR: 16 (11-08-24 @ 07:31) (14 - 16)  SpO2: 97% (11-08-24 @ 07:31) (97% - 98%)    Gen - NAD, Comfortable  HEENT - NCAT, EOMI  Pulm - resp nonlabored  Cardiovascular - warm and well perfused, no cyanosis or pedal edema  Abdomen - Soft, NT/ND  Extremities - No peripheral edema, No calf tenderness  Neuro-     Cognitive - AO to self and year. able to follow simple commands and answer questions (delayed processing/response)     Communication -  No dysarthria, hypophonic     Attention: fair     Cranial Nerves - no facial asymmetry, tongue midline, EOMI, facial sensation intact     Motor +motor apraxia                    LEFT    UE - ShAB 5/5, EF 5/5, EE 5/5, WE 5/5,  5/5                    RIGHT UE - ShAB 5/5, EF 5/5, EE 5/5, WE 5/5,  5/5                    LEFT    LE - HF 5/5, KE 5/5, DF 5/5, PF 5/5                    RIGHT LE - HF 5/5, KE 5/5, DF 5/5, PF 5/5        Sensory - Intact to LT  Psychiatric - Mood stable, Affect WNL    RECENT LABS:  11-08    137  |  103  |  23  ----------------------------<  98  4.3   |  26  |  1.28    Ca    9.2      08 Nov 2024 05:41    TPro  7.7  /  Alb  3.1[L]  /  TBili  0.5  /  DBili  x   /  AST  122[H]  /  ALT  476[H]  /  AlkPhos  156[H]  11-08    LIVER FUNCTIONS - ( 08 Nov 2024 05:41 )  Alb: 3.1 g/dL / Pro: 7.7 g/dL / ALK PHOS: 156 U/L / ALT: 476 U/L / AST: 122 U/L / GGT: x          11-07    140  |  103  |  24[H]  ----------------------------<  94  4.2   |  27  |  1.22    Ca    9.7      07 Nov 2024 05:54    TPro  8.0  /  Alb  3.2[L]  /  TBili  0.5  /  DBili  x   /  AST  151[H]  /  ALT  537[H]  /  AlkPhos  166[H]  11-07    LIVER FUNCTIONS - ( 07 Nov 2024 05:54 )  Alb: 3.2 g/dL / Pro: 8.0 g/dL / ALK PHOS: 166 U/L / ALT: 537 U/L / AST: 151 U/L / GGT: x                         11.9   8.71  )-----------( 408      ( 07 Nov 2024 05:54 )             35.7     Allergies  No Known Allergies  Intolerances    MEDICATIONS  (STANDING):  aspirin enteric coated 81 milliGRAM(s) Oral daily  chlorhexidine 2% Cloths 1 Application(s) Topical daily  enoxaparin Injectable 40 milliGRAM(s) SubCutaneous <User Schedule>  ezetimibe 10 milliGRAM(s) Oral daily  FLUoxetine 20 milliGRAM(s) Oral daily  labetalol 200 milliGRAM(s) Oral two times a day  levoFLOXacin  Tablet 500 milliGRAM(s) Oral every 24 hours  losartan 25 milliGRAM(s) Oral daily  modafinil 100 milliGRAM(s) Oral <User Schedule>  pantoprazole    Tablet 40 milliGRAM(s) Oral before breakfast  polyethylene glycol 3350 17 Gram(s) Oral daily  senna 2 Tablet(s) Oral at bedtime  sodium chloride 1 Gram(s) Oral four times a day    MEDICATIONS  (PRN):

## 2024-11-08 NOTE — CHART NOTE - NSCHARTNOTEFT_GEN_A_CORE
#Acute Transaminitis, unclear origin, likely medication induced  #Steatohepatosis  - Improvement in AST/ALT/ALP from yesterday: 151/537/166 > 122/476>156  - bili remains normal, repeat viral hepatitis panel negative  - avoid hepatotoxins, trend LFTs  - will need outpatient GI follow up    We will sign off. Reconsult as needed.    Rest as per primary team.  Case discussed with Dr. Goncalves #Acute Transaminitis, unclear origin, likely medication induced  #Steatohepatosis  - Improvement in AST/ALT/ALP from yesterday: 151/537/166 > 122/476>156  - bili remains normal, repeat viral hepatitis panel negative  - likely secondary to medication (suspect Meropenem)  - avoid further hepatotoxins, trend LFTs  - will need outpatient GI follow up    We will sign off. Reconsult as needed.    Rest as per primary team.  Case discussed with Dr. Goncalves

## 2024-11-08 NOTE — PROGRESS NOTE ADULT - SUBJECTIVE AND OBJECTIVE BOX
PROGRESS NOTE:     Patient is a 44y old  Male who presents with a chief complaint of CVA- Basal Ganglia Hemorrhage & R corona radiata infarct (07 Nov 2024 14:41)      SUBJECTIVE / OVERNIGHT EVENTS: pt was seen adn evaluated today  no complains offered     ADDITIONAL REVIEW OF SYSTEMS: as per HPI    MEDICATIONS  (STANDING):  aspirin enteric coated 81 milliGRAM(s) Oral daily  chlorhexidine 2% Cloths 1 Application(s) Topical daily  enoxaparin Injectable 40 milliGRAM(s) SubCutaneous <User Schedule>  ezetimibe 10 milliGRAM(s) Oral daily  FLUoxetine 20 milliGRAM(s) Oral daily  labetalol 200 milliGRAM(s) Oral two times a day  levoFLOXacin  Tablet 500 milliGRAM(s) Oral every 24 hours  losartan 25 milliGRAM(s) Oral daily  modafinil 100 milliGRAM(s) Oral <User Schedule>  pantoprazole    Tablet 40 milliGRAM(s) Oral before breakfast  polyethylene glycol 3350 17 Gram(s) Oral daily  senna 2 Tablet(s) Oral at bedtime  sodium chloride 1 Gram(s) Oral four times a day    MEDICATIONS  (PRN):      CAPILLARY BLOOD GLUCOSE        I&O's Summary      PHYSICAL EXAM:  Vital Signs Last 24 Hrs  T(C): 36.7 (08 Nov 2024 07:31), Max: 36.8 (07 Nov 2024 19:56)  T(F): 98.1 (08 Nov 2024 07:31), Max: 98.3 (07 Nov 2024 19:56)  HR: 69 (08 Nov 2024 07:31) (69 - 95)  BP: 122/78 (08 Nov 2024 07:31) (110/70 - 123/89)  BP(mean): --  RR: 16 (08 Nov 2024 07:31) (14 - 16)  SpO2: 97% (08 Nov 2024 07:31) (97% - 98%)    Parameters below as of 08 Nov 2024 07:31  Patient On (Oxygen Delivery Method): room air          PHYSICAL EXAM:  General: NAD, awake, alert. pleasant   ENT: MMM, no tonsilar exudate  Neck: Supple, No JVD  Lungs: Clear to auscultation bilaterally, no wheezes. Good air entry bilaterally   Cardio: RRR, S1/S2, No murmurs  Abdomen: Soft, Nontender, Nondistended; Bowel sounds present  Extremities: No calf tenderness, No pitting edema    LABS:                        11.9   8.71  )-----------( 408      ( 07 Nov 2024 05:54 )             35.7     11-08    137  |  103  |  23  ----------------------------<  98  4.3   |  26  |  1.28    Ca    9.2      08 Nov 2024 05:41    TPro  7.7  /  Alb  3.1[L]  /  TBili  0.5  /  DBili  x   /  AST  122[H]  /  ALT  476[H]  /  AlkPhos  156[H]  11-08    PT/INR - ( 08 Nov 2024 05:41 )   PT: 12.7 sec;   INR: 1.08 ratio               Urinalysis Basic - ( 08 Nov 2024 05:41 )    Color: x / Appearance: x / SG: x / pH: x  Gluc: 98 mg/dL / Ketone: x  / Bili: x / Urobili: x   Blood: x / Protein: x / Nitrite: x   Leuk Esterase: x / RBC: x / WBC x   Sq Epi: x / Non Sq Epi: x / Bacteria: x          discussed during IDR and with consultants

## 2024-11-08 NOTE — PROGRESS NOTE ADULT - ASSESSMENT
44 year old, Swedish speaking, male with PMH of uncontrolled HTN; who presented to [facility] on 10/13 for headache, dizziness, vomiting and flu-like symptoms. Imaging was significant for L basal ganglia IPH with intraventricular hemorrhage extension. MR showed R corona radiata ischemic infarct.  Vascular neurology was consulted and had concerns for infectious etiology vs cardiac cause. EEG negative. His hospital course was significant for hypercoagulable workup (negative thus far), leukocytosis (2/2 UTI on ABX), transaminitis/hepatic steatosis (statin on hold), hyponatremia (on NaCL tabs), and +MRSA (completed mupirocin in nares). Patient now admitted for a multidisciplinary rehab program. 11-04-24 @ 13:26  -------------    Rehab Management/MEDICAL MANAGEMENT     #CVA- Basal Ganglia IPH & R corona radiata infarct  - Gait Instability, ADL impairments and Functional impairments: start Comprehensive Rehab Program of PT/OT/SLP  - 3 hours a day, 5 days a week  - P&O as needed   - Neurology Impression: AMS and dizziness due to Nontraumatic intracerebral hemorrhage subcortical w/ IVH, perihematomal cerebral edema and brain compression. Etiology likely hypertensive hemorrhage.   - ASA 81mg daily  - Statin on hold d/t elevated LFT ( )  - Plan for outpatient ILR placement     #HTN/HLD -   - Amlodipine 10my daily --dec to 5 mg (11/5) - Dc  - Ezetimibe 10mg daily   - Labetalol 200mg TID -- change to BID (11/5)  - Losartan 100mg daily -- change to 50 (11/5) - held today > dec 25 QD(11/7)  - /70 - 122/80 (11/8)    #Hyponatremia  - Sodium Chloride 2G QID >  reduce NaCl to 1g QID (11/6)> BID  - Na 139 (11/5) > 140 (11/7), reduce to BID, repeat CMP 11/11    #Sleep/Mood/Alertness  - Fluoxetine 20mg daily   - Modafinil 100mg daily     #Skin  #MRSA in nares  - Completed Mupirocin in BL nares on 11/2   - Skin on admission: intact  - Pressure injury/Skin: OOB to Chair, PT/OT     #Pain Mgmt   - Tylenol PRN-- DC due to transaminitis    #Transaminitis  - Statin on hold   - AST//334 (11/4) > 113/421 (11/5)>151/537 (11/7)> 122/476 (11/8) - downtrending  - Alk phos 174 (11/4) > 167 (11/5)> 166(11/7)>156 (11/8)  - US abd (10/21) No acute cholecystitis; Hepatic steatosis  - GI Consult appreciated - continue to trend liver enzymes    #GI/Bowel Mgmt   - Pantoprazole   - Senna & Miralax QD    #/Bladder Mgmt   #UTI  - Levofloxacin 500mg daily (til 11/11)  - voiding     #FEN   - Diet - Regular + Thins   - Dysphagia  SLP - evaluation and treatment    #Precautions / PROPHYLAXIS:   - Falls  - ortho: Weight bearing status: WBAT   - Lungs: Aspiration, Incentive Spirometer   - DVT: Lovenox 40mg daily   -----------------------------------------------  OUTPATIENT/FOLLOW UP:    Ashley Jiang  NP in [facility]  [address] [place] [id number]  Phone: [phone]  Fax: [phone]  Follow Up Time: 2 weeks    Bernardo Chilel  Cardiovascular Disease  [address] [place] [address] [address]  [place] [address]  Phone: [phone]  Fax: [phone]  Follow Up Time: 1 month    Ana Resendez  Vascular Neurology  [address] [place] [address] [address]  [place] [address]  Phone: [phone]  Fax: [phone]  Follow Up Time: 1 week    Hawk Lizarraga  Cardiac Electrophysiology  [address] [place] [address], [id number]  [place] [address]  Phone: [phone]  Fax: [phone]  Follow Up Time: 1 week    Hawk Weston  Gastroenterology  [address] [place] [address]  Phone: [phone]  Fax: [phone]  Follow Up Time: 1 week    Angela Donaldson  Hematology  [address] [place] [id number]  Phone: [phone]  Fax: [phone]  Follow Up Time: 1 week    IDT 11/6  NSG: cont B/B  SW: lives with sister on [address] fl apt, 0 MICH.  has great family support in community  SLP: reg diet, recept language difficulty with multi step command, mild-mod cog, reduce attention, delay recall, reduced problem solving, safety awareness fine. not very motivated.    OT: SV grooming, Closer SV dressing, CGA bathing, CGA-close SV toileting/bathroom transfers  PT: SV bed mob, CGA transfer/am 100' no AD/ 12 steps 2 HR  Team goals: pt use external aide to recall therapy info min A; amb to bathroom + toilet SV  Barriers: cog, initiation  TDD: 11/13 home SV during waking hours

## 2024-11-08 NOTE — PROGRESS NOTE ADULT - ASSESSMENT
45 yo M with PMH of uncontrolled Hypertension who was admitted to [facility] for dizziness, headache. FOund to have L basal ganglia IPH with intraventricular hemorrhage extension. EEG negative. Hypercoag w/u thus far negative. Course complicated by transaminitis, hyponatremia, MRSA in nares. Now admitted to [facility] for rehab.    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 statin on hold d/t transaminitis       Hypertension   Orthostatic hypotension 11/5 am   -  holding norvasc to 5mg daily, and reducing losartan to 25mg daily with hold paramenters   - can continue labetalol 100mg bid    Hyponatremia, SIADH  - continue NaCl tab 2g q6h. wean down/off as tolerated     Hyperlipidemia   - zetia  -   - not on statin d/t transaminitis     Transaminitis   - imaging from [facility] showing hepatic steatosis  - avoid hepatotoxins, trend LFTs  - GI input appreciated   - hepatitis panel reviewed       UTI   - continue levaquin. culture reviewed    DVT Prophylaxis:  - Lovenox

## 2024-11-09 PROCEDURE — 99231 SBSQ HOSP IP/OBS SF/LOW 25: CPT

## 2024-11-09 PROCEDURE — 99232 SBSQ HOSP IP/OBS MODERATE 35: CPT

## 2024-11-09 RX ADMIN — SODIUM CHLORIDE 1 GRAM(S): 9 INJECTION, SOLUTION INTRAMUSCULAR; INTRAVENOUS; SUBCUTANEOUS at 12:07

## 2024-11-09 RX ADMIN — MODAFINIL 100 MILLIGRAM(S): 200 TABLET ORAL at 08:15

## 2024-11-09 RX ADMIN — Medication 200 MILLIGRAM(S): at 17:28

## 2024-11-09 RX ADMIN — SODIUM CHLORIDE 1 GRAM(S): 9 INJECTION, SOLUTION INTRAMUSCULAR; INTRAVENOUS; SUBCUTANEOUS at 05:17

## 2024-11-09 RX ADMIN — Medication 2 TABLET(S): at 22:14

## 2024-11-09 RX ADMIN — SODIUM CHLORIDE 1 GRAM(S): 9 INJECTION, SOLUTION INTRAMUSCULAR; INTRAVENOUS; SUBCUTANEOUS at 22:14

## 2024-11-09 RX ADMIN — SODIUM CHLORIDE 1 GRAM(S): 9 INJECTION, SOLUTION INTRAMUSCULAR; INTRAVENOUS; SUBCUTANEOUS at 17:28

## 2024-11-09 RX ADMIN — CHLORHEXIDINE GLUCONATE 1 APPLICATION(S): 40 SOLUTION TOPICAL at 12:09

## 2024-11-09 RX ADMIN — POLYETHYLENE GLYCOL 3350 17 GRAM(S): 17 POWDER, FOR SOLUTION ORAL at 12:08

## 2024-11-09 RX ADMIN — Medication 40 MILLIGRAM(S): at 17:28

## 2024-11-09 RX ADMIN — Medication 20 MILLIGRAM(S): at 12:08

## 2024-11-09 RX ADMIN — Medication 81 MILLIGRAM(S): at 12:07

## 2024-11-09 RX ADMIN — Medication 200 MILLIGRAM(S): at 05:17

## 2024-11-09 RX ADMIN — LOSARTAN POTASSIUM 25 MILLIGRAM(S): 25 TABLET ORAL at 05:17

## 2024-11-09 RX ADMIN — PANTOPRAZOLE SODIUM 40 MILLIGRAM(S): 40 TABLET, DELAYED RELEASE ORAL at 05:16

## 2024-11-09 RX ADMIN — EZETIMIBE 10 MILLIGRAM(S): 10 TABLET ORAL at 12:07

## 2024-11-09 NOTE — PROGRESS NOTE ADULT - SUBJECTIVE AND OBJECTIVE BOX
Patient is a 44y old  Male who presents with a chief complaint of CVA- Basal Ganglia Hemorrhage & R corona radiata infarct     HPI:  Von Ferro is a 44 year old, Mohawk speaking, male with PMH of uncontrolled HTN; who presented to [facility] on 10/13 for headache, dizziness, vomiting and flu-like symptoms. Imaging was significant for L basal ganglia IPH with intraventricular hemorrhage extension. MR showed R corona radiata ischemic infarct.  Vascular neurology was consulted and had concerns for infectious etiology vs cardiac cause. EEG negative. His hospital course was significant for hypercoagulable workup (negative thus far), leukocytosis (2/2 UTI on ABX), transaminitis/hepatic steatosis (statin on hold), hyponatremia (on NaCL tabs), and +MRSA (completed mupirocin in nares). PM&R was consulted and deemed him an appropriate candidate for IRF. He was medically stabilized and cleared for discharge to [facility].     SUBJECTIVE/OBJECTIVE:   Patient seen bedside. Offers no complaints. slept well    REVIEW OF SYSTEMS:  Denies CP, SOB, HA, pain, dysuria  +cog impairment  +limb apraxia   +BP normal tensive (denies dizzness)    PHYSICAL EXAM  T(C): 36.6 (09 Nov 2024 08:18), Max: 37.1 (08 Nov 2024 20:09)  T(F): 97.8 (09 Nov 2024 08:18), Max: 98.8 (08 Nov 2024 20:09)  HR: 83 (09 Nov 2024 08:18) (69 - 87)  BP: 112/74 (09 Nov 2024 08:18) (112/74 - 130/77)  RR: 14 (09 Nov 2024 08:18) (14 - 16)  SpO2: 96% (09 Nov 2024 08:18) (96% - 97%)    Gen - NAD, Comfortable  HEENT - NCAT, EOMI  Pulm - resp nonlabored  Cardiovascular - warm and well perfused, no cyanosis or pedal edema  Abdomen - Soft, NT/ND  Extremities - No peripheral edema, No calf tenderness  Neuro-     Cognitive - AO to self and year. able to follow simple commands and answer questions (delayed processing/response)     Communication -  No dysarthria, hypophonic     Attention: fair     Cranial Nerves - no facial asymmetry, tongue midline, EOMI, facial sensation intact     Motor +motor apraxia                    LEFT    UE - ShAB 5/5, EF 5/5, EE 5/5, WE 5/5,  5/5                    RIGHT UE - ShAB 5/5, EF 5/5, EE 5/5, WE 5/5,  5/5                    LEFT    LE - HF 5/5, KE 5/5, DF 5/5, PF 5/5                    RIGHT LE - HF 5/5, KE 5/5, DF 5/5, PF 5/5        Sensory - Intact to LT  Psychiatric - Mood stable, Affect WNL    RECENT LABS:  11-08    137  |  103  |  23  ----------------------------<  98  4.3   |  26  |  1.28    Ca    9.2      08 Nov 2024 05:41    TPro  7.7  /  Alb  3.1[L]  /  TBili  0.5  /  DBili  x   /  AST  122[H]  /  ALT  476[H]  /  AlkPhos  156[H]  11-08    LIVER FUNCTIONS - ( 08 Nov 2024 05:41 )  Alb: 3.1 g/dL / Pro: 7.7 g/dL / ALK PHOS: 156 U/L / ALT: 476 U/L / AST: 122 U/L / GGT: x          11-07    140  |  103  |  24[H]  ----------------------------<  94  4.2   |  27  |  1.22    Ca    9.7      07 Nov 2024 05:54    TPro  8.0  /  Alb  3.2[L]  /  TBili  0.5  /  DBili  x   /  AST  151[H]  /  ALT  537[H]  /  AlkPhos  166[H]  11-07    LIVER FUNCTIONS - ( 07 Nov 2024 05:54 )  Alb: 3.2 g/dL / Pro: 8.0 g/dL / ALK PHOS: 166 U/L / ALT: 537 U/L / AST: 151 U/L / GGT: x                         11.9   8.71  )-----------( 408      ( 07 Nov 2024 05:54 )             35.7     Allergies  No Known Allergies  Intolerances    MEDICATIONS  (STANDING):  aspirin enteric coated 81 milliGRAM(s) Oral daily  chlorhexidine 2% Cloths 1 Application(s) Topical daily  enoxaparin Injectable 40 milliGRAM(s) SubCutaneous <User Schedule>  ezetimibe 10 milliGRAM(s) Oral daily  FLUoxetine 20 milliGRAM(s) Oral daily  labetalol 200 milliGRAM(s) Oral two times a day  levoFLOXacin  Tablet 500 milliGRAM(s) Oral every 24 hours  losartan 25 milliGRAM(s) Oral daily  modafinil 100 milliGRAM(s) Oral <User Schedule>  pantoprazole    Tablet 40 milliGRAM(s) Oral before breakfast  polyethylene glycol 3350 17 Gram(s) Oral daily  senna 2 Tablet(s) Oral at bedtime  sodium chloride 1 Gram(s) Oral four times a day    MEDICATIONS  (PRN):

## 2024-11-09 NOTE — PROGRESS NOTE ADULT - ASSESSMENT
44 year old, Sinhala speaking, male with PMH of uncontrolled HTN; who presented to [facility] on 10/13 for headache, dizziness, vomiting and flu-like symptoms. Imaging was significant for L basal ganglia IPH with intraventricular hemorrhage extension. MR showed R corona radiata ischemic infarct.  Vascular neurology was consulted and had concerns for infectious etiology vs cardiac cause. EEG negative. His hospital course was significant for hypercoagulable workup (negative thus far), leukocytosis (2/2 UTI on ABX), transaminitis/hepatic steatosis (statin on hold), hyponatremia (on NaCL tabs), and +MRSA (completed mupirocin in nares). Patient now admitted for a multidisciplinary rehab program. 11-04-24 @ 13:26  -------------    Rehab Management/MEDICAL MANAGEMENT     #CVA- Basal Ganglia IPH & R corona radiata infarct  - Gait Instability, ADL impairments and Functional impairments: start Comprehensive Rehab Program of PT/OT/SLP  - 3 hours a day, 5 days a week  - P&O as needed   - Neurology Impression: AMS and dizziness due to Nontraumatic intracerebral hemorrhage subcortical w/ IVH, perihematomal cerebral edema and brain compression. Etiology likely hypertensive hemorrhage.   - ASA 81mg daily  - Statin on hold d/t elevated LFT ( )  - Plan for outpatient ILR placement     #HTN/HLD -   - Amlodipine 10my daily --dec to 5 mg (11/5) - Dc  - Ezetimibe 10mg daily   - Labetalol 200mg TID -- change to BID (11/5)  - Losartan 100mg daily -- change to 50 (11/5) - held today > dec 25 QD(11/7)  - /70 - 122/80 (11/8)  - BP: 112/74 (09 Nov 2024 08:18) (112/74 - 130/77)    #Hyponatremia  - Sodium Chloride 2G QID >  reduce NaCl to 1g QID (11/6)> BID  - Na 139 (11/5) > 140 (11/7), reduce to BID, repeat CMP 11/11    #Sleep/Mood/Alertness  - Fluoxetine 20mg daily   - Modafinil 100mg daily     #Skin  #MRSA in nares  - Completed Mupirocin in BL nares on 11/2   - Skin on admission: intact  - Pressure injury/Skin: OOB to Chair, PT/OT     #Pain Mgmt   - Tylenol PRN-- DC due to transaminitis    #Transaminitis  - Statin on hold   - AST//334 (11/4) > 113/421 (11/5)>151/537 (11/7)> 122/476 (11/8) - downtrending  - Alk phos 174 (11/4) > 167 (11/5)> 166(11/7)>156 (11/8)  - US abd (10/21) No acute cholecystitis; Hepatic steatosis  - GI Consult appreciated - continue to trend liver enzymes    #GI/Bowel Mgmt   - Pantoprazole   - Senna & Miralax QD    #/Bladder Mgmt   #UTI  - Levofloxacin 500mg daily (til 11/11)  - voiding     #FEN   - Diet - Regular + Thins   - Dysphagia  SLP - evaluation and treatment    #Precautions / PROPHYLAXIS:   - Falls  - ortho: Weight bearing status: WBAT   - Lungs: Aspiration, Incentive Spirometer   - DVT: Lovenox 40mg daily   -----------------------------------------------

## 2024-11-09 NOTE — PROGRESS NOTE ADULT - SUBJECTIVE AND OBJECTIVE BOX
PROGRESS NOTE:     Patient is a 44y old  Male who presents with a chief complaint of CVA- Basal Ganglia Hemorrhage & R corona radiata infarct (08 Nov 2024 11:20)      SUBJECTIVE / OVERNIGHT EVENTS: pt was seen and evaluated today. no complains offered  resting comfortably in bed     ADDITIONAL REVIEW OF SYSTEMS: as per HPI    MEDICATIONS  (STANDING):  aspirin enteric coated 81 milliGRAM(s) Oral daily  chlorhexidine 2% Cloths 1 Application(s) Topical daily  enoxaparin Injectable 40 milliGRAM(s) SubCutaneous <User Schedule>  ezetimibe 10 milliGRAM(s) Oral daily  FLUoxetine 20 milliGRAM(s) Oral daily  labetalol 200 milliGRAM(s) Oral two times a day  levoFLOXacin  Tablet 500 milliGRAM(s) Oral every 24 hours  losartan 25 milliGRAM(s) Oral daily  modafinil 100 milliGRAM(s) Oral <User Schedule>  pantoprazole    Tablet 40 milliGRAM(s) Oral before breakfast  polyethylene glycol 3350 17 Gram(s) Oral daily  senna 2 Tablet(s) Oral at bedtime  sodium chloride 1 Gram(s) Oral four times a day    MEDICATIONS  (PRN):      CAPILLARY BLOOD GLUCOSE        I&O's Summary    08 Nov 2024 07:01  -  09 Nov 2024 07:00  --------------------------------------------------------  IN: 0 mL / OUT: 1 mL / NET: -1 mL        PHYSICAL EXAM:  Vital Signs Last 24 Hrs  T(C): 36.6 (09 Nov 2024 08:18), Max: 37.1 (08 Nov 2024 20:09)  T(F): 97.8 (09 Nov 2024 08:18), Max: 98.8 (08 Nov 2024 20:09)  HR: 83 (09 Nov 2024 08:18) (69 - 87)  BP: 112/74 (09 Nov 2024 08:18) (112/74 - 131/82)  BP(mean): --  RR: 14 (09 Nov 2024 08:18) (14 - 16)  SpO2: 96% (09 Nov 2024 08:18) (96% - 99%)    Parameters below as of 09 Nov 2024 08:18  Patient On (Oxygen Delivery Method): room air        PHYSICAL EXAM:  General: NAD, awake, alert. pleasant   ENT: MMM, no tonsilar exudate  Neck: Supple, No JVD  Lungs: Clear to auscultation bilaterally, no wheezes. Good air entry bilaterally   Cardio: RRR, S1/S2, No murmurs  Abdomen: Soft, Nontender, Nondistended; Bowel sounds present  Extremities: No calf tenderness, No pitting edema      LABS:    11-08    137  |  103  |  23  ----------------------------<  98  4.3   |  26  |  1.28    Ca    9.2      08 Nov 2024 05:41    TPro  7.7  /  Alb  3.1[L]  /  TBili  0.5  /  DBili  x   /  AST  122[H]  /  ALT  476[H]  /  AlkPhos  156[H]  11-08    PT/INR - ( 08 Nov 2024 05:41 )   PT: 12.7 sec;   INR: 1.08 ratio               Urinalysis Basic - ( 08 Nov 2024 05:41 )    Color: x / Appearance: x / SG: x / pH: x  Gluc: 98 mg/dL / Ketone: x  / Bili: x / Urobili: x   Blood: x / Protein: x / Nitrite: x   Leuk Esterase: x / RBC: x / WBC x   Sq Epi: x / Non Sq Epi: x / Bacteria: x        discussed during IDR and with consultants

## 2024-11-09 NOTE — PROGRESS NOTE ADULT - ASSESSMENT
· Assessment	  45 yo M with PMH of uncontrolled Hypertension who was admitted to [facility] for dizziness, headache. FOund to have L basal ganglia IPH with intraventricular hemorrhage extension. EEG negative. Hypercoag w/u thus far negative. Course complicated by transaminitis, hyponatremia, MRSA in nares. Now admitted to [facility] for rehab.    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 statin on hold d/t transaminitis       Hypertension   Orthostatic hypotension 11/5 am   -  holding norvasc to 5mg daily, and reducing losartan to 25mg daily with hold paramenters   - can continue labetalol 100mg bid    Hyponatremia, SIADH  - continue NaCl tab 2g q6h. wean down/off as tolerated     Hyperlipidemia   - zetia  -   - not on statin d/t transaminitis     Transaminitis   - imaging from [facility] showing hepatic steatosis  - avoid hepatotoxins, trend LFTs  - GI input appreciated   - hepatitis panel reviewed       UTI   - continue levaquin. culture reviewed    DVT Prophylaxis:  - Lovenox

## 2024-11-10 PROCEDURE — 99232 SBSQ HOSP IP/OBS MODERATE 35: CPT

## 2024-11-10 PROCEDURE — 99231 SBSQ HOSP IP/OBS SF/LOW 25: CPT

## 2024-11-10 RX ADMIN — SODIUM CHLORIDE 1 GRAM(S): 9 INJECTION, SOLUTION INTRAMUSCULAR; INTRAVENOUS; SUBCUTANEOUS at 05:58

## 2024-11-10 RX ADMIN — Medication 20 MILLIGRAM(S): at 13:56

## 2024-11-10 RX ADMIN — Medication 2 TABLET(S): at 21:13

## 2024-11-10 RX ADMIN — Medication 81 MILLIGRAM(S): at 13:56

## 2024-11-10 RX ADMIN — Medication 40 MILLIGRAM(S): at 17:37

## 2024-11-10 RX ADMIN — SODIUM CHLORIDE 1 GRAM(S): 9 INJECTION, SOLUTION INTRAMUSCULAR; INTRAVENOUS; SUBCUTANEOUS at 23:49

## 2024-11-10 RX ADMIN — EZETIMIBE 10 MILLIGRAM(S): 10 TABLET ORAL at 13:55

## 2024-11-10 RX ADMIN — POLYETHYLENE GLYCOL 3350 17 GRAM(S): 17 POWDER, FOR SOLUTION ORAL at 13:56

## 2024-11-10 RX ADMIN — Medication 200 MILLIGRAM(S): at 05:58

## 2024-11-10 RX ADMIN — LOSARTAN POTASSIUM 25 MILLIGRAM(S): 25 TABLET ORAL at 05:58

## 2024-11-10 RX ADMIN — Medication 200 MILLIGRAM(S): at 17:37

## 2024-11-10 RX ADMIN — SODIUM CHLORIDE 1 GRAM(S): 9 INJECTION, SOLUTION INTRAMUSCULAR; INTRAVENOUS; SUBCUTANEOUS at 13:55

## 2024-11-10 RX ADMIN — SODIUM CHLORIDE 1 GRAM(S): 9 INJECTION, SOLUTION INTRAMUSCULAR; INTRAVENOUS; SUBCUTANEOUS at 17:37

## 2024-11-10 RX ADMIN — MODAFINIL 100 MILLIGRAM(S): 200 TABLET ORAL at 08:28

## 2024-11-10 RX ADMIN — PANTOPRAZOLE SODIUM 40 MILLIGRAM(S): 40 TABLET, DELAYED RELEASE ORAL at 05:58

## 2024-11-10 RX ADMIN — CHLORHEXIDINE GLUCONATE 1 APPLICATION(S): 40 SOLUTION TOPICAL at 12:39

## 2024-11-10 NOTE — PROGRESS NOTE ADULT - SUBJECTIVE AND OBJECTIVE BOX
Medicine Progress Note    Patient is a 44y old  Male who presents with a chief complaint of CVA- Basal Ganglia Hemorrhage & R corona radiata infarct (09 Nov 2024 18:22)      SUBJECTIVE / OVERNIGHT EVENTS:    ADDITIONAL REVIEW OF SYSTEMS:    MEDICATIONS  (STANDING):  aspirin enteric coated 81 milliGRAM(s) Oral daily  chlorhexidine 2% Cloths 1 Application(s) Topical daily  enoxaparin Injectable 40 milliGRAM(s) SubCutaneous <User Schedule>  ezetimibe 10 milliGRAM(s) Oral daily  FLUoxetine 20 milliGRAM(s) Oral daily  labetalol 200 milliGRAM(s) Oral two times a day  levoFLOXacin  Tablet 500 milliGRAM(s) Oral every 24 hours  losartan 25 milliGRAM(s) Oral daily  modafinil 100 milliGRAM(s) Oral <User Schedule>  pantoprazole    Tablet 40 milliGRAM(s) Oral before breakfast  polyethylene glycol 3350 17 Gram(s) Oral daily  senna 2 Tablet(s) Oral at bedtime  sodium chloride 1 Gram(s) Oral four times a day    MEDICATIONS  (PRN):    CAPILLARY BLOOD GLUCOSE        I&O's Summary      PHYSICAL EXAM:  Vital Signs Last 24 Hrs  T(C): 36.7 (09 Nov 2024 20:12), Max: 36.7 (09 Nov 2024 20:12)  T(F): 98 (09 Nov 2024 20:12), Max: 98 (09 Nov 2024 20:12)  HR: 80 (10 Nov 2024 06:00) (75 - 83)  BP: 128/81 (10 Nov 2024 06:00) (100/65 - 128/81)  BP(mean): --  RR: 15 (09 Nov 2024 20:12) (14 - 15)  SpO2: 97% (10 Nov 2024 06:00) (96% - 98%)    Parameters below as of 10 Nov 2024 06:00  Patient On (Oxygen Delivery Method): room air        LABS:                    COVID-19 PCR: NotDetec (04 Nov 2024 17:50)  SARS-CoV-2: NotDetec (30 Oct 2024 14:58)      RADIOLOGY & ADDITIONAL TESTS:  Imaging from Last 24 Hours:    Electrocardiogram/QTc Interval:    COORDINATION OF CARE:  Care Discussed with Consultants/Other Providers:   Medicine Progress Note    Patient is a 44y old  Male who presents with a chief complaint of CVA- Basal Ganglia Hemorrhage & R corona radiata infarct (09 Nov 2024 18:22)      SUBJECTIVE / OVERNIGHT EVENTS:  seen and examined  Chart reviewed  No overnight events  Limb weakness improving with therapy  BM+  No pain  No complaints      ROS:  denied fever/chills/CP/SOB/cough/palpitation/dizziness/abdominal pian/nausea/vomiting/diarrhoea/constipation/dysuria/leg or calf pain/headaches.all other ROS neg    MEDICATIONS  (STANDING):  aspirin enteric coated 81 milliGRAM(s) Oral daily  chlorhexidine 2% Cloths 1 Application(s) Topical daily  enoxaparin Injectable 40 milliGRAM(s) SubCutaneous <User Schedule>  ezetimibe 10 milliGRAM(s) Oral daily  FLUoxetine 20 milliGRAM(s) Oral daily  labetalol 200 milliGRAM(s) Oral two times a day  levoFLOXacin  Tablet 500 milliGRAM(s) Oral every 24 hours  losartan 25 milliGRAM(s) Oral daily  modafinil 100 milliGRAM(s) Oral <User Schedule>  pantoprazole    Tablet 40 milliGRAM(s) Oral before breakfast  polyethylene glycol 3350 17 Gram(s) Oral daily  senna 2 Tablet(s) Oral at bedtime  sodium chloride 1 Gram(s) Oral four times a day    MEDICATIONS  (PRN):    CAPILLARY BLOOD GLUCOSE        I&O's Summary      PHYSICAL EXAM:  Vital Signs Last 24 Hrs  T(C): 36.7 (09 Nov 2024 20:12), Max: 36.7 (09 Nov 2024 20:12)  T(F): 98 (09 Nov 2024 20:12), Max: 98 (09 Nov 2024 20:12)  HR: 80 (10 Nov 2024 06:00) (75 - 83)  BP: 128/81 (10 Nov 2024 06:00) (100/65 - 128/81)  BP(mean): --  RR: 15 (09 Nov 2024 20:12) (14 - 15)  SpO2: 97% (10 Nov 2024 06:00) (96% - 98%)    Parameters below as of 10 Nov 2024 06:00  Patient On (Oxygen Delivery Method): room air    GENERAL: Not in distress. Alert    HEENT: clear conjuctiva, MMM. no pallor or icterus  CARDIOVASCULAR: RRR S1, S2. No murmur/rubs/gallop  LUNGS: BLAE+, no rales, no wheezing, no rhonchi.    ABDOMEN: ND. Soft,  NT, no guarding / rebound / rigidity. BS normoactive  BACK: No spine tenderness.  EXTREMITIES: no edema. no leg or calf TP.  SKIN: warm and dry  PSYCHIATRIC: Calm.  No agitation.  CNS: AAO*3. moves limbs, follows commands        LABS:                    COVID-19 PCR: NotDetec (04 Nov 2024 17:50)  SARS-CoV-2: NotDetec (30 Oct 2024 14:58)      RADIOLOGY & ADDITIONAL TESTS:  Imaging from Last 24 Hours:    Electrocardiogram/QTc Interval:    COORDINATION OF CARE:  Care Discussed with Consultants/Other Providers:

## 2024-11-10 NOTE — PROGRESS NOTE ADULT - SUBJECTIVE AND OBJECTIVE BOX
Patient is a 44y old  Male who presents with a chief complaint of CVA- Basal Ganglia Hemorrhage & R corona radiata infarct     HPI:  Von Ferro is a 44 year old, Frisian speaking, male with PMH of uncontrolled HTN; who presented to [facility] on 10/13 for headache, dizziness, vomiting and flu-like symptoms. Imaging was significant for L basal ganglia IPH with intraventricular hemorrhage extension. MR showed R corona radiata ischemic infarct.  Vascular neurology was consulted and had concerns for infectious etiology vs cardiac cause. EEG negative. His hospital course was significant for hypercoagulable workup (negative thus far), leukocytosis (2/2 UTI on ABX), transaminitis/hepatic steatosis (statin on hold), hyponatremia (on NaCL tabs), and +MRSA (completed mupirocin in nares). PM&R was consulted and deemed him an appropriate candidate for IRF. He was medically stabilized and cleared for discharge to [facility].     SUBJECTIVE/OBJECTIVE:   Patient seen bedside. Offers no complaints. slept well    REVIEW OF SYSTEMS:  Denies CP, SOB, HA, pain, dysuria  +cog impairment  +limb apraxia   +BP normal tensive (denies dizzness)    PHYSICAL EXAM  ICU Vital Signs Last 24 Hrs  T(C): 36.8 (10 Nov 2024 11:20), Max: 36.8 (10 Nov 2024 11:20)  T(F): 98.2 (10 Nov 2024 11:20), Max: 98.2 (10 Nov 2024 11:20)  HR: 75 (10 Nov 2024 17:34) (75 - 90)  BP: 126/82 (10 Nov 2024 17:34) (100/65 - 128/81)  RR: 14 (10 Nov 2024 11:20) (14 - 15)  SpO2: 97% (10 Nov 2024 11:20) (97% - 98%)      Gen - NAD, Comfortable  HEENT - NCAT, EOMI  Pulm - resp nonlabored  Cardiovascular - warm and well perfused, no cyanosis or pedal edema  Abdomen - Soft, NT/ND  Extremities - No peripheral edema, No calf tenderness  Neuro-     Cognitive - AO to self and year. able to follow simple commands and answer questions (delayed processing/response)     Communication -  No dysarthria, hypophonic     Attention: fair     Cranial Nerves - no facial asymmetry, tongue midline, EOMI, facial sensation intact     Motor +motor apraxia                    LEFT    UE - ShAB 5/5, EF 5/5, EE 5/5, WE 5/5,  5/5                    RIGHT UE - ShAB 5/5, EF 5/5, EE 5/5, WE 5/5,  5/5                    LEFT    LE - HF 5/5, KE 5/5, DF 5/5, PF 5/5                    RIGHT LE - HF 5/5, KE 5/5, DF 5/5, PF 5/5        Sensory - Intact to LT  Psychiatric - Mood stable, Affect WNL    RECENT LABS:  11-08    137  |  103  |  23  ----------------------------<  98  4.3   |  26  |  1.28    Ca    9.2      08 Nov 2024 05:41    TPro  7.7  /  Alb  3.1[L]  /  TBili  0.5  /  DBili  x   /  AST  122[H]  /  ALT  476[H]  /  AlkPhos  156[H]  11-08    LIVER FUNCTIONS - ( 08 Nov 2024 05:41 )  Alb: 3.1 g/dL / Pro: 7.7 g/dL / ALK PHOS: 156 U/L / ALT: 476 U/L / AST: 122 U/L / GGT: x          11-07    140  |  103  |  24[H]  ----------------------------<  94  4.2   |  27  |  1.22    Ca    9.7      07 Nov 2024 05:54    TPro  8.0  /  Alb  3.2[L]  /  TBili  0.5  /  DBili  x   /  AST  151[H]  /  ALT  537[H]  /  AlkPhos  166[H]  11-07    LIVER FUNCTIONS - ( 07 Nov 2024 05:54 )  Alb: 3.2 g/dL / Pro: 8.0 g/dL / ALK PHOS: 166 U/L / ALT: 537 U/L / AST: 151 U/L / GGT: x                         11.9   8.71  )-----------( 408      ( 07 Nov 2024 05:54 )             35.7     Allergies  No Known Allergies  Intolerances    MEDICATIONS  (STANDING):  aspirin enteric coated 81 milliGRAM(s) Oral daily  chlorhexidine 2% Cloths 1 Application(s) Topical daily  enoxaparin Injectable 40 milliGRAM(s) SubCutaneous <User Schedule>  ezetimibe 10 milliGRAM(s) Oral daily  FLUoxetine 20 milliGRAM(s) Oral daily  labetalol 200 milliGRAM(s) Oral two times a day  levoFLOXacin  Tablet 500 milliGRAM(s) Oral every 24 hours  losartan 25 milliGRAM(s) Oral daily  modafinil 100 milliGRAM(s) Oral <User Schedule>  pantoprazole    Tablet 40 milliGRAM(s) Oral before breakfast  polyethylene glycol 3350 17 Gram(s) Oral daily  senna 2 Tablet(s) Oral at bedtime  sodium chloride 1 Gram(s) Oral four times a day    MEDICATIONS  (PRN):

## 2024-11-10 NOTE — PROGRESS NOTE ADULT - ASSESSMENT
44 year old, Belarusian speaking, male with PMH of uncontrolled HTN; who presented to [facility] on 10/13 for headache, dizziness, vomiting and flu-like symptoms. Imaging was significant for L basal ganglia IPH with intraventricular hemorrhage extension. MR showed R corona radiata ischemic infarct.  Vascular neurology was consulted and had concerns for infectious etiology vs cardiac cause. EEG negative. His hospital course was significant for hypercoagulable workup (negative thus far), leukocytosis (2/2 UTI on ABX), transaminitis/hepatic steatosis (statin on hold), hyponatremia (on NaCL tabs), and +MRSA (completed mupirocin in nares). Patient now admitted for a multidisciplinary rehab program. 11-04-24 @ 13:26  -------------    Rehab Management/MEDICAL MANAGEMENT     #CVA- Basal Ganglia IPH & R corona radiata infarct  - Gait Instability, ADL impairments and Functional impairments: start Comprehensive Rehab Program of PT/OT/SLP  - 3 hours a day, 5 days a week  - P&O as needed   - Neurology Impression: AMS and dizziness due to Nontraumatic intracerebral hemorrhage subcortical w/ IVH, perihematomal cerebral edema and brain compression. Etiology likely hypertensive hemorrhage.   - ASA 81mg daily  - Statin on hold d/t elevated LFT ( )  - Plan for outpatient ILR placement     #HTN/HLD -   - Amlodipine 10my daily --dec to 5 mg (11/5) - Dc  - Ezetimibe 10mg daily   - Labetalol 200mg TID -- change to BID (11/5)  - Losartan 100mg daily -- change to 50 (11/5) - held today > dec 25 QD(11/7)  - /70 - 122/80 (11/8)  - BP: 112/74 (09 Nov 2024 08:18) (112/74 - 130/77)  - BP: 126/82 (10 Nov 2024 17:34) (100/65 - 128/81)    #Hyponatremia  - Sodium Chloride 2G QID >  reduce NaCl to 1g QID (11/6)> BID  - Na 139 (11/5) > 140 (11/7), reduce to BID, repeat CMP 11/11    #Sleep/Mood/Alertness  - Fluoxetine 20mg daily   - Modafinil 100mg daily     #Skin  #MRSA in nares  - Completed Mupirocin in BL nares on 11/2   - Skin on admission: intact  - Pressure injury/Skin: OOB to Chair, PT/OT     #Pain Mgmt   - Tylenol PRN-- DC due to transaminitis    #Transaminitis  - Statin on hold   - AST//334 (11/4) > 113/421 (11/5)>151/537 (11/7)> 122/476 (11/8) - downtrending  - Alk phos 174 (11/4) > 167 (11/5)> 166(11/7)>156 (11/8)  - US abd (10/21) No acute cholecystitis; Hepatic steatosis  - GI Consult appreciated - continue to trend liver enzymes    #GI/Bowel Mgmt   - Pantoprazole   - Senna & Miralax QD    #/Bladder Mgmt   #UTI  - Levofloxacin 500mg daily (til 11/11)  - voiding     #FEN   - Diet - Regular + Thins   - Dysphagia  SLP - evaluation and treatment    #Precautions / PROPHYLAXIS:   - Falls  - ortho: Weight bearing status: WBAT   - Lungs: Aspiration, Incentive Spirometer   - DVT: Lovenox 40mg daily   -----------------------------------------------

## 2024-11-10 NOTE — PROGRESS NOTE ADULT - ASSESSMENT
· Assessment	  45 yo M with PMH of uncontrolled Hypertension who was admitted to [facility] for dizziness, headache. FOund to have L basal ganglia IPH with intraventricular hemorrhage extension. EEG negative. Hypercoag w/u thus far negative. Course complicated by transaminitis, hyponatremia, MRSA in nares. Now admitted to [facility] for rehab.    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 statin on hold d/t transaminitis       Hypertension   Orthostatic hypotension 11/5 am   -  holding norvasc to 5mg daily, and reducing losartan to 25mg daily with hold paramenters   - can continue labetalol 100mg bid    Hyponatremia, SIADH  - continue NaCl tab 2g q6h. wean down/off as tolerated     Hyperlipidemia   - zetia  -   - not on statin d/t transaminitis     Transaminitis   - imaging from [facility] showing hepatic steatosis  - avoid hepatotoxins, trend LFTs  - GI input appreciated   - hepatitis panel reviewed       UTI   - continue levaquin. culture reviewed    DVT Prophylaxis:  - Lovenox    · Assessment	  43 yo M with PMH of uncontrolled Hypertension who was admitted to [facility] for dizziness, headache. FOund to have L basal ganglia IPH with intraventricular hemorrhage extension. EEG negative. Hypercoag w/u thus far negative. Course complicated by transaminitis, hyponatremia, MRSA in nares. Now admitted to [facility] for rehab.    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 statin on hold d/t transaminitis   - fall, aspiration precautions      Hypertension   Orthostatic hypotension 11/5 am   -  holding norvasc to 5mg daily, and reducing losartan to 25mg daily with hold parameters   - can continue labetalol 100mg bid  - monitor VS including OH    Hyponatremia, SIADH  - continue NaCl tab 2g q6h. wean down/off as tolerated     Hyperlipidemia   - zetia  -   - not on statin d/t transaminitis     Transaminitis   - imaging from [facility] showing hepatic steatosis  - avoid hepatotoxins, trend LFTs  - GI input appreciated . signed off. f/u OP if LFT persistently high  - hepatitis panel reviewed       UTI   - continue levaquin. culture reviewed. last day 11/11    DVT Prophylaxis:  - Lovenox

## 2024-11-11 LAB
ALBUMIN SERPL ELPH-MCNC: 3.3 G/DL — SIGNIFICANT CHANGE UP (ref 3.3–5)
ALP SERPL-CCNC: 137 U/L — HIGH (ref 40–120)
ALT FLD-CCNC: 430 U/L — HIGH (ref 10–45)
ANION GAP SERPL CALC-SCNC: 5 MMOL/L — SIGNIFICANT CHANGE UP (ref 5–17)
AST SERPL-CCNC: 85 U/L — HIGH (ref 10–40)
BASOPHILS # BLD AUTO: 0.08 K/UL — SIGNIFICANT CHANGE UP (ref 0–0.2)
BASOPHILS NFR BLD AUTO: 0.9 % — SIGNIFICANT CHANGE UP (ref 0–2)
BILIRUB SERPL-MCNC: 0.6 MG/DL — SIGNIFICANT CHANGE UP (ref 0.2–1.2)
BUN SERPL-MCNC: 15 MG/DL — SIGNIFICANT CHANGE UP (ref 7–23)
CALCIUM SERPL-MCNC: 9.9 MG/DL — SIGNIFICANT CHANGE UP (ref 8.4–10.5)
CHLORIDE SERPL-SCNC: 101 MMOL/L — SIGNIFICANT CHANGE UP (ref 96–108)
CO2 SERPL-SCNC: 30 MMOL/L — SIGNIFICANT CHANGE UP (ref 22–31)
CREAT SERPL-MCNC: 1.07 MG/DL — SIGNIFICANT CHANGE UP (ref 0.5–1.3)
EGFR: 88 ML/MIN/1.73M2 — SIGNIFICANT CHANGE UP
EOSINOPHIL # BLD AUTO: 0.21 K/UL — SIGNIFICANT CHANGE UP (ref 0–0.5)
EOSINOPHIL NFR BLD AUTO: 2.4 % — SIGNIFICANT CHANGE UP (ref 0–6)
GLUCOSE SERPL-MCNC: 89 MG/DL — SIGNIFICANT CHANGE UP (ref 70–99)
HCT VFR BLD CALC: 37.2 % — LOW (ref 39–50)
HGB BLD-MCNC: 12.2 G/DL — LOW (ref 13–17)
IMM GRANULOCYTES NFR BLD AUTO: 0.5 % — SIGNIFICANT CHANGE UP (ref 0–0.9)
LYMPHOCYTES # BLD AUTO: 3.24 K/UL — SIGNIFICANT CHANGE UP (ref 1–3.3)
LYMPHOCYTES # BLD AUTO: 37.4 % — SIGNIFICANT CHANGE UP (ref 13–44)
MCHC RBC-ENTMCNC: 28.7 PG — SIGNIFICANT CHANGE UP (ref 27–34)
MCHC RBC-ENTMCNC: 32.8 G/DL — SIGNIFICANT CHANGE UP (ref 32–36)
MCV RBC AUTO: 87.5 FL — SIGNIFICANT CHANGE UP (ref 80–100)
MONOCYTES # BLD AUTO: 0.63 K/UL — SIGNIFICANT CHANGE UP (ref 0–0.9)
MONOCYTES NFR BLD AUTO: 7.3 % — SIGNIFICANT CHANGE UP (ref 2–14)
NEUTROPHILS # BLD AUTO: 4.47 K/UL — SIGNIFICANT CHANGE UP (ref 1.8–7.4)
NEUTROPHILS NFR BLD AUTO: 51.5 % — SIGNIFICANT CHANGE UP (ref 43–77)
NRBC # BLD: 0 /100 WBCS — SIGNIFICANT CHANGE UP (ref 0–0)
PLATELET # BLD AUTO: 325 K/UL — SIGNIFICANT CHANGE UP (ref 150–400)
POTASSIUM SERPL-MCNC: 4.4 MMOL/L — SIGNIFICANT CHANGE UP (ref 3.5–5.3)
POTASSIUM SERPL-SCNC: 4.4 MMOL/L — SIGNIFICANT CHANGE UP (ref 3.5–5.3)
PROT SERPL-MCNC: 7.9 G/DL — SIGNIFICANT CHANGE UP (ref 6–8.3)
RBC # BLD: 4.25 M/UL — SIGNIFICANT CHANGE UP (ref 4.2–5.8)
RBC # FLD: 13.6 % — SIGNIFICANT CHANGE UP (ref 10.3–14.5)
SODIUM SERPL-SCNC: 136 MMOL/L — SIGNIFICANT CHANGE UP (ref 135–145)
WBC # BLD: 8.67 K/UL — SIGNIFICANT CHANGE UP (ref 3.8–10.5)
WBC # FLD AUTO: 8.67 K/UL — SIGNIFICANT CHANGE UP (ref 3.8–10.5)

## 2024-11-11 PROCEDURE — 99232 SBSQ HOSP IP/OBS MODERATE 35: CPT

## 2024-11-11 PROCEDURE — 99232 SBSQ HOSP IP/OBS MODERATE 35: CPT | Mod: GC

## 2024-11-11 RX ORDER — SODIUM CHLORIDE 9 MG/ML
1 INJECTION, SOLUTION INTRAMUSCULAR; INTRAVENOUS; SUBCUTANEOUS THREE TIMES A DAY
Refills: 0 | Status: DISCONTINUED | OUTPATIENT
Start: 2024-11-11 | End: 2024-11-13

## 2024-11-11 RX ADMIN — SODIUM CHLORIDE 1 GRAM(S): 9 INJECTION, SOLUTION INTRAMUSCULAR; INTRAVENOUS; SUBCUTANEOUS at 05:31

## 2024-11-11 RX ADMIN — Medication 200 MILLIGRAM(S): at 18:17

## 2024-11-11 RX ADMIN — Medication 81 MILLIGRAM(S): at 11:27

## 2024-11-11 RX ADMIN — Medication 2 TABLET(S): at 21:37

## 2024-11-11 RX ADMIN — EZETIMIBE 10 MILLIGRAM(S): 10 TABLET ORAL at 11:27

## 2024-11-11 RX ADMIN — PANTOPRAZOLE SODIUM 40 MILLIGRAM(S): 40 TABLET, DELAYED RELEASE ORAL at 05:32

## 2024-11-11 RX ADMIN — CHLORHEXIDINE GLUCONATE 1 APPLICATION(S): 40 SOLUTION TOPICAL at 11:34

## 2024-11-11 RX ADMIN — Medication 20 MILLIGRAM(S): at 11:27

## 2024-11-11 RX ADMIN — SODIUM CHLORIDE 1 GRAM(S): 9 INJECTION, SOLUTION INTRAMUSCULAR; INTRAVENOUS; SUBCUTANEOUS at 11:26

## 2024-11-11 RX ADMIN — LOSARTAN POTASSIUM 25 MILLIGRAM(S): 25 TABLET ORAL at 05:30

## 2024-11-11 RX ADMIN — Medication 200 MILLIGRAM(S): at 05:30

## 2024-11-11 RX ADMIN — MODAFINIL 100 MILLIGRAM(S): 200 TABLET ORAL at 08:37

## 2024-11-11 RX ADMIN — POLYETHYLENE GLYCOL 3350 17 GRAM(S): 17 POWDER, FOR SOLUTION ORAL at 11:28

## 2024-11-11 RX ADMIN — Medication 40 MILLIGRAM(S): at 18:19

## 2024-11-11 RX ADMIN — SODIUM CHLORIDE 1 GRAM(S): 9 INJECTION, SOLUTION INTRAMUSCULAR; INTRAVENOUS; SUBCUTANEOUS at 21:37

## 2024-11-11 NOTE — PROGRESS NOTE ADULT - ASSESSMENT
44 year old, Armenian speaking, male with PMH of uncontrolled HTN; who presented to [facility] on 10/13 for headache, dizziness, vomiting and flu-like symptoms. Imaging was significant for L basal ganglia IPH with intraventricular hemorrhage extension. MR showed R corona radiata ischemic infarct.  Vascular neurology was consulted and had concerns for infectious etiology vs cardiac cause. EEG negative. His hospital course was significant for hypercoagulable workup (negative thus far), leukocytosis (2/2 UTI on ABX), transaminitis/hepatic steatosis (statin on hold), hyponatremia (on NaCL tabs), and +MRSA (completed mupirocin in nares). Patient now admitted for a multidisciplinary rehab program. 11-04-24 @ 13:26  -------------    Rehab Management/MEDICAL MANAGEMENT     #CVA- Basal Ganglia IPH & R corona radiata infarct  - Gait Instability, ADL impairments and Functional impairments: start Comprehensive Rehab Program of PT/OT/SLP  - 3 hours a day, 5 days a week  - P&O as needed   - Neurology Impression: AMS and dizziness due to Nontraumatic intracerebral hemorrhage subcortical w/ IVH, perihematomal cerebral edema and brain compression. Etiology likely hypertensive hemorrhage.   - ASA 81mg daily  - Statin on hold d/t elevated LFT ( )  - Plan for outpatient ILR placement     #HTN/HLD -   - Amlodipine 10my daily --dec to 5 mg (11/5) - Dc  - Ezetimibe 10mg daily   - Labetalol 200mg TID -- change to BID (11/5)  - Losartan 100mg daily -- change to 50 (11/5) - held today > dec 25 QD(11/7)      #Hyponatremia  - Sodium Chloride 2G QID >  reduce NaCl to 1g QID (11/6)> BID  - NA tabs 1gram TID   - Na 139 (11/5) > 140 (11/7), reduce to BID, repeat CMP 11/11    #Sleep/Mood/Alertness  - Fluoxetine 20mg daily   - Modafinil 100mg daily     #Skin  #MRSA in nares  - Completed Mupirocin in BL nares on 11/2   - Skin on admission: intact  - Pressure injury/Skin: OOB to Chair, PT/OT     #Pain Mgmt   - Tylenol PRN-- DC due to transaminitis    #Transaminitis  - Statin on hold   - AST//334 (11/4) > 113/421 (11/5)>151/537 (11/7)> 122/476 (11/8) - downtrending  - Alk phos 174 (11/4) > 167 (11/5)> 166(11/7)>156 (11/8)  - US abd (10/21) No acute cholecystitis; Hepatic steatosis  - GI Consult appreciated - continue to trend liver enzymes    #GI/Bowel Mgmt   - Pantoprazole   - Senna & Miralax QD    #/Bladder Mgmt   #UTI  - Levofloxacin 500mg daily (til 11/11)  - voiding     #FEN   - Diet - Regular + Thins   - Dysphagia  SLP - evaluation and treatment    #Precautions / PROPHYLAXIS:   - Falls  - ortho: Weight bearing status: WBAT   - Lungs: Aspiration, Incentive Spirometer   - DVT: Lovenox 40mg daily   -----------------------------------------------  OUTPATIENT/FOLLOW UP:    Ashley Jiang  NP in Family Health  [address] [place] [address] [address]  [place] [address]  Phone: [phone]  Fax: [phone]  Follow Up Time: 2 weeks    Bernardo Chilel  Cardiovascular Disease  [address] [place] [address], [address]  [place] [address]  Phone: [phone]  Fax: [phone]  Follow Up Time: 1 month    Ana Resendez  Vascular Neurology  [address] [place] [address], [address]  [place] [address]  Phone: [phone]  Fax: [phone]  Follow Up Time: 1 week    Hawk Lizarraga  Cardiac Electrophysiology  [address] [place] [address], [id number]  [place] [address]  Phone: [phone]  Fax: [phone]  Follow Up Time: 1 week    Hawk Weston  Gastroenterology  [address] [place] [address]  Phone: [phone]  Fax: [phone]  Follow Up Time: 1 week    Angela Donaldson  Hematology  [address] [place] [address]  Phone: [phone]  Fax: [phone]  Follow Up Time: 1 week    IDT 11/6  NSG: cont B/B  SW: lives with sister on 1st fl apt, 0 MICH.  has great family support in community  SLP: reg diet, recept language difficulty with multi step command, mild-mod cog, reduce attention, delay recall, reduced problem solving, safety awareness fine. not very motivated.    OT: SV grooming, Closer SV dressing, CGA bathing, CGA-close SV toileting/bathroom transfers  PT: SV bed mob, CGA transfer/am 100' no AD/ 12 steps 2 HR  Team goals: pt use external aide to recall therapy info min A; amb to bathroom + toilet SV  Barriers: cog, initiation  TDD: 11/13 home SV during waking hours   44 year old, Malay speaking, male with PMH of uncontrolled HTN; who presented to [facility] on 10/13 for headache, dizziness, vomiting and flu-like symptoms. Imaging was significant for L basal ganglia IPH with intraventricular hemorrhage extension. MR showed R corona radiata ischemic infarct.  Vascular neurology was consulted and had concerns for infectious etiology vs cardiac cause. EEG negative. His hospital course was significant for hypercoagulable workup (negative thus far), leukocytosis (2/2 UTI on ABX), transaminitis/hepatic steatosis (statin on hold), hyponatremia (on NaCL tabs), and +MRSA (completed mupirocin in nares). Patient now admitted for a multidisciplinary rehab program. 11-04-24 @ 13:26  -------------    Rehab Management/MEDICAL MANAGEMENT     #CVA- Basal Ganglia IPH & R corona radiata infarct  - Gait Instability, ADL impairments and Functional impairments: start Comprehensive Rehab Program of PT/OT/SLP  - 3 hours a day, 5 days a week  - P&O as needed   - Neurology Impression: AMS and dizziness due to Nontraumatic intracerebral hemorrhage subcortical w/ IVH, perihematomal cerebral edema and brain compression. Etiology likely hypertensive hemorrhage.   - ASA 81mg daily  - Statin on hold d/t elevated LFT ( )  - Plan for outpatient ILR placement     #HTN/HLD -   - Amlodipine 10my daily --dec to 5 mg (11/5) - Dc  - Ezetimibe 10mg daily   - Labetalol 200mg TID -- change to BID (11/5)  - Losartan 100mg daily -- change to 50 (11/5) - > dec 25 QD(11/7)  - Bp stable, on the lower side.    #Hyponatremia  - Sodium Chloride 2G QID>  1gm 4X/DAY  - tapering as tolerated  - NaCl 1gm TID   - Na 136 (11/11)    #Sleep/Mood/Alertness  - Fluoxetine 20mg daily   - Modafinil 100mg daily     #Skin  #MRSA in nares  - Completed Mupirocin in BL nares on 11/2   - Skin on admission: intact  - Pressure injury/Skin: OOB to Chair, PT/OT     #Pain Mgmt   - Tylenol PRN-- DC due to transaminitis    #Transaminitis  - Statin on hold   - ALK phos-166>156>137 (11/11)  - AST/ALT-- 151/537>122/476>85/430 (11/11)  - GI Consult appreciated - continue to trend liver enzymes    #GI/Bowel Mgmt   - Pantoprazole   - Senna & Miralax QD    #/Bladder Mgmt   #UTI  - Levofloxacin 500mg daily (til 11/11)  - voiding     #FEN   - Diet - Regular + Thins   - Dysphagia  SLP - evaluation and treatment    #Precautions / PROPHYLAXIS:   - Falls  - ortho: Weight bearing status: WBAT   - Lungs: Aspiration, Incentive Spirometer   - DVT: Lovenox 40mg daily   -----------------------------------------------  OUTPATIENT/FOLLOW UP:    Ashley Jiang  NP in [facility]  [address] [place] [address] [address]  [place] [address]  Phone: [phone]  Fax: [phone]  Follow Up Time: 2 weeks    Bernardo Chilel  Cardiovascular Disease  [address] [place] [address], [facility] [address]  [place] [address]  Phone: [phone]  Fax: [phone]  Follow Up Time: 1 month    Ana Resendez  Vascular Neurology  [address] [place] [address] [address]  [place] [address]  Phone: [phone]  Fax: [phone]  Follow Up Time: 1 week    Hawk Lizarraga  Cardiac Electrophysiology  [address] [place] [address], [address]  [place] [address]  Phone: [phone]  Fax: [phone]  Follow Up Time: 1 week    Hawk Weston  Gastroenterology  [address] [place] [address]  Phone: [phone]  Fax: [phone]  Follow Up Time: 1 week    Angela Donaldson  Hematology  [address] [place] [address]  Phone: [phone]  Fax: [phone]  Follow Up Time: 1 week    IDT 11/6  NSG: cont B/B  SW: lives with sister on 1st fl apt, 0 MICH.  has great family support in community  SLP: reg diet, recept language difficulty with multi step command, mild-mod cog, reduce attention, delay recall, reduced problem solving, safety awareness fine. not very motivated.    OT: SV grooming, Closer SV dressing, CGA bathing, CGA-close SV toileting/bathroom transfers  PT: SV bed mob, CGA transfer/am 100' no AD/ 12 steps 2 HR  Team goals: pt use external aide to recall therapy info min A; amb to bathroom + toilet SV  Barriers: cog, initiation  TDD: 11/13 home SV during waking hours

## 2024-11-11 NOTE — PROGRESS NOTE ADULT - NS ATTEND AMEND GEN_ALL_CORE FT
I have personally seen and examined the patient.  I fully participated in the care of this patient.  I have made amendments to the documentation where necessary, and agree with the history, physical exam, and plan as documented above  Patient seen at bedside, doing well, looking forward to going home on wednesday.  Tapering NaCl tabs  Total time 35 mins-face to face time encounter and counseling the patient, meeting with hospitalist, nursing staff, therapists and social work to discuss medical updates and management, care coordination, reviewing chart and data

## 2024-11-11 NOTE — PROGRESS NOTE ADULT - SUBJECTIVE AND OBJECTIVE BOX
Medicine Progress Note    Patient is a 44y old  Male who presents with a chief complaint of CVA- Basal Ganglia Hemorrhage & R corona radiata infarct (10 Nov 2024 19:45)      SUBJECTIVE / OVERNIGHT EVENTS:    ADDITIONAL REVIEW OF SYSTEMS:    MEDICATIONS  (STANDING):  aspirin enteric coated 81 milliGRAM(s) Oral daily  chlorhexidine 2% Cloths 1 Application(s) Topical daily  enoxaparin Injectable 40 milliGRAM(s) SubCutaneous <User Schedule>  ezetimibe 10 milliGRAM(s) Oral daily  FLUoxetine 20 milliGRAM(s) Oral daily  labetalol 200 milliGRAM(s) Oral two times a day  losartan 25 milliGRAM(s) Oral daily  modafinil 100 milliGRAM(s) Oral <User Schedule>  pantoprazole    Tablet 40 milliGRAM(s) Oral before breakfast  polyethylene glycol 3350 17 Gram(s) Oral daily  senna 2 Tablet(s) Oral at bedtime  sodium chloride 1 Gram(s) Oral four times a day    MEDICATIONS  (PRN):    CAPILLARY BLOOD GLUCOSE        I&O's Summary      PHYSICAL EXAM:  Vital Signs Last 24 Hrs  T(C): 37 (10 Nov 2024 20:16), Max: 37 (10 Nov 2024 20:16)  T(F): 98.6 (10 Nov 2024 20:16), Max: 98.6 (10 Nov 2024 20:16)  HR: 70 (11 Nov 2024 05:33) (70 - 90)  BP: 128/75 (11 Nov 2024 05:33) (105/71 - 128/75)  BP(mean): --  RR: 14 (11 Nov 2024 05:33) (14 - 14)  SpO2: 98% (11 Nov 2024 05:33) (97% - 98%)    Parameters below as of 11 Nov 2024 05:33  Patient On (Oxygen Delivery Method): room air      GENERAL: Not in distress. Alert    HEENT: clear conjuctiva, MMM. no pallor or icterus  CARDIOVASCULAR: RRR S1, S2. No murmur/rubs/gallop  LUNGS: BLAE+, no rales, no wheezing, no rhonchi.    ABDOMEN: ND. Soft,  NT, no guarding / rebound / rigidity. BS normoactive  BACK: No spine tenderness.  EXTREMITIES: no edema. no leg or calf TP.  SKIN: warm and dry  PSYCHIATRIC: Calm.  No agitation.  CNS: AAO*3. moves limbs, follows commands    LABS:                        12.2   8.67  )-----------( 325      ( 11 Nov 2024 06:35 )             37.2                     COVID-19 PCR: NotDetec (04 Nov 2024 17:50)  SARS-CoV-2: NotDetec (30 Oct 2024 14:58)      RADIOLOGY & ADDITIONAL TESTS:  Imaging from Last 24 Hours:    Electrocardiogram/QTc Interval:    COORDINATION OF CARE:  Care Discussed with Consultants/Other Providers:   Medicine Progress Note    Patient is a 44y old  Male who presents with a chief complaint of CVA- Basal Ganglia Hemorrhage & R corona radiata infarct (10 Nov 2024 19:45)      SUBJECTIVE / OVERNIGHT EVENTS:  seen and examined  Chart reviewed  No overnight events  Limb weakness improving with therapy  BM+  No pain  No complaints      ROS:  denied fever/chills/CP/SOB/cough/palpitation/dizziness/abdominal pian/nausea/vomiting/diarrhoea/constipation/dysuria/leg or calf pain/headaches.all other ROS neg    MEDICATIONS  (STANDING):  aspirin enteric coated 81 milliGRAM(s) Oral daily  chlorhexidine 2% Cloths 1 Application(s) Topical daily  enoxaparin Injectable 40 milliGRAM(s) SubCutaneous <User Schedule>  ezetimibe 10 milliGRAM(s) Oral daily  FLUoxetine 20 milliGRAM(s) Oral daily  labetalol 200 milliGRAM(s) Oral two times a day  losartan 25 milliGRAM(s) Oral daily  modafinil 100 milliGRAM(s) Oral <User Schedule>  pantoprazole    Tablet 40 milliGRAM(s) Oral before breakfast  polyethylene glycol 3350 17 Gram(s) Oral daily  senna 2 Tablet(s) Oral at bedtime  sodium chloride 1 Gram(s) Oral four times a day    MEDICATIONS  (PRN):    CAPILLARY BLOOD GLUCOSE        I&O's Summary      PHYSICAL EXAM:  Vital Signs Last 24 Hrs  T(C): 37 (10 Nov 2024 20:16), Max: 37 (10 Nov 2024 20:16)  T(F): 98.6 (10 Nov 2024 20:16), Max: 98.6 (10 Nov 2024 20:16)  HR: 70 (11 Nov 2024 05:33) (70 - 90)  BP: 128/75 (11 Nov 2024 05:33) (105/71 - 128/75)  BP(mean): --  RR: 14 (11 Nov 2024 05:33) (14 - 14)  SpO2: 98% (11 Nov 2024 05:33) (97% - 98%)    Parameters below as of 11 Nov 2024 05:33  Patient On (Oxygen Delivery Method): room air      GENERAL: Not in distress. Alert    HEENT: clear conjuctiva, MMM. no pallor or icterus  CARDIOVASCULAR: RRR S1, S2. No murmur/rubs/gallop  LUNGS: BLAE+, no rales, no wheezing, no rhonchi.    ABDOMEN: ND. Soft,  NT, no guarding / rebound / rigidity. BS normoactive  BACK: No spine tenderness.  EXTREMITIES: no edema. no leg or calf TP.  SKIN: warm and dry  PSYCHIATRIC: Calm.  No agitation.  CNS: AAO*3. moves limbs, follows commands. walked with first paced on hallway with PT    LABS:                        12.2   8.67  )-----------( 325      ( 11 Nov 2024 06:35 )             37.2                     COVID-19 PCR: NotDetec (04 Nov 2024 17:50)  SARS-CoV-2: NotDetec (30 Oct 2024 14:58)      RADIOLOGY & ADDITIONAL TESTS:  Imaging from Last 24 Hours:    Electrocardiogram/QTc Interval:    COORDINATION OF CARE:  Care Discussed with Consultants/Other Providers:

## 2024-11-11 NOTE — PROGRESS NOTE ADULT - ASSESSMENT
43 yo M with PMH of uncontrolled Hypertension who was admitted to [facility] for dizziness, headache. FOund to have L basal ganglia IPH with intraventricular hemorrhage extension. EEG negative. Hypercoag w/u thus far negative. Course complicated by transaminitis, hyponatremia, MRSA in nares. Now admitted to [facility] for rehab.    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 statin on hold d/t transaminitis   - fall, aspiration precautions      Hypertension   Orthostatic hypotension 11/5 am   - norvasc to 5mg daily on hold  - c/w losartan 25mg daily with hold parameters   - continue labetalol 200mg bid  - monitor VS including OH    Hyponatremia, SIADH  - continue NaCl tab 2g q6h. wean down/off as tolerated     Hyperlipidemia   - zetia  -   - not on statin d/t transaminitis     Transaminitis   - imaging from [facility] showing hepatic steatosis  - avoid hepatotoxins, trend LFTs  - GI input appreciated . signed off. f/u OP if LFT persistently high  - hepatitis panel reviewed       UTI   - continue levaquin. culture reviewed. last day 11/11    DVT Prophylaxis:  - Lovenox    43 yo M with PMH of uncontrolled Hypertension who was admitted to [facility] for dizziness, headache. FOund to have L basal ganglia IPH with intraventricular hemorrhage extension. EEG negative. Hypercoag w/u thus far negative. Course complicated by transaminitis, hyponatremia, MRSA in nares. Now admitted to [facility] for rehab.    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 statin on hold d/t transaminitis   - fall, aspiration precautions      Hypertension   Orthostatic hypotension 11/5 am   - norvasc to 5mg daily on hold  - c/w losartan 25mg daily with hold parameters   - continue labetalol 200mg bid  - monitor VS including OH    Hyponatremia, SIADH  - continue NaCl tab 2g q6h. reduced to 2 gm TID on 11/11.  wean down/off as tolerated     Hyperlipidemia   - zetia  -   - not on statin d/t transaminitis     Transaminitis   - imaging from [facility] showing hepatic steatosis  - avoid hepatotoxins, trend LFTs  - GI input appreciated . signed off. f/u OP if LFT persistently high  - hepatitis panel reviewed       UTI   - continue levaquin. culture reviewed. last day 11/11    DVT Prophylaxis:  - Lovenox    43 yo M with PMH of uncontrolled Hypertension who was admitted to [facility] for dizziness, headache. FOund to have L basal ganglia IPH with intraventricular hemorrhage extension. EEG negative. Hypercoag w/u thus far negative. Course complicated by transaminitis, hyponatremia, MRSA in nares. Now admitted to [facility] for rehab.    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 statin on hold d/t transaminitis   - fall, aspiration precautions      Hypertension   Orthostatic hypotension 11/5 am   - norvasc to 5mg daily on hold  - c/w losartan 25mg daily with hold parameters   - continue labetalol 200mg bid  - monitor VS including OH    Hyponatremia,   SIADH  - Na 136  - On Salt tab 1 gm QID. reduced to 1 gm TID on 11/11.  wean down/off as tolerated     Hyperlipidemia   - zetia  -   - not on statin d/t transaminitis     Transaminitis   - imaging from [facility] showing hepatic steatosis  - avoid hepatotoxins, trend LFTs  - GI input appreciated . signed off. f/u OP if LFT persistently high  - hepatitis panel reviewed       UTI   - continue levaquin. culture reviewed. last day 11/11    DVT Prophylaxis:  - Lovenox     d/w rehab team

## 2024-11-11 NOTE — PROGRESS NOTE ADULT - SUBJECTIVE AND OBJECTIVE BOX
Patient is a 44y old  Male who presents with a chief complaint of CVA- Basal Ganglia Hemorrhage & R corona radiata infarct     HPI:  Von Ferro is a 44 year old, Setswana speaking, male with PMH of uncontrolled HTN; who presented to [facility] on 10/13 for headache, dizziness, vomiting and flu-like symptoms. Imaging was significant for L basal ganglia IPH with intraventricular hemorrhage extension. MR showed R corona radiata ischemic infarct.  Vascular neurology was consulted and had concerns for infectious etiology vs cardiac cause. EEG negative. His hospital course was significant for hypercoagulable workup (negative thus far), leukocytosis (2/2 UTI on ABX), transaminitis/hepatic steatosis (statin on hold), hyponatremia (on NaCL tabs), and +MRSA (completed mupirocin in nares). PM&R was consulted and deemed him an appropriate candidate for IRF. He was medically stabilized and cleared for discharge to [facility].     SUBJECTIVE/OBJECTIVE: Patient seen and evaluated while sitting up in WC at bedside. Feeling well, slept overnight. Offers no complaints Happily anticipating discharge on Wednesday.     REVIEW OF SYSTEMS:  Denies CP, SOB, HA, pain, dysuria  +cog impairment  +limb apraxia       PHYSICAL EXAM  Vital Signs Last 24 Hrs  T(C): 37.1 (11 Nov 2024 08:58), Max: 37.1 (11 Nov 2024 08:58)  T(F): 98.7 (11 Nov 2024 08:58), Max: 98.7 (11 Nov 2024 08:58)  HR: 82 (11 Nov 2024 08:58) (70 - 82)  BP: 110/77 (11 Nov 2024 08:58) (106/68 - 128/75)  BP(mean): --  RR: 14 (11 Nov 2024 08:58) (14 - 14)  SpO2: 98% (11 Nov 2024 08:58) (98% - 98%)    Parameters below as of 11 Nov 2024 08:58  Patient On (Oxygen Delivery Method): room air        Gen - NAD, Comfortable  HEENT - NCAT, EOMI  Pulm - resp nonlabored  Cardiovascular - warm and well perfused, no cyanosis or pedal edema  Abdomen - Soft, NT/ND  Extremities - No peripheral edema, No calf tenderness  Neuro-     Cognitive - AO to self and year. able to follow simple commands and answer questions (delayed processing/response)     Communication -  No dysarthria, hypophonic     Attention: fair     Cranial Nerves - no facial asymmetry, tongue midline, EOMI, facial sensation intact     Motor +motor apraxia                    LEFT    UE - ShAB 5/5, EF 5/5, EE 5/5, WE 5/5,  5/5                    RIGHT UE - ShAB 5/5, EF 5/5, EE 5/5, WE 5/5,  5/5                    LEFT    LE - HF 5/5, KE 5/5, DF 5/5, PF 5/5                    RIGHT LE - HF 5/5, KE 5/5, DF 5/5, PF 5/5        Sensory - Intact to LT  Psychiatric - Mood stable, Affect WNL    RECENT LABS:                          12.2   8.67  )-----------( 325      ( 11 Nov 2024 06:35 )             37.2     11-11    136  |  101  |  15  ----------------------------<  89  4.4   |  30  |  1.07    Ca    9.9      11 Nov 2024 06:35    TPro  7.9  /  Alb  3.3  /  TBili  0.6  /  DBili  x   /  AST  85[H]  /  ALT  430[H]  /  AlkPhos  137[H]  11-11    LIVER FUNCTIONS - ( 11 Nov 2024 06:35 )  Alb: 3.3 g/dL / Pro: 7.9 g/dL / ALK PHOS: 137 U/L / ALT: 430 U/L / AST: 85 U/L / GGT: x                 Urinalysis Basic - ( 11 Nov 2024 06:35 )    Color: x / Appearance: x / SG: x / pH: x  Gluc: 89 mg/dL / Ketone: x  / Bili: x / Urobili: x   Blood: x / Protein: x / Nitrite: x   Leuk Esterase: x / RBC: x / WBC x   Sq Epi: x / Non Sq Epi: x / Bacteria: x              Allergies  No Known Allergies  Intolerances    MEDICATIONS  (STANDING):  aspirin enteric coated 81 milliGRAM(s) Oral daily  chlorhexidine 2% Cloths 1 Application(s) Topical daily  enoxaparin Injectable 40 milliGRAM(s) SubCutaneous <User Schedule>  ezetimibe 10 milliGRAM(s) Oral daily  FLUoxetine 20 milliGRAM(s) Oral daily  labetalol 200 milliGRAM(s) Oral two times a day  levoFLOXacin  Tablet 500 milliGRAM(s) Oral every 24 hours  losartan 25 milliGRAM(s) Oral daily  modafinil 100 milliGRAM(s) Oral <User Schedule>  pantoprazole    Tablet 40 milliGRAM(s) Oral before breakfast  polyethylene glycol 3350 17 Gram(s) Oral daily  senna 2 Tablet(s) Oral at bedtime  sodium chloride 1 Gram(s) Oral four times a day    MEDICATIONS  (PRN):

## 2024-11-12 ENCOUNTER — TRANSCRIPTION ENCOUNTER (OUTPATIENT)
Age: 44
End: 2024-11-12

## 2024-11-12 PROCEDURE — 99232 SBSQ HOSP IP/OBS MODERATE 35: CPT

## 2024-11-12 PROCEDURE — 99233 SBSQ HOSP IP/OBS HIGH 50: CPT | Mod: GC

## 2024-11-12 RX ORDER — LOSARTAN POTASSIUM 25 MG/1
1 TABLET ORAL
Qty: 30 | Refills: 0
Start: 2024-11-12 | End: 2024-12-11

## 2024-11-12 RX ORDER — EZETIMIBE 10 MG/1
1 TABLET ORAL
Qty: 30 | Refills: 0
Start: 2024-11-12 | End: 2024-12-11

## 2024-11-12 RX ORDER — LABETALOL HCL 200 MG
1 TABLET ORAL
Qty: 60 | Refills: 0
Start: 2024-11-12 | End: 2024-12-11

## 2024-11-12 RX ORDER — ASPIRIN/MAG CARB/ALUMINUM AMIN 325 MG
1 TABLET ORAL
Qty: 30 | Refills: 0
Start: 2024-11-12 | End: 2024-12-11

## 2024-11-12 RX ORDER — FLUOXETINE HCL 10 MG
1 CAPSULE ORAL
Qty: 30 | Refills: 0
Start: 2024-11-12 | End: 2024-12-11

## 2024-11-12 RX ORDER — POLYETHYLENE GLYCOL 3350 17 G/17G
17 POWDER, FOR SOLUTION ORAL
Qty: 0 | Refills: 0 | DISCHARGE
Start: 2024-11-12

## 2024-11-12 RX ORDER — SENNA 187 MG
2 TABLET ORAL
Qty: 0 | Refills: 0 | DISCHARGE
Start: 2024-11-12

## 2024-11-12 RX ORDER — SODIUM CHLORIDE 9 MG/ML
1 INJECTION, SOLUTION INTRAMUSCULAR; INTRAVENOUS; SUBCUTANEOUS
Qty: 42 | Refills: 0
Start: 2024-11-12 | End: 2024-11-25

## 2024-11-12 RX ADMIN — Medication 20 MILLIGRAM(S): at 11:55

## 2024-11-12 RX ADMIN — Medication 200 MILLIGRAM(S): at 17:19

## 2024-11-12 RX ADMIN — LOSARTAN POTASSIUM 25 MILLIGRAM(S): 25 TABLET ORAL at 05:22

## 2024-11-12 RX ADMIN — SODIUM CHLORIDE 1 GRAM(S): 9 INJECTION, SOLUTION INTRAMUSCULAR; INTRAVENOUS; SUBCUTANEOUS at 05:22

## 2024-11-12 RX ADMIN — SODIUM CHLORIDE 1 GRAM(S): 9 INJECTION, SOLUTION INTRAMUSCULAR; INTRAVENOUS; SUBCUTANEOUS at 21:33

## 2024-11-12 RX ADMIN — PANTOPRAZOLE SODIUM 40 MILLIGRAM(S): 40 TABLET, DELAYED RELEASE ORAL at 05:22

## 2024-11-12 RX ADMIN — MODAFINIL 100 MILLIGRAM(S): 200 TABLET ORAL at 07:40

## 2024-11-12 RX ADMIN — Medication 81 MILLIGRAM(S): at 11:55

## 2024-11-12 RX ADMIN — Medication 2 TABLET(S): at 21:33

## 2024-11-12 RX ADMIN — SODIUM CHLORIDE 1 GRAM(S): 9 INJECTION, SOLUTION INTRAMUSCULAR; INTRAVENOUS; SUBCUTANEOUS at 13:03

## 2024-11-12 RX ADMIN — Medication 200 MILLIGRAM(S): at 05:22

## 2024-11-12 RX ADMIN — Medication 40 MILLIGRAM(S): at 17:18

## 2024-11-12 RX ADMIN — EZETIMIBE 10 MILLIGRAM(S): 10 TABLET ORAL at 11:55

## 2024-11-12 RX ADMIN — CHLORHEXIDINE GLUCONATE 1 APPLICATION(S): 40 SOLUTION TOPICAL at 11:56

## 2024-11-12 NOTE — DISCHARGE NOTE PROVIDER - CARE PROVIDER_API CALL
Ashley Jiang  NP in Family Health  [address] [facility] [address] [facility] [address], [address]  [place] [address]  Phone: [phone]  Fax: [phone]  Follow Up Time: 2 weeks    Bernardo Chilel  Cardiovascular Disease  [address] [place] [address], [address]  [place] [address]  Phone: [phone]  Fax: [phone]  Follow Up Time: 1 month    Ana Resendez  Vascular Neurology  [address] [place] [address], [address]  [place] [address]  Phone: [phone]  Fax: [phone]  Follow Up Time: 1 week    Hawk Lizarraga  Cardiac Electrophysiology  [address] [place] [address], [address]  [place] [address]  Phone: [phone]  Fax: [phone]  Follow Up Time: 1 week    Hawk Weston  Gastroenterology  [address] [place] [address]  Phone: [phone]  Fax: [phone]  Follow Up Time: 1 week    Angela Donaldson  Hematology  [address] [place] [address]  Phone: [phone]  Fax: [phone]  Follow Up Time: 1 week    Arielle Neal  Physical/Rehab Medicine  [address] [place] [address]  Phone: [phone]  Fax: [phone]  Follow Up Time: 1 month    Primary Care provider,   Phone: (   )    -  Fax: (   )    -  Follow Up Time: 1 week

## 2024-11-12 NOTE — DISCHARGE NOTE PROVIDER - CARE PROVIDERS DIRECT ADDRESSES
[email or web address],DirectAddress_Unknown,DirectAddress_Unknown,DirectAddress_Unknown,DirectAddress_Unknown,DirectAddress_Unknown,DirectAddress_Unknown,DirectAddress_Unknown

## 2024-11-12 NOTE — DISCHARGE NOTE PROVIDER - NSDCMRMEDTOKEN_GEN_ALL_CORE_FT
aspirin 81 mg oral delayed release tablet: 1 tab(s) orally once a day  ezetimibe 10 mg oral tablet: 1 tab(s) orally once a day  FLUoxetine 20 mg oral capsule: 1 cap(s) orally once a day  labetalol 200 mg oral tablet: 1 tab(s) orally 2 times a day  losartan 25 mg oral tablet: 1 tab(s) orally once a day  polyethylene glycol 3350 oral powder for reconstitution: 17 gram(s) orally once a day as needed for  constipation  senna leaf extract oral tablet: 2 tab(s) orally once a day (at bedtime)  sodium chloride 1 g oral tablet: 1 tab(s) orally 3 times a day please follow up with your primary care within 1week  Please reduce to twice daily on 11/15   aspirin 81 mg oral delayed release tablet: 1 tab(s) orally once a day  ezetimibe 10 mg oral tablet: 1 tab(s) orally once a day  FLUoxetine 20 mg oral capsule: 1 cap(s) orally once a day  labetalol 200 mg oral tablet: 1 tab(s) orally 2 times a day  losartan 25 mg oral tablet: 1 tab(s) orally once a day  polyethylene glycol 3350 oral powder for reconstitution: 17 gram(s) orally once a day as needed for  constipation  senna leaf extract oral tablet: 2 tab(s) orally once a day (at bedtime)  sodium chloride 1 g oral tablet: 1 tab(s) orally 3 times a day please follow up with your primary care within 1week  Please reduce to twice daily on 11/15  speech therapy: 2-3x/week

## 2024-11-12 NOTE — CHART NOTE - NSCHARTNOTEFT_GEN_A_CORE
Nutrition Follow Up Note  Source: Medical Record [X] Patient [X] Family [ ]         Diet: Consistent carbohydrate (no snacks), halal  Pt tolerating diet with good PO intake, eating >75% of meals Per nursing flowsheets. Observed during meal rounds with good PO intake. Liver function tests still remain elevated but are trending down, GI is following. No digestive issues reported at this time.    Enteral/Parenteral Nutrition: N/A    Current Weight: 142.4 lbs (11-4)    Pertinent Medications: MEDICATIONS  (STANDING):  aspirin enteric coated 81 milliGRAM(s) Oral daily  chlorhexidine 2% Cloths 1 Application(s) Topical daily  enoxaparin Injectable 40 milliGRAM(s) SubCutaneous <User Schedule>  ezetimibe 10 milliGRAM(s) Oral daily  FLUoxetine 20 milliGRAM(s) Oral daily  labetalol 200 milliGRAM(s) Oral two times a day  losartan 25 milliGRAM(s) Oral daily  pantoprazole    Tablet 40 milliGRAM(s) Oral before breakfast  polyethylene glycol 3350 17 Gram(s) Oral daily  senna 2 Tablet(s) Oral at bedtime  sodium chloride 1 Gram(s) Oral three times a day    MEDICATIONS  (PRN):      Pertinent Labs:  11-11 Na136 mmol/L Glu 89 mg/dL K+ 4.4 mmol/L Cr  1.07 mg/dL BUN 15 mg/dL 11-11 Alb 3.3 g/dL 10-14 Chol 209 mg/dL[H] LDL --    HDL 43 mg/dL Trig 109 mg/dL        Skin: Skin intact per nursing flow sheets     Edema: No edema per nursing flow sheets     Last BM: on 11-11 Per nursing flowsheets     Estimated Needs:   [X] No Change since Previous Assessment  [ ] Recalculated:     Previous Nutrition Diagnosis:   altered nutrition related lab values 2/2 liver dysfunction    Nutrition Diagnosis is [X] Ongoing  - GI following    New Nutrition Diagnosis: [X] Not Applicable  [ ] Inadequate Protein Energy Intake   [ ] Inadequate Oral Intake   [ ] Excessive Energy Intake   [ ] Increased Nutrient Needs   [ ] Obesity   [ ] Altered GI Function   [ ] Unintended Weight Loss   [ ] Food & Nutrition Related Knowledge Deficit  [ ] Limited Adherence to nutrition related recommendations   [ ] Malnutrition      Interventions:   1. Recommend continuing with current plan of care  2. Encourage PO intake  3. Obtain and honor food preferences as able      Monitoring & Evaluation:   [X] Weights   [X] PO Intake   [X] Follow Up (Per Protocol)  [X] Tolerance to Diet Prescription   [X] Other: Labs     RD Remains Available.  Mai Grey RD

## 2024-11-12 NOTE — PROGRESS NOTE ADULT - ASSESSMENT
44 year old, Croatian speaking, male with PMH of uncontrolled HTN; who presented to [facility] on 10/13 for headache, dizziness, vomiting and flu-like symptoms. Imaging was significant for L basal ganglia IPH with intraventricular hemorrhage extension. MR showed R corona radiata ischemic infarct.  Vascular neurology was consulted and had concerns for infectious etiology vs cardiac cause. EEG negative. His hospital course was significant for hypercoagulable workup (negative thus far), leukocytosis (2/2 UTI on ABX), transaminitis/hepatic steatosis (statin on hold), hyponatremia (on NaCL tabs), and +MRSA (completed mupirocin in nares). Patient now admitted for a multidisciplinary rehab program. 11-04-24   -------------    Rehab Management/MEDICAL MANAGEMENT     #CVA- Basal Ganglia IPH & R corona radiata infarct + IVH  - Plan for outpatient ILR placement   - ASA 81mg daily  - Statin on hold d/t elevated LFT ( )  - Comprehensive Rehab Program of PT/OT/SLP  - 3 hours a day, 5 days a week    #HTN/HLD   - Amlodipine 10my daily --dec to 5 mg (11/5) - Dc  - Ezetimibe 10mg daily   - Labetalol 200mg TID -- change to BID (11/5)  - Losartan 100mg daily -- change to 50 (11/5) - > dec 25 QD(11/7)  - Bp stable: 118/74 - 123/74 (11/12)    #Hyponatremia  - Sodium Chloride 2G QID>  1gm 4X/DAY  - tapering as tolerated  - NaCl 1gm TID  - Na 136 (11/11)    #Sleep/Mood/Alertness  - Fluoxetine 20mg daily   - Modafinil 100mg daily - dc    #Skin  #MRSA in nares  - Completed Mupirocin in BL nares on 11/2   - Skin on admission: intact  - Pressure injury/Skin: OOB to Chair, PT/OT     #Pain Mgmt   - Tylenol PRN-- DC due to transaminitis    #Transaminitis - downtrending  - Statin on hold   - ALK phos-166>156>137 (11/11)  - AST/ALT-- 151/537>122/476>85/430 (11/11)  - GI Consult appreciated - continue to trend liver enzymes    #GI/Bowel Mgmt   - Pantoprazole   - Senna & Miralax QD    #/Bladder Mgmt   #UTI  - Levofloxacin 500mg daily (til 11/11)  - voiding     #FEN   - Diet - Regular + Thins   - Dysphagia  SLP - evaluation and treatment    #Precautions / PROPHYLAXIS:   - Falls  - ortho: Weight bearing status: WBAT   - Lungs: Aspiration, Incentive Spirometer   - DVT: Lovenox 40mg daily   -----------------------------------------------  OUTPATIENT/FOLLOW UP:    Ashley Jiang  NP in Family Health  [address] [place] [address] [address]  [place] [address]  Phone: [phone]  Fax: [phone]  Follow Up Time: 2 weeks    Bernardo Chilel  Cardiovascular Disease  [address] [place] [address], [facility] [address]  [place] [address]  Phone: [phone]  Fax: [phone]  Follow Up Time: 1 month    Ana Resendez  Vascular Neurology  [address] [place] [address] [address]  [place] [address]  Phone: [phone]  Fax: [phone]  Follow Up Time: 1 week    Hawk Lizarraga  Cardiac Electrophysiology  [address] [place] [address], [address]  [place] [address]  Phone: [phone]  Fax: [phone]  Follow Up Time: 1 week    Hawk Weston  Gastroenterology  [address] [place] [address]  Phone: [phone]  Fax: [phone]  Follow Up Time: 1 week    Angela Donaldson  Hematology  [address] [place] [address]  Phone: [phone]  Fax: [phone]  Follow Up Time: 1 week    IDT 11/6  NSG: cont B/B  SW: lives with sister on 1st fl apt, 0 MICH.  has great family support in community  SLP: reg diet, recept language difficulty with multi step command, mild-mod cog, reduce attention, delay recall, reduced problem solving, safety awareness fine. not very motivated.    OT: SV grooming, Closer SV dressing, CGA bathing, CGA-close SV toileting/bathroom transfers  PT: SV bed mob, CGA transfer/am 100' no AD/ 12 steps 2 HR  Team goals: pt use external aide to recall therapy info min A; amb to bathroom + toilet SV  Barriers: cog, initiation  TDD: 11/13 home SV during waking hours

## 2024-11-12 NOTE — DISCHARGE NOTE PROVIDER - NSDCHC_MEDRECSTATUS_GEN_ALL_CORE
Addended by: FREDRICK AYALA on: 11/13/2018 02:53 PM     Modules accepted: Orders     Admission Reconciliation is Completed  Discharge Reconciliation is Completed

## 2024-11-12 NOTE — DISCHARGE NOTE PROVIDER - HOSPITAL COURSE
HPI:  Von Ferro is a 44 year old, Divehi speaking, male with PMH of uncontrolled HTN; who presented to [facility] on 10/13 for headache, dizziness, vomiting and flu-like symptoms. Imaging was significant for L basal ganglia IPH with intraventricular hemorrhage extension. MR showed R corona radiata ischemic infarct.  Vascular neurology was consulted and had concerns for infectious etiology vs cardiac cause. EEG negative. His hospital course was significant for hypercoagulable workup (negative thus far), leukocytosis (2/2 UTI on ABX), transaminitis/hepatic steatosis (statin on hold), hyponatremia (on NaCL tabs), and +MRSA (completed mupirocin in nares). PM&R was consulted and deemed himan appropriate candidate for IRF. He was medically stabilized and cleared for discharge to [facility].     Pt was stable upon rehab admission to  [facility]. Admitted with gait instabilty, ADL, and functional impairments.     Rehab Course significant for:  - soft BP > BP medications were adjusted    All other medical co-morbidities were stable.     Last IDT 11/6  NSG: cont B/B  SW: lives with sister on 1st fl apt, 0 MICH.  has great family support in community  SLP: reg diet, recept language difficulty with multi step command, mild-mod cog, reduce attention, delay recall, reduced problem solving, safety awareness fine. not very motivated.    OT: SV grooming, Closer SV dressing, CGA bathing, CGA-close SV toileting/bathroom transfers  PT: SV bed mob, CGA transfer/am 100' no AD/ 12 steps 2 HR  Team goals: pt use external aide to recall therapy info min A; amb to bathroom + toilet SV  Barriers: cog, initiation  TDD: 11/13 home SV during waking hours    Pt tolerated course of inpatient PT/OT/SLP rehab with significant functional improvements and met rehab goals prior to discharge.

## 2024-11-12 NOTE — DISCHARGE NOTE PROVIDER - ATTENDING DISCHARGE PHYSICAL EXAMINATION:
PHYSICAL EXAM  Constitutional - NAD, Comfortable  HEENT - NCAT, EOMI  Neck - Supple, No limited ROM  Chest - Breathing comfortably, No wheezing  Cardiovascular - S1S2   Abdomen - Soft   Extremities - No C/C/E, No calf tenderness   Neurologic Exam -                    Cognitive - AAO to self, place, date, year, situation     Communication - Fluent, No dysarthria     Cranial Nerves - CN 2-12 intact     Motor - No focal deficits                    LEFT    UE - ShAB 5/5, EF 5/5, EE 5/5, WE 5/5,  5/5                    RIGHT UE - ShAB 5/5, EF 5/5, EE 5/5, WE 5/5,  5/5                    LEFT    LE - HF 5/5, KE 5/5, DF 5/5, PF 5/5                    RIGHT LE - HF 5/5, KE 5/5, DF 5/5, PF 5/5        Sensory - Intact to LT     Reflexes - DTR Intact, No primitive reflexive     Coordination - FTN intact     OculoVestibular - No saccades, No nystagmus, VOR         Balance - WNL Static  Psychiatric - Mood stable, Affect WNL

## 2024-11-12 NOTE — DISCHARGE NOTE PROVIDER - PROVIDER TOKENS
PROVIDER:[TOKEN:[[id number]],FOLLOWUP:[2 weeks]],PROVIDER:[TOKEN:[[id number]],FOLLOWUP:[1 month]],PROVIDER:[TOKEN:[[id number]],FOLLOWUP:[1 week]],PROVIDER:[TOKEN:[[id number]],FOLLOWUP:[1 week]],PROVIDER:[TOKEN:[[id number]],FOLLOWUP:[1 week]],PROVIDER:[TOKEN:[[id number]],FOLLOWUP:[1 week]],PROVIDER:[TOKEN:[[id number]],FOLLOWUP:[1 month]],FREE:[LAST:[Primary Care provider],PHONE:[(   )    -],FAX:[(   )    -],FOLLOWUP:[1 week]]

## 2024-11-12 NOTE — DISCHARGE NOTE PROVIDER - NPI NUMBER (FOR SYSADMIN USE ONLY) :
[[id number]],[UNKNOWN]

## 2024-11-12 NOTE — PROGRESS NOTE ADULT - SUBJECTIVE AND OBJECTIVE BOX
PROGRESS NOTE:     Patient is a 44y old  Male who presents with a chief complaint of CVA- Basal Ganglia Hemorrhage & R corona radiata infarct (11 Nov 2024 12:23)      SUBJECTIVE / OVERNIGHT EVENTS: pt was seen and evaluated today  no complains offered. he was resting comfortably     ADDITIONAL REVIEW OF SYSTEMS: as per HPI    MEDICATIONS  (STANDING):  aspirin enteric coated 81 milliGRAM(s) Oral daily  chlorhexidine 2% Cloths 1 Application(s) Topical daily  enoxaparin Injectable 40 milliGRAM(s) SubCutaneous <User Schedule>  ezetimibe 10 milliGRAM(s) Oral daily  FLUoxetine 20 milliGRAM(s) Oral daily  labetalol 200 milliGRAM(s) Oral two times a day  losartan 25 milliGRAM(s) Oral daily  modafinil 100 milliGRAM(s) Oral <User Schedule>  pantoprazole    Tablet 40 milliGRAM(s) Oral before breakfast  polyethylene glycol 3350 17 Gram(s) Oral daily  senna 2 Tablet(s) Oral at bedtime  sodium chloride 1 Gram(s) Oral three times a day    MEDICATIONS  (PRN):      CAPILLARY BLOOD GLUCOSE        I&O's Summary    11 Nov 2024 07:01  -  12 Nov 2024 07:00  --------------------------------------------------------  IN: 0 mL / OUT: 2 mL / NET: -2 mL        PHYSICAL EXAM:  Vital Signs Last 24 Hrs  T(C): 36.8 (12 Nov 2024 09:04), Max: 36.8 (12 Nov 2024 09:04)  T(F): 98.2 (12 Nov 2024 09:04), Max: 98.2 (12 Nov 2024 09:04)  HR: 72 (12 Nov 2024 09:04) (72 - 74)  BP: 123/74 (12 Nov 2024 09:04) (118/74 - 123/74)  BP(mean): --  RR: 16 (12 Nov 2024 09:04) (14 - 16)  SpO2: 97% (12 Nov 2024 09:04) (97% - 98%)    Parameters below as of 12 Nov 2024 09:04  Patient On (Oxygen Delivery Method): room air        GENERAL: Not in distress. Alert    HEENT: clear conjuctiva, MMM. no pallor or icterus  CARDIOVASCULAR: RRR S1, S2. No murmur/rubs/gallop  LUNGS: BLAE+, no rales, no wheezing, no rhonchi.    ABDOMEN: ND. Soft,  NT, no guarding / rebound / rigidity. BS normoactive  BACK: No spine tenderness.  EXTREMITIES: no edema. no leg or calf TP.  SKIN: warm and dry  PSYCHIATRIC: Calm.  No agitation.  CNS: AAO*3.    LABS:                        12.2   8.67  )-----------( 325      ( 11 Nov 2024 06:35 )             37.2     11-11    136  |  101  |  15  ----------------------------<  89  4.4   |  30  |  1.07    Ca    9.9      11 Nov 2024 06:35    TPro  7.9  /  Alb  3.3  /  TBili  0.6  /  DBili  x   /  AST  85[H]  /  ALT  430[H]  /  AlkPhos  137[H]  11-11          Urinalysis Basic - ( 11 Nov 2024 06:35 )    Color: x / Appearance: x / SG: x / pH: x  Gluc: 89 mg/dL / Ketone: x  / Bili: x / Urobili: x   Blood: x / Protein: x / Nitrite: x   Leuk Esterase: x / RBC: x / WBC x   Sq Epi: x / Non Sq Epi: x / Bacteria: x      patient was discussed during IDR and with consultants

## 2024-11-12 NOTE — PROGRESS NOTE ADULT - ASSESSMENT
4 yo M with PMH of uncontrolled Hypertension who was admitted to [facility] for dizziness, headache. FOund to have L basal ganglia IPH with intraventricular hemorrhage extension. EEG negative. Hypercoag w/u thus far negative. Course complicated by transaminitis, hyponatremia, MRSA in nares. Now admitted to [facility] for rehab.    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 statin on hold d/t transaminitis   - fall, aspiration precautions      Hypertension   Orthostatic hypotension 11/5 am   - norvasc to 5mg daily on hold  - c/w losartan 25mg daily with hold parameters   - continue labetalol 200mg bid  - monitor VS including OH    Hyponatremia,   SIADH  - Na 136  - On Salt tab 1 gm QID. reduced to 1 gm TID on 11/11.  wean down/off as tolerated     Hyperlipidemia   - zetia  -   - not on statin d/t transaminitis     Transaminitis   - imaging from [facility] showing hepatic steatosis  - avoid hepatotoxins, trend LFTs  - GI input appreciated . signed off. f/u OP if LFT persistently high  - hepatitis panel reviewed       UTI   -  s/p course of levaquin. culture reviewed. last day 11/11    DVT Prophylaxis:  - Lovenox     d/w rehab team

## 2024-11-12 NOTE — PROGRESS NOTE ADULT - NS ATTEND OPT1A GEN_ALL_CORE
History/Medical decision making
History/Medical decision making
History/Exam/Medical decision making
History/Medical decision making
History/Medical decision making

## 2024-11-12 NOTE — PROGRESS NOTE ADULT - SUBJECTIVE AND OBJECTIVE BOX
Patient is a 44y old  Male who presents with a chief complaint of CVA- Basal Ganglia Hemorrhage & R corona radiata infarct     HPI:  Von Ferro is a 44 year old, Croatian speaking, male with PMH of uncontrolled HTN; who presented to [facility] on 10/13 for headache, dizziness, vomiting and flu-like symptoms. Imaging was significant for L basal ganglia IPH with intraventricular hemorrhage extension. MR showed R corona radiata ischemic infarct.  Vascular neurology was consulted and had concerns for infectious etiology vs cardiac cause. EEG negative. His hospital course was significant for hypercoagulable workup (negative thus far), leukocytosis (2/2 UTI on ABX), transaminitis/hepatic steatosis (statin on hold), hyponatremia (on NaCL tabs), and +MRSA (completed mupirocin in nares). PM&R was consulted and deemed him an appropriate candidate for IRF. He was medically stabilized and cleared for discharge to [facility].     SUBJECTIVE/OBJECTIVE: Patient seen and evaluated while sitting up in WC at bedside. Independent with meals, functionally doing well > doesn't feel the need for family training.     REVIEW OF SYSTEMS:  Denies CP, SOB, cough, HA, dizziness, pain, abd discomfort, dysuria  +cog impairment  +limb apraxia     PHYSICAL EXAM  Vital Signs Last 24 Hrs  T(C): 36.8 (11-12-24 @ 09:04), Max: 36.8 (11-12-24 @ 09:04)  T(F): 98.2 (11-12-24 @ 09:04), Max: 98.2 (11-12-24 @ 09:04)  HR: 72 (11-12-24 @ 09:04) (72 - 74)  BP: 123/74 (11-12-24 @ 09:04) (118/74 - 123/74)  RR: 16 (11-12-24 @ 09:04) (14 - 16)  SpO2: 97% (11-12-24 @ 09:04) (97% - 98%)    Gen - NAD, Comfortable  HEENT - NCAT, EOMI  Pulm - resp nonlabored  Cardiovascular - warm and well perfused, no cyanosis or pedal edema  Abdomen - Soft, NT/ND  Extremities - No peripheral edema, No calf tenderness  Neuro-     Cognitive - AO to self and year. able to follow simple commands and answer questions (delayed processing/response)     Communication -  No dysarthria, hypophonic     Attention: fair     Cranial Nerves - no facial asymmetry, tongue midline, EOMI, facial sensation intact     Motor +motor apraxia                    LEFT    UE - ShAB 5/5, EF 5/5, EE 5/5, WE 5/5,  5/5                    RIGHT UE - ShAB 5/5, EF 5/5, EE 5/5, WE 5/5,  5/5                    LEFT    LE - HF 5/5, KE 5/5, DF 5/5, PF 5/5                    RIGHT LE - HF 5/5, KE 5/5, DF 5/5, PF 5/5        Sensory - Intact to LT  Psychiatric - Mood stable, Affect WNL    RECENT LABS:                      12.2   8.67  )-----------( 325      ( 11 Nov 2024 06:35 )             37.2     11-11    136  |  101  |  15  ----------------------------<  89  4.4   |  30  |  1.07    Ca    9.9      11 Nov 2024 06:35    TPro  7.9  /  Alb  3.3  /  TBili  0.6  /  DBili  x   /  AST  85[H]  /  ALT  430[H]  /  AlkPhos  137[H]  11-11    LIVER FUNCTIONS - ( 11 Nov 2024 06:35 )  Alb: 3.3 g/dL / Pro: 7.9 g/dL / ALK PHOS: 137 U/L / ALT: 430 U/L / AST: 85 U/L / GGT: x             Allergies  No Known Allergies  Intolerances    MEDICATIONS  (STANDING):  aspirin enteric coated 81 milliGRAM(s) Oral daily  chlorhexidine 2% Cloths 1 Application(s) Topical daily  enoxaparin Injectable 40 milliGRAM(s) SubCutaneous <User Schedule>  ezetimibe 10 milliGRAM(s) Oral daily  FLUoxetine 20 milliGRAM(s) Oral daily  labetalol 200 milliGRAM(s) Oral two times a day  losartan 25 milliGRAM(s) Oral daily  modafinil 100 milliGRAM(s) Oral <User Schedule>  pantoprazole    Tablet 40 milliGRAM(s) Oral before breakfast  polyethylene glycol 3350 17 Gram(s) Oral daily  senna 2 Tablet(s) Oral at bedtime  sodium chloride 1 Gram(s) Oral three times a day    MEDICATIONS  (PRN):

## 2024-11-12 NOTE — PROGRESS NOTE ADULT - NS ATTEND AMEND GEN_ALL_CORE FT
I have personally seen and examined the patient.  I fully participated in the care of this patient.  I have made amendments to the documentation where necessary, and agree with the history, physical exam, and plan as documented above  Patient seen at bedside, slept well. Aware he is going home tomorrow- looking forward to it.  Sister will be providing assist upon dc

## 2024-11-12 NOTE — DISCHARGE NOTE PROVIDER - NSDCCPCAREPLAN_GEN_ALL_CORE_FT
PRINCIPAL DISCHARGE DIAGNOSIS  Diagnosis: CVA (cerebrovascular accident)  Assessment and Plan of Treatment: You presented to the hospital with headache, dizziness, and vomiting. you were found to have a stroke + intracerebral hemorrhage.   Continue Aspirin 81 for seondary stroke prevention.  You should be on a  statin for secondary stroke/control cholesterol. Your LDL (bad cholesterol) is 146.; after a stroke, goal is for LDL to be <70.  However, statins elevates liver enzymes which yours is already elevated.  Once it's within normal limit you can restart on it.  You are on another colesterol medication Ezetimibe.   Other modifications: Monitor your blood pressure.  Reduce fat, cholesterol and salt intake in your diet.  Increase intake of fruits and vegetables.  Limit alcohol to minimum and do not smoke. Avoid nonsteroidal anti-inflammatory drugs (NSAIDs). You may be at risk for falling, make changes to your home to help you walk easier.  Keep up to date on vaccinations.  If you experience any symptoms of faical drooping, slurred speech, arm or leg weakness, severe headache, vision changes or any worsening symptoms, notify provider immediately and return to ER.      SECONDARY DISCHARGE DIAGNOSES  Diagnosis: UTI (urinary tract infection)  Assessment and Plan of Treatment: you were treated for UTI > completed coruse of antibiotics on 11/11.    Diagnosis: Transaminitis  Assessment and Plan of Treatment: elevated LFT.  You had an abdominal ultrasound which showed fatty liver.  Currently not on hepatotoxic medications.  Hold statin and tylenol use.  Outpatient monitoring by PCP.    Alk phos/AST/ALT (11/11) 137/85/430 - have been downtrending    Diagnosis: Hyponatremia  Assessment and Plan of Treatment: low sodium.  Has been on supplement to help.  Was previously on sodium chloride 2g 4 times daily > now weaned to 1g 3 times daily.  At time of discharge Sodium level at 136.  Continue to wean as outpatient>  to be monitored/managed by primary care provider    Diagnosis: HTN (hypertension)  Assessment and Plan of Treatment: During rehab course, BP is soft> medications were adjusted.  Please continue with current regimen.  Monitor your Blood pressure > too high increases risk of stroke, too low may cause hypo-profusion/dizziness.  Goal is for systolic blood pressure (top number) to be 120-140.

## 2024-11-13 ENCOUNTER — TRANSCRIPTION ENCOUNTER (OUTPATIENT)
Age: 44
End: 2024-11-13

## 2024-11-13 VITALS
RESPIRATION RATE: 14 BRPM | DIASTOLIC BLOOD PRESSURE: 87 MMHG | HEART RATE: 72 BPM | TEMPERATURE: 97 F | SYSTOLIC BLOOD PRESSURE: 136 MMHG | OXYGEN SATURATION: 98 %

## 2024-11-13 PROCEDURE — 99232 SBSQ HOSP IP/OBS MODERATE 35: CPT

## 2024-11-13 PROCEDURE — 99239 HOSP IP/OBS DSCHRG MGMT >30: CPT | Mod: GC

## 2024-11-13 RX ADMIN — Medication 200 MILLIGRAM(S): at 05:16

## 2024-11-13 RX ADMIN — PANTOPRAZOLE SODIUM 40 MILLIGRAM(S): 40 TABLET, DELAYED RELEASE ORAL at 05:17

## 2024-11-13 RX ADMIN — Medication 20 MILLIGRAM(S): at 11:08

## 2024-11-13 RX ADMIN — SODIUM CHLORIDE 1 GRAM(S): 9 INJECTION, SOLUTION INTRAMUSCULAR; INTRAVENOUS; SUBCUTANEOUS at 05:16

## 2024-11-13 RX ADMIN — Medication 81 MILLIGRAM(S): at 11:09

## 2024-11-13 RX ADMIN — EZETIMIBE 10 MILLIGRAM(S): 10 TABLET ORAL at 11:08

## 2024-11-13 NOTE — PROGRESS NOTE ADULT - PROVIDER SPECIALTY LIST ADULT
Hospitalist
Physiatry
Hospitalist
Neuropsychology
Physiatry
Physiatry
Hospitalist
Physiatry
Hospitalist
Physiatry
Hospitalist

## 2024-11-13 NOTE — DISCHARGE NOTE NURSING/CASE MANAGEMENT/SOCIAL WORK - PATIENT PORTAL LINK FT
You can access the FollowMyHealth Patient Portal offered by [facility] by registering at the following website: [email or web address]. By joining [employer]’s FollowMyHealth portal, you will also be able to view your health information using other applications (apps) compatible with our system.

## 2024-11-13 NOTE — DISCHARGE NOTE NURSING/CASE MANAGEMENT/SOCIAL WORK - FINANCIAL ASSISTANCE
[facility] provides services at a reduced cost to those who are determined to be eligible through [facility]’s financial assistance program. Information regarding [facility]’s financial assistance program can be found by going to [email or web address] or by calling [phone].

## 2024-11-13 NOTE — PROGRESS NOTE ADULT - ASSESSMENT
44 year old, German speaking, male with PMH of uncontrolled HTN; who presented to [facility] on 10/13 for headache, dizziness, vomiting and flu-like symptoms. Imaging was significant for L basal ganglia IPH with intraventricular hemorrhage extension. MR showed R corona radiata ischemic infarct.  Vascular neurology was consulted and had concerns for infectious etiology vs cardiac cause. EEG negative. His hospital course was significant for hypercoagulable workup (negative thus far), leukocytosis (2/2 UTI on ABX), transaminitis/hepatic steatosis (statin on hold), hyponatremia (on NaCL tabs), and +MRSA (completed mupirocin in nares). Patient now admitted for a multidisciplinary rehab program. 11-04-24   -------------    Rehab Management/MEDICAL MANAGEMENT     #CVA- Basal Ganglia IPH & R corona radiata infarct + IVH  - Plan for outpatient ILR placement   - ASA 81mg daily  - Statin on hold d/t elevated LFT ( )  - [facility] from [facility]/SLP  - 3 hours a day, 5 days a week-- continue outpatient-    #HTN/HLD   - Amlodipine 10my daily --dec to 5 mg (11/5) - Dc  - Ezetimibe 10mg daily   - Labetalol 200mg TID -- change to BID (11/5)  - Losartan 100mg daily -- change to 50 (11/5) - > dec 25 QD(11/7)  - Bp stable    #Hyponatremia  - Sodium Chloride 2G QID>  1gm 4X/DAY  - tapering as tolerated  - NaCl 1gm TID  - Na 136 (11/11)    #Sleep/Mood/Alertness  - Fluoxetine 20mg daily   - Dc'd Modafinil 100mg    #Skin  #MRSA in nares  - Completed Mupirocin in BL nares on 11/2   - Skin on admission: intact  - Pressure injury/Skin: OOB to Chair, PT/OT     #Pain Mgmt   - Tylenol PRN-- DC due to transaminitis    #Transaminitis - downtrending  - Statin on hold   - ALK phos-166>156>137 (11/11)  - AST/ALT-- 151/537>122/476>85/430 (11/11)  - GI Consult appreciated     #GI/Bowel Mgmt   - Pantoprazole   - Senna & Miralax QD    #/Bladder Mgmt   #UTI  - Levofloxacin 500mg daily (til 11/11)  - voiding     #FEN   - Diet - Regular + Thins   - Dysphagia  SLP - evaluation and treatment    #Precautions / PROPHYLAXIS:   - Falls  - ortho: Weight bearing status: WBAT   - Lungs: Aspiration, Incentive Spirometer   - DVT: Lovenox 40mg daily   -----------------------------------------------  OUTPATIENT/FOLLOW UP:    Ashley Jiang  NP in [facility]  [address] [place] [address], [facility] [address]  [place] [address]  Phone: [phone]  Fax: [phone]  Follow Up Time: 2 weeks    Bernardo Chilel  Cardiovascular Disease  [address] [place] [address], [facility] [address]  [place] [address]  Phone: [phone]  Fax: [phone]  Follow Up Time: 1 month    Ana Resendez  Vascular Neurology  [address] [place] [address] [address]  [place] [address]  Phone: [phone]  Fax: [phone]  Follow Up Time: 1 week    Hawk Lizarraga  Cardiac Electrophysiology  [address] [place] [address], [address]  [place] [address]  Phone: [phone]  Fax: [phone]  Follow Up Time: 1 week    Hawk Weston  Gastroenterology  [address] [place] [address]  Phone: [phone]  Fax: [phone]  Follow Up Time: 1 week    Angela Donaldson  Hematology  [address] [place] [address]  Phone: [phone]  Fax: [phone]  Follow Up Time: 1 week    DC home today with family  provided ST script at bedside

## 2024-11-13 NOTE — PROGRESS NOTE ADULT - REASON FOR ADMISSION
CVA- Basal Ganglia Hemorrhage & R corona radiata infarct

## 2024-11-13 NOTE — PROGRESS NOTE ADULT - ASSESSMENT
43 yo M with PMH of uncontrolled Hypertension who was admitted to [facility] for dizziness, headache. FOund to have L basal ganglia IPH with intraventricular hemorrhage extension. EEG negative. Hypercoag w/u thus far negative. Course complicated by transaminitis, hyponatremia, MRSA in nares. Now admitted to [facility] for rehab.    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 statin on hold d/t transaminitis   - fall, aspiration precautions      Hypertension   Orthostatic hypotension 11/5 am   - norvasc to 5mg daily on hold  - c/w losartan 25mg daily with hold parameters   - continue labetalol 200mg bid  - monitor VS including OH    Hyponatremia,   SIADH  - Na 136  - On Salt tab 1 gm QID. reduced to 1 gm TID on 11/11.  wean down/off as tolerated     Hyperlipidemia   - zetia  -   - not on statin d/t transaminitis     Transaminitis   - imaging from [facility] showing hepatic steatosis  - avoid hepatotoxins, trend LFTs  - GI input appreciated . signed off. f/u OP if LFT persistently high  - hepatitis panel reviewed       UTI   -  s/p course of levaquin. culture reviewed. last day 11/11    DVT Prophylaxis:  - Lovenox

## 2024-11-13 NOTE — PROGRESS NOTE ADULT - SUBJECTIVE AND OBJECTIVE BOX
Patient is a 44y old  Male who presents with a chief complaint of CVA- Basal Ganglia Hemorrhage & R corona radiata infarct     HPI:  Von Ferro is a 44 year old, Lithuanian speaking, male with PMH of uncontrolled HTN; who presented to [facility] on 10/13 for headache, dizziness, vomiting and flu-like symptoms. Imaging was significant for L basal ganglia IPH with intraventricular hemorrhage extension. MR showed R corona radiata ischemic infarct.  Vascular neurology was consulted and had concerns for infectious etiology vs cardiac cause. EEG negative. His hospital course was significant for hypercoagulable workup (negative thus far), leukocytosis (2/2 UTI on ABX), transaminitis/hepatic steatosis (statin on hold), hyponatremia (on NaCL tabs), and +MRSA (completed mupirocin in nares). PM&R was consulted and deemed him an appropriate candidate for IRF. He was medically stabilized and cleared for discharge to [facility].     SUBJECTIVE/OBJECTIVE: Patient seen and evaluated at bedside, going home today    REVIEW OF SYSTEMS:  Denies CP, SOB, cough, HA, dizziness, pain, abd discomfort, dysuria  +cog impairment  +limb apraxia     PHYSICAL EXAM  PHYSICAL EXAM  Constitutional - NAD, Comfortable  HEENT - NCAT, EOMI  Neck - Supple, No limited ROM  Chest - Breathing comfortably, No wheezing  Cardiovascular - S1S2   Abdomen - Soft   Extremities - No C/C/E, No calf tenderness   Neurologic Exam -                    Cognitive - AAO to self, place, date, year, situation     Communication - Fluent, No dysarthria     Cranial Nerves - CN 2-12 intact     Motor - No focal deficits                    LEFT    UE - ShAB 5/5, EF 5/5, EE 5/5, WE 5/5,  5/5                    RIGHT UE - ShAB 5/5, EF 5/5, EE 5/5, WE 5/5,  5/5                    LEFT    LE - HF 5/5, KE 5/5, DF 5/5, PF 5/5                    RIGHT LE - HF 5/5, KE 5/5, DF 5/5, PF 5/5        Sensory - Intact to LT     Reflexes - DTR Intact, No primitive reflexive     Coordination - FTN intact     OculoVestibular - No saccades, No nystagmus, VOR         Balance - WNL Static  Psychiatric - Mood stable, Affect WNL    Gen - NAD, Comfortable  HEENT - NCAT, EOMI  Pulm - resp nonlabored  Cardiovascular - warm and well perfused, no cyanosis or pedal edema  Abdomen - Soft, NT/ND  Extremities - No peripheral edema, No calf tenderness  Neuro-     Cognitive - AO to self and year. able to follow simple commands and answer questions (delayed processing/response)     Communication -  No dysarthria, hypophonic     Attention: fair     Cranial Nerves - no facial asymmetry, tongue midline, EOMI, facial sensation intact     Motor +motor apraxia                    LEFT    UE - ShAB 5/5, EF 5/5, EE 5/5, WE 5/5,  5/5                    RIGHT UE - ShAB 5/5, EF 5/5, EE 5/5, WE 5/5,  5/5                    LEFT    LE - HF 5/5, KE 5/5, DF 5/5, PF 5/5                    RIGHT LE - HF 5/5, KE 5/5, DF 5/5, PF 5/5        Sensory - Intact to LT  Psychiatric - Mood stable, Affect WNL    RECENT LABS:                      12.2   8.67  )-----------( 325      ( 11 Nov 2024 06:35 )             37.2     11-11    136  |  101  |  15  ----------------------------<  89  4.4   |  30  |  1.07    Ca    9.9      11 Nov 2024 06:35    TPro  7.9  /  Alb  3.3  /  TBili  0.6  /  DBili  x   /  AST  85[H]  /  ALT  430[H]  /  AlkPhos  137[H]  11-11    LIVER FUNCTIONS - ( 11 Nov 2024 06:35 )  Alb: 3.3 g/dL / Pro: 7.9 g/dL / ALK PHOS: 137 U/L / ALT: 430 U/L / AST: 85 U/L / GGT: x             Allergies  No Known Allergies  Intolerances    MEDICATIONS  (STANDING):  aspirin enteric coated 81 milliGRAM(s) Oral daily  chlorhexidine 2% Cloths 1 Application(s) Topical daily  enoxaparin Injectable 40 milliGRAM(s) SubCutaneous <User Schedule>  ezetimibe 10 milliGRAM(s) Oral daily  FLUoxetine 20 milliGRAM(s) Oral daily  labetalol 200 milliGRAM(s) Oral two times a day  losartan 25 milliGRAM(s) Oral daily  modafinil 100 milliGRAM(s) Oral <User Schedule>  pantoprazole    Tablet 40 milliGRAM(s) Oral before breakfast  polyethylene glycol 3350 17 Gram(s) Oral daily  senna 2 Tablet(s) Oral at bedtime  sodium chloride 1 Gram(s) Oral three times a day    MEDICATIONS  (PRN):   Patient is a 44y old  Male who presents with a chief complaint of CVA- Basal Ganglia Hemorrhage & R corona radiata infarct     HPI:  Von Ferro is a 44 year old, Setswana speaking, male with PMH of uncontrolled HTN; who presented to [facility] on 10/13 for headache, dizziness, vomiting and flu-like symptoms. Imaging was significant for L basal ganglia IPH with intraventricular hemorrhage extension. MR showed R corona radiata ischemic infarct.  Vascular neurology was consulted and had concerns for infectious etiology vs cardiac cause. EEG negative. His hospital course was significant for hypercoagulable workup (negative thus far), leukocytosis (2/2 UTI on ABX), transaminitis/hepatic steatosis (statin on hold), hyponatremia (on NaCL tabs), and +MRSA (completed mupirocin in nares). PM&R was consulted and deemed him an appropriate candidate for IRF. He was medically stabilized and cleared for discharge to [facility].     SUBJECTIVE/OBJECTIVE: Patient seen and evaluated at bedside, going home today    REVIEW OF SYSTEMS:  Denies CP, SOB, cough, HA, dizziness, pain, abd discomfort, dysuria  +cog impairment  +limb apraxia     PHYSICAL EXAM  Gen - NAD, Comfortable  HEENT - NCAT, EOMI  Pulm - resp nonlabored  Cardiovascular - warm and well perfused, no cyanosis or pedal edema  Abdomen - Soft, NT/ND  Extremities - No peripheral edema, No calf tenderness  Neuro-     Cognitive - AO to self and year. able to follow simple commands and answer questions (delayed processing/response)     Communication -  No dysarthria, hypophonic     Attention: fair     Cranial Nerves - no facial asymmetry, tongue midline, EOMI, facial sensation intact     Motor +motor apraxia                    LEFT    UE - ShAB 5/5, EF 5/5, EE 5/5, WE 5/5,  5/5                    RIGHT UE - ShAB 5/5, EF 5/5, EE 5/5, WE 5/5,  5/5                    LEFT    LE - HF 5/5, KE 5/5, DF 5/5, PF 5/5                    RIGHT LE - HF 5/5, KE 5/5, DF 5/5, PF 5/5        Sensory - Intact to LT  Psychiatric - Mood stable, Affect WNL    RECENT LABS:                      12.2   8.67  )-----------( 325      ( 11 Nov 2024 06:35 )             37.2     11-11    136  |  101  |  15  ----------------------------<  89  4.4   |  30  |  1.07    Ca    9.9      11 Nov 2024 06:35    TPro  7.9  /  Alb  3.3  /  TBili  0.6  /  DBili  x   /  AST  85[H]  /  ALT  430[H]  /  AlkPhos  137[H]  11-11    LIVER FUNCTIONS - ( 11 Nov 2024 06:35 )  Alb: 3.3 g/dL / Pro: 7.9 g/dL / ALK PHOS: 137 U/L / ALT: 430 U/L / AST: 85 U/L / GGT: x             Allergies  No Known Allergies  Intolerances    MEDICATIONS  (STANDING):  aspirin enteric coated 81 milliGRAM(s) Oral daily  chlorhexidine 2% Cloths 1 Application(s) Topical daily  enoxaparin Injectable 40 milliGRAM(s) SubCutaneous <User Schedule>  ezetimibe 10 milliGRAM(s) Oral daily  FLUoxetine 20 milliGRAM(s) Oral daily  labetalol 200 milliGRAM(s) Oral two times a day  losartan 25 milliGRAM(s) Oral daily  modafinil 100 milliGRAM(s) Oral <User Schedule>  pantoprazole    Tablet 40 milliGRAM(s) Oral before breakfast  polyethylene glycol 3350 17 Gram(s) Oral daily  senna 2 Tablet(s) Oral at bedtime  sodium chloride 1 Gram(s) Oral three times a day    MEDICATIONS  (PRN):

## 2024-11-13 NOTE — PROGRESS NOTE ADULT - SUBJECTIVE AND OBJECTIVE BOX
PROGRESS NOTE:     Patient is a 44y old  Male who presents with a chief complaint of CVA- Basal Ganglia Hemorrhage & R corona radiata infarct (12 Nov 2024 11:54)      SUBJECTIVE / OVERNIGHT EVENTS: pt was seen and evaluated today  no complains offered    ADDITIONAL REVIEW OF SYSTEMS:    MEDICATIONS  (STANDING):  aspirin enteric coated 81 milliGRAM(s) Oral daily  chlorhexidine 2% Cloths 1 Application(s) Topical daily  enoxaparin Injectable 40 milliGRAM(s) SubCutaneous <User Schedule>  ezetimibe 10 milliGRAM(s) Oral daily  FLUoxetine 20 milliGRAM(s) Oral daily  labetalol 200 milliGRAM(s) Oral two times a day  losartan 25 milliGRAM(s) Oral daily  pantoprazole    Tablet 40 milliGRAM(s) Oral before breakfast  polyethylene glycol 3350 17 Gram(s) Oral daily  senna 2 Tablet(s) Oral at bedtime  sodium chloride 1 Gram(s) Oral three times a day    MEDICATIONS  (PRN):      CAPILLARY BLOOD GLUCOSE        I&O's Summary      PHYSICAL EXAM:  Vital Signs Last 24 Hrs  T(C): 36.3 (13 Nov 2024 07:45), Max: 36.9 (12 Nov 2024 19:45)  T(F): 97.4 (13 Nov 2024 07:45), Max: 98.5 (12 Nov 2024 19:45)  HR: 72 (13 Nov 2024 07:45) (72 - 90)  BP: 136/87 (13 Nov 2024 07:45) (104/65 - 136/87)  BP(mean): --  RR: 14 (13 Nov 2024 07:45) (14 - 16)  SpO2: 98% (13 Nov 2024 07:45) (97% - 98%)    Parameters below as of 13 Nov 2024 07:45  Patient On (Oxygen Delivery Method): room air        GENERAL: Not in distress. Alert    HEENT: clear conjuctiva, MMM. no pallor or icterus  CARDIOVASCULAR: RRR S1, S2. No murmur/rubs/gallop  LUNGS: BLAE+, no rales, no wheezing, no rhonchi.    ABDOMEN: ND. Soft,  NT, no guarding / rebound / rigidity. BS normoactive  BACK: No spine tenderness.  EXTREMITIES: no edema. no leg or calf TP.  SKIN: warm and dry  PSYCHIATRIC: Calm.  No agitation.  CNS: AAO*3.      LABS:    n/a       discussed during IDR and with consultants

## 2024-11-16 PROBLEM — I10 ESSENTIAL (PRIMARY) HYPERTENSION: Chronic | Status: ACTIVE | Noted: 2024-11-04

## 2024-12-31 ENCOUNTER — NON-APPOINTMENT (OUTPATIENT)
Age: 44
End: 2024-12-31

## 2024-12-31 ENCOUNTER — APPOINTMENT (OUTPATIENT)
Dept: PHYSICAL MEDICINE AND REHAB | Facility: CLINIC | Age: 44
End: 2024-12-31
Payer: MEDICAID

## 2024-12-31 VITALS
DIASTOLIC BLOOD PRESSURE: 75 MMHG | TEMPERATURE: 98.3 F | SYSTOLIC BLOOD PRESSURE: 108 MMHG | OXYGEN SATURATION: 98 % | HEART RATE: 87 BPM | WEIGHT: 156 LBS

## 2024-12-31 DIAGNOSIS — I63.9 CEREBRAL INFARCTION, UNSPECIFIED: ICD-10-CM

## 2024-12-31 DIAGNOSIS — I61.0 NONTRAUMATIC INTRACEREBRAL HEMORRHAGE IN HEMISPHERE, SUBCORTICAL: ICD-10-CM

## 2024-12-31 DIAGNOSIS — I61.5 NONTRAUMATIC INTRACEREBRAL HEMORRHAGE, INTRAVENTRICULAR: ICD-10-CM

## 2024-12-31 DIAGNOSIS — Z86.79 PERSONAL HISTORY OF OTHER DISEASES OF THE CIRCULATORY SYSTEM: ICD-10-CM

## 2024-12-31 DIAGNOSIS — R48.2 APRAXIA: ICD-10-CM

## 2024-12-31 PROCEDURE — 99214 OFFICE O/P EST MOD 30 MIN: CPT

## 2025-03-03 PROCEDURE — 80053 COMPREHEN METABOLIC PANEL: CPT

## 2025-03-03 PROCEDURE — 85025 COMPLETE CBC W/AUTO DIFF WBC: CPT

## 2025-03-03 PROCEDURE — 92610 EVALUATE SWALLOWING FUNCTION: CPT | Mod: GN

## 2025-03-03 PROCEDURE — 97116 GAIT TRAINING THERAPY: CPT | Mod: GP

## 2025-03-03 PROCEDURE — 97110 THERAPEUTIC EXERCISES: CPT | Mod: GP

## 2025-03-03 PROCEDURE — 80074 ACUTE HEPATITIS PANEL: CPT

## 2025-03-03 PROCEDURE — 97530 THERAPEUTIC ACTIVITIES: CPT | Mod: GO

## 2025-03-03 PROCEDURE — 92507 TX SP LANG VOICE COMM INDIV: CPT | Mod: GN

## 2025-03-03 PROCEDURE — 36415 COLL VENOUS BLD VENIPUNCTURE: CPT

## 2025-03-03 PROCEDURE — 85610 PROTHROMBIN TIME: CPT

## 2025-03-03 PROCEDURE — 87635 SARS-COV-2 COVID-19 AMP PRB: CPT

## 2025-03-03 PROCEDURE — 97165 OT EVAL LOW COMPLEX 30 MIN: CPT | Mod: GO

## 2025-03-03 PROCEDURE — 97535 SELF CARE MNGMENT TRAINING: CPT | Mod: GO

## 2025-03-03 PROCEDURE — 97112 NEUROMUSCULAR REEDUCATION: CPT | Mod: GP

## 2025-03-03 PROCEDURE — 97161 PT EVAL LOW COMPLEX 20 MIN: CPT | Mod: GP

## 2025-03-03 PROCEDURE — 92523 SPEECH SOUND LANG COMPREHEN: CPT | Mod: GN
